# Patient Record
Sex: MALE | Race: OTHER | HISPANIC OR LATINO
[De-identification: names, ages, dates, MRNs, and addresses within clinical notes are randomized per-mention and may not be internally consistent; named-entity substitution may affect disease eponyms.]

---

## 2023-03-09 PROBLEM — Z00.00 ENCOUNTER FOR PREVENTIVE HEALTH EXAMINATION: Status: ACTIVE | Noted: 2023-03-09

## 2023-03-17 ENCOUNTER — APPOINTMENT (OUTPATIENT)
Dept: VASCULAR SURGERY | Facility: CLINIC | Age: 78
End: 2023-03-17
Payer: COMMERCIAL

## 2023-03-17 VITALS
SYSTOLIC BLOOD PRESSURE: 146 MMHG | DIASTOLIC BLOOD PRESSURE: 82 MMHG | WEIGHT: 189.38 LBS | HEART RATE: 69 BPM | HEIGHT: 65 IN | BODY MASS INDEX: 31.55 KG/M2

## 2023-03-17 VITALS — WEIGHT: 186 LBS

## 2023-03-17 DIAGNOSIS — Z78.9 OTHER SPECIFIED HEALTH STATUS: ICD-10-CM

## 2023-03-17 DIAGNOSIS — I87.2 VENOUS INSUFFICIENCY (CHRONIC) (PERIPHERAL): ICD-10-CM

## 2023-03-17 DIAGNOSIS — I87.8 OTHER SPECIFIED DISORDERS OF VEINS: ICD-10-CM

## 2023-03-17 DIAGNOSIS — Z87.891 PERSONAL HISTORY OF NICOTINE DEPENDENCE: ICD-10-CM

## 2023-03-17 DIAGNOSIS — M79.89 OTHER SPECIFIED SOFT TISSUE DISORDERS: ICD-10-CM

## 2023-03-17 PROCEDURE — 93970 EXTREMITY STUDY: CPT

## 2023-03-17 PROCEDURE — 99212 OFFICE O/P EST SF 10 MIN: CPT

## 2023-03-17 NOTE — PHYSICAL EXAM
[Normal Breath Sounds] : Normal breath sounds [Normal Heart Sounds] : normal heart sounds [2+] : left 2+ [Ankle Swelling (On Exam)] : present [Ankle Swelling Bilaterally] : bilaterally  [] : bilaterally [Ankle Swelling On The Left] : moderate [Alert] : alert [Oriented to Person] : oriented to person [Oriented to Place] : oriented to place [Oriented to Time] : oriented to time [Calm] : calm [Carotid Bruits] : no carotid bruits [Varicose Veins Of Lower Extremities] : not present [Abdomen Masses] : No abdominal masses [Abdomen Tenderness] : ~T ~M No abdominal tenderness [Abdominal Bruit] : no abdominal bruit

## 2023-03-17 NOTE — HISTORY OF PRESENT ILLNESS
[FreeTextEntry1] : The patient is a 77-year-old gentleman who presents with bilateral leg swelling and venous stasis skin changes.  The patient states his leg swelling has been present for the past 2 years.  His primary care physician believes it is a vein issue and referred him here for evaluation

## 2023-09-12 ENCOUNTER — APPOINTMENT (OUTPATIENT)
Dept: ORTHOPEDIC SURGERY | Facility: CLINIC | Age: 78
End: 2023-09-12
Payer: MEDICARE

## 2023-09-12 VITALS — HEIGHT: 67 IN | WEIGHT: 200 LBS | BODY MASS INDEX: 31.39 KG/M2

## 2023-09-12 PROCEDURE — 99203 OFFICE O/P NEW LOW 30 MIN: CPT

## 2023-09-12 PROCEDURE — 73630 X-RAY EXAM OF FOOT: CPT | Mod: LT

## 2023-09-12 PROCEDURE — 73610 X-RAY EXAM OF ANKLE: CPT | Mod: LT

## 2023-09-22 ENCOUNTER — APPOINTMENT (OUTPATIENT)
Dept: VASCULAR SURGERY | Facility: CLINIC | Age: 78
End: 2023-09-22

## 2023-10-05 ENCOUNTER — APPOINTMENT (OUTPATIENT)
Dept: ORTHOPEDIC SURGERY | Facility: CLINIC | Age: 78
End: 2023-10-05
Payer: MEDICARE

## 2023-10-05 DIAGNOSIS — M76.62 ACHILLES TENDINITIS, LEFT LEG: ICD-10-CM

## 2023-10-05 PROCEDURE — 99213 OFFICE O/P EST LOW 20 MIN: CPT

## 2023-12-02 ENCOUNTER — RX RENEWAL (OUTPATIENT)
Age: 78
End: 2023-12-02

## 2024-02-29 ENCOUNTER — RX RENEWAL (OUTPATIENT)
Age: 79
End: 2024-02-29

## 2024-05-22 ENCOUNTER — RX RENEWAL (OUTPATIENT)
Age: 79
End: 2024-05-22

## 2024-05-22 RX ORDER — MELOXICAM 15 MG/1
15 TABLET ORAL
Qty: 30 | Refills: 2 | Status: ACTIVE | COMMUNITY
Start: 2023-09-12 | End: 1900-01-01

## 2024-08-21 ENCOUNTER — EMERGENCY (EMERGENCY)
Facility: HOSPITAL | Age: 79
LOS: 0 days | Discharge: HOME | End: 2024-08-21
Attending: EMERGENCY MEDICINE
Payer: MEDICARE

## 2024-08-21 VITALS
SYSTOLIC BLOOD PRESSURE: 189 MMHG | WEIGHT: 186.07 LBS | HEART RATE: 85 BPM | RESPIRATION RATE: 18 BRPM | OXYGEN SATURATION: 97 % | HEIGHT: 66 IN | DIASTOLIC BLOOD PRESSURE: 91 MMHG | TEMPERATURE: 99 F

## 2024-08-21 DIAGNOSIS — R05.9 COUGH, UNSPECIFIED: ICD-10-CM

## 2024-08-21 DIAGNOSIS — J18.9 PNEUMONIA, UNSPECIFIED ORGANISM: ICD-10-CM

## 2024-08-21 DIAGNOSIS — E78.00 PURE HYPERCHOLESTEROLEMIA, UNSPECIFIED: ICD-10-CM

## 2024-08-21 DIAGNOSIS — R50.9 FEVER, UNSPECIFIED: ICD-10-CM

## 2024-08-21 DIAGNOSIS — E78.5 HYPERLIPIDEMIA, UNSPECIFIED: ICD-10-CM

## 2024-08-21 DIAGNOSIS — R09.89 OTHER SPECIFIED SYMPTOMS AND SIGNS INVOLVING THE CIRCULATORY AND RESPIRATORY SYSTEMS: ICD-10-CM

## 2024-08-21 DIAGNOSIS — U07.1 COVID-19: ICD-10-CM

## 2024-08-21 LAB
FLUAV AG NPH QL: SIGNIFICANT CHANGE UP
FLUBV AG NPH QL: SIGNIFICANT CHANGE UP
RSV RNA NPH QL NAA+NON-PROBE: SIGNIFICANT CHANGE UP
SARS-COV-2 RNA SPEC QL NAA+PROBE: DETECTED

## 2024-08-21 PROCEDURE — 99283 EMERGENCY DEPT VISIT LOW MDM: CPT | Mod: 25

## 2024-08-21 PROCEDURE — 71046 X-RAY EXAM CHEST 2 VIEWS: CPT

## 2024-08-21 PROCEDURE — 71046 X-RAY EXAM CHEST 2 VIEWS: CPT | Mod: 26

## 2024-08-21 PROCEDURE — 99284 EMERGENCY DEPT VISIT MOD MDM: CPT

## 2024-08-21 PROCEDURE — 0241U: CPT

## 2024-08-21 NOTE — ED PROVIDER NOTE - OBJECTIVE STATEMENT
Patient with history of high cholesterol presents with 2 days of runny nose dry cough, feeling feverish and malaise.  Denies sick contacts.  No relief with NyQuil.  Does admit to some dry mouth after taking NyQuil.  Denies chest pain or shortness of breath.

## 2024-08-21 NOTE — ED ADULT NURSE NOTE - ALCOHOL PRE SCREEN (AUDIT - C)
[Breast Self Exam] : breast self exam [Nutrition] : nutrition Statement Selected [Exercise] : exercise [Vitamins/Supplements] : vitamins/supplements [STD (testing, results, tx)] : STD (testing, results, tx)

## 2024-08-21 NOTE — ED PROVIDER NOTE - PATIENT PORTAL LINK FT
You can access the FollowMyHealth Patient Portal offered by [facility] by registering at the following website: [email or web address]. By joining [facility]’s FollowMyHealth portal, you will also be able to view your health information using other applications (apps) compatible with our system.

## 2024-08-21 NOTE — ED PROVIDER NOTE - CLINICAL SUMMARY MEDICAL DECISION MAKING FREE TEXT BOX
78-year-old male with past medical history of hyperlipidemia, presents with 2 days of congestion, cough, general fatigue and subjective fever.  No shortness of breath or chest pain.  No lower extremity swelling or GI symptoms.  On exam nontoxic, vital signs noted, normal work of breathing, lungs clear bilaterally, no wheezes, rales or rhonchi, heart regular no murmurs, abdomen benign, no peripheral edema.  Normal gait.  Chest x-ray with possible so left greater than right bibasilar opacities.  Clinically will treat for pneumonia.  In my opinion, based on current evaluation and results, an acute medical or surgical emergency does not appear to be occurring at this time and I feel that the pt is stable for further outpatient work up and/or treatment.  Return precautions discussed.

## 2024-08-21 NOTE — ED ADULT TRIAGE NOTE - CHIEF COMPLAINT QUOTE
Pt reports cold symptoms - congestion - starting today with dry mouth. Pt states he took nyquil with no relief

## 2024-08-21 NOTE — ED ADULT NURSE NOTE - CAS ELECT INFOMATION PROVIDED
44 yo m with h/o alcohol use , discarged yesterday after treatment for syncope and electrolyte disorder presents today in withdrawal has not had a drink in 4 days. per notes from past admission, ciwa protocol was initiated but score was too low to start ativan. pt is confused and cant provide reliable hx. initial ciwa 9 now improved to 5 after ativan and phenobarb. ct head and face unremarkable for fracture or bleed. labs unchanged from discharge however lipase was checked and is 690. us Abd feb 2 did not show pancreatitis. initial vitals 165/91, 147 bpm. 
DC instructions

## 2024-08-22 ENCOUNTER — RX RENEWAL (OUTPATIENT)
Age: 79
End: 2024-08-22

## 2024-09-03 ENCOUNTER — EMERGENCY (EMERGENCY)
Facility: HOSPITAL | Age: 79
LOS: 0 days | Discharge: HOME | End: 2024-09-03
Attending: STUDENT IN AN ORGANIZED HEALTH CARE EDUCATION/TRAINING PROGRAM
Payer: MEDICARE

## 2024-09-03 VITALS
HEART RATE: 72 BPM | SYSTOLIC BLOOD PRESSURE: 148 MMHG | OXYGEN SATURATION: 99 % | RESPIRATION RATE: 18 BRPM | DIASTOLIC BLOOD PRESSURE: 82 MMHG

## 2024-09-03 VITALS
HEIGHT: 67 IN | RESPIRATION RATE: 18 BRPM | WEIGHT: 199.96 LBS | OXYGEN SATURATION: 99 % | HEART RATE: 74 BPM | DIASTOLIC BLOOD PRESSURE: 90 MMHG | TEMPERATURE: 98 F | SYSTOLIC BLOOD PRESSURE: 160 MMHG

## 2024-09-03 DIAGNOSIS — E78.5 HYPERLIPIDEMIA, UNSPECIFIED: ICD-10-CM

## 2024-09-03 DIAGNOSIS — M79.662 PAIN IN LEFT LOWER LEG: ICD-10-CM

## 2024-09-03 LAB
ALBUMIN SERPL ELPH-MCNC: 4.4 G/DL — SIGNIFICANT CHANGE UP (ref 3.5–5.2)
ALP SERPL-CCNC: 86 U/L — SIGNIFICANT CHANGE UP (ref 30–115)
ALT FLD-CCNC: 42 U/L — HIGH (ref 0–41)
ANION GAP SERPL CALC-SCNC: 11 MMOL/L — SIGNIFICANT CHANGE UP (ref 7–14)
AST SERPL-CCNC: 47 U/L — HIGH (ref 0–41)
BASOPHILS # BLD AUTO: 0.08 K/UL — SIGNIFICANT CHANGE UP (ref 0–0.2)
BASOPHILS NFR BLD AUTO: 0.7 % — SIGNIFICANT CHANGE UP (ref 0–1)
BILIRUB SERPL-MCNC: 0.6 MG/DL — SIGNIFICANT CHANGE UP (ref 0.2–1.2)
BUN SERPL-MCNC: 11 MG/DL — SIGNIFICANT CHANGE UP (ref 10–20)
CALCIUM SERPL-MCNC: 9.8 MG/DL — SIGNIFICANT CHANGE UP (ref 8.4–10.4)
CHLORIDE SERPL-SCNC: 99 MMOL/L — SIGNIFICANT CHANGE UP (ref 98–110)
CO2 SERPL-SCNC: 26 MMOL/L — SIGNIFICANT CHANGE UP (ref 17–32)
CREAT SERPL-MCNC: 0.8 MG/DL — SIGNIFICANT CHANGE UP (ref 0.7–1.5)
EGFR: 89 ML/MIN/1.73M2 — SIGNIFICANT CHANGE UP
EOSINOPHIL # BLD AUTO: 0.15 K/UL — SIGNIFICANT CHANGE UP (ref 0–0.7)
EOSINOPHIL NFR BLD AUTO: 1.3 % — SIGNIFICANT CHANGE UP (ref 0–8)
GLUCOSE SERPL-MCNC: 117 MG/DL — HIGH (ref 70–99)
HCT VFR BLD CALC: 41.2 % — LOW (ref 42–52)
HGB BLD-MCNC: 13.8 G/DL — LOW (ref 14–18)
IMM GRANULOCYTES NFR BLD AUTO: 1.1 % — HIGH (ref 0.1–0.3)
LYMPHOCYTES # BLD AUTO: 1.71 K/UL — SIGNIFICANT CHANGE UP (ref 1.2–3.4)
LYMPHOCYTES # BLD AUTO: 14.7 % — LOW (ref 20.5–51.1)
MCHC RBC-ENTMCNC: 32.9 PG — HIGH (ref 27–31)
MCHC RBC-ENTMCNC: 33.5 G/DL — SIGNIFICANT CHANGE UP (ref 32–37)
MCV RBC AUTO: 98.1 FL — HIGH (ref 80–94)
MONOCYTES # BLD AUTO: 0.98 K/UL — HIGH (ref 0.1–0.6)
MONOCYTES NFR BLD AUTO: 8.4 % — SIGNIFICANT CHANGE UP (ref 1.7–9.3)
NEUTROPHILS # BLD AUTO: 8.61 K/UL — HIGH (ref 1.4–6.5)
NEUTROPHILS NFR BLD AUTO: 73.8 % — SIGNIFICANT CHANGE UP (ref 42.2–75.2)
NRBC # BLD: 0 /100 WBCS — SIGNIFICANT CHANGE UP (ref 0–0)
PLATELET # BLD AUTO: 247 K/UL — SIGNIFICANT CHANGE UP (ref 130–400)
PMV BLD: 10.3 FL — SIGNIFICANT CHANGE UP (ref 7.4–10.4)
POTASSIUM SERPL-MCNC: 5.1 MMOL/L — HIGH (ref 3.5–5)
POTASSIUM SERPL-SCNC: 5.1 MMOL/L — HIGH (ref 3.5–5)
PROT SERPL-MCNC: 7.5 G/DL — SIGNIFICANT CHANGE UP (ref 6–8)
RBC # BLD: 4.2 M/UL — LOW (ref 4.7–6.1)
RBC # FLD: 13.1 % — SIGNIFICANT CHANGE UP (ref 11.5–14.5)
SODIUM SERPL-SCNC: 136 MMOL/L — SIGNIFICANT CHANGE UP (ref 135–146)
WBC # BLD: 11.66 K/UL — HIGH (ref 4.8–10.8)
WBC # FLD AUTO: 11.66 K/UL — HIGH (ref 4.8–10.8)

## 2024-09-03 PROCEDURE — 85025 COMPLETE CBC W/AUTO DIFF WBC: CPT

## 2024-09-03 PROCEDURE — 36415 COLL VENOUS BLD VENIPUNCTURE: CPT

## 2024-09-03 PROCEDURE — 99284 EMERGENCY DEPT VISIT MOD MDM: CPT

## 2024-09-03 PROCEDURE — 73590 X-RAY EXAM OF LOWER LEG: CPT | Mod: 26,LT

## 2024-09-03 PROCEDURE — 99283 EMERGENCY DEPT VISIT LOW MDM: CPT | Mod: 25

## 2024-09-03 PROCEDURE — 73590 X-RAY EXAM OF LOWER LEG: CPT | Mod: LT

## 2024-09-03 PROCEDURE — 36000 PLACE NEEDLE IN VEIN: CPT

## 2024-09-03 PROCEDURE — 80053 COMPREHEN METABOLIC PANEL: CPT

## 2024-09-03 RX ORDER — CEPHALEXIN 500 MG
1 CAPSULE ORAL
Qty: 28 | Refills: 0
Start: 2024-09-03 | End: 2024-09-09

## 2024-09-03 RX ORDER — IBUPROFEN 600 MG
600 TABLET ORAL ONCE
Refills: 0 | Status: COMPLETED | OUTPATIENT
Start: 2024-09-03 | End: 2024-09-03

## 2024-09-03 NOTE — ED PROVIDER NOTE - PATIENT PORTAL LINK FT
You can access the FollowMyHealth Patient Portal offered by [facility] by registering at the following website: [email or web address]. By joining [employer]’s FollowMyHealth portal, you will also be able to view your health information using other applications (apps) compatible with our system.

## 2024-09-03 NOTE — ED ADULT NURSE NOTE - OBJECTIVE STATEMENT
79yr old male, presenting to ED c/o wound eval. As per pt, pt c/o L leg wound x2 years. Denies n/v/d/fevers/chills. Pt A&Ox4, ambulatory baseline.

## 2024-09-03 NOTE — ED PROVIDER NOTE - NSFOLLOWUPINSTRUCTIONS_ED_ALL_ED_FT
Follow up with Dr. Abreu this week for your wound follow up.    Wound Infection  A wound infection happens when germs start to grow in a wound. Infection can cause the wound to break open or get worse. Wound infections need treatment. If a wound infection is not treated, serious problems can happen. These problems could include getting an infection in your blood (septicemia) or bones (osteomyelitis).    What are the causes?  A wound infection is most often caused by bacteria growing in a wound. Other germs, like yeast and fungi, can also cause wound infections.    What increases the risk?  The following factors may make you more likely to develop a wound infection:  A weak body defense system (immune system).  Diabetes.  Taking steroid medicines for a long time (chronic use).  Smoking.  Being an older adult.  Obesity.  Taking chemotherapy medicines.  Poor nutrition.  What are the signs or symptoms?  Symptoms of a wound infection include:  More redness, swelling, or pain at the wound site.  More blood or fluid at the wound site.  A bad smell coming from a wound or bandage (dressing).  Fever.  Feeling tired (fatigued).  Warmth at or around the wound.  Pus at the wound site.  How is this diagnosed?  A wound infection is diagnosed based on your symptoms, medical history, and physical exam. You may also have a wound culture, blood tests, or both.    How is this treated?  This condition is usually treated with antibiotics and medicines that lower inflammation.    The infection should get better in 24–48 hours after starting antibiotics. After 24–48 hours, redness around the wound should stop spreading. The wound should also be less painful. If the condition is severe, you may need to stay at the hospital and get antibiotics through an IV.    Follow these instructions at home:  Medicines    Take or apply over-the-counter and prescription medicines only as told by your health care provider.  If you were prescribed antibiotics, take or apply them as told by your provider. Do not stop using the antibiotic even if you start to feel better.  Wound care    Two stitched incisions. One is normal. The other is red with pus and infected.  Clean the wound each day or as told by your provider.  Wash the wound with mild soap and water.  Rinse the wound with water to remove all soap.  Pat the wound dry with a clean towel. Do not rub it.  Follow instructions from your provider about how to take care of your wound. Make sure you:  Wash your hands with soap and water for at least 20 seconds before and after you change your dressing. If soap and water are not available, use hand .  Change your dressing as told by your provider.  Leave stitches (sutures), skin glue, or tape strips in place. These skin closures may need to stay in place for 2 weeks or longer. If tape strip edges start to loosen and curl up, you may trim the loose edges. Do not remove tape strips completely unless your provider tells you to do that.  Check your wound every day for signs of infection. Check for:  More redness, swelling, or pain.  More fluid or blood.  Warmth.  Pus or a bad smell.  General instructions    Drink enough fluid to keep your pee (urine) pale yellow.  Do not take baths, swim, or use a hot tub until your provider approves. Ask your provider if you may take showers. You may only be allowed to take sponge baths.  Raise (elevate) the wound area above the level of your heart while you are sitting or lying down.  Do not scratch or pick at the wound.  Keep all follow-up visits. These visits help your provider make sure a more serious infection is not developing.  Contact a health care provider if:  Your infection does not get better in 24–48 hours.  You have signs of infection.  You have a fever.  Your wound gets larger, turns dark in color, or becomes more painful.  You feel generally sick (malaise) with muscle aches and weakness.  Your symptoms get worse.  You have vomiting or diarrhea that does not stop.  Get help right away if:  Your wound that was closed breaks open.  You see red streaks coming from the infected area.  This information is not intended to replace advice given to you by your health care provider. Make sure you discuss any questions you have with your health care provider.

## 2024-09-03 NOTE — ED PROVIDER NOTE - PHYSICAL EXAMINATION
VITAL SIGNS: I have reviewed nursing notes and confirm.  CONSTITUTIONAL: well-appearing, non-toxic, NAD  SKIN: Warm dry, normal skin turgor. Aprox 3 cm wound to lateral aspect of lower leg. No erythema or purulent discharge.  HEAD: NCAT  CARD: RRR, no murmurs, rubs or gallops  RESP: clear to ausculation b/l.  No rales, rhonchi, or wheezing.  EXT: Full ROM. + L pedal pulse   NEURO: normal motor. normal sensory.   PSYCH: Cooperative, appropriate.

## 2024-09-03 NOTE — ED ADULT TRIAGE NOTE - CHIEF COMPLAINT QUOTE
Pt presents to the ED w/ c/o of left leg wound check. Pt states he has had a poorly healing wound above his left ankle for 2 years and recently it has been causing him more pain.

## 2024-09-03 NOTE — ED ADULT NURSE NOTE - NSFALLUNIVINTERV_ED_ALL_ED
Bed/Stretcher in lowest position, wheels locked, appropriate side rails in place/Call bell, personal items and telephone in reach/Instruct patient to call for assistance before getting out of bed/chair/stretcher/Non-slip footwear applied when patient is off stretcher/[place] to call system/Physically safe environment - no spills, clutter or unnecessary equipment/Purposeful proactive rounding/Room/bathroom lighting operational, light cord in reach

## 2024-09-03 NOTE — ED PROVIDER NOTE - OBJECTIVE STATEMENT
79-year-old male with past medical history of hyperlipidemia, presents to the ED due to left lower leg wound check.  Patient states wound has been there for about 2 years.  Has not seen a physician for this.  Denies any fever, nausea, vomiting, abdominal pain or other complaints.

## 2024-09-03 NOTE — ED PROVIDER NOTE - CLINICAL SUMMARY MEDICAL DECISION MAKING FREE TEXT BOX
78 yo male, PMHx of HLD, presenting for a non healing wound on left lower extremity, localized pain, non radiating, no alleviating or aggravating factors, no associated symptoms.  Denies fevers, chills, weakness, nausea, vomiting, inability to ambulate.  3 cm circular ulcer left lateral shin without erythematous streaking or discharge.  No calf swelling or tenderness. Otherwise FROM of left lower extremity.  Labs were ordered and reviewed.  Imaging was ordered and reviewed by me.  Appropriate medications for patient's presenting complaints were ordered and effects were reassessed. 78 yo male, PMHx of HLD, presenting for a non healing wound on left lower extremity, localized pain, non radiating, no alleviating or aggravating factors, no associated symptoms.  Denies fevers, chills, weakness, nausea, vomiting, inability to ambulate.  3 cm circular ulcer left lateral shin without erythematous streaking or discharge.  No calf swelling or tenderness. Otherwise FROM of left lower extremity.  Labs were ordered and reviewed.  Imaging was ordered and reviewed by me.  Appropriate medications for patient's presenting complaints were ordered and effects were reassessed. Antibiotic sent to pharmacy.  Discussed return precautions and follow up outpatient.  Patient comfortable with plan.

## 2024-10-16 ENCOUNTER — APPOINTMENT (OUTPATIENT)
Dept: GASTROENTEROLOGY | Facility: CLINIC | Age: 79
End: 2024-10-16

## 2024-10-21 ENCOUNTER — INPATIENT (INPATIENT)
Facility: HOSPITAL | Age: 79
LOS: 1 days | Discharge: LEFT BEFORE DISCHARGE | DRG: 872 | End: 2024-10-23
Attending: STUDENT IN AN ORGANIZED HEALTH CARE EDUCATION/TRAINING PROGRAM | Admitting: HOSPITALIST
Payer: MEDICARE

## 2024-10-21 VITALS
OXYGEN SATURATION: 98 % | SYSTOLIC BLOOD PRESSURE: 115 MMHG | HEART RATE: 109 BPM | TEMPERATURE: 98 F | DIASTOLIC BLOOD PRESSURE: 74 MMHG | RESPIRATION RATE: 18 BRPM | HEIGHT: 67 IN | WEIGHT: 199.96 LBS

## 2024-10-21 DIAGNOSIS — Z98.49 CATARACT EXTRACTION STATUS, UNSPECIFIED EYE: Chronic | ICD-10-CM

## 2024-10-21 DIAGNOSIS — A41.9 SEPSIS, UNSPECIFIED ORGANISM: ICD-10-CM

## 2024-10-21 LAB
ALBUMIN SERPL ELPH-MCNC: 4.2 G/DL — SIGNIFICANT CHANGE UP (ref 3.5–5.2)
ALP SERPL-CCNC: 55 U/L — SIGNIFICANT CHANGE UP (ref 30–115)
ALT FLD-CCNC: 14 U/L — SIGNIFICANT CHANGE UP (ref 0–41)
ANION GAP SERPL CALC-SCNC: 11 MMOL/L — SIGNIFICANT CHANGE UP (ref 7–14)
AST SERPL-CCNC: 19 U/L — SIGNIFICANT CHANGE UP (ref 0–41)
BASOPHILS # BLD AUTO: 0.02 K/UL — SIGNIFICANT CHANGE UP (ref 0–0.2)
BASOPHILS NFR BLD AUTO: 0.2 % — SIGNIFICANT CHANGE UP (ref 0–1)
BILIRUB SERPL-MCNC: 0.3 MG/DL — SIGNIFICANT CHANGE UP (ref 0.2–1.2)
BUN SERPL-MCNC: 34 MG/DL — HIGH (ref 10–20)
CALCIUM SERPL-MCNC: 9.4 MG/DL — SIGNIFICANT CHANGE UP (ref 8.4–10.5)
CHLORIDE SERPL-SCNC: 103 MMOL/L — SIGNIFICANT CHANGE UP (ref 98–110)
CO2 SERPL-SCNC: 23 MMOL/L — SIGNIFICANT CHANGE UP (ref 17–32)
CREAT SERPL-MCNC: 0.7 MG/DL — SIGNIFICANT CHANGE UP (ref 0.7–1.5)
CRP SERPL-MCNC: 12 MG/L — HIGH
EGFR: 93 ML/MIN/1.73M2 — SIGNIFICANT CHANGE UP
EOSINOPHIL # BLD AUTO: 0.02 K/UL — SIGNIFICANT CHANGE UP (ref 0–0.7)
EOSINOPHIL NFR BLD AUTO: 0.2 % — SIGNIFICANT CHANGE UP (ref 0–8)
ERYTHROCYTE [SEDIMENTATION RATE] IN BLOOD: 40 MM/HR — HIGH (ref 0–10)
GLUCOSE BLDC GLUCOMTR-MCNC: 117 MG/DL — HIGH (ref 70–99)
GLUCOSE BLDC GLUCOMTR-MCNC: 152 MG/DL — HIGH (ref 70–99)
GLUCOSE SERPL-MCNC: 110 MG/DL — HIGH (ref 70–99)
HCT VFR BLD CALC: 27.1 % — LOW (ref 42–52)
HGB BLD-MCNC: 9.1 G/DL — LOW (ref 14–18)
IMM GRANULOCYTES NFR BLD AUTO: 1.1 % — HIGH (ref 0.1–0.3)
LACTATE SERPL-SCNC: 2.2 MMOL/L — HIGH (ref 0.7–2)
LACTATE SERPL-SCNC: 3 MMOL/L — HIGH (ref 0.7–2)
LACTATE SERPL-SCNC: 3.8 MMOL/L — HIGH (ref 0.7–2)
LYMPHOCYTES # BLD AUTO: 1.99 K/UL — SIGNIFICANT CHANGE UP (ref 1.2–3.4)
LYMPHOCYTES # BLD AUTO: 16.2 % — LOW (ref 20.5–51.1)
MCHC RBC-ENTMCNC: 33.6 G/DL — SIGNIFICANT CHANGE UP (ref 32–37)
MCHC RBC-ENTMCNC: 34.3 PG — HIGH (ref 27–31)
MCV RBC AUTO: 102.3 FL — HIGH (ref 80–94)
MONOCYTES # BLD AUTO: 1.06 K/UL — HIGH (ref 0.1–0.6)
MONOCYTES NFR BLD AUTO: 8.6 % — SIGNIFICANT CHANGE UP (ref 1.7–9.3)
NEUTROPHILS # BLD AUTO: 9.08 K/UL — HIGH (ref 1.4–6.5)
NEUTROPHILS NFR BLD AUTO: 73.7 % — SIGNIFICANT CHANGE UP (ref 42.2–75.2)
NRBC # BLD: 0 /100 WBCS — SIGNIFICANT CHANGE UP (ref 0–0)
PLATELET # BLD AUTO: 287 K/UL — SIGNIFICANT CHANGE UP (ref 130–400)
PMV BLD: 9.6 FL — SIGNIFICANT CHANGE UP (ref 7.4–10.4)
POTASSIUM SERPL-MCNC: 4.4 MMOL/L — SIGNIFICANT CHANGE UP (ref 3.5–5)
POTASSIUM SERPL-SCNC: 4.4 MMOL/L — SIGNIFICANT CHANGE UP (ref 3.5–5)
PROT SERPL-MCNC: 7 G/DL — SIGNIFICANT CHANGE UP (ref 6–8)
RBC # BLD: 2.65 M/UL — LOW (ref 4.7–6.1)
RBC # FLD: 13.9 % — SIGNIFICANT CHANGE UP (ref 11.5–14.5)
SODIUM SERPL-SCNC: 137 MMOL/L — SIGNIFICANT CHANGE UP (ref 135–146)
WBC # BLD: 12.31 K/UL — HIGH (ref 4.8–10.8)
WBC # FLD AUTO: 12.31 K/UL — HIGH (ref 4.8–10.8)

## 2024-10-21 PROCEDURE — 85025 COMPLETE CBC W/AUTO DIFF WBC: CPT

## 2024-10-21 PROCEDURE — 83605 ASSAY OF LACTIC ACID: CPT

## 2024-10-21 PROCEDURE — 93970 EXTREMITY STUDY: CPT | Mod: 26

## 2024-10-21 PROCEDURE — 73630 X-RAY EXAM OF FOOT: CPT | Mod: 26,LT

## 2024-10-21 PROCEDURE — 83036 HEMOGLOBIN GLYCOSYLATED A1C: CPT

## 2024-10-21 PROCEDURE — 73590 X-RAY EXAM OF LOWER LEG: CPT | Mod: 26,LT

## 2024-10-21 PROCEDURE — 82728 ASSAY OF FERRITIN: CPT

## 2024-10-21 PROCEDURE — 83540 ASSAY OF IRON: CPT

## 2024-10-21 PROCEDURE — 82607 VITAMIN B-12: CPT

## 2024-10-21 PROCEDURE — 99222 1ST HOSP IP/OBS MODERATE 55: CPT

## 2024-10-21 PROCEDURE — 83550 IRON BINDING TEST: CPT

## 2024-10-21 PROCEDURE — 93970 EXTREMITY STUDY: CPT

## 2024-10-21 PROCEDURE — 86900 BLOOD TYPING SEROLOGIC ABO: CPT

## 2024-10-21 PROCEDURE — 83735 ASSAY OF MAGNESIUM: CPT

## 2024-10-21 PROCEDURE — 86901 BLOOD TYPING SEROLOGIC RH(D): CPT

## 2024-10-21 PROCEDURE — 80053 COMPREHEN METABOLIC PANEL: CPT

## 2024-10-21 PROCEDURE — 82962 GLUCOSE BLOOD TEST: CPT

## 2024-10-21 PROCEDURE — 99285 EMERGENCY DEPT VISIT HI MDM: CPT

## 2024-10-21 PROCEDURE — 86850 RBC ANTIBODY SCREEN: CPT

## 2024-10-21 PROCEDURE — 73610 X-RAY EXAM OF ANKLE: CPT | Mod: 26,LT

## 2024-10-21 PROCEDURE — 80202 ASSAY OF VANCOMYCIN: CPT

## 2024-10-21 PROCEDURE — 82746 ASSAY OF FOLIC ACID SERUM: CPT

## 2024-10-21 PROCEDURE — 36415 COLL VENOUS BLD VENIPUNCTURE: CPT

## 2024-10-21 RX ORDER — MAGNESIUM, ALUMINUM HYDROXIDE 200-200 MG
30 TABLET,CHEWABLE ORAL EVERY 4 HOURS
Refills: 0 | Status: DISCONTINUED | OUTPATIENT
Start: 2024-10-21 | End: 2024-10-23

## 2024-10-21 RX ORDER — INSULIN LISPRO 100/ML
VIAL (ML) SUBCUTANEOUS
Refills: 0 | Status: DISCONTINUED | OUTPATIENT
Start: 2024-10-21 | End: 2024-10-23

## 2024-10-21 RX ORDER — MELATONIN 5 MG
3 TABLET ORAL AT BEDTIME
Refills: 0 | Status: DISCONTINUED | OUTPATIENT
Start: 2024-10-21 | End: 2024-10-23

## 2024-10-21 RX ORDER — CHLORHEXIDINE GLUCONATE 40 MG/ML
1 SOLUTION TOPICAL
Refills: 0 | Status: DISCONTINUED | OUTPATIENT
Start: 2024-10-21 | End: 2024-10-23

## 2024-10-21 RX ORDER — ENOXAPARIN SODIUM 40MG/0.4ML
40 SYRINGE (ML) SUBCUTANEOUS EVERY 24 HOURS
Refills: 0 | Status: DISCONTINUED | OUTPATIENT
Start: 2024-10-21 | End: 2024-10-23

## 2024-10-21 RX ORDER — VANCOMYCIN HYDROCHLORIDE 50 MG/ML
750 KIT ORAL EVERY 12 HOURS
Refills: 0 | Status: DISCONTINUED | OUTPATIENT
Start: 2024-10-21 | End: 2024-10-22

## 2024-10-21 RX ORDER — ACETAMINOPHEN 500 MG
650 TABLET ORAL EVERY 6 HOURS
Refills: 0 | Status: DISCONTINUED | OUTPATIENT
Start: 2024-10-21 | End: 2024-10-23

## 2024-10-21 RX ORDER — MORPHINE SULFATE 30 MG/1
2 TABLET, EXTENDED RELEASE ORAL ONCE
Refills: 0 | Status: DISCONTINUED | OUTPATIENT
Start: 2024-10-21 | End: 2024-10-21

## 2024-10-21 RX ORDER — VANCOMYCIN HYDROCHLORIDE 50 MG/ML
1250 KIT ORAL ONCE
Refills: 0 | Status: COMPLETED | OUTPATIENT
Start: 2024-10-21 | End: 2024-10-21

## 2024-10-21 RX ORDER — AZTREONAM 75 MG/ML
2000 KIT INHALATION ONCE
Refills: 0 | Status: COMPLETED | OUTPATIENT
Start: 2024-10-21 | End: 2024-10-21

## 2024-10-21 RX ORDER — MORPHINE SULFATE 30 MG/1
4 TABLET, EXTENDED RELEASE ORAL ONCE
Refills: 0 | Status: DISCONTINUED | OUTPATIENT
Start: 2024-10-21 | End: 2024-10-21

## 2024-10-21 RX ORDER — ONDANSETRON HYDROCHLORIDE 2 MG/ML
4 INJECTION, SOLUTION INTRAMUSCULAR; INTRAVENOUS EVERY 8 HOURS
Refills: 0 | Status: DISCONTINUED | OUTPATIENT
Start: 2024-10-21 | End: 2024-10-23

## 2024-10-21 RX ORDER — METRONIDAZOLE 250 MG/1
500 TABLET ORAL ONCE
Refills: 0 | Status: COMPLETED | OUTPATIENT
Start: 2024-10-21 | End: 2024-10-21

## 2024-10-21 RX ORDER — GLUCAGON INJECTION, SOLUTION 1 MG/.2ML
1 INJECTION, SOLUTION SUBCUTANEOUS ONCE
Refills: 0 | Status: DISCONTINUED | OUTPATIENT
Start: 2024-10-21 | End: 2024-10-23

## 2024-10-21 RX ORDER — TAMSULOSIN HCL 0.4 MG
0.4 CAPSULE ORAL AT BEDTIME
Refills: 0 | Status: DISCONTINUED | OUTPATIENT
Start: 2024-10-21 | End: 2024-10-23

## 2024-10-21 RX ORDER — CEFEPIME 2 G/1
1000 INJECTION, POWDER, FOR SOLUTION INTRAVENOUS EVERY 8 HOURS
Refills: 0 | Status: DISCONTINUED | OUTPATIENT
Start: 2024-10-21 | End: 2024-10-22

## 2024-10-21 RX ADMIN — MORPHINE SULFATE 2 MILLIGRAM(S): 30 TABLET, EXTENDED RELEASE ORAL at 16:14

## 2024-10-21 RX ADMIN — VANCOMYCIN HYDROCHLORIDE 250 MILLIGRAM(S): KIT at 16:04

## 2024-10-21 RX ADMIN — MORPHINE SULFATE 2 MILLIGRAM(S): 30 TABLET, EXTENDED RELEASE ORAL at 14:05

## 2024-10-21 RX ADMIN — Medication 500 MILLILITER(S): at 14:32

## 2024-10-21 RX ADMIN — Medication 40 MILLIGRAM(S): at 18:12

## 2024-10-21 RX ADMIN — Medication 500 MILLILITER(S): at 15:25

## 2024-10-21 RX ADMIN — Medication 1: at 17:34

## 2024-10-21 RX ADMIN — CEFEPIME 100 MILLIGRAM(S): 2 INJECTION, POWDER, FOR SOLUTION INTRAVENOUS at 22:43

## 2024-10-21 RX ADMIN — MORPHINE SULFATE 4 MILLIGRAM(S): 30 TABLET, EXTENDED RELEASE ORAL at 14:54

## 2024-10-21 RX ADMIN — MORPHINE SULFATE 4 MILLIGRAM(S): 30 TABLET, EXTENDED RELEASE ORAL at 16:14

## 2024-10-21 RX ADMIN — Medication 20 MILLIGRAM(S): at 22:43

## 2024-10-21 RX ADMIN — Medication 0.4 MILLIGRAM(S): at 22:43

## 2024-10-21 RX ADMIN — Medication 100 MILLILITER(S): at 17:37

## 2024-10-21 RX ADMIN — METRONIDAZOLE 100 MILLIGRAM(S): 250 TABLET ORAL at 14:32

## 2024-10-21 RX ADMIN — AZTREONAM 100 MILLIGRAM(S): KIT at 15:12

## 2024-10-21 NOTE — PATIENT PROFILE ADULT - FALL HARM RISK - HARM RISK INTERVENTIONS

## 2024-10-21 NOTE — ED PROVIDER NOTE - CLINICAL SUMMARY MEDICAL DECISION MAKING FREE TEXT BOX
In my opinion, in patient treatment is medically justifiable and appropriate..  Patient has septic parameters and require IV antibiotics and IV fluid.

## 2024-10-21 NOTE — ED PROVIDER NOTE - OBJECTIVE STATEMENT
79-year-old male with history of DM and HLD presents for evaluation of left leg wound.  Patient has had this pain for 1 month and states that he has not taken any treatment for it.  Per chart review, patient was prescribed antibiotics last month for this infection, but does not endorse taking anything for the infection to his leg.  Patient denies any traumas, fevers, chills, sweats, chest pain, shortness of breath, abdominal, nausea, vomiting, diarrhea, or urinary symptoms.  Patient states that Tylenol which he is taking does not help this pain.

## 2024-10-21 NOTE — H&P ADULT - NSHPPHYSICALEXAM_GEN_ALL_CORE
Vital Signs (24 Hrs):  T(C): 36.7 (10-21-24 @ 09:59), Max: 36.7 (10-21-24 @ 09:59)  HR: 107 (10-21-24 @ 15:26) (107 - 109)  BP: 123/76 (10-21-24 @ 15:26) (115/74 - 123/76)  RR: 20 (10-21-24 @ 15:26) (18 - 20)  SpO2: 98% (10-21-24 @ 15:26) (98% - 98%)    PHYSICAL EXAM:  GENERAL: NAD, well-developed  SKIN: ulceration noted to anterior tib/fib approx 4x3 with some surrounding erythema.  No discharge.  + surrounding xerosis.  Another small ulceration is noted on latyeral aspect of distal tib/fib.    HEAD:  Atraumatic, Normocephalic  EYES: EOMI, PERRLA, conjunctiva and sclera clear  NECK: Supple, No JVD  CHEST/LUNG: Clear to auscultation bilaterally; No wheeze  HEART: Regular rate and rhythm; No murmurs, rubs, or gallops  ABDOMEN: Soft, Nontender, Nondistended; Bowel sounds present  EXTREMITIES:  No clubbing, cyanosis, 2+ LLE pitting edema  CNS: AAOx3

## 2024-10-21 NOTE — H&P ADULT - NS ATTEND AMEND GEN_ALL_CORE FT
Patient seen at bedside, time spent evaluating and treating the patient's acute illness as well as time spent reviewing labs, radiology, discussing with patient and/or patient's family and discussing the case with a multidisciplinary team.    Plan as above. I edited the note.

## 2024-10-21 NOTE — H&P ADULT - NSHPLABSRESULTS_GEN_ALL_CORE
9.1    12.31 )-----------( 287      ( 21 Oct 2024 12:50 )             27.1       10-21    137  |  103  |  34[H]  ----------------------------<  110[H]  4.4   |  23  |  0.7    Ca    9.4      21 Oct 2024 12:50    TPro  7.0  /  Alb  4.2  /  TBili  0.3  /  DBili  x   /  AST  19  /  ALT  14  /  AlkPhos  55  10-21          Urinalysis Basic - ( 21 Oct 2024 12:50 )    Color: x / Appearance: x / SG: x / pH: x  Gluc: 110 mg/dL / Ketone: x  / Bili: x / Urobili: x   Blood: x / Protein: x / Nitrite: x   Leuk Esterase: x / RBC: x / WBC x   Sq Epi: x / Non Sq Epi: x / Bacteria: x      Lactate Trend  10-21 @ 12:50 Lactate:2.2

## 2024-10-21 NOTE — ED PROVIDER NOTE - ATTENDING CONTRIBUTION TO CARE
Patient presents with increasing leak purulent and red wound on left shin.  Vital signs noted.  No tachycardia.  There is chronic venous stasis changes bilaterally.  There is a wound with active purulence overlying the tibia.  WBC is mildly elevated.  Lactate is elevated this patient has sepsis.  IV fluid, IV antibiotics ordered.  X-ray showed no fracture or definite signs of osteomyelitis.

## 2024-10-21 NOTE — H&P ADULT - ASSESSMENT
Patient is a 80 yo M with a PMHx of DM and HLD, p/w LLE wound x1 month, admitted to medicine service for further management.      #LLE Wound with Cellulitis, Severe Sepsis present on admission (HR, WBC, Lactic Acid)  - admit to medicine service  - Vanco/Cefepime   - check Venous Duplex  - FU X-ray done in ED  - ESR/CRP Done  - ID c/s  - FU BCx  - trend WBC count/fever curve  - LR at 100 for 12h, recheck lactic acid at 1900    #Anemia  - new onset compared to previous done 9/24  - check Fe stores, B12/Folate  - trend H/H    #DM2  - Hold PO meds  - FC AC TID w/ISS  - CHO Diet    #HLD  - c/w Statin  - DASH Diet    #BPH  - no s/s UR  - c/w Flomax    Diet: DASH/CHO  Activity: OOB  DVT PPX: Lovenox  Code status: Full  Dispo: Home  CHG 2% Wipes QD  Patient is a 78 yo M with a PMHx of DM and HLD, p/w LLE wound x1 month, admitted to medicine service for further management.      #LLE Wound with Cellulitis, Severe Sepsis present on admission (HR, WBC, Lactic Acid)  - admit to medicine service  - Vanco/Cefepime   - check Venous Duplex  - FU X-ray left leg   - ESR/CRP elevated  - ID c/s  - FU BCx  - trend WBC count/fever curve  - LR at 100 cc/hr  - trend lactate    #Anemia  - new onset compared to previous done 9/24  - check Fe stores, B12/Folate  - trend H/H    #DM2  - Hold PO meds  - FC AC TID w/ISS  - CHO Diet    #HLD  - c/w Statin  - DASH Diet    #BPH  - no s/s UR  - c/w Flomax    Diet: DASH/CHO  Activity: OOB  DVT PPX: Lovenox  Code status: Full  Dispo: Home  CHG 2% Wipes QD

## 2024-10-21 NOTE — ED PROVIDER NOTE - PHYSICAL EXAMINATION
CONSTITUTIONAL: well-appearing, well nourished, non-toxic, NAD  SKIN: distal anterior and lateral tibial superficial skin ulceration with purulent discharge, erythema and significant tenderness  HEAD: NCAT  EYES: EOMI, PERRLA, no scleral icterus  ENT: Moist mucous membranes, normal pharynx with no erythema or exudates  NECK: non tender. Full ROM. No cervical LAD  CARD: RRR  RESP: clear to ausculation b/l  ABD: soft, non-tender, No CVA tenderness  EXT: Full ROM,, +pedal edema, +   NEURO: normal motor. normal sensory. Normal gait.  PSYCH: Cooperative, appropriate.

## 2024-10-21 NOTE — H&P ADULT - HISTORY OF PRESENT ILLNESS
Patient is a 80 yo M poor historian with a PMHx of DM and HLD, p/w LLE wound x1 month. Patient denies any trauma. He reports feeling an itchy rash 1 month ago, which he scratched. He states that the wound started as a rash on the outer LLE and then it progressed across the shin. Patient c/o constant stabbing pain, 10/10 in severity. He reports going to his dermatologist who prescribed acetaminophen and tramadol, which the patient took w/ no relief. Patient reports worsening of pain when he sleeps, with mild relief when walking. He denies any radiation of pain, fever, chills, wound d/c.  Patient is a 80 yo M with a PMHx of DM and HLD, p/w LLE wound x1 month. Patient denies any trauma. He reports feeling an itchy rash 1 month ago, which he scratched. He states that the wound started as a rash on the outer LLE and then it progressed across the shin. Patient c/o constant stabbing pain, 10/10 in severity. He reports going to his dermatologist who prescribed acetaminophen and tramadol, which the patient took w/ no relief. Patient reports worsening of pain when he sleeps, with mild relief when walking. He denies any radiation of pain, fever, chills, wound d/c.

## 2024-10-22 ENCOUNTER — APPOINTMENT (OUTPATIENT)
Dept: ORTHOPEDIC SURGERY | Facility: CLINIC | Age: 79
End: 2024-10-22

## 2024-10-22 LAB
A1C WITH ESTIMATED AVERAGE GLUCOSE RESULT: 5.5 % — SIGNIFICANT CHANGE UP (ref 4–5.6)
ALBUMIN SERPL ELPH-MCNC: 3.8 G/DL — SIGNIFICANT CHANGE UP (ref 3.5–5.2)
ALP SERPL-CCNC: 50 U/L — SIGNIFICANT CHANGE UP (ref 30–115)
ALT FLD-CCNC: 12 U/L — SIGNIFICANT CHANGE UP (ref 0–41)
ANION GAP SERPL CALC-SCNC: 13 MMOL/L — SIGNIFICANT CHANGE UP (ref 7–14)
AST SERPL-CCNC: 17 U/L — SIGNIFICANT CHANGE UP (ref 0–41)
BASOPHILS # BLD AUTO: 0.04 K/UL — SIGNIFICANT CHANGE UP (ref 0–0.2)
BASOPHILS NFR BLD AUTO: 0.4 % — SIGNIFICANT CHANGE UP (ref 0–1)
BILIRUB SERPL-MCNC: 0.2 MG/DL — SIGNIFICANT CHANGE UP (ref 0.2–1.2)
BUN SERPL-MCNC: 23 MG/DL — HIGH (ref 10–20)
CALCIUM SERPL-MCNC: 9.1 MG/DL — SIGNIFICANT CHANGE UP (ref 8.4–10.5)
CHLORIDE SERPL-SCNC: 103 MMOL/L — SIGNIFICANT CHANGE UP (ref 98–110)
CO2 SERPL-SCNC: 24 MMOL/L — SIGNIFICANT CHANGE UP (ref 17–32)
CREAT SERPL-MCNC: 0.7 MG/DL — SIGNIFICANT CHANGE UP (ref 0.7–1.5)
EGFR: 93 ML/MIN/1.73M2 — SIGNIFICANT CHANGE UP
EOSINOPHIL # BLD AUTO: 0.04 K/UL — SIGNIFICANT CHANGE UP (ref 0–0.7)
EOSINOPHIL NFR BLD AUTO: 0.4 % — SIGNIFICANT CHANGE UP (ref 0–8)
ESTIMATED AVERAGE GLUCOSE: 111 MG/DL — SIGNIFICANT CHANGE UP (ref 68–114)
FERRITIN SERPL-MCNC: 384 NG/ML — SIGNIFICANT CHANGE UP (ref 30–400)
FOLATE SERPL-MCNC: 16 NG/ML — SIGNIFICANT CHANGE UP
GLUCOSE BLDC GLUCOMTR-MCNC: 117 MG/DL — HIGH (ref 70–99)
GLUCOSE BLDC GLUCOMTR-MCNC: 119 MG/DL — HIGH (ref 70–99)
GLUCOSE BLDC GLUCOMTR-MCNC: 133 MG/DL — HIGH (ref 70–99)
GLUCOSE BLDC GLUCOMTR-MCNC: 208 MG/DL — HIGH (ref 70–99)
GLUCOSE SERPL-MCNC: 128 MG/DL — HIGH (ref 70–99)
HCT VFR BLD CALC: 23.6 % — LOW (ref 42–52)
HGB BLD-MCNC: 7.9 G/DL — LOW (ref 14–18)
IMM GRANULOCYTES NFR BLD AUTO: 1.1 % — HIGH (ref 0.1–0.3)
IRON SATN MFR SERPL: 100 UG/DL — SIGNIFICANT CHANGE UP (ref 35–150)
IRON SATN MFR SERPL: 27 % — SIGNIFICANT CHANGE UP (ref 15–50)
LYMPHOCYTES # BLD AUTO: 18.6 % — LOW (ref 20.5–51.1)
LYMPHOCYTES # BLD AUTO: 2.11 K/UL — SIGNIFICANT CHANGE UP (ref 1.2–3.4)
MCHC RBC-ENTMCNC: 33.5 G/DL — SIGNIFICANT CHANGE UP (ref 32–37)
MCHC RBC-ENTMCNC: 34.5 PG — HIGH (ref 27–31)
MCV RBC AUTO: 103.1 FL — HIGH (ref 80–94)
MONOCYTES # BLD AUTO: 1.25 K/UL — HIGH (ref 0.1–0.6)
MONOCYTES NFR BLD AUTO: 11 % — HIGH (ref 1.7–9.3)
NEUTROPHILS # BLD AUTO: 7.8 K/UL — HIGH (ref 1.4–6.5)
NEUTROPHILS NFR BLD AUTO: 68.5 % — SIGNIFICANT CHANGE UP (ref 42.2–75.2)
NRBC # BLD: 0 /100 WBCS — SIGNIFICANT CHANGE UP (ref 0–0)
PLATELET # BLD AUTO: 245 K/UL — SIGNIFICANT CHANGE UP (ref 130–400)
PMV BLD: 9.7 FL — SIGNIFICANT CHANGE UP (ref 7.4–10.4)
POTASSIUM SERPL-MCNC: 4 MMOL/L — SIGNIFICANT CHANGE UP (ref 3.5–5)
POTASSIUM SERPL-SCNC: 4 MMOL/L — SIGNIFICANT CHANGE UP (ref 3.5–5)
PROT SERPL-MCNC: 6.3 G/DL — SIGNIFICANT CHANGE UP (ref 6–8)
RBC # BLD: 2.29 M/UL — LOW (ref 4.7–6.1)
RBC # FLD: 14.4 % — SIGNIFICANT CHANGE UP (ref 11.5–14.5)
SODIUM SERPL-SCNC: 140 MMOL/L — SIGNIFICANT CHANGE UP (ref 135–146)
TIBC SERPL-MCNC: 370 UG/DL — SIGNIFICANT CHANGE UP (ref 220–430)
UIBC SERPL-MCNC: 270 UG/DL — SIGNIFICANT CHANGE UP (ref 110–370)
VIT B12 SERPL-MCNC: 246 PG/ML — SIGNIFICANT CHANGE UP (ref 232–1245)
WBC # BLD: 11.36 K/UL — HIGH (ref 4.8–10.8)
WBC # FLD AUTO: 11.36 K/UL — HIGH (ref 4.8–10.8)

## 2024-10-22 PROCEDURE — 99232 SBSQ HOSP IP/OBS MODERATE 35: CPT

## 2024-10-22 RX ORDER — MORPHINE SULFATE 30 MG/1
2 TABLET, EXTENDED RELEASE ORAL EVERY 4 HOURS
Refills: 0 | Status: DISCONTINUED | OUTPATIENT
Start: 2024-10-22 | End: 2024-10-22

## 2024-10-22 RX ORDER — AMPICILLIN AND SULBACTAM 2; 1 G/1; G/1
3 INJECTION, POWDER, FOR SOLUTION INTRAMUSCULAR; INTRAVENOUS EVERY 6 HOURS
Refills: 0 | Status: DISCONTINUED | OUTPATIENT
Start: 2024-10-22 | End: 2024-10-23

## 2024-10-22 RX ORDER — MORPHINE SULFATE 30 MG/1
4 TABLET, EXTENDED RELEASE ORAL EVERY 6 HOURS
Refills: 0 | Status: DISCONTINUED | OUTPATIENT
Start: 2024-10-22 | End: 2024-10-22

## 2024-10-22 RX ORDER — VANCOMYCIN HYDROCHLORIDE 50 MG/ML
750 KIT ORAL EVERY 12 HOURS
Refills: 0 | Status: DISCONTINUED | OUTPATIENT
Start: 2024-10-22 | End: 2024-10-23

## 2024-10-22 RX ADMIN — AMPICILLIN AND SULBACTAM 200 GRAM(S): 2; 1 INJECTION, POWDER, FOR SOLUTION INTRAMUSCULAR; INTRAVENOUS at 16:38

## 2024-10-22 RX ADMIN — CHLORHEXIDINE GLUCONATE 1 APPLICATION(S): 40 SOLUTION TOPICAL at 05:42

## 2024-10-22 RX ADMIN — Medication 20 MILLIGRAM(S): at 21:17

## 2024-10-22 RX ADMIN — Medication 0.4 MILLIGRAM(S): at 21:18

## 2024-10-22 RX ADMIN — VANCOMYCIN HYDROCHLORIDE 250 MILLIGRAM(S): KIT at 05:41

## 2024-10-22 RX ADMIN — Medication 100 MILLILITER(S): at 21:26

## 2024-10-22 RX ADMIN — Medication 40 MILLIGRAM(S): at 16:37

## 2024-10-22 RX ADMIN — AMPICILLIN AND SULBACTAM 200 GRAM(S): 2; 1 INJECTION, POWDER, FOR SOLUTION INTRAMUSCULAR; INTRAVENOUS at 23:21

## 2024-10-22 RX ADMIN — MORPHINE SULFATE 2 MILLIGRAM(S): 30 TABLET, EXTENDED RELEASE ORAL at 21:33

## 2024-10-22 RX ADMIN — Medication 100 MILLILITER(S): at 05:41

## 2024-10-22 RX ADMIN — Medication 2: at 08:48

## 2024-10-22 RX ADMIN — CEFEPIME 100 MILLIGRAM(S): 2 INJECTION, POWDER, FOR SOLUTION INTRAVENOUS at 05:41

## 2024-10-22 RX ADMIN — VANCOMYCIN HYDROCHLORIDE 250 MILLIGRAM(S): KIT at 16:36

## 2024-10-22 NOTE — PROGRESS NOTE ADULT - CONVERSATION DETAILS
Discussed treatment plan with patient. Discussed code status. Patient states he would like to be full code, and continue full medical management.

## 2024-10-22 NOTE — CONSULT NOTE ADULT - SUBJECTIVE AND OBJECTIVE BOX
INFECTIOUS DISEASE CONSULT NOTE    Patient is a 79y old  Male who presents with a chief complaint of Sepsis (21 Oct 2024 15:30)    HPI:  Patient is a 80 yo M with a PMHx of DM and HLD, p/w LLE wound x1 month. Patient denies any trauma. He reports feeling an itchy rash 1 month ago, which he scratched. He states that the wound started as a rash on the outer LLE and then it progressed across the shin. Patient c/o constant stabbing pain, 10/10 in severity. He reports going to his dermatologist who prescribed acetaminophen and tramadol, which the patient took w/ no relief. Patient reports worsening of pain when he sleeps, with mild relief when walking. He denies any radiation of pain, fever, chills, wound d/c.  (21 Oct 2024 15:30)         Prior hospital charts reviewed [Yes]  Primary team notes reviewed [Yes]  Other consultant notes reviewed [Yes]    REVIEW OF SYSTEMS:      PAST MEDICAL & SURGICAL HISTORY:  Diabetes      HLD (hyperlipidemia)      BPH without urinary obstruction      S/P cataract surgery          SOCIAL HISTORY:  - Born in _____, migrated to  in 19XX  - Currently working as / Retired  - Lives with _____; no pets  - No recent travel  - Denies tobacco use  - Denies alcohol use  - Denies illicit drug use  - Currently sexually active, has one male/female sexual partner    FAMILY HISTORY:      Allergies:  No Known Allergies      ANTIMICROBIALS:  cefepime   IVPB 1000 every 8 hours  vancomycin  IVPB 750 every 12 hours      ANTIMICROBIALS (past 90 days):  MEDICATIONS  (STANDING):  aztreonam  IVPB   100 mL/Hr IV Intermittent (10-21-24 @ 15:12)    cefepime   IVPB   100 mL/Hr IV Intermittent (10-22-24 @ 05:41)   100 mL/Hr IV Intermittent (10-21-24 @ 22:43)    metroNIDAZOLE  IVPB   100 mL/Hr IV Intermittent (10-21-24 @ 14:32)    vancomycin  IVPB   250 mL/Hr IV Intermittent (10-22-24 @ 05:41)    vancomycin  IVPB.   250 mL/Hr IV Intermittent (10-21-24 @ 16:04)        OTHER MEDS:   MEDICATIONS  (STANDING):  acetaminophen     Tablet .. 650 every 6 hours PRN  aluminum hydroxide/magnesium hydroxide/simethicone Suspension 30 every 4 hours PRN  atorvastatin 20 at bedtime  dextrose 50% Injectable 12.5 once  dextrose 50% Injectable 25 once  dextrose 50% Injectable 25 once  dextrose Oral Gel 15 once PRN  enoxaparin Injectable 40 every 24 hours  glucagon  Injectable 1 once  insulin lispro (ADMELOG) corrective regimen sliding scale  three times a day before meals  melatonin 3 at bedtime PRN  ondansetron Injectable 4 every 8 hours PRN  tamsulosin 0.4 at bedtime      VITALS:  Vital Signs Last 24 Hrs  T(F): 98.5 (10-22-24 @ 04:48), Max: 99.4 (10-21-24 @ 16:27)    Vital Signs Last 24 Hrs  HR: 91 (10-22-24 @ 04:48) (91 - 109)  BP: 111/63 (10-22-24 @ 04:48) (111/63 - 136/74)  RR: 18 (10-22-24 @ 04:48)  SpO2: 98% (10-21-24 @ 21:34) (98% - 99%)  Wt(kg): --    EXAM:    Labs:                        7.9    11.36 )-----------( 245      ( 22 Oct 2024 06:02 )             23.6     10-22    140  |  103  |  23[H]  ----------------------------<  128[H]  4.0   |  24  |  0.7    Ca    9.1      22 Oct 2024 06:02    TPro  6.3  /  Alb  3.8  /  TBili  0.2  /  DBili  x   /  AST  17  /  ALT  12  /  AlkPhos  50  10-22      WBC Trend:  WBC Count: 11.36 (10-22-24 @ 06:02)  WBC Count: 12.31 (10-21-24 @ 12:50)      Auto Neutrophil #: 7.80 K/uL (10-22-24 @ 06:02)  Auto Neutrophil #: 9.08 K/uL (10-21-24 @ 12:50)  Auto Neutrophil #: 8.61 K/uL (09-03-24 @ 13:31)      Creatine Trend:  Creatinine: 0.7 (10-22)  Creatinine: 0.7 (10-21)      Liver Biochemical Testing Trend:  Alanine Aminotransferase (ALT/SGPT): 12 (10-22)  Alanine Aminotransferase (ALT/SGPT): 14 (10-21)  Alanine Aminotransferase (ALT/SGPT): 42 *H* (09-03)  Aspartate Aminotransferase (AST/SGOT): 17 (10-22-24 @ 06:02)  Aspartate Aminotransferase (AST/SGOT): 19 (10-21-24 @ 12:50)  Aspartate Aminotransferase (AST/SGOT): 47 (09-03-24 @ 13:31)  Bilirubin Total: 0.2 (10-22)  Bilirubin Total: 0.3 (10-21)  Bilirubin Total: 0.6 (09-03)      Trend LDH      Auto Eosinophil %: 0.4 % (10-22-24 @ 06:02)  Auto Eosinophil %: 0.2 % (10-21-24 @ 12:50)      Urinalysis Basic - ( 22 Oct 2024 06:02 )    Color: x / Appearance: x / SG: x / pH: x  Gluc: 128 mg/dL / Ketone: x  / Bili: x / Urobili: x   Blood: x / Protein: x / Nitrite: x   Leuk Esterase: x / RBC: x / WBC x   Sq Epi: x / Non Sq Epi: x / Bacteria: x          MICROBIOLOGY:    C-Reactive Protein: 12.0 (10-21)    Lactate, Blood: 3.8 (10-21 @ 19:06)  Lactate, Blood: 3.0 (10-21 @ 15:16)  Lactate, Blood: 2.2 (10-21 @ 12:50)      Sedimentation Rate, Erythrocyte: 40 mm/Hr (10-21-24 @ 12:50)      RADIOLOGY:  imaging below personally reviewed     INFECTIOUS DISEASE CONSULT NOTE    Patient is a 79y old  Male who presents with a chief complaint of Sepsis (21 Oct 2024 15:30)    HPI:  Patient is a 80 yo M with a PMHx of DM and HLD, p/w LLE wound x1 month. Patient denies any trauma. He reports feeling an itchy rash 1 month ago, which he scratched. He states that the wound started as a rash on the outer LLE and then it progressed across the shin. Patient c/o constant stabbing pain, 10/10 in severity. He reports going to his dermatologist who prescribed acetaminophen and tramadol, which the patient took w/ no relief. Patient reports worsening of pain when he sleeps, with mild relief when walking. He denies any radiation of pain, fever, chills, wound d/c.  (21 Oct 2024 15:30)     Prior hospital charts reviewed [Yes]  Primary team notes reviewed [Yes]  Other consultant notes reviewed [Yes]    REVIEW OF SYSTEMS:  CONSTITUTIONAL: No fever or chills  HEAD: No lesion on scalp  EYES: No visual disturbance  ENT: No sore throat  RESPIRATORY: No cough, no shortness of breath  CARDIOVASCULAR: No chest pain or palpitations; LLE swelling  GASTROINTESTINAL: No abdominal or epigastric pain  GENITOURINARY: No dysuria  NEUROLOGICAL: No headache/dizziness  MUSCULOSKELETAL: No joint pain, erythema, or swelling; no back pain  SKIN: LLE swelling and wound  All other ROS negative except noted above      PAST MEDICAL & SURGICAL HISTORY:  Diabetes      HLD (hyperlipidemia)      BPH without urinary obstruction      S/P cataract surgery          SOCIAL HISTORY:  - No recent travel  - Denies tobacco use  - Denies alcohol use  - Denies illicit drug use    FAMILY HISTORY:  No family history of DM    Allergies:  No Known Allergies      ANTIMICROBIALS:  cefepime   IVPB 1000 every 8 hours  vancomycin  IVPB 750 every 12 hours      ANTIMICROBIALS (past 90 days):  MEDICATIONS  (STANDING):  aztreonam  IVPB   100 mL/Hr IV Intermittent (10-21-24 @ 15:12)    cefepime   IVPB   100 mL/Hr IV Intermittent (10-22-24 @ 05:41)   100 mL/Hr IV Intermittent (10-21-24 @ 22:43)    metroNIDAZOLE  IVPB   100 mL/Hr IV Intermittent (10-21-24 @ 14:32)    vancomycin  IVPB   250 mL/Hr IV Intermittent (10-22-24 @ 05:41)    vancomycin  IVPB.   250 mL/Hr IV Intermittent (10-21-24 @ 16:04)        OTHER MEDS:   MEDICATIONS  (STANDING):  acetaminophen     Tablet .. 650 every 6 hours PRN  aluminum hydroxide/magnesium hydroxide/simethicone Suspension 30 every 4 hours PRN  atorvastatin 20 at bedtime  dextrose 50% Injectable 12.5 once  dextrose 50% Injectable 25 once  dextrose 50% Injectable 25 once  dextrose Oral Gel 15 once PRN  enoxaparin Injectable 40 every 24 hours  glucagon  Injectable 1 once  insulin lispro (ADMELOG) corrective regimen sliding scale  three times a day before meals  melatonin 3 at bedtime PRN  ondansetron Injectable 4 every 8 hours PRN  tamsulosin 0.4 at bedtime      VITALS:  Vital Signs Last 24 Hrs  T(F): 98.5 (10-22-24 @ 04:48), Max: 99.4 (10-21-24 @ 16:27)    Vital Signs Last 24 Hrs  HR: 91 (10-22-24 @ 04:48) (91 - 109)  BP: 111/63 (10-22-24 @ 04:48) (111/63 - 136/74)  RR: 18 (10-22-24 @ 04:48)  SpO2: 98% (10-21-24 @ 21:34) (98% - 99%)  Wt(kg): --    EXAM:  GENERAL: NAD, lying in bed  HEAD: No head lesions  EYES: Conjunctiva pink and cornea white  EAR, NOSE, MOUTH, THROAT: Normal external ears and nose, no discharges; moist mucous membranes  NECK: Supple, nontender to palpation; no JVD  RESPIRATORY: Clear to auscultation bilaterally  CARDIOVASCULAR: S1 S2  GASTROINTESTINAL: Soft, nontender, nondistended; normoactive bowel sounds  GENITOURINARY: No mejia catheter, no CVA tenderness  EXTREMITIES: No clubbing, cyanosis, LLE swelling  NERVOUS SYSTEM: Alert and oriented to person, time, place and situation, speech clear. No focal deficits   MUSCULOSKELETAL: No joint erythema, swelling or pain  SKIN: Left lower shin wounds with erythema, no purulence, no superficial thrombophlebitis  PSYCH: Normal affect      Labs:                        7.9    11.36 )-----------( 245      ( 22 Oct 2024 06:02 )             23.6     10-22    140  |  103  |  23[H]  ----------------------------<  128[H]  4.0   |  24  |  0.7    Ca    9.1      22 Oct 2024 06:02    TPro  6.3  /  Alb  3.8  /  TBili  0.2  /  DBili  x   /  AST  17  /  ALT  12  /  AlkPhos  50  10-22      WBC Trend:  WBC Count: 11.36 (10-22-24 @ 06:02)  WBC Count: 12.31 (10-21-24 @ 12:50)      Auto Neutrophil #: 7.80 K/uL (10-22-24 @ 06:02)  Auto Neutrophil #: 9.08 K/uL (10-21-24 @ 12:50)  Auto Neutrophil #: 8.61 K/uL (09-03-24 @ 13:31)      Creatine Trend:  Creatinine: 0.7 (10-22)  Creatinine: 0.7 (10-21)      Liver Biochemical Testing Trend:  Alanine Aminotransferase (ALT/SGPT): 12 (10-22)  Alanine Aminotransferase (ALT/SGPT): 14 (10-21)  Alanine Aminotransferase (ALT/SGPT): 42 *H* (09-03)  Aspartate Aminotransferase (AST/SGOT): 17 (10-22-24 @ 06:02)  Aspartate Aminotransferase (AST/SGOT): 19 (10-21-24 @ 12:50)  Aspartate Aminotransferase (AST/SGOT): 47 (09-03-24 @ 13:31)  Bilirubin Total: 0.2 (10-22)  Bilirubin Total: 0.3 (10-21)  Bilirubin Total: 0.6 (09-03)      Trend LDH      Auto Eosinophil %: 0.4 % (10-22-24 @ 06:02)  Auto Eosinophil %: 0.2 % (10-21-24 @ 12:50)      Urinalysis Basic - ( 22 Oct 2024 06:02 )    Color: x / Appearance: x / SG: x / pH: x  Gluc: 128 mg/dL / Ketone: x  / Bili: x / Urobili: x   Blood: x / Protein: x / Nitrite: x   Leuk Esterase: x / RBC: x / WBC x   Sq Epi: x / Non Sq Epi: x / Bacteria: x      MICROBIOLOGY:    C-Reactive Protein: 12.0 (10-21)    Lactate, Blood: 3.8 (10-21 @ 19:06)  Lactate, Blood: 3.0 (10-21 @ 15:16)  Lactate, Blood: 2.2 (10-21 @ 12:50)      Sedimentation Rate, Erythrocyte: 40 mm/Hr (10-21-24 @ 12:50)      RADIOLOGY:  imaging below personally reviewed      < from: VA Duplex Lower Ext Vein Scan, Bilat (10.21.24 @ 17:29) >  FINDINGS:    RIGHT:  Normal compressibility of the RIGHT common femoral, femoral and popliteal   veins.  Doppler examination shows normal spontaneous and phasic flow.  No RIGHT calf vein thrombosis is detected.    LEFT:  Normal compressibility of the LEFT common femoral, femoral and popliteal   veins.  Doppler examination shows normal spontaneous and phasic flow.  No LEFT calf vein thrombosis is detected.    IMPRESSION:  No evidence of deep venous thrombosis in either lower extremity.    < end of copied text >      < from: Xray Ankle Complete 3 Views, Left (10.21.24 @ 12:05) >  FINDINGS/  IMPRESSION:    Focal 2.7 cm soft tissue irregularity/defect along the lateral aspect of   the left mid to distal fibula, without underlying fracture or   dislocation. No definite radiographic evidence of acute osteomyelitis.   Ankle mortise ispreserved. Posterior and plantar calcaneal spurs.   Multiple vascular calcifications are noted.    < end of copied text >   16-Jun-2018 10:54

## 2024-10-22 NOTE — PHARMACOTHERAPY INTERVENTION NOTE - COMMENTS
Appropriate to continue dosing of vancomycin 750mg IV q12h as it predicts a therapeutic AUC of 426 (within goal of 400 to 600). Ordered level before 4th dose on 10/23 with AM labs.

## 2024-10-22 NOTE — PROGRESS NOTE ADULT - SUBJECTIVE AND OBJECTIVE BOX
LEENA BAILEY 79y Male  MRN#: [id number]     SUBJECTIVE  Patient is a 79y old Male who presents with a chief complaint of Sepsis (22 Oct 2024 09:50)    Interval/Overnight Events:    Today is hospital day 1d, and this morning he is lying in bed without distress.   No acute overnight events.     OBJECTIVE  PAST MEDICAL & SURGICAL HISTORY  Diabetes    HLD (hyperlipidemia)    BPH without urinary obstruction    S/P cataract surgery      ALLERGIES:  No Known Allergies    MEDICATIONS:  STANDING MEDICATIONS  ampicillin/sulbactam  IVPB 3 Gram(s) IV Intermittent every 6 hours  atorvastatin 20 milliGRAM(s) Oral at bedtime  chlorhexidine 2% Cloths 1 Application(s) Topical <User Schedule>  dextrose 5%. 1000 milliLiter(s) IV Continuous <Continuous>  dextrose 5%. 1000 milliLiter(s) IV Continuous <Continuous>  dextrose 50% Injectable 12.5 Gram(s) IV Push once  dextrose 50% Injectable 25 Gram(s) IV Push once  dextrose 50% Injectable 25 Gram(s) IV Push once  enoxaparin Injectable 40 milliGRAM(s) SubCutaneous every 24 hours  glucagon  Injectable 1 milliGRAM(s) IntraMuscular once  insulin lispro (ADMELOG) corrective regimen sliding scale   SubCutaneous three times a day before meals  lactated ringers. 1000 milliLiter(s) IV Continuous <Continuous>  tamsulosin 0.4 milliGRAM(s) Oral at bedtime  vancomycin  IVPB 750 milliGRAM(s) IV Intermittent every 12 hours    PRN MEDICATIONS  acetaminophen     Tablet .. 650 milliGRAM(s) Oral every 6 hours PRN  aluminum hydroxide/magnesium hydroxide/simethicone Suspension 30 milliLiter(s) Oral every 4 hours PRN  dextrose Oral Gel 15 Gram(s) Oral once PRN  melatonin 3 milliGRAM(s) Oral at bedtime PRN  ondansetron Injectable 4 milliGRAM(s) IV Push every 8 hours PRN    HOME MEDICATIONS  Home Medications:  atorvastatin 20 mg oral tablet: 1 tab(s) orally once a day (at bedtime) (21 Oct 2024 15:55)  Januvia 100 mg oral tablet: 1 tab(s) orally (21 Oct 2024 14:40)  tamsulosin 0.4 mg oral capsule: 1 cap(s) orally once a day (at bedtime) (21 Oct 2024 15:55)      LABS:                        7.9    11.36 )-----------( 245      ( 22 Oct 2024 06:02 )             23.6     10-22    140  |  103  |  23[H]  ----------------------------<  128[H]  4.0   |  24  |  0.7    Ca    9.1      22 Oct 2024 06:02    TPro  6.3  /  Alb  3.8  /  TBili  0.2  /  DBili  x   /  AST  17  /  ALT  12  /  AlkPhos  50  10-22    LIVER FUNCTIONS - ( 22 Oct 2024 06:02 )  Alb: 3.8 g/dL / Pro: 6.3 g/dL / ALK PHOS: 50 U/L / ALT: 12 U/L / AST: 17 U/L / GGT: x             Urinalysis Basic - ( 22 Oct 2024 06:02 )    Color: x / Appearance: x / SG: x / pH: x  Gluc: 128 mg/dL / Ketone: x  / Bili: x / Urobili: x   Blood: x / Protein: x / Nitrite: x   Leuk Esterase: x / RBC: x / WBC x   Sq Epi: x / Non Sq Epi: x / Bacteria: x        Lactate, Blood: 3.8 mmol/L *H* (10-21-24 @ 19:06)  Lactate, Blood: 3.0 mmol/L *H* (10-21-24 @ 15:16)          CAPILLARY BLOOD GLUCOSE      POCT Blood Glucose.: 133 mg/dL (22 Oct 2024 11:39)      PHYSICAL EXAM:  T(C): 36.9 (10-22-24 @ 04:48), Max: 37.4 (10-21-24 @ 16:27)  HR: 91 (10-22-24 @ 04:48) (91 - 107)  BP: 111/63 (10-22-24 @ 04:48) (111/63 - 136/74)  RR: 18 (10-22-24 @ 04:48) (18 - 20)  SpO2: 98% (10-21-24 @ 21:34) (98% - 99%)    GENERAL: NAD, well-developed, 79y  RESPIRATORY: Clear to auscultation bilaterally  CARDIOVASCULAR: Regular rate and rhythm  GASTROINTESTINAL: Abdomen Soft, Nontender, Nondistended, +BS  PSYCH: AAOx3, affect appropriate  NEUROLOGY: non-focal  SKIN: ulceration noted to anterior tib/fib approx 4x3 with some surrounding erythema.  No discharge.  + surrounding xerosis.    ADMISSION SUMMARY  Patient is a 79y old Male who presents with a chief complaint of Sepsis (22 Oct 2024 09:50)

## 2024-10-22 NOTE — CONSULT NOTE ADULT - ASSESSMENT
78 yo M with a PMHx of DM and HLD, p/w LLE wound x1 month. Patient denies any trauma. He reports feeling an itchy rash 1 month ago, which he scratched.     ID is consulted for LLE cellulitis  Low grade fever 99.4, WBC 12.31 > 11.36  On room air  BCx NGTD  No prior culture in [facility]    Venous duplex bilateral LE no DVT    Xray left leg  Focal 2.7 cm soft tissue irregularity/defect along the lateral aspect of   the left mid to distal fibula, without underlying fracture or   dislocation. No definite radiographic evidence of acute osteomyelitis.   Ankle mortise ispreserved. Posterior and plantar calcaneal spurs.   Multiple vascular calcifications are noted.    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  Attending Physician  Division of Infectious Diseases  [facility]  Please contact me via [employer] Teams

## 2024-10-23 ENCOUNTER — TRANSCRIPTION ENCOUNTER (OUTPATIENT)
Age: 79
End: 2024-10-23

## 2024-10-23 VITALS
HEART RATE: 98 BPM | TEMPERATURE: 98 F | DIASTOLIC BLOOD PRESSURE: 67 MMHG | SYSTOLIC BLOOD PRESSURE: 124 MMHG | OXYGEN SATURATION: 99 % | RESPIRATION RATE: 18 BRPM

## 2024-10-23 LAB
ALBUMIN SERPL ELPH-MCNC: 3.8 G/DL — SIGNIFICANT CHANGE UP (ref 3.5–5.2)
ALP SERPL-CCNC: 57 U/L — SIGNIFICANT CHANGE UP (ref 30–115)
ALT FLD-CCNC: 17 U/L — SIGNIFICANT CHANGE UP (ref 0–41)
ANION GAP SERPL CALC-SCNC: 13 MMOL/L — SIGNIFICANT CHANGE UP (ref 7–14)
AST SERPL-CCNC: 26 U/L — SIGNIFICANT CHANGE UP (ref 0–41)
BASOPHILS # BLD AUTO: 0.04 K/UL — SIGNIFICANT CHANGE UP (ref 0–0.2)
BASOPHILS NFR BLD AUTO: 0.4 % — SIGNIFICANT CHANGE UP (ref 0–1)
BILIRUB SERPL-MCNC: 0.3 MG/DL — SIGNIFICANT CHANGE UP (ref 0.2–1.2)
BLD GP AB SCN SERPL QL: SIGNIFICANT CHANGE UP
BUN SERPL-MCNC: 10 MG/DL — SIGNIFICANT CHANGE UP (ref 10–20)
CALCIUM SERPL-MCNC: 8.9 MG/DL — SIGNIFICANT CHANGE UP (ref 8.4–10.5)
CHLORIDE SERPL-SCNC: 101 MMOL/L — SIGNIFICANT CHANGE UP (ref 98–110)
CO2 SERPL-SCNC: 26 MMOL/L — SIGNIFICANT CHANGE UP (ref 17–32)
CREAT SERPL-MCNC: 0.7 MG/DL — SIGNIFICANT CHANGE UP (ref 0.7–1.5)
EGFR: 93 ML/MIN/1.73M2 — SIGNIFICANT CHANGE UP
EOSINOPHIL # BLD AUTO: 0.04 K/UL — SIGNIFICANT CHANGE UP (ref 0–0.7)
EOSINOPHIL NFR BLD AUTO: 0.4 % — SIGNIFICANT CHANGE UP (ref 0–8)
GLUCOSE BLDC GLUCOMTR-MCNC: 179 MG/DL — HIGH (ref 70–99)
GLUCOSE SERPL-MCNC: 122 MG/DL — HIGH (ref 70–99)
HCT VFR BLD CALC: 22.6 % — LOW (ref 42–52)
HGB BLD-MCNC: 7.5 G/DL — LOW (ref 14–18)
IMM GRANULOCYTES NFR BLD AUTO: 0.9 % — HIGH (ref 0.1–0.3)
LACTATE SERPL-SCNC: 1.5 MMOL/L — SIGNIFICANT CHANGE UP (ref 0.7–2)
LYMPHOCYTES # BLD AUTO: 1.2 K/UL — SIGNIFICANT CHANGE UP (ref 1.2–3.4)
LYMPHOCYTES # BLD AUTO: 11.4 % — LOW (ref 20.5–51.1)
MAGNESIUM SERPL-MCNC: 1.7 MG/DL — LOW (ref 1.8–2.4)
MCHC RBC-ENTMCNC: 33.2 G/DL — SIGNIFICANT CHANGE UP (ref 32–37)
MCHC RBC-ENTMCNC: 34.2 PG — HIGH (ref 27–31)
MCV RBC AUTO: 103.2 FL — HIGH (ref 80–94)
MONOCYTES # BLD AUTO: 1.18 K/UL — HIGH (ref 0.1–0.6)
MONOCYTES NFR BLD AUTO: 11.2 % — HIGH (ref 1.7–9.3)
NEUTROPHILS # BLD AUTO: 8 K/UL — HIGH (ref 1.4–6.5)
NEUTROPHILS NFR BLD AUTO: 75.7 % — HIGH (ref 42.2–75.2)
NRBC # BLD: 0 /100 WBCS — SIGNIFICANT CHANGE UP (ref 0–0)
PLATELET # BLD AUTO: 246 K/UL — SIGNIFICANT CHANGE UP (ref 130–400)
PMV BLD: 9.7 FL — SIGNIFICANT CHANGE UP (ref 7.4–10.4)
POTASSIUM SERPL-MCNC: 3.8 MMOL/L — SIGNIFICANT CHANGE UP (ref 3.5–5)
POTASSIUM SERPL-SCNC: 3.8 MMOL/L — SIGNIFICANT CHANGE UP (ref 3.5–5)
PROT SERPL-MCNC: 6.2 G/DL — SIGNIFICANT CHANGE UP (ref 6–8)
RBC # BLD: 2.19 M/UL — LOW (ref 4.7–6.1)
RBC # FLD: 14.5 % — SIGNIFICANT CHANGE UP (ref 11.5–14.5)
SODIUM SERPL-SCNC: 140 MMOL/L — SIGNIFICANT CHANGE UP (ref 135–146)
VANCOMYCIN TROUGH SERPL-MCNC: 5.9 UG/ML — SIGNIFICANT CHANGE UP (ref 5–10)
WBC # BLD: 10.56 K/UL — SIGNIFICANT CHANGE UP (ref 4.8–10.8)
WBC # FLD AUTO: 10.56 K/UL — SIGNIFICANT CHANGE UP (ref 4.8–10.8)

## 2024-10-23 PROCEDURE — 99239 HOSP IP/OBS DSCHRG MGMT >30: CPT

## 2024-10-23 RX ADMIN — Medication 1: at 08:15

## 2024-10-23 RX ADMIN — CHLORHEXIDINE GLUCONATE 1 APPLICATION(S): 40 SOLUTION TOPICAL at 05:35

## 2024-10-23 RX ADMIN — VANCOMYCIN HYDROCHLORIDE 250 MILLIGRAM(S): KIT at 07:00

## 2024-10-23 RX ADMIN — AMPICILLIN AND SULBACTAM 200 GRAM(S): 2; 1 INJECTION, POWDER, FOR SOLUTION INTRAMUSCULAR; INTRAVENOUS at 05:32

## 2024-10-23 NOTE — DISCHARGE NOTE PROVIDER - HOSPITAL COURSE
78 yo M with a PMHx of DM and HLD, p/w LLE wound x1 month. Admitted for LLE cellulitis.     #Left LE Cellulitis  - Sepsis present on admission (HR, WBC, Lactic Acid)  - xray left foot: Focal 2.7 cm soft tissue irregularity/defect along the lateral aspect of the left mid to distal fibula, without underlying fracture or dislocation. No evidence of osteomyelitis. LE venous duplex negative for DVT. ESR/CRP elevated  ID consulted>>Unasyn     Anemia  - H/H stable  - iron studies wnl  - f/u folate, B2  - monitor CBC  - keep active T&S    Patient lefet AMA, antibiotics will be sent to pharmacy on file

## 2024-10-23 NOTE — DISCHARGE NOTE PROVIDER - CARE PROVIDER_API CALL
Jose Abreu  Internal Medicine  [address] [place] [address]  Phone: [phone]  Fax: [phone]  Follow Up Time: 1-3 days

## 2024-10-23 NOTE — PROGRESS NOTE ADULT - SUBJECTIVE AND OBJECTIVE BOX
INFECTIOUS DISEASE FOLLOW UP NOTE:    Interval History/ROS: Patient is a 79y old  Male who presents with a chief complaint of Sepsis (22 Oct 2024 13:51)    Overnight events: Patient reports feeling well. Wants to go home. Left leg is still swollen but less warmth.    REVIEW OF SYSTEMS:  CONSTITUTIONAL: No fever or chills  HEAD: No lesion on scalp  EYES: No visual disturbance  ENT: No sore throat  RESPIRATORY: No cough, no shortness of breath  CARDIOVASCULAR: No chest pain or palpitations; LLE swelling  GASTROINTESTINAL: No abdominal or epigastric pain  GENITOURINARY: No dysuria  NEUROLOGICAL: No headache/dizziness  MUSCULOSKELETAL: No joint pain, erythema, or swelling; no back pain  SKIN: LLE swelling and wound  All other ROS negative except noted above    Prior hospital charts reviewed [Yes]  Primary team notes reviewed [Yes]  Other consultant notes reviewed [Yes]    Allergies:  No Known Allergies      ANTIMICROBIALS:   ampicillin/sulbactam  IVPB 3 every 6 hours      OTHER MEDS: MEDICATIONS  (STANDING):  acetaminophen     Tablet .. 650 every 6 hours PRN  aluminum hydroxide/magnesium hydroxide/simethicone Suspension 30 every 4 hours PRN  atorvastatin 20 at bedtime  dextrose 50% Injectable 25 once  dextrose 50% Injectable 25 once  dextrose 50% Injectable 12.5 once  dextrose Oral Gel 15 once PRN  enoxaparin Injectable 40 every 24 hours  glucagon  Injectable 1 once  insulin lispro (ADMELOG) corrective regimen sliding scale  three times a day before meals  melatonin 3 at bedtime PRN  morphine  - Injectable 2 every 4 hours PRN  morphine  - Injectable 4 every 6 hours PRN  ondansetron Injectable 4 every 8 hours PRN  tamsulosin 0.4 at bedtime      Vital Signs Last 24 Hrs  T(F): 98.4 (10-23-24 @ 04:47), Max: 99.4 (10-21-24 @ 16:27)    Vital Signs Last 24 Hrs  HR: 93 (10-23-24 @ 04:47) (87 - 103)  BP: 115/67 (10-23-24 @ 04:47) (113/57 - 125/62)  RR: 18 (10-23-24 @ 04:47)  SpO2: 100% (10-22-24 @ 20:53) (98% - 100%)  Wt(kg): --    EXAM:  GENERAL: NAD, lying in bed  HEAD: No head lesions  EYES: Conjunctiva pink and cornea white  EAR, NOSE, MOUTH, THROAT: Normal external ears and nose, no discharges; moist mucous membranes  NECK: Supple, nontender to palpation; no JVD  RESPIRATORY: Clear to auscultation bilaterally  CARDIOVASCULAR: S1 S2  GASTROINTESTINAL: Soft, nontender, nondistended; normoactive bowel sounds  GENITOURINARY: No mejia catheter, no CVA tenderness  EXTREMITIES: No clubbing, cyanosis, LLE swelling  NERVOUS SYSTEM: Alert and oriented to person, time, place and situation, speech clear. No focal deficits   MUSCULOSKELETAL: No joint erythema, swelling or pain  SKIN: Left lower shin wounds with erythema, no purulence, no superficial thrombophlebitis  PSYCH: Normal affect    Labs:                        7.5    10.56 )-----------( 246      ( 23 Oct 2024 06:15 )             22.6     10-23    140  |  101  |  10  ----------------------------<  122[H]  3.8   |  26  |  0.7    Ca    8.9      23 Oct 2024 06:15  Mg     1.7     10-23    TPro  6.2  /  Alb  3.8  /  TBili  0.3  /  DBili  x   /  AST  26  /  ALT  17  /  AlkPhos  57  10-23      WBC Trend:  WBC Count: 10.56 (10-23-24 @ 06:15)  WBC Count: 11.36 (10-22-24 @ 06:02)  WBC Count: 12.31 (10-21-24 @ 12:50)      Creatine Trend:  Creatinine: 0.7 (10-23)  Creatinine: 0.7 (10-22)  Creatinine: 0.7 (10-21)      Liver Biochemical Testing Trend:  Alanine Aminotransferase (ALT/SGPT): 17 (10-23)  Alanine Aminotransferase (ALT/SGPT): 12 (10-22)  Alanine Aminotransferase (ALT/SGPT): 14 (10-21)  Alanine Aminotransferase (ALT/SGPT): 42 *H* (09-03)  Aspartate Aminotransferase (AST/SGOT): 26 (10-23-24 @ 06:15)  Aspartate Aminotransferase (AST/SGOT): 17 (10-22-24 @ 06:02)  Aspartate Aminotransferase (AST/SGOT): 19 (10-21-24 @ 12:50)  Aspartate Aminotransferase (AST/SGOT): 47 (09-03-24 @ 13:31)  Bilirubin Total: 0.3 (10-23)  Bilirubin Total: 0.2 (10-22)  Bilirubin Total: 0.3 (10-21)  Bilirubin Total: 0.6 (09-03)      Trend LDH      Urinalysis Basic - ( 23 Oct 2024 06:15 )    Color: x / Appearance: x / SG: x / pH: x  Gluc: 122 mg/dL / Ketone: x  / Bili: x / Urobili: x   Blood: x / Protein: x / Nitrite: x   Leuk Esterase: x / RBC: x / WBC x   Sq Epi: x / Non Sq Epi: x / Bacteria: x        MICROBIOLOGY:  Vancomycin Level, Trough: 5.9 (10-23 @ 06:15)        Culture - Blood (collected 21 Oct 2024 12:50)  Source: .Blood BLOOD  Preliminary Report:    No growth at 24 hours    Culture - Blood (collected 21 Oct 2024 12:50)  Source: .Blood BLOOD  Preliminary Report:    No growth at 24 hours    C-Reactive Protein: 12.0 (10-21)    Ferritin: 384 (10-21)    Lactate, Blood: 1.5 (10-23 @ 06:15)  Lactate, Blood: 3.8 (10-21 @ 19:06)  Lactate, Blood: 3.0 (10-21 @ 15:16)  Lactate, Blood: 2.2 (10-21 @ 12:50)      RADIOLOGY:  imaging below personally reviewed    < from: VA Duplex Lower Ext Vein Scan, Bilat (10.21.24 @ 17:29) >  FINDINGS:    RIGHT:  Normal compressibility of the RIGHT common femoral, femoral and popliteal   veins.  Doppler examination shows normal spontaneous and phasic flow.  No RIGHT calf vein thrombosis is detected.    LEFT:  Normal compressibility of the LEFT common femoral, femoral and popliteal   veins.  Doppler examination shows normal spontaneous and phasic flow.  No LEFT calf vein thrombosis is detected.    IMPRESSION:  No evidence of deep venous thrombosis in either lower extremity.    < end of copied text >    < from: Xray Ankle Complete 3 Views, Left (10.21.24 @ 12:05) >  FINDINGS/  IMPRESSION:    Focal 2.7 cm soft tissue irregularity/defect along the lateral aspect of   the left mid to distal fibula, without underlying fracture or   dislocation. No definite radiographic evidence of acute osteomyelitis.   Ankle mortise ispreserved. Posterior and plantar calcaneal spurs.   Multiple vascular calcifications are noted.    < end of copied text >

## 2024-10-23 NOTE — DISCHARGE NOTE PROVIDER - ATTENDING DISCHARGE PHYSICAL EXAMINATION:
GENERAL: NAD, well-developed, 79y  RESPIRATORY: Clear to auscultation bilaterally  CARDIOVASCULAR: Regular rate and rhythm  GASTROINTESTINAL: Abdomen Soft, Nontender, Nondistended, +BS  PSYCH: AAOx3, affect appropriate  NEUROLOGY: non-focal  SKIN: ulceration noted to anterior tib/fib approx 4x3 with some surrounding erythema.  No discharge.  + surrounding xerosis.

## 2024-10-23 NOTE — PROGRESS NOTE ADULT - ASSESSMENT
80 yo M with a PMHx of DM and HLD, p/w LLE wound x1 month. Patient denies any trauma. He reports feeling an itchy rash 1 month ago, which he scratched.     ID is consulted for LLE cellulitis  Low grade fever 99.4, WBC 12.31 > 11.36  On room air  BCx NGTD  No prior culture in HIE    Venous duplex bilateral LE no DVT    Xray left leg  Focal 2.7 cm soft tissue irregularity/defect along the lateral aspect of   the left mid to distal fibula, without underlying fracture or   dislocation. No definite radiographic evidence of acute osteomyelitis.   Ankle mortise ispreserved. Posterior and plantar calcaneal spurs.   Multiple vascular calcifications are noted.    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  Attending Physician  Division of Infectious Diseases  [facility]  Please contact me via [employer] Teams
78 yo M with a PMHx of DM and HLD, p/w LLE wound x1 month. Admitted for LLE cellulitis.     #Left LE Cellulitis  - Sepsis present on admission (HR, WBC, Lactic Acid)  - xray left foot: Focal 2.7 cm soft tissue irregularity/defect along the lateral aspect of the left mid to distal fibula, without underlying fracture or dislocation. No evidence of osteomyelitis.   - LE venous duplex negative for DVT  - ESR/CRP elevated  - ID consulted  - c/w vancomycin and unasyn  - f/u blood cx    #Anemia  - H/H stable  - iron studies wnl  - f/u folate, B2  - monitor CBC  - keep active T&S    #DM2  - Hold PO meds  - FC AC TID w/ISS    #HLD  - c/w Statin    #BPH  - c/w Flomax    Diet: DASH/CHO  Activity: OOB  DVT PPX: Lovenox  Code status: Full  Dispo: Home    Pending/Handoff: Blood cx, clinical improvement in cellulitis, ID f/u, poss 48 hrs

## 2024-10-23 NOTE — DISCHARGE NOTE PROVIDER - NSDCMRMEDTOKEN_GEN_ALL_CORE_FT
atorvastatin 20 mg oral tablet: 1 tab(s) orally once a day (at bedtime)  Januvia 100 mg oral tablet: 1 tab(s) orally  tamsulosin 0.4 mg oral capsule: 1 cap(s) orally once a day (at bedtime)

## 2024-10-24 RX ORDER — AMOXICILLIN AND CLAVULANATE POTASSIUM 600; 42.9 MG/5ML; MG/5ML
1 POWDER, FOR SUSPENSION ORAL
Qty: 14 | Refills: 0
Start: 2024-10-24 | End: 2024-10-30

## 2024-10-26 LAB
CULTURE RESULTS: SIGNIFICANT CHANGE UP
CULTURE RESULTS: SIGNIFICANT CHANGE UP
SPECIMEN SOURCE: SIGNIFICANT CHANGE UP
SPECIMEN SOURCE: SIGNIFICANT CHANGE UP

## 2024-10-27 ENCOUNTER — EMERGENCY (EMERGENCY)
Facility: HOSPITAL | Age: 79
LOS: 0 days | Discharge: HOME | End: 2024-10-27
Attending: STUDENT IN AN ORGANIZED HEALTH CARE EDUCATION/TRAINING PROGRAM
Payer: MEDICARE

## 2024-10-27 VITALS
HEART RATE: 85 BPM | TEMPERATURE: 98 F | OXYGEN SATURATION: 98 % | RESPIRATION RATE: 18 BRPM | DIASTOLIC BLOOD PRESSURE: 72 MMHG | SYSTOLIC BLOOD PRESSURE: 132 MMHG | HEIGHT: 64 IN | WEIGHT: 199.96 LBS

## 2024-10-27 DIAGNOSIS — L98.9 DISORDER OF THE SKIN AND SUBCUTANEOUS TISSUE, UNSPECIFIED: ICD-10-CM

## 2024-10-27 DIAGNOSIS — E11.9 TYPE 2 DIABETES MELLITUS WITHOUT COMPLICATIONS: ICD-10-CM

## 2024-10-27 DIAGNOSIS — Z98.49 CATARACT EXTRACTION STATUS, UNSPECIFIED EYE: Chronic | ICD-10-CM

## 2024-10-27 PROCEDURE — 99284 EMERGENCY DEPT VISIT MOD MDM: CPT

## 2024-10-27 PROCEDURE — 99283 EMERGENCY DEPT VISIT LOW MDM: CPT

## 2024-10-27 RX ADMIN — Medication 1 TABLET(S): at 19:36

## 2024-10-27 RX ADMIN — Medication 600 MILLIGRAM(S): at 19:36

## 2024-10-27 NOTE — ED PROVIDER NOTE - NSFOLLOWUPINSTRUCTIONS_ED_ALL_ED_FT
TAKE ANTIBIOTICS AS PRESCRIBED  RETURN TO ED FOR NEW OR WORSENING SYMPTOMS  FOLLOW UP WITH YOUR PRIMARY DOCTOR    Chronic Wounds    WHAT YOU NEED TO KNOW:    A chronic wound is a wound that does not heal completely in 6 weeks. A wound is an injury that causes a break in the skin. There may also be damage to nearby tissues. Examples of wounds that can become chronic are deep ulcers (open sores), large burns, and infected cuts.    DISCHARGE INSTRUCTIONS:    Call your doctor if:    You have a fever.    You have increased or new pain, swelling, redness, or bleeding in or around your wound.    You have pus or a foul odor coming from your wound.    Your skin itches or has a rash.    You have open sores, blisters, or changes in the color or temperature of your skin.    You have questions or concerns about your condition or care.  Medicines: You may need any of the following:    NSAIDs, such as ibuprofen, help decrease swelling, pain, and fever. This medicine is available with or without a doctor's order. NSAIDs can cause stomach bleeding or kidney problems in certain people. If you take blood thinner medicine, always ask if NSAIDs are safe for you. Always read the medicine label and follow directions. Do not give these medicines to children younger than 6 months without direction from a healthcare provider.    Acetaminophen decreases pain and fever. It is available without a doctor's order. Ask how much to take and how often to take it. Follow directions. Read the labels of all other medicines you are using to see if they also contain acetaminophen, or ask your doctor or pharmacist. Acetaminophen can cause liver damage if not taken correctly.    Antibiotics may be given to prevent or treat an infection caused by bacteria.    Take your medicine as directed. Contact your healthcare provider if you think your medicine is not helping or if you have side effects. Tell your provider if you are allergic to any medicine. Keep a list of the medicines, vitamins, and herbs you take. Include the amounts, and when and why you take them. Bring the list or the pill bottles to follow-up visits. Carry your medicine list with you in case of an emergency.  What you need to know about wound care:    Wash your hands before and after you take care of your wound.  Handwashing      Keep the bandage clean and dry. Do not stop using the bandage on your wound unless your healthcare provider says it is okay.    Clean the wound and change the dressing as often as directed by your provider.  Eat healthy foods and drink liquids as directed: Healthy foods give your body the nutrients it needs to heal your wound. Liquids prevent dehydration that can decrease the blood supply to your wound. Healthy foods include fruits, vegetables, grains (breads and cereals), dairy, and protein foods. Protein foods include meat, fish, nuts, and soy products. Protein, calories, vitamin C, and zinc help wounds heal. Ask for more information about the foods you should eat to improve healing.    Do not smoke: If you smoke, it is never too late to quit. Smoking delays wound healing. Smoking also increases your risk for infection after surgery. Ask your healthcare provider for information if you need help quitting.    Prevent pressure wounds: Pressure wounds can develop when blood flow to an area is blocked. For example, you sit or lie in the same position without moving and put pressure on your heels. You can prevent pressure wounds by doing any of the following:    Change your position every 15 minutes while you are sitting.    Change your position every 2 hours while you lie in bed.    Prop your legs on pillows to lift your heels while you are lying down.    Check your skin or have someone else check your skin daily. Check the areas that are common to pressure wounds, such as elbows, heels, and buttocks. Common early signs of pressure wounds are open sores, blisters, or changes in color or temperature.  Follow up with your doctor as directed: You need to return to have your wound checked. You may also need to have the bandage changed. Write down your questions so you remember to ask them during your visits.

## 2024-10-27 NOTE — ED PROVIDER NOTE - PATIENT PORTAL LINK FT
You can access the FollowMyHealth Patient Portal offered by [facility] by registering at the following website: [email or web address]. By joining [employer]’s FollowMyHealth portal, you will also be able to view your health information using other applications (apps) compatible with our system.

## 2024-10-27 NOTE — ED PROVIDER NOTE - CARE PROVIDER_API CALL
your primary doctor,   Phone: (   )    -  Fax: (   )    -  Follow Up Time: 1-3 Days    Paulo Hoover  Plastic Surgery  [address] [place] [id number]  Phone: [phone]  Fax: [phone]  Follow Up Time: 1-3 Days

## 2024-10-27 NOTE — ED PROVIDER NOTE - PHYSICAL EXAMINATION
CONST: Well appearing in NAD  EYES: EOMI, Sclera and conjunctiva clear.   ENT: No nasal discharge.   NECK: Non-tender, no meningeal signs  CARD: Normal S1 S2; Normal rate and rhythm  RESP: Equal BS B/L, No wheezes, rhonchi or rales. No distress  GI: Soft, non-distended.  MS: Normal ROM in all extremities.   SKIN: Warm, dry. Good turgor. left lower leg lateral wound with mild erythema and purulent drainage  NEURO: A&Ox3, No focal deficits. Strength 5/5 with no sensory deficits. Steady gait

## 2024-10-27 NOTE — ED PROVIDER NOTE - CLINICAL SUMMARY MEDICAL DECISION MAKING FREE TEXT BOX
79-year-old presents today with left lower leg wound.  Patient's wound does not appear to be infected at this time.  Patient will start antibiotics that he was prescribed to take upon discharge.  Will discharge at this time.  Return precautions explained to patient.

## 2024-10-27 NOTE — ED PROVIDER NOTE - OBJECTIVE STATEMENT
Admit to neurology on patient is a 79-year-old male PMH diabetes coming to ED for wound to left lower leg.  Patient was discharged from hospital x 4 days ago, reports he did not know he should be taking antibiotics, reports increasing pain to left lower leg wound but no worsening redness/drainage.  Denies fever, numbness/paresthesias, weakness, chest pain, shortness of breath, abdominal pain, nausea, vomiting, diarrhea, constipation, inability to ambulate

## 2024-10-27 NOTE — ED PROVIDER NOTE - PROVIDER TOKENS
FREE:[LAST:[your primary doctor],PHONE:[(   )    -],FAX:[(   )    -],FOLLOWUP:[1-3 Days]],PROVIDER:[TOKEN:[[id number]],FOLLOWUP:[1-3 Days]]

## 2024-10-28 PROBLEM — E78.5 HYPERLIPIDEMIA, UNSPECIFIED: Chronic | Status: ACTIVE | Noted: 2024-10-21

## 2024-10-28 PROBLEM — E11.9 TYPE 2 DIABETES MELLITUS WITHOUT COMPLICATIONS: Chronic | Status: ACTIVE | Noted: 2024-10-21

## 2024-10-28 PROBLEM — N40.0 BENIGN PROSTATIC HYPERPLASIA WITHOUT LOWER URINARY TRACT SYMPTOMS: Chronic | Status: ACTIVE | Noted: 2024-10-21

## 2024-10-29 DIAGNOSIS — E78.5 HYPERLIPIDEMIA, UNSPECIFIED: ICD-10-CM

## 2024-10-29 DIAGNOSIS — L03.116 CELLULITIS OF LEFT LOWER LIMB: ICD-10-CM

## 2024-10-29 DIAGNOSIS — D84.81 IMMUNODEFICIENCY DUE TO CONDITIONS CLASSIFIED ELSEWHERE: ICD-10-CM

## 2024-10-29 DIAGNOSIS — I87.8 OTHER SPECIFIED DISORDERS OF VEINS: ICD-10-CM

## 2024-10-29 DIAGNOSIS — D64.9 ANEMIA, UNSPECIFIED: ICD-10-CM

## 2024-10-29 DIAGNOSIS — L97.929 NON-PRESSURE CHRONIC ULCER OF UNSPECIFIED PART OF LEFT LOWER LEG WITH UNSPECIFIED SEVERITY: ICD-10-CM

## 2024-10-29 DIAGNOSIS — N40.0 BENIGN PROSTATIC HYPERPLASIA WITHOUT LOWER URINARY TRACT SYMPTOMS: ICD-10-CM

## 2024-10-29 DIAGNOSIS — Z79.899 OTHER LONG TERM (CURRENT) DRUG THERAPY: ICD-10-CM

## 2024-10-29 DIAGNOSIS — Z79.84 LONG TERM (CURRENT) USE OF ORAL HYPOGLYCEMIC DRUGS: ICD-10-CM

## 2024-10-29 DIAGNOSIS — A41.9 SEPSIS, UNSPECIFIED ORGANISM: ICD-10-CM

## 2024-10-29 DIAGNOSIS — R65.20 SEVERE SEPSIS WITHOUT SEPTIC SHOCK: ICD-10-CM

## 2024-10-29 DIAGNOSIS — Z53.29 PROCEDURE AND TREATMENT NOT CARRIED OUT BECAUSE OF PATIENT'S DECISION FOR OTHER REASONS: ICD-10-CM

## 2024-10-29 DIAGNOSIS — E11.9 TYPE 2 DIABETES MELLITUS WITHOUT COMPLICATIONS: ICD-10-CM

## 2024-10-30 ENCOUNTER — INPATIENT (INPATIENT)
Facility: HOSPITAL | Age: 79
LOS: 6 days | Discharge: HOME HEALTH | DRG: 872 | End: 2024-11-06
Attending: INTERNAL MEDICINE | Admitting: STUDENT IN AN ORGANIZED HEALTH CARE EDUCATION/TRAINING PROGRAM
Payer: MEDICARE

## 2024-10-30 VITALS
DIASTOLIC BLOOD PRESSURE: 64 MMHG | HEIGHT: 64 IN | OXYGEN SATURATION: 100 % | TEMPERATURE: 98 F | HEART RATE: 96 BPM | RESPIRATION RATE: 18 BRPM | SYSTOLIC BLOOD PRESSURE: 123 MMHG | WEIGHT: 199.96 LBS

## 2024-10-30 DIAGNOSIS — L03.90 CELLULITIS, UNSPECIFIED: ICD-10-CM

## 2024-10-30 DIAGNOSIS — Z98.49 CATARACT EXTRACTION STATUS, UNSPECIFIED EYE: Chronic | ICD-10-CM

## 2024-10-30 LAB
ACANTHOCYTES BLD QL SMEAR: SLIGHT — SIGNIFICANT CHANGE UP
ALBUMIN SERPL ELPH-MCNC: 4.1 G/DL — SIGNIFICANT CHANGE UP (ref 3.5–5.2)
ALP SERPL-CCNC: 94 U/L — SIGNIFICANT CHANGE UP (ref 30–115)
ALT FLD-CCNC: 25 U/L — SIGNIFICANT CHANGE UP (ref 0–41)
ANION GAP SERPL CALC-SCNC: 12 MMOL/L — SIGNIFICANT CHANGE UP (ref 7–14)
ANISOCYTOSIS BLD QL: SLIGHT — SIGNIFICANT CHANGE UP
AST SERPL-CCNC: 37 U/L — SIGNIFICANT CHANGE UP (ref 0–41)
BASOPHILS # BLD AUTO: 0 K/UL — SIGNIFICANT CHANGE UP (ref 0–0.2)
BASOPHILS NFR BLD AUTO: 0 % — SIGNIFICANT CHANGE UP (ref 0–1)
BILIRUB SERPL-MCNC: 0.3 MG/DL — SIGNIFICANT CHANGE UP (ref 0.2–1.2)
BUN SERPL-MCNC: 6 MG/DL — LOW (ref 10–20)
BURR CELLS BLD QL SMEAR: PRESENT — SIGNIFICANT CHANGE UP
CALCIUM SERPL-MCNC: 9.2 MG/DL — SIGNIFICANT CHANGE UP (ref 8.4–10.5)
CHLORIDE SERPL-SCNC: 99 MMOL/L — SIGNIFICANT CHANGE UP (ref 98–110)
CO2 SERPL-SCNC: 26 MMOL/L — SIGNIFICANT CHANGE UP (ref 17–32)
CREAT SERPL-MCNC: 0.6 MG/DL — LOW (ref 0.7–1.5)
EGFR: 98 ML/MIN/1.73M2 — SIGNIFICANT CHANGE UP
EOSINOPHIL # BLD AUTO: 0 K/UL — SIGNIFICANT CHANGE UP (ref 0–0.7)
EOSINOPHIL NFR BLD AUTO: 0 % — SIGNIFICANT CHANGE UP (ref 0–8)
GLUCOSE SERPL-MCNC: 74 MG/DL — SIGNIFICANT CHANGE UP (ref 70–99)
HCT VFR BLD CALC: 26.1 % — LOW (ref 42–52)
HGB BLD-MCNC: 8.3 G/DL — LOW (ref 14–18)
LACTATE SERPL-SCNC: 1.8 MMOL/L — SIGNIFICANT CHANGE UP (ref 0.7–2)
LYMPHOCYTES # BLD AUTO: 1.88 K/UL — SIGNIFICANT CHANGE UP (ref 1.2–3.4)
LYMPHOCYTES # BLD AUTO: 12.7 % — LOW (ref 20.5–51.1)
MACROCYTES BLD QL: SLIGHT — SIGNIFICANT CHANGE UP
MCHC RBC-ENTMCNC: 31.8 G/DL — LOW (ref 32–37)
MCHC RBC-ENTMCNC: 33.2 PG — HIGH (ref 27–31)
MCV RBC AUTO: 104.4 FL — HIGH (ref 80–94)
MONOCYTES # BLD AUTO: 0.5 K/UL — SIGNIFICANT CHANGE UP (ref 0.1–0.6)
MONOCYTES NFR BLD AUTO: 3.4 % — SIGNIFICANT CHANGE UP (ref 1.7–9.3)
NEUTROPHILS # BLD AUTO: 12.16 K/UL — HIGH (ref 1.4–6.5)
NEUTROPHILS NFR BLD AUTO: 82.2 % — HIGH (ref 42.2–75.2)
PLAT MORPH BLD: ABNORMAL
PLATELET # BLD AUTO: 474 K/UL — HIGH (ref 130–400)
PMV BLD: 9.1 FL — SIGNIFICANT CHANGE UP (ref 7.4–10.4)
POIKILOCYTOSIS BLD QL AUTO: SLIGHT — SIGNIFICANT CHANGE UP
POLYCHROMASIA BLD QL SMEAR: SIGNIFICANT CHANGE UP
POTASSIUM SERPL-MCNC: 4.3 MMOL/L — SIGNIFICANT CHANGE UP (ref 3.5–5)
POTASSIUM SERPL-SCNC: 4.3 MMOL/L — SIGNIFICANT CHANGE UP (ref 3.5–5)
PROMYELOCYTES # FLD: 1.7 % — HIGH (ref 0–0)
PROT SERPL-MCNC: 7.1 G/DL — SIGNIFICANT CHANGE UP (ref 6–8)
RBC # BLD: 2.5 M/UL — LOW (ref 4.7–6.1)
RBC # FLD: 15.1 % — HIGH (ref 11.5–14.5)
RBC BLD AUTO: ABNORMAL
SODIUM SERPL-SCNC: 137 MMOL/L — SIGNIFICANT CHANGE UP (ref 135–146)
TARGETS BLD QL SMEAR: SLIGHT — SIGNIFICANT CHANGE UP
WBC # BLD: 14.79 K/UL — HIGH (ref 4.8–10.8)
WBC # FLD AUTO: 14.79 K/UL — HIGH (ref 4.8–10.8)

## 2024-10-30 PROCEDURE — 82607 VITAMIN B-12: CPT

## 2024-10-30 PROCEDURE — 87641 MR-STAPH DNA AMP PROBE: CPT

## 2024-10-30 PROCEDURE — 99285 EMERGENCY DEPT VISIT HI MDM: CPT

## 2024-10-30 PROCEDURE — 85045 AUTOMATED RETICULOCYTE COUNT: CPT

## 2024-10-30 PROCEDURE — 36415 COLL VENOUS BLD VENIPUNCTURE: CPT

## 2024-10-30 PROCEDURE — 87640 STAPH A DNA AMP PROBE: CPT

## 2024-10-30 PROCEDURE — 87070 CULTURE OTHR SPECIMN AEROBIC: CPT

## 2024-10-30 PROCEDURE — 86140 C-REACTIVE PROTEIN: CPT

## 2024-10-30 PROCEDURE — 73590 X-RAY EXAM OF LOWER LEG: CPT | Mod: 26,LT

## 2024-10-30 PROCEDURE — 82962 GLUCOSE BLOOD TEST: CPT

## 2024-10-30 PROCEDURE — 80053 COMPREHEN METABOLIC PANEL: CPT

## 2024-10-30 PROCEDURE — 87077 CULTURE AEROBIC IDENTIFY: CPT

## 2024-10-30 PROCEDURE — 99223 1ST HOSP IP/OBS HIGH 75: CPT

## 2024-10-30 PROCEDURE — 86900 BLOOD TYPING SEROLOGIC ABO: CPT

## 2024-10-30 PROCEDURE — 87186 SC STD MICRODIL/AGAR DIL: CPT

## 2024-10-30 PROCEDURE — 93925 LOWER EXTREMITY STUDY: CPT

## 2024-10-30 PROCEDURE — 86901 BLOOD TYPING SEROLOGIC RH(D): CPT

## 2024-10-30 PROCEDURE — 83735 ASSAY OF MAGNESIUM: CPT

## 2024-10-30 PROCEDURE — 86850 RBC ANTIBODY SCREEN: CPT

## 2024-10-30 PROCEDURE — 93971 EXTREMITY STUDY: CPT | Mod: 26,LT

## 2024-10-30 PROCEDURE — 85025 COMPLETE CBC W/AUTO DIFF WBC: CPT

## 2024-10-30 PROCEDURE — 85652 RBC SED RATE AUTOMATED: CPT

## 2024-10-30 RX ORDER — ONDANSETRON HYDROCHLORIDE 2 MG/ML
4 INJECTION, SOLUTION INTRAMUSCULAR; INTRAVENOUS ONCE
Refills: 0 | Status: COMPLETED | OUTPATIENT
Start: 2024-10-30 | End: 2024-10-30

## 2024-10-30 RX ORDER — MORPHINE SULFATE 30 MG/1
4 TABLET, EXTENDED RELEASE ORAL ONCE
Refills: 0 | Status: DISCONTINUED | OUTPATIENT
Start: 2024-10-30 | End: 2024-10-30

## 2024-10-30 RX ORDER — ACETAMINOPHEN 500 MG
650 TABLET ORAL ONCE
Refills: 0 | Status: COMPLETED | OUTPATIENT
Start: 2024-10-30 | End: 2024-10-30

## 2024-10-30 RX ORDER — CEFAZOLIN SODIUM 1 G
2000 VIAL (EA) INJECTION ONCE
Refills: 0 | Status: COMPLETED | OUTPATIENT
Start: 2024-10-30 | End: 2024-10-30

## 2024-10-30 RX ADMIN — Medication 100 MILLIGRAM(S): at 18:45

## 2024-10-30 RX ADMIN — MORPHINE SULFATE 4 MILLIGRAM(S): 30 TABLET, EXTENDED RELEASE ORAL at 18:45

## 2024-10-30 RX ADMIN — MORPHINE SULFATE 4 MILLIGRAM(S): 30 TABLET, EXTENDED RELEASE ORAL at 20:50

## 2024-10-30 RX ADMIN — Medication 650 MILLIGRAM(S): at 22:46

## 2024-10-30 RX ADMIN — ONDANSETRON HYDROCHLORIDE 4 MILLIGRAM(S): 2 INJECTION, SOLUTION INTRAMUSCULAR; INTRAVENOUS at 18:46

## 2024-10-30 NOTE — ED PROVIDER NOTE - CLINICAL SUMMARY MEDICAL DECISION MAKING FREE TEXT BOX
78 yo M with a PMHx of DM and HLD, p/w LLE wound x1 month p/w pain and clear discharge from the LLE- no fevers. was admitted for cellulitis however left AMA now presenting for persistent pain. no other medical complaints. labs reviewed. 80 yo M with a PMHx of DM and HLD, p/w LLE wound x1 month p/w pain and clear discharge from the LLE- no fevers. was admitted for cellulitis however left AMA now presenting for persistent pain. no other medical complaints. labs reviewed. xray independently interpreted by me Dr. Gonzalez showing no gas formation. given iv abx. admitted for further management.

## 2024-10-30 NOTE — ED PROVIDER NOTE - PHYSICAL EXAMINATION
CONST: Well appearing in NAD  EYES: PERRL, EOMI, Sclera and conjunctiva clear.   ENT: No nasal discharge. Oropharynx normal appearing  NECK: Non-tender, no meningeal signs. normal ROM. supple   CARD: Normal S1 S2; Normal rate and rhythm  RESP: Equal BS B/L, No wheezes, rhonchi or rales. No distress  GI: Soft, non-tender, non-distended. no cva tenderness. normal BS  MS: Normal ROM in all extremities. No midline spinal tenderness. pulses 2 +. no calf tenderness or swelling  SKIN: multiple weeping wounds to LLE, with redness and swelling. Warm, dry, no acute rashes. Good turgor  NEURO: sensation intact and muscle strength 5/5

## 2024-10-30 NOTE — ED ADULT NURSE NOTE - DOES PATIENT HAVE ADVANCE DIRECTIVE
September 24, 2024       Fior Hernandez MD  [address]  [address] [address]  Via Fax: [phone]      Patient: Patricia Barron   YOB: 1956   Date of Visit: 9/24/2024       Dear Dr. Hernandez:    I saw your patient, Patricia Barron, for an evaluation. Below are my notes for this visit with her.    If you have questions, please do not hesitate to call me.      Sincerely,        Aristides Villalobos MD        CC: No Recipients    Aristides Villalobos MD  9/24/2024 12:22 PM  Signed  Subjective  Patient ID: Patricia is a 68 year old female.    Chief Complaint   Patient presents with   • Surgical Followup     68-year-old female here for follow-up after laparoscopic appendectomy.  She refers feeling well regarding her surgery.  She developed a significant rash in her abdomen possible due to the Used To Clean the Abdomen before Surgery.  She Has Tried Multiple Ointments and Pills with No Results.  She Is Still Having A Lot Of Pruritus.    Here today for follow-up regarding her rash.  She refers feeling much better still having some pruritus but the rash is almost gone.  Tolerating diet    Surgical Followup  Associated symptoms include a rash. Pertinent negatives include no arthralgias, chest pain, congestion, coughing, fever, sore throat or weakness.     Patient's medications, allergies, past medical, surgical, social and family histories were reviewed and updated as appropriate.    Review of Systems   Constitutional:  Negative for activity change, appetite change and fever.   HENT:  Negative for congestion and sore throat.    Eyes:  Negative for pain and visual disturbance.   Respiratory:  Negative for apnea, cough and wheezing.    Cardiovascular:  Negative for chest pain and palpitations.   Endocrine: Negative for cold intolerance and heat intolerance.   Genitourinary:  Negative for difficulty urinating, dyspareunia and dysuria.   Musculoskeletal:  Negative for arthralgias and back pain.   Skin:   Positive for rash. Negative for color change.   Neurological:  Negative for tremors, syncope and weakness.   Psychiatric/Behavioral:  Negative for agitation, behavioral problems and confusion.      Objective  Physical Exam  Constitutional:       General: She is not in acute distress.     Appearance: Normal appearance. She is obese. She is not ill-appearing.   HENT:      Head: Normocephalic and atraumatic.      Right Ear: Tympanic membrane normal.      Left Ear: Tympanic membrane normal.      Nose: Nose normal.      Mouth/Throat:      Mouth: Mucous membranes are moist.      Pharynx: Oropharynx is clear.      Neck: Normal range of motion and neck supple. No rigidity.   Eyes:      Extraocular Movements: Extraocular movements intact.      Pupils: Pupils are equal, round, and reactive to light.   Cardiovascular:      Rate and Rhythm: Normal rate and regular rhythm.      Pulses: Normal pulses.      Heart sounds: Normal heart sounds.   Pulmonary:      Effort: Pulmonary effort is normal.      Breath sounds: Normal breath sounds.   Abdominal:      Comments: Soft nontender, nondistended.  Incision healing properly with Dermabond in place.  There is a papular erythematous rash all over the anterior abdomen mainly in the lower abdomen.   Musculoskeletal:         General: Normal range of motion.   Skin:     General: Skin is warm.      Capillary Refill: Capillary refill takes less than 2 seconds.      Findings: Rash present.      Comments: Rash significantly improved.  Monocryl sutures still in the umbilical area   Neurological:      General: No focal deficit present.      Mental Status: She is alert and oriented to person, place, and time.   Psychiatric:         Mood and Affect: Mood normal.         Behavior: Behavior normal.       Assessment  Diagnoses and all orders for this visit:  Acute appendicitis with localized peritonitis, without perforation, abscess, or gangrene    A 68-year-old female here for follow-up after  laparoscopic appendectomy for acute appendicitis.  She is doing well.  She developed a rash in the anterior abdomen, I think it may be due to the prep solution use before surgery.  She was prescribed steroids which helped significantly and she said right now is about 10%.  Otherwise she is doing very well.  Incisions healing properly    Follow-up as needed      Aristides Villalobos MD, FACS  General and Minimally Invasive Surgery  Robotic Hepatobiliary Surgery  [facility]  [phone]    The patient was offered a chaperone declines.    Accompanied by: unaccompanied    Subjective:  Date of procedure:9/4/24    Procedure: APPENDECTOMY, LAPAROSCOPIC     Results/ Data:  Pathology Results:    0 Result Notes      Component    Pathologic Diagnosis   A. Appendix, appendectomy:   -Acute appendicitis and periappendicitis   Electronically signed by Fadi Duffy MD on 9/10/2024 at 0742         No

## 2024-10-30 NOTE — ED PROVIDER NOTE - OBJECTIVE STATEMENT
79 year old male with pmhx of dm and hld presents to ed with LLE wounds x 1 month. + pain and swelling to LLE. no fevers. Pt was admitted and signed out AMA 1 week ago.

## 2024-10-30 NOTE — ED PROVIDER NOTE - ATTENDING APP SHARED VISIT CONTRIBUTION OF CARE
78 yo M with a PMHx of DM and HLD, p/w LLE wound x1 month p/w pain and clear discharge from the LLE- no fevers. was admitted for cellulitis however left AMA now presenting for persistent pain. no other medical complaints.     CONSTITUTIONAL: Well-developed; well-nourished; in no acute distress.   SKIN: warm, dry  HEAD: Normocephalic; atraumatic.  EYES: PERRL, EOMI, no conjunctival erythema  ENT: No nasal discharge; airway clear.  NECK: Supple; non tender.  CARD: S1, S2 normal; no murmurs, gallops, or rubs. Regular rate and rhythm.   RESP: No wheezes, rales or rhonchi.  ABD: soft ntnd  EXT: Normal ROM.  LLE is edematous, open sores to the left calf, blisters noted, no purulent discharge. DP pulse 2+ bilaterally.   NEURO: Alert, oriented, grossly unremarkable.  PSYCH: Cooperative, appropriate.

## 2024-10-31 ENCOUNTER — TRANSCRIPTION ENCOUNTER (OUTPATIENT)
Age: 79
End: 2024-10-31

## 2024-10-31 LAB
ALBUMIN SERPL ELPH-MCNC: 4 G/DL — SIGNIFICANT CHANGE UP (ref 3.5–5.2)
ALP SERPL-CCNC: 87 U/L — SIGNIFICANT CHANGE UP (ref 30–115)
ALT FLD-CCNC: 21 U/L — SIGNIFICANT CHANGE UP (ref 0–41)
ANION GAP SERPL CALC-SCNC: 14 MMOL/L — SIGNIFICANT CHANGE UP (ref 7–14)
AST SERPL-CCNC: 27 U/L — SIGNIFICANT CHANGE UP (ref 0–41)
BASOPHILS # BLD AUTO: 0.05 K/UL — SIGNIFICANT CHANGE UP (ref 0–0.2)
BASOPHILS NFR BLD AUTO: 0.4 % — SIGNIFICANT CHANGE UP (ref 0–1)
BILIRUB SERPL-MCNC: 0.3 MG/DL — SIGNIFICANT CHANGE UP (ref 0.2–1.2)
BUN SERPL-MCNC: 7 MG/DL — LOW (ref 10–20)
CALCIUM SERPL-MCNC: 9 MG/DL — SIGNIFICANT CHANGE UP (ref 8.4–10.5)
CHLORIDE SERPL-SCNC: 105 MMOL/L — SIGNIFICANT CHANGE UP (ref 98–110)
CO2 SERPL-SCNC: 25 MMOL/L — SIGNIFICANT CHANGE UP (ref 17–32)
CREAT SERPL-MCNC: 0.7 MG/DL — SIGNIFICANT CHANGE UP (ref 0.7–1.5)
CRP SERPL-MCNC: 33.2 MG/L — HIGH
EGFR: 93 ML/MIN/1.73M2 — SIGNIFICANT CHANGE UP
EOSINOPHIL # BLD AUTO: 0.23 K/UL — SIGNIFICANT CHANGE UP (ref 0–0.7)
EOSINOPHIL NFR BLD AUTO: 2 % — SIGNIFICANT CHANGE UP (ref 0–8)
ERYTHROCYTE [SEDIMENTATION RATE] IN BLOOD: 117 MM/HR — HIGH (ref 0–10)
GLUCOSE BLDC GLUCOMTR-MCNC: 105 MG/DL — HIGH (ref 70–99)
GLUCOSE BLDC GLUCOMTR-MCNC: 108 MG/DL — HIGH (ref 70–99)
GLUCOSE BLDC GLUCOMTR-MCNC: 108 MG/DL — HIGH (ref 70–99)
GLUCOSE BLDC GLUCOMTR-MCNC: 154 MG/DL — HIGH (ref 70–99)
GLUCOSE SERPL-MCNC: 87 MG/DL — SIGNIFICANT CHANGE UP (ref 70–99)
HCT VFR BLD CALC: 25.6 % — LOW (ref 42–52)
HGB BLD-MCNC: 8.1 G/DL — LOW (ref 14–18)
IMM GRANULOCYTES NFR BLD AUTO: 1.9 % — HIGH (ref 0.1–0.3)
LYMPHOCYTES # BLD AUTO: 1.81 K/UL — SIGNIFICANT CHANGE UP (ref 1.2–3.4)
LYMPHOCYTES # BLD AUTO: 15.5 % — LOW (ref 20.5–51.1)
MCHC RBC-ENTMCNC: 31.6 G/DL — LOW (ref 32–37)
MCHC RBC-ENTMCNC: 33.3 PG — HIGH (ref 27–31)
MCV RBC AUTO: 105.3 FL — HIGH (ref 80–94)
MONOCYTES # BLD AUTO: 1.42 K/UL — HIGH (ref 0.1–0.6)
MONOCYTES NFR BLD AUTO: 12.1 % — HIGH (ref 1.7–9.3)
MRSA PCR RESULT.: NEGATIVE — SIGNIFICANT CHANGE UP
NEUTROPHILS # BLD AUTO: 7.96 K/UL — HIGH (ref 1.4–6.5)
NEUTROPHILS NFR BLD AUTO: 68.1 % — SIGNIFICANT CHANGE UP (ref 42.2–75.2)
NRBC # BLD: 0 /100 WBCS — SIGNIFICANT CHANGE UP (ref 0–0)
PLATELET # BLD AUTO: 442 K/UL — HIGH (ref 130–400)
PMV BLD: 9.3 FL — SIGNIFICANT CHANGE UP (ref 7.4–10.4)
POTASSIUM SERPL-MCNC: 4.3 MMOL/L — SIGNIFICANT CHANGE UP (ref 3.5–5)
POTASSIUM SERPL-SCNC: 4.3 MMOL/L — SIGNIFICANT CHANGE UP (ref 3.5–5)
PROT SERPL-MCNC: 6.8 G/DL — SIGNIFICANT CHANGE UP (ref 6–8)
RBC # BLD: 2.43 M/UL — LOW (ref 4.7–6.1)
RBC # BLD: 2.43 M/UL — LOW (ref 4.7–6.1)
RBC # FLD: 15 % — HIGH (ref 11.5–14.5)
RETICS #: 149.4 K/UL — HIGH (ref 25–125)
RETICS/RBC NFR: 6.2 % — HIGH (ref 0.5–1.5)
SODIUM SERPL-SCNC: 144 MMOL/L — SIGNIFICANT CHANGE UP (ref 135–146)
WBC # BLD: 11.69 K/UL — HIGH (ref 4.8–10.8)
WBC # FLD AUTO: 11.69 K/UL — HIGH (ref 4.8–10.8)

## 2024-10-31 PROCEDURE — 93925 LOWER EXTREMITY STUDY: CPT | Mod: 26

## 2024-10-31 PROCEDURE — 99232 SBSQ HOSP IP/OBS MODERATE 35: CPT | Mod: GC

## 2024-10-31 RX ORDER — KETOROLAC TROMETHAMINE 30 MG/ML
15 INJECTION INTRAMUSCULAR; INTRAVENOUS ONCE
Refills: 0 | Status: DISCONTINUED | OUTPATIENT
Start: 2024-10-31 | End: 2024-10-31

## 2024-10-31 RX ORDER — CLINDAMYCIN PHOSPHATE 150 MG/ML
600 VIAL (ML) INJECTION ONCE
Refills: 0 | Status: COMPLETED | OUTPATIENT
Start: 2024-10-31 | End: 2024-10-31

## 2024-10-31 RX ORDER — ENOXAPARIN SODIUM 40MG/0.4ML
40 SYRINGE (ML) SUBCUTANEOUS EVERY 24 HOURS
Refills: 0 | Status: DISCONTINUED | OUTPATIENT
Start: 2024-10-31 | End: 2024-11-06

## 2024-10-31 RX ORDER — AMPICILLIN AND SULBACTAM 2; 1 G/1; G/1
3 INJECTION, POWDER, FOR SOLUTION INTRAMUSCULAR; INTRAVENOUS ONCE
Refills: 0 | Status: COMPLETED | OUTPATIENT
Start: 2024-10-31 | End: 2024-10-31

## 2024-10-31 RX ORDER — PANTOPRAZOLE SODIUM 40 MG/1
40 TABLET, DELAYED RELEASE ORAL
Refills: 0 | Status: DISCONTINUED | OUTPATIENT
Start: 2024-10-31 | End: 2024-10-31

## 2024-10-31 RX ORDER — CLINDAMYCIN PHOSPHATE 150 MG/ML
VIAL (ML) INJECTION
Refills: 0 | Status: DISCONTINUED | OUTPATIENT
Start: 2024-10-31 | End: 2024-11-01

## 2024-10-31 RX ORDER — INSULIN LISPRO 100/ML
VIAL (ML) SUBCUTANEOUS
Refills: 0 | Status: DISCONTINUED | OUTPATIENT
Start: 2024-10-31 | End: 2024-11-06

## 2024-10-31 RX ORDER — PANTOPRAZOLE SODIUM 40 MG/1
40 TABLET, DELAYED RELEASE ORAL DAILY
Refills: 0 | Status: DISCONTINUED | OUTPATIENT
Start: 2024-10-31 | End: 2024-11-01

## 2024-10-31 RX ORDER — NYSTATIN 100000 U/G
1 POWDER TOPICAL
Refills: 0 | Status: DISCONTINUED | OUTPATIENT
Start: 2024-10-31 | End: 2024-11-06

## 2024-10-31 RX ORDER — CLINDAMYCIN PHOSPHATE 150 MG/ML
600 VIAL (ML) INJECTION EVERY 8 HOURS
Refills: 0 | Status: DISCONTINUED | OUTPATIENT
Start: 2024-10-31 | End: 2024-11-01

## 2024-10-31 RX ORDER — ENOXAPARIN SODIUM 40MG/0.4ML
40 SYRINGE (ML) SUBCUTANEOUS EVERY 12 HOURS
Refills: 0 | Status: DISCONTINUED | OUTPATIENT
Start: 2024-10-31 | End: 2024-10-31

## 2024-10-31 RX ORDER — TAMSULOSIN HCL 0.4 MG
0.4 CAPSULE ORAL AT BEDTIME
Refills: 0 | Status: DISCONTINUED | OUTPATIENT
Start: 2024-10-31 | End: 2024-11-06

## 2024-10-31 RX ORDER — AMPICILLIN AND SULBACTAM 2; 1 G/1; G/1
INJECTION, POWDER, FOR SOLUTION INTRAMUSCULAR; INTRAVENOUS
Refills: 0 | Status: DISCONTINUED | OUTPATIENT
Start: 2024-10-31 | End: 2024-11-03

## 2024-10-31 RX ORDER — ACETAMINOPHEN 500 MG
650 TABLET ORAL EVERY 6 HOURS
Refills: 0 | Status: DISCONTINUED | OUTPATIENT
Start: 2024-10-31 | End: 2024-11-02

## 2024-10-31 RX ORDER — GLUCAGON INJECTION, SOLUTION 1 MG/.2ML
1 INJECTION, SOLUTION SUBCUTANEOUS ONCE
Refills: 0 | Status: DISCONTINUED | OUTPATIENT
Start: 2024-10-31 | End: 2024-11-06

## 2024-10-31 RX ORDER — MELATONIN 5 MG
3 TABLET ORAL AT BEDTIME
Refills: 0 | Status: DISCONTINUED | OUTPATIENT
Start: 2024-10-31 | End: 2024-11-06

## 2024-10-31 RX ORDER — AMPICILLIN AND SULBACTAM 2; 1 G/1; G/1
3 INJECTION, POWDER, FOR SOLUTION INTRAMUSCULAR; INTRAVENOUS EVERY 6 HOURS
Refills: 0 | Status: DISCONTINUED | OUTPATIENT
Start: 2024-10-31 | End: 2024-11-03

## 2024-10-31 RX ORDER — CYANOCOBALAMIN (VITAMIN B-12) 1000MCG/15
1000 LIQUID (ML) ORAL DAILY
Refills: 0 | Status: COMPLETED | OUTPATIENT
Start: 2024-10-31 | End: 2024-11-03

## 2024-10-31 RX ADMIN — AMPICILLIN AND SULBACTAM 200 GRAM(S): 2; 1 INJECTION, POWDER, FOR SOLUTION INTRAMUSCULAR; INTRAVENOUS at 03:25

## 2024-10-31 RX ADMIN — KETOROLAC TROMETHAMINE 15 MILLIGRAM(S): 30 INJECTION INTRAMUSCULAR; INTRAVENOUS at 00:30

## 2024-10-31 RX ADMIN — Medication 100 MILLIGRAM(S): at 21:10

## 2024-10-31 RX ADMIN — Medication 0.4 MILLIGRAM(S): at 21:10

## 2024-10-31 RX ADMIN — Medication 20 MILLIGRAM(S): at 21:10

## 2024-10-31 RX ADMIN — AMPICILLIN AND SULBACTAM 200 GRAM(S): 2; 1 INJECTION, POWDER, FOR SOLUTION INTRAMUSCULAR; INTRAVENOUS at 06:00

## 2024-10-31 RX ADMIN — Medication 1000 MICROGRAM(S): at 16:09

## 2024-10-31 RX ADMIN — NYSTATIN 1 APPLICATION(S): 100000 POWDER TOPICAL at 17:01

## 2024-10-31 RX ADMIN — AMPICILLIN AND SULBACTAM 200 GRAM(S): 2; 1 INJECTION, POWDER, FOR SOLUTION INTRAMUSCULAR; INTRAVENOUS at 23:54

## 2024-10-31 RX ADMIN — Medication 100 MILLIGRAM(S): at 16:08

## 2024-10-31 RX ADMIN — Medication 40 MILLIGRAM(S): at 11:41

## 2024-10-31 RX ADMIN — AMPICILLIN AND SULBACTAM 200 GRAM(S): 2; 1 INJECTION, POWDER, FOR SOLUTION INTRAMUSCULAR; INTRAVENOUS at 11:40

## 2024-10-31 RX ADMIN — PANTOPRAZOLE SODIUM 40 MILLIGRAM(S): 40 TABLET, DELAYED RELEASE ORAL at 11:41

## 2024-10-31 RX ADMIN — AMPICILLIN AND SULBACTAM 200 GRAM(S): 2; 1 INJECTION, POWDER, FOR SOLUTION INTRAMUSCULAR; INTRAVENOUS at 17:02

## 2024-10-31 NOTE — H&P ADULT - ATTENDING COMMENTS
HPI:  79 year old male with PMHx of DM and HLD presented to the ED today with swelling and wounds on his LLE for the last month. He has pain at the site, no impairments with walking. No loss of sensation, weakness, or difficulty with movements. No fevers or chills.  Patient was treated at [facility] last week with Unasyn for LLE cellulitis but signed out AMA on 10/23 and was not taking Augmentin sent to pharmacy. Patient not aware of anemia diagnosis and denies any blood in stool.     ED vitals:   · BP Systolic	123 mm Hg  · BP Diastolic	64 mm Hg  · Heart Rate	96 /min  · Respiration Rate (breaths/min)	18 /min  · Temp (F)	98.4 Degrees F  · Temp (C)	36.9 Degrees C  · Temp site	oral  · SpO2 (%)	100 %  · O2 Delivery/Oxygen Delivery Method	room air  · Temp at ED Arrival (C)	36.9 Degrees C   (31 Oct 2024 00:08)    REVIEW OF SYSTEMS: see cc/HPI   CONSTITUTIONAL: No weakness, fevers or chills, (+) c/o pain in LLE   EYES/ENT: No visual changes;  No vertigo or throat pain   NECK: No pain or stiffness  RESPIRATORY: No cough, wheezing, hemoptysis; No shortness of breath  CARDIOVASCULAR: No chest pain or palpitations  GASTROINTESTINAL: No abdominal or epigastric pain. No nausea, vomiting, or hematemesis; No diarrhea or constipation. No melena or hematochezia.  GENITOURINARY: No dysuria, frequency or hematuria  NEUROLOGICAL: No numbness or weakness  SKIN: No itching, rashes, (+) distal LLE ulceration, erythema, swelling   ENDO: No hyperglycemia, No thyroid disorder, No dyslipidemia   HEM: No bleeding, No easy bruising, No anemia   PSYCHE: No psychosis, No mood disorder No hallucinations No delusion   MSK: No deformity, No fracture, No Joint swelling    Physical Exam:  General: WN/WD NAD  Neurology: A&Ox3, nonfocal, follows commands  Eyes: PERRLA/ EOMI  ENT/Neck: Neck supple, trachea midline, No JVD  Respiratory: CTA B/L, No wheezing, rales, rhonchi  CV: Normal rate regular rhythm, S1S2, no murmurs, rubs or gallops  Abdominal: Soft, NT, ND +BS,   Extremities: (+) swelling /edema of LLE, + peripheral pulses, see skin eval  Skin: No Rashes, Hematoma, Ecchymosis,(+) distal LLE ulceration, erythema, swelling, tender to palp/touch     A/p  Distal LLE Cellulitis / infected/non-healing skin ulceration   -IV Abx  -Arterial duplex   -check blood Cx  -elevate the LE and compression ( ACE vs. stockings)   -PRN pain Rx - IV morphine vs. Toradol    Anemia w/ macrocytosis - acute change ?? etiology - patient denies any obvious symptoms    -check retic %, FOBT, Peripheral smear   -serial CBC and Active T&S   -if FOBT positive ----> GI eval vs. Hem eval if no GI etiology   -IV PPI for now     HTN - stable   -c/w OP Rx     Dyslipidemia  -statin     DM type   -F/s monitoring and ISS     DVT prophylaxis    PATIENT SEEN by ATTENDING 10/30/24  and case/work-up d/w Resident ( note revisited 10/31)

## 2024-10-31 NOTE — PATIENT PROFILE ADULT - TOBACCO USE
[No Acute Distress] : no acute distress [Well Nourished] : well nourished [Well Developed] : well developed [Well-Appearing] : well-appearing [Normal Sclera/Conjunctiva] : normal sclera/conjunctiva [PERRL] : pupils equal round and reactive to light [EOMI] : extraocular movements intact [Normal Outer Ear/Nose] : the outer ears and nose were normal in appearance [Normal Oropharynx] : the oropharynx was normal [No JVD] : no jugular venous distention [No Lymphadenopathy] : no lymphadenopathy [Supple] : supple [No Respiratory Distress] : no respiratory distress  [No Accessory Muscle Use] : no accessory muscle use [Clear to Auscultation] : lungs were clear to auscultation bilaterally [Normal Rate] : normal rate  [Regular Rhythm] : with a regular rhythm [Normal S1, S2] : normal S1 and S2 [No Murmur] : no murmur heard [Pedal Pulses Present] : the pedal pulses are present [No Edema] : there was no peripheral edema [No Extremity Clubbing/Cyanosis] : no extremity clubbing/cyanosis [Soft] : abdomen soft [Non Tender] : non-tender [Non-distended] : non-distended [Normal Bowel Sounds] : normal bowel sounds [Normal Posterior Cervical Nodes] : no posterior cervical lymphadenopathy [Normal Anterior Cervical Nodes] : no anterior cervical lymphadenopathy [No CVA Tenderness] : no CVA  tenderness [No Spinal Tenderness] : no spinal tenderness [No Joint Swelling] : no joint swelling [Grossly Normal Strength/Tone] : grossly normal strength/tone [No Rash] : no rash [Coordination Grossly Intact] : coordination grossly intact [No Focal Deficits] : no focal deficits [Normal Gait] : normal gait [Speech Grossly Normal] : speech grossly normal [Normal Affect] : the affect was normal [Alert and Oriented x3] : oriented to person, place, and time [Normal Insight/Judgement] : insight and judgment were intact Never smoker

## 2024-10-31 NOTE — H&P ADULT - HISTORY OF PRESENT ILLNESS
79 year old male with pmhx of DM and HLD presented to the ED today with swelling and wounds on his LLE for the last month. He has pain at the site, no impairments with walking. No loss of sensation, weakness, or difficulty with movements. No fevers or chills.  Patient was treated at [facility] last week with Unasyn for LLE cellulitis but signed out AMA on 10/23 and was not taking Augmentin sent to pharmacy. Patient not aware of anemia diagnosis and denies any blood in stool     ED vitals:   · BP Systolic	123 mm Hg  · BP Diastolic	64 mm Hg  · Heart Rate	96 /min  · Respiration Rate (breaths/min)	18 /min  · Temp (F)	98.4 Degrees F  · Temp (C)	36.9 Degrees C  · Temp site	oral  · SpO2 (%)	100 %  · O2 Delivery/Oxygen Delivery Method	room air  · Temp at ED Arrival (C)	36.9 Degrees C   79 year old male with pmhx of DM and HLD presented to the ED today with swelling and wounds on his LLE for the last month. He has pain at the site, no impairments with walking. No loss of sensation, weakness, or difficulty with movements. No fevers or chills.  Patient was treated at [facility] last week with Unasyn for LLE cellulitis but signed out AMA on 10/23 and was not taking Augmentin sent to pharmacy. Patient not aware of anemia diagnosis and denies any blood in stool.     ED vitals:   · BP Systolic	123 mm Hg  · BP Diastolic	64 mm Hg  · Heart Rate	96 /min  · Respiration Rate (breaths/min)	18 /min  · Temp (F)	98.4 Degrees F  · Temp (C)	36.9 Degrees C  · Temp site	oral  · SpO2 (%)	100 %  · O2 Delivery/Oxygen Delivery Method	room air  · Temp at ED Arrival (C)	36.9 Degrees C

## 2024-10-31 NOTE — DISCHARGE NOTE NURSING/CASE MANAGEMENT/SOCIAL WORK - PATIENT PORTAL LINK FT
You can access the FollowMyHealth Patient Portal offered by [facility] by registering at the following website: [email or web address]. By joining [employer]’s FollowMyHealth portal, you will also be able to view your health information using other applications (apps) compatible with our system.

## 2024-10-31 NOTE — H&P ADULT - ASSESSMENT
79 year old male with pmhx of DM and HLD presented to the ED today with swelling and wounds on his LLE for the last month. Patient was treated at [facility] last week with Unasyn for LLE cellulitis but signed out AMA on 10/23. Patient LLE is warm, erythematous, edematous tender with visible wounds.     #Cellulitis, LLE  - Doppler negative for DVT  - XR of LLE negative for osteomyelitis   - lactate 1.8, afebrile and HDS, WBC elevated (14.83)  - s/p 2g Ancef in ED   - Fu blood culture, consider wound culture if purulent drainage occurs   - consider ID consult   - Continue Ancef     #Anemia  - 13.8 in Sept 2024, downtrended to between 7-8 now   - 8.3 on admission   - no signs of active bleed  - iron studies, b12, folate all within normal limits during previous admission   - keep hgb >7, maintain active T&S  - consider gi consult or outpatient workup   - protonix daily     #HTN  #HLD  - continue home atorvastatin 20     #DM  - hold home Januvia   - SSI   - a1c 5.5 previous admission     #Misc  Dvt ppx - lovenox   Diet - dash   Dispo - admit to [address] [place] [address]  79 year old male with pmhx of DM and HLD presented to the ED today with swelling and wounds on his LLE for the last month. Patient was treated at [facility] last week with Unasyn for LLE cellulitis but signed out AMA on 10/23.     #Cellulitis, LLE  - LLE is warm, erythematous, edematous tender with visible wounds and no pus drainage  - Doppler negative for DVT  - XR of LLE negative for osteomyelitis   - lactate 1.8, afebrile and HDS, WBC elevated (14.83)  - s/p 2g Ancef in ED   - Fu blood culture  - Seen by ID Dr. Foster during previous admission, will continue Unasyn 3g q6 per their recommendation   - ESR, CRP in am   - Pt refuses compression stocks for edema, use ACE bandage wrapping for now     #Anemia  - 13.8 in Sept 2024, downtrended to between 7-8 now   - 8.3 on admission, MCV elevated   - no signs of active bleed  - iron studies, folate all within normal limits during previous admission   - B12 246, on lower end   - keep hgb >7, maintain active T&S  - protonix daily   - fu retic count, consider heme onc or gi consult based on result   - fu FOBT     #HTN  #HLD  - continue home atorvastatin 20     #DM  - hold home Januvia   - SSI   - a1c 5.5 previous admission     #Misc  Dvt ppx - lovenox   Diet - dash   Dispo - admit to  medicine

## 2024-10-31 NOTE — H&P ADULT - NSHPLABSRESULTS_GEN_ALL_CORE
8.3    14.79 )-----------( 474      ( 30 Oct 2024 19:09 )             26.1       10-30    137  |  99  |  6[L]  ----------------------------<  74  4.3   |  26  |  0.6[L]    Ca    9.2      30 Oct 2024 19:09    TPro  7.1  /  Alb  4.1  /  TBili  0.3  /  DBili  x   /  AST  37  /  ALT  25  /  AlkPhos  94  10-30              Urinalysis Basic - ( 30 Oct 2024 19:09 )    Color: x / Appearance: x / SG: x / pH: x  Gluc: 74 mg/dL / Ketone: x  / Bili: x / Urobili: x   Blood: x / Protein: x / Nitrite: x   Leuk Esterase: x / RBC: x / WBC x   Sq Epi: x / Non Sq Epi: x / Bacteria: x            Lactate Trend  10-30 @ 19:09 Lactate:1.8             CAPILLARY BLOOD GLUCOSE            Culture Results:   No growth at 5 days (10-21 @ 12:50)  Culture Results:   No growth at 5 days (10-21 @ 12:50)

## 2024-10-31 NOTE — PROGRESS NOTE ADULT - SUBJECTIVE AND OBJECTIVE BOX
LEENA BAILEY 79y Male  MRN#: [id number]   Hospital Day: 1d    HPI:  79 year old male with pmhx of DM and HLD presented to the ED today with swelling and wounds on his LLE for the last month. He has pain at the site, no impairments with walking. No loss of sensation, weakness, or difficulty with movements. No fevers or chills.  Patient was treated at [facility] last week with Unasyn for LLE cellulitis but signed out AMA on 10/23 and was not taking Augmentin sent to pharmacy. Patient not aware of anemia diagnosis and denies any blood in stool.     ED vitals:   · BP Systolic	123 mm Hg  · BP Diastolic	64 mm Hg  · Heart Rate	96 /min  · Respiration Rate (breaths/min)	18 /min  · Temp (F)	98.4 Degrees F  · Temp (C)	36.9 Degrees C  · Temp site	oral  · SpO2 (%)	100 %  · O2 Delivery/Oxygen Delivery Method	room air  · Temp at ED Arrival (C)	36.9 Degrees C   (31 Oct 2024 00:08)      SUBJECTIVE      OBJECTIVE  PAST MEDICAL & SURGICAL HISTORY  Diabetes    HLD (hyperlipidemia)    BPH without urinary obstruction    S/P cataract surgery      ALLERGIES:  No Known Allergies    MEDICATIONS:  STANDING MEDICATIONS  ampicillin/sulbactam  IVPB      ampicillin/sulbactam  IVPB 3 Gram(s) IV Intermittent every 6 hours  atorvastatin 20 milliGRAM(s) Oral at bedtime  dextrose 5%. 1000 milliLiter(s) IV Continuous <Continuous>  dextrose 50% Injectable 25 Gram(s) IV Push once  enoxaparin Injectable 40 milliGRAM(s) SubCutaneous every 24 hours  glucagon  Injectable 1 milliGRAM(s) IntraMuscular once  insulin lispro (ADMELOG) corrective regimen sliding scale   SubCutaneous three times a day before meals  ketorolac   Injectable 15 milliGRAM(s) IV Push once  pantoprazole    Tablet 40 milliGRAM(s) Oral before breakfast  tamsulosin 0.4 milliGRAM(s) Oral at bedtime    PRN MEDICATIONS  acetaminophen     Tablet .. 650 milliGRAM(s) Oral every 6 hours PRN  dextrose Oral Gel 15 Gram(s) Oral once PRN  melatonin 3 milliGRAM(s) Oral at bedtime PRN      VITAL SIGNS: Last 24 Hours  T(C): 36.8 (30 Oct 2024 23:40), Max: 36.9 (30 Oct 2024 17:30)  T(F): 98.2 (30 Oct 2024 23:40), Max: 98.4 (30 Oct 2024 17:30)  HR: 89 (30 Oct 2024 23:40) (89 - 96)  BP: 134/69 (30 Oct 2024 23:40) (123/64 - 134/69)  BP(mean): --  RR: 18 (30 Oct 2024 23:40) (18 - 18)  SpO2: 98% (30 Oct 2024 23:40) (98% - 100%)    LABS:                        8.3    14.79 )-----------( 474      ( 30 Oct 2024 19:09 )             26.1     10-30    137  |  99  |  6[L]  ----------------------------<  74  4.3   |  26  |  0.6[L]    Ca    9.2      30 Oct 2024 19:09    TPro  7.1  /  Alb  4.1  /  TBili  0.3  /  DBili  x   /  AST  37  /  ALT  25  /  AlkPhos  94  10-30      Urinalysis Basic - ( 30 Oct 2024 19:09 )    Color: x / Appearance: x / SG: x / pH: x  Gluc: 74 mg/dL / Ketone: x  / Bili: x / Urobili: x   Blood: x / Protein: x / Nitrite: x   Leuk Esterase: x / RBC: x / WBC x   Sq Epi: x / Non Sq Epi: x / Bacteria: x        Lactate, Blood: 1.8 mmol/L (10-30-24 @ 19:09)              PHYSICAL EXAM:  GENERAL: NAD, well-developed.  HEAD:  Atraumatic, Normocephalic.  EYES: conjunctiva and sclera clear  CHEST/LUNG: GBAE. No wheezing or crackles   HEART: regular rate and rhythm; S1/S2.  ABDOMEN: Soft, Nontender, Nondistended  EXTREMITIES: No edema.   PSYCH: AAOx3.  NEUROLOGY: non-focal; moves all extremities

## 2024-10-31 NOTE — PATIENT PROFILE ADULT - FALL HARM RISK - HARM RISK INTERVENTIONS

## 2024-10-31 NOTE — PROGRESS NOTE ADULT - ASSESSMENT
80 yo M with a PMHx of DM and HLD, p/w LLE wound x1 month. Admitted for LLE cellulitis.     #Left LE Cellulitis  - Sepsis present on admission (HR, WBC, Lactic Acid)  - xray left foot: Focal 2.7 cm soft tissue irregularity/defect along the lateral aspect of the left mid to distal fibula, without underlying fracture or dislocation. No evidence of osteomyelitis.   - LE venous duplex 10/21 negative for DVT  - ESR/CRP elevated  - ID consulted  - c/w vancomycin and unasyn  - f/u blood cx    #Macrocytic Anemia  - H/H stable  - iron studies wnl  - b12, folate wnl   - monitor CBC  - keep active T&S    #DM2  - Hold PO meds  - IS SS    #HLD  - c/w Statin    #BPH  - c/w Flomax    Diet: DASH/CHO  Activity: OOB  DVT PPX: Lovenox  Code status: Full  Dispo: Home 80 yo M with a PMHx of DM and HLD, p/w LLE wound x1 month. Admitted for LLE cellulitis.     #Left LE Cellulitis  - Sepsis present on admission (HR, WBC, Lactic Acid)  - xray left foot: Focal 2.7 cm soft tissue irregularity/defect along the lateral aspect of the left mid to distal fibula, without underlying fracture or dislocation. No evidence of osteomyelitis.   - LE venous duplex 10/21 negative for DVT  - LE arterial duplex 10/31: normal arterial flow b/l   - ESR/CRP elevated  - f/u blood cx  - F/U ID  - c/w Zosyn was on it before  - started clindamycin 10/31   - burn consult for b/l LE ulcers     #Macrocytic Anemia  - H/H stable  - iron studies wnl  - b12 (low side), folate wnl   - started 3 days of b12 injectable   - monitor CBC  - keep active T&S    #DM2  - Hold PO meds  - IS SS    #HLD  - c/w Statin    #BPH  - c/w Flomax    Diet: DASH/CHO  Activity: OOB  DVT PPX: Lovenox  Code status: Full  Dispo: Home

## 2024-10-31 NOTE — DISCHARGE NOTE NURSING/CASE MANAGEMENT/SOCIAL WORK - FINANCIAL ASSISTANCE
[facility] provides services at a reduced cost to those who are determined to be eligible through [facility]’s financial assistance program. Information regarding [facility]’s financial assistance program can be found by going to [email or web address] or by calling [phone].

## 2024-10-31 NOTE — H&P ADULT - NSHPPHYSICALEXAM_GEN_ALL_CORE
VITALS:   T(C): 36.9 (10-30-24 @ 17:30), Max: 36.9 (10-30-24 @ 17:30)  HR: 96 (10-30-24 @ 17:30) (96 - 96)  BP: 123/64 (10-30-24 @ 17:30) (123/64 - 123/64)  RR: 18 (10-30-24 @ 17:30) (18 - 18)  SpO2: 100% (10-30-24 @ 17:30) (100% - 100%)    GENERAL: NAD, lying in bed comfortably  HEAD:  Atraumatic, normocephalic  EYES: EOMI, PERRLA, conjunctiva and sclera clear  ENT: Moist mucous membranes  NECK: Supple, no JVD  HEART: Regular rate and rhythm, no murmurs, rubs, or gallops  LUNGS: Unlabored respirations.  Clear to auscultation bilaterally, no crackles, wheezing, or rhonchi  ABDOMEN: Soft, nontender, nondistended  EXTREMITIES: 2+ peripheral pulses bilaterally. No clubbing, cyanosis. LLE erythematous edematous warn to touch, non pitting, with open wounds some with yellow crusting around, no visible pus. RLE has dry skin discoloration without wounds. Sensation and movement intact bilaterally.   NERVOUS SYSTEM:  A&Ox3, no focal deficits

## 2024-11-01 LAB
ALBUMIN SERPL ELPH-MCNC: 3.6 G/DL — SIGNIFICANT CHANGE UP (ref 3.5–5.2)
ALP SERPL-CCNC: 77 U/L — SIGNIFICANT CHANGE UP (ref 30–115)
ALT FLD-CCNC: 16 U/L — SIGNIFICANT CHANGE UP (ref 0–41)
ANION GAP SERPL CALC-SCNC: 11 MMOL/L — SIGNIFICANT CHANGE UP (ref 7–14)
AST SERPL-CCNC: 24 U/L — SIGNIFICANT CHANGE UP (ref 0–41)
BASOPHILS # BLD AUTO: 0.04 K/UL — SIGNIFICANT CHANGE UP (ref 0–0.2)
BASOPHILS NFR BLD AUTO: 0.4 % — SIGNIFICANT CHANGE UP (ref 0–1)
BILIRUB SERPL-MCNC: 0.3 MG/DL — SIGNIFICANT CHANGE UP (ref 0.2–1.2)
BUN SERPL-MCNC: 8 MG/DL — LOW (ref 10–20)
CALCIUM SERPL-MCNC: 8.6 MG/DL — SIGNIFICANT CHANGE UP (ref 8.4–10.4)
CHLORIDE SERPL-SCNC: 102 MMOL/L — SIGNIFICANT CHANGE UP (ref 98–110)
CO2 SERPL-SCNC: 27 MMOL/L — SIGNIFICANT CHANGE UP (ref 17–32)
CREAT SERPL-MCNC: 0.8 MG/DL — SIGNIFICANT CHANGE UP (ref 0.7–1.5)
EGFR: 89 ML/MIN/1.73M2 — SIGNIFICANT CHANGE UP
EOSINOPHIL # BLD AUTO: 0.25 K/UL — SIGNIFICANT CHANGE UP (ref 0–0.7)
EOSINOPHIL NFR BLD AUTO: 2.4 % — SIGNIFICANT CHANGE UP (ref 0–8)
GLUCOSE BLDC GLUCOMTR-MCNC: 105 MG/DL — HIGH (ref 70–99)
GLUCOSE BLDC GLUCOMTR-MCNC: 123 MG/DL — HIGH (ref 70–99)
GLUCOSE BLDC GLUCOMTR-MCNC: 135 MG/DL — HIGH (ref 70–99)
GLUCOSE BLDC GLUCOMTR-MCNC: 141 MG/DL — HIGH (ref 70–99)
GLUCOSE SERPL-MCNC: 100 MG/DL — HIGH (ref 70–99)
HCT VFR BLD CALC: 23.8 % — LOW (ref 42–52)
HGB BLD-MCNC: 7.5 G/DL — LOW (ref 14–18)
IMM GRANULOCYTES NFR BLD AUTO: 1 % — HIGH (ref 0.1–0.3)
LYMPHOCYTES # BLD AUTO: 1.64 K/UL — SIGNIFICANT CHANGE UP (ref 1.2–3.4)
LYMPHOCYTES # BLD AUTO: 15.9 % — LOW (ref 20.5–51.1)
MAGNESIUM SERPL-MCNC: 1.9 MG/DL — SIGNIFICANT CHANGE UP (ref 1.8–2.4)
MCHC RBC-ENTMCNC: 31.5 G/DL — LOW (ref 32–37)
MCHC RBC-ENTMCNC: 32.6 PG — HIGH (ref 27–31)
MCV RBC AUTO: 103.5 FL — HIGH (ref 80–94)
MONOCYTES # BLD AUTO: 1.1 K/UL — HIGH (ref 0.1–0.6)
MONOCYTES NFR BLD AUTO: 10.7 % — HIGH (ref 1.7–9.3)
NEUTROPHILS # BLD AUTO: 7.16 K/UL — HIGH (ref 1.4–6.5)
NEUTROPHILS NFR BLD AUTO: 69.6 % — SIGNIFICANT CHANGE UP (ref 42.2–75.2)
NRBC # BLD: 0 /100 WBCS — SIGNIFICANT CHANGE UP (ref 0–0)
PLATELET # BLD AUTO: 389 K/UL — SIGNIFICANT CHANGE UP (ref 130–400)
PMV BLD: 9.3 FL — SIGNIFICANT CHANGE UP (ref 7.4–10.4)
POTASSIUM SERPL-MCNC: 4.2 MMOL/L — SIGNIFICANT CHANGE UP (ref 3.5–5)
POTASSIUM SERPL-SCNC: 4.2 MMOL/L — SIGNIFICANT CHANGE UP (ref 3.5–5)
PROT SERPL-MCNC: 6.4 G/DL — SIGNIFICANT CHANGE UP (ref 6–8)
RBC # BLD: 2.3 M/UL — LOW (ref 4.7–6.1)
RBC # FLD: 14.6 % — HIGH (ref 11.5–14.5)
SODIUM SERPL-SCNC: 140 MMOL/L — SIGNIFICANT CHANGE UP (ref 135–146)
VIT B12 SERPL-MCNC: 341 PG/ML — SIGNIFICANT CHANGE UP (ref 232–1245)
WBC # BLD: 10.29 K/UL — SIGNIFICANT CHANGE UP (ref 4.8–10.8)
WBC # FLD AUTO: 10.29 K/UL — SIGNIFICANT CHANGE UP (ref 4.8–10.8)

## 2024-11-01 PROCEDURE — 99497 ADVNCD CARE PLAN 30 MIN: CPT

## 2024-11-01 PROCEDURE — 99231 SBSQ HOSP IP/OBS SF/LOW 25: CPT

## 2024-11-01 PROCEDURE — 99232 SBSQ HOSP IP/OBS MODERATE 35: CPT

## 2024-11-01 RX ORDER — MORPHINE SULFATE 30 MG/1
1 TABLET, EXTENDED RELEASE ORAL EVERY 4 HOURS
Refills: 0 | Status: DISCONTINUED | OUTPATIENT
Start: 2024-11-01 | End: 2024-11-01

## 2024-11-01 RX ORDER — SILVER SULFADIAZINE 10 MG/G
1 CREAM TOPICAL
Refills: 0 | Status: DISCONTINUED | OUTPATIENT
Start: 2024-11-01 | End: 2024-11-06

## 2024-11-01 RX ORDER — PANTOPRAZOLE SODIUM 40 MG/1
40 TABLET, DELAYED RELEASE ORAL
Refills: 0 | Status: DISCONTINUED | OUTPATIENT
Start: 2024-11-01 | End: 2024-11-06

## 2024-11-01 RX ORDER — MORPHINE SULFATE 30 MG/1
1 TABLET, EXTENDED RELEASE ORAL EVERY 4 HOURS
Refills: 0 | Status: DISCONTINUED | OUTPATIENT
Start: 2024-11-01 | End: 2024-11-05

## 2024-11-01 RX ADMIN — AMPICILLIN AND SULBACTAM 200 GRAM(S): 2; 1 INJECTION, POWDER, FOR SOLUTION INTRAMUSCULAR; INTRAVENOUS at 23:00

## 2024-11-01 RX ADMIN — AMPICILLIN AND SULBACTAM 200 GRAM(S): 2; 1 INJECTION, POWDER, FOR SOLUTION INTRAMUSCULAR; INTRAVENOUS at 11:38

## 2024-11-01 RX ADMIN — MORPHINE SULFATE 1 MILLIGRAM(S): 30 TABLET, EXTENDED RELEASE ORAL at 14:28

## 2024-11-01 RX ADMIN — Medication 20 MILLIGRAM(S): at 21:01

## 2024-11-01 RX ADMIN — AMPICILLIN AND SULBACTAM 200 GRAM(S): 2; 1 INJECTION, POWDER, FOR SOLUTION INTRAMUSCULAR; INTRAVENOUS at 05:06

## 2024-11-01 RX ADMIN — Medication 0.4 MILLIGRAM(S): at 21:00

## 2024-11-01 RX ADMIN — Medication 1000 MICROGRAM(S): at 11:38

## 2024-11-01 RX ADMIN — Medication 100 MILLIGRAM(S): at 05:06

## 2024-11-01 RX ADMIN — AMPICILLIN AND SULBACTAM 200 GRAM(S): 2; 1 INJECTION, POWDER, FOR SOLUTION INTRAMUSCULAR; INTRAVENOUS at 17:58

## 2024-11-01 RX ADMIN — MORPHINE SULFATE 1 MILLIGRAM(S): 30 TABLET, EXTENDED RELEASE ORAL at 15:37

## 2024-11-01 RX ADMIN — MORPHINE SULFATE 1 MILLIGRAM(S): 30 TABLET, EXTENDED RELEASE ORAL at 11:37

## 2024-11-01 RX ADMIN — SILVER SULFADIAZINE 1 APPLICATION(S): 10 CREAM TOPICAL at 17:56

## 2024-11-01 RX ADMIN — NYSTATIN 1 APPLICATION(S): 100000 POWDER TOPICAL at 05:36

## 2024-11-01 RX ADMIN — Medication 40 MILLIGRAM(S): at 11:39

## 2024-11-01 NOTE — PROGRESS NOTE ADULT - SUBJECTIVE AND OBJECTIVE BOX
LEENA BAILEY 79y Male  MRN#: [id number]   Hospital Day: 2d    HPI:  79 year old male with pmhx of DM and HLD presented to the ED today with swelling and wounds on his LLE for the last month. He has pain at the site, no impairments with walking. No loss of sensation, weakness, or difficulty with movements. No fevers or chills.  Patient was treated at [facility] last week with Unasyn for LLE cellulitis but signed out AMA on 10/23 and was not taking Augmentin sent to pharmacy. Patient not aware of anemia diagnosis and denies any blood in stool.     ED vitals:   · BP Systolic	123 mm Hg  · BP Diastolic	64 mm Hg  · Heart Rate	96 /min  · Respiration Rate (breaths/min)	18 /min  · Temp (F)	98.4 Degrees F  · Temp (C)	36.9 Degrees C  · Temp site	oral  · SpO2 (%)	100 %  · O2 Delivery/Oxygen Delivery Method	room air  · Temp at ED Arrival (C)	36.9 Degrees C   (31 Oct 2024 00:08)      SUBJECTIVE      OBJECTIVE  PAST MEDICAL & SURGICAL HISTORY  Diabetes    HLD (hyperlipidemia)    BPH without urinary obstruction    S/P cataract surgery      ALLERGIES:  No Known Allergies    MEDICATIONS:  STANDING MEDICATIONS  ampicillin/sulbactam  IVPB      ampicillin/sulbactam  IVPB 3 Gram(s) IV Intermittent every 6 hours  atorvastatin 20 milliGRAM(s) Oral at bedtime  clindamycin IVPB      clindamycin IVPB 600 milliGRAM(s) IV Intermittent every 8 hours  cyanocobalamin Injectable 1000 MICROGram(s) IntraMuscular daily  dextrose 5%. 1000 milliLiter(s) IV Continuous <Continuous>  dextrose 50% Injectable 25 Gram(s) IV Push once  enoxaparin Injectable 40 milliGRAM(s) SubCutaneous every 24 hours  glucagon  Injectable 1 milliGRAM(s) IntraMuscular once  insulin lispro (ADMELOG) corrective regimen sliding scale   SubCutaneous three times a day before meals  nystatin Cream 1 Application(s) Topical two times a day  pantoprazole  Injectable 40 milliGRAM(s) IV Push daily  tamsulosin 0.4 milliGRAM(s) Oral at bedtime    PRN MEDICATIONS  acetaminophen     Tablet .. 650 milliGRAM(s) Oral every 6 hours PRN  dextrose Oral Gel 15 Gram(s) Oral once PRN  melatonin 3 milliGRAM(s) Oral at bedtime PRN      VITAL SIGNS: Last 24 Hours  T(C): 36.8 (01 Nov 2024 01:00), Max: 36.8 (31 Oct 2024 07:46)  T(F): 98.2 (01 Nov 2024 01:00), Max: 98.3 (31 Oct 2024 07:46)  HR: 83 (01 Nov 2024 01:00) (74 - 83)  BP: 123/60 (01 Nov 2024 01:00) (116/55 - 123/60)  BP(mean): --  RR: 19 (01 Nov 2024 01:00) (18 - 19)  SpO2: 97% (01 Nov 2024 01:00) (97% - 97%)    LABS:                        8.1    11.69 )-----------( 442      ( 31 Oct 2024 06:17 )             25.6     10-31    144  |  105  |  7[L]  ----------------------------<  87  4.3   |  25  |  0.7    Ca    9.0      31 Oct 2024 06:17    TPro  6.8  /  Alb  4.0  /  TBili  0.3  /  DBili  x   /  AST  27  /  ALT  21  /  AlkPhos  87  10-31      Urinalysis Basic - ( 31 Oct 2024 06:17 )    Color: x / Appearance: x / SG: x / pH: x  Gluc: 87 mg/dL / Ketone: x  / Bili: x / Urobili: x   Blood: x / Protein: x / Nitrite: x   Leuk Esterase: x / RBC: x / WBC x   Sq Epi: x / Non Sq Epi: x / Bacteria: x                    PHYSICAL EXAM:  GENERAL: NAD, well-developed.  HEAD:  Atraumatic, Normocephalic.  EYES: conjunctiva and sclera clear  CHEST/LUNG: GBAE. No wheezing or crackles   HEART: regular rate and rhythm; S1/S2.  ABDOMEN: Soft, Nontender, Nondistended  EXTREMITIES: No edema.   PSYCH: AAOx3.  NEUROLOGY: non-focal; moves all extremities     LEENA BAILEY 79y Male  MRN#: [id number]   Hospital Day: 2d    HPI:  79 year old male with pmhx of DM and HLD presented to the ED today with swelling and wounds on his LLE for the last month. He has pain at the site, no impairments with walking. No loss of sensation, weakness, or difficulty with movements. No fevers or chills.  Patient was treated at [facility] last week with Unasyn for LLE cellulitis but signed out AMA on 10/23 and was not taking Augmentin sent to pharmacy. Patient not aware of anemia diagnosis and denies any blood in stool.     ED vitals:   · BP Systolic	123 mm Hg  · BP Diastolic	64 mm Hg  · Heart Rate	96 /min  · Respiration Rate (breaths/min)	18 /min  · Temp (F)	98.4 Degrees F  · Temp (C)	36.9 Degrees C  · Temp site	oral  · SpO2 (%)	100 %  · O2 Delivery/Oxygen Delivery Method	room air  · Temp at ED Arrival (C)	36.9 Degrees C   (31 Oct 2024 00:08)      SUBJECTIVE  PT was seen and evaluated at bedside. No acute events overnight.     OBJECTIVE  PAST MEDICAL & SURGICAL HISTORY  Diabetes    HLD (hyperlipidemia)    BPH without urinary obstruction    S/P cataract surgery      ALLERGIES:  No Known Allergies    MEDICATIONS:  STANDING MEDICATIONS  ampicillin/sulbactam  IVPB      ampicillin/sulbactam  IVPB 3 Gram(s) IV Intermittent every 6 hours  atorvastatin 20 milliGRAM(s) Oral at bedtime  clindamycin IVPB      clindamycin IVPB 600 milliGRAM(s) IV Intermittent every 8 hours  cyanocobalamin Injectable 1000 MICROGram(s) IntraMuscular daily  dextrose 5%. 1000 milliLiter(s) IV Continuous <Continuous>  dextrose 50% Injectable 25 Gram(s) IV Push once  enoxaparin Injectable 40 milliGRAM(s) SubCutaneous every 24 hours  glucagon  Injectable 1 milliGRAM(s) IntraMuscular once  insulin lispro (ADMELOG) corrective regimen sliding scale   SubCutaneous three times a day before meals  nystatin Cream 1 Application(s) Topical two times a day  pantoprazole  Injectable 40 milliGRAM(s) IV Push daily  tamsulosin 0.4 milliGRAM(s) Oral at bedtime    PRN MEDICATIONS  acetaminophen     Tablet .. 650 milliGRAM(s) Oral every 6 hours PRN  dextrose Oral Gel 15 Gram(s) Oral once PRN  melatonin 3 milliGRAM(s) Oral at bedtime PRN      VITAL SIGNS: Last 24 Hours  T(C): 36.8 (01 Nov 2024 01:00), Max: 36.8 (31 Oct 2024 07:46)  T(F): 98.2 (01 Nov 2024 01:00), Max: 98.3 (31 Oct 2024 07:46)  HR: 83 (01 Nov 2024 01:00) (74 - 83)  BP: 123/60 (01 Nov 2024 01:00) (116/55 - 123/60)  BP(mean): --  RR: 19 (01 Nov 2024 01:00) (18 - 19)  SpO2: 97% (01 Nov 2024 01:00) (97% - 97%)    LABS:                        8.1    11.69 )-----------( 442      ( 31 Oct 2024 06:17 )             25.6     10-31    144  |  105  |  7[L]  ----------------------------<  87  4.3   |  25  |  0.7    Ca    9.0      31 Oct 2024 06:17    TPro  6.8  /  Alb  4.0  /  TBili  0.3  /  DBili  x   /  AST  27  /  ALT  21  /  AlkPhos  87  10-31      Urinalysis Basic - ( 31 Oct 2024 06:17 )    Color: x / Appearance: x / SG: x / pH: x  Gluc: 87 mg/dL / Ketone: x  / Bili: x / Urobili: x   Blood: x / Protein: x / Nitrite: x   Leuk Esterase: x / RBC: x / WBC x   Sq Epi: x / Non Sq Epi: x / Bacteria: x                    PHYSICAL EXAM:  GENERAL: NAD, well-developed.  HEAD:  Atraumatic, Normocephalic.  EYES: conjunctiva and sclera clear  CHEST/LUNG: GBAE. No wheezing or crackles   HEART: regular rate and rhythm; S1/S2.  ABDOMEN: Soft, Nontender, Nondistended  EXTREMITIES: LLE swollen warm, with some drainage not pus  PSYCH: AAOx3.  NEUROLOGY: non-focal; moves all extremities

## 2024-11-01 NOTE — CONSULT NOTE ADULT - SUBJECTIVE AND OBJECTIVE BOX
LEENA BAILEY  79y, Male  Allergy: No Known Allergies      All historical available data reviewed.    HPI:  79 year old male with pmhx of DM and HLD presented to the ED today with swelling and wounds on his LLE for the last month. He has pain at the site, no impairments with walking. No loss of sensation, weakness, or difficulty with movements. No fevers or chills.  Patient was treated at [facility] last week with Unasyn for LLE cellulitis but signed out AMA on 10/23 and was not taking Augmentin sent to pharmacy. Patient not aware of anemia diagnosis and denies any blood in stool.     ED vitals:   · BP Systolic	123 mm Hg  · BP Diastolic	64 mm Hg  · Heart Rate	96 /min  · Respiration Rate (breaths/min)	18 /min  · Temp (F)	98.4 Degrees F  · Temp (C)	36.9 Degrees C  · Temp site	oral  · SpO2 (%)	100 %  · O2 Delivery/Oxygen Delivery Method	room air  · Temp at ED Arrival (C)	36.9 Degrees C   (31 Oct 2024 00:08)    FAMILY HISTORY:    PAST MEDICAL & SURGICAL HISTORY:  Diabetes      HLD (hyperlipidemia)      BPH without urinary obstruction      S/P cataract surgery            VITALS:  T(F): 98.5, Max: 98.5 (11-01-24 @ 07:14)  HR: 78  BP: 116/67  RR: 18Vital Signs Last 24 Hrs  T(C): 36.9 (01 Nov 2024 07:14), Max: 36.9 (01 Nov 2024 07:14)  T(F): 98.5 (01 Nov 2024 07:14), Max: 98.5 (01 Nov 2024 07:14)  HR: 78 (01 Nov 2024 07:14) (76 - 83)  BP: 116/67 (01 Nov 2024 07:14) (116/55 - 123/60)  BP(mean): --  RR: 18 (01 Nov 2024 07:14) (18 - 19)  SpO2: 100% (01 Nov 2024 07:14) (97% - 100%)    Parameters below as of 01 Nov 2024 07:14  Patient On (Oxygen Delivery Method): room air        TESTS & MEASUREMENTS:                        7.5    10.29 )-----------( 389      ( 01 Nov 2024 05:26 )             23.8     11-01    140  |  102  |  8[L]  ----------------------------<  100[H]  4.2   |  27  |  0.8    Ca    8.6      01 Nov 2024 05:26  Mg     1.9     11-01    TPro  6.4  /  Alb  3.6  /  TBili  0.3  /  DBili  x   /  AST  24  /  ALT  16  /  AlkPhos  77  11-01    LIVER FUNCTIONS - ( 01 Nov 2024 05:26 )  Alb: 3.6 g/dL / Pro: 6.4 g/dL / ALK PHOS: 77 U/L / ALT: 16 U/L / AST: 24 U/L / GGT: x             Urinalysis Basic - ( 01 Nov 2024 05:26 )    Color: x / Appearance: x / SG: x / pH: x  Gluc: 100 mg/dL / Ketone: x  / Bili: x / Urobili: x   Blood: x / Protein: x / Nitrite: x   Leuk Esterase: x / RBC: x / WBC x   Sq Epi: x / Non Sq Epi: x / Bacteria: x          RADIOLOGY & ADDITIONAL TESTS:  Personal review of radiological diagnostics performed  Echo and EKG results noted when applicable.     MEDICATIONS:  acetaminophen     Tablet .. 650 milliGRAM(s) Oral every 6 hours PRN  ampicillin/sulbactam  IVPB      ampicillin/sulbactam  IVPB 3 Gram(s) IV Intermittent every 6 hours  atorvastatin 20 milliGRAM(s) Oral at bedtime  clindamycin IVPB      clindamycin IVPB 600 milliGRAM(s) IV Intermittent every 8 hours  cyanocobalamin Injectable 1000 MICROGram(s) IntraMuscular daily  dextrose 5%. 1000 milliLiter(s) IV Continuous <Continuous>  dextrose 50% Injectable 25 Gram(s) IV Push once  dextrose Oral Gel 15 Gram(s) Oral once PRN  enoxaparin Injectable 40 milliGRAM(s) SubCutaneous every 24 hours  glucagon  Injectable 1 milliGRAM(s) IntraMuscular once  insulin lispro (ADMELOG) corrective regimen sliding scale   SubCutaneous three times a day before meals  melatonin 3 milliGRAM(s) Oral at bedtime PRN  nystatin Cream 1 Application(s) Topical two times a day  pantoprazole  Injectable 40 milliGRAM(s) IV Push daily  tamsulosin 0.4 milliGRAM(s) Oral at bedtime      ANTIBIOTICS:  ampicillin/sulbactam  IVPB      ampicillin/sulbactam  IVPB 3 Gram(s) IV Intermittent every 6 hours  clindamycin IVPB      clindamycin IVPB 600 milliGRAM(s) IV Intermittent every 8 hours

## 2024-11-01 NOTE — CONSULT NOTE ADULT - ASSESSMENT
79 year old male with pmhx of DM, HLD, BPH seen by burn team for bilateral LE wounds.     ASSESSMENT/PLAN:  #b/l LE wounds with venous stasis changes   - No surgical debridement  - wound care: Wash with soap and water BID, apply silvadene, cover with ABD/Kerlix/ACE BID  - Abx as indicated/ per ID  - Rest of care and management as per primary team  79 year old male with pmhx of DM, HLD, BPH seen by burn team for bilateral LE wounds.     ASSESSMENT/PLAN:  #b/l LE wounds with venous stasis changes   - No surgical debridement  - wound care: Wash with soap and water BID  --> LEFT LEG: apply silvadene, cover with ABD/Kerlix/ACE BID  --> RIGHT LEG: wrap with ACE wrap   - F/u culture results   - Abx as indicated/ per ID  - Rest of care and management as per primary team

## 2024-11-01 NOTE — PROGRESS NOTE ADULT - CONVERSATION DETAILS
The Goals of care and preferences and wishes were discussed with patient. Discussed in detail possible prognosis and treatment options reviewed as best as possible .   Resuscitation measures explained and patient's wishes discussed.     patient is undecided now and will think about it. Informed patient we will be continuing FULL CODE status untill he finalized the decision.

## 2024-11-01 NOTE — PROGRESS NOTE ADULT - ASSESSMENT
78 yo M with a PMHx of DM and HLD, p/w LLE wound x1 month. Admitted for LLE cellulitis.     #Left LE Cellulitis  - Sepsis present on admission (HR, WBC, Lactic Acid)  - xray left foot: Focal 2.7 cm soft tissue irregularity/defect along the lateral aspect of the left mid to distal fibula, without underlying fracture or dislocation. No evidence of osteomyelitis.   - LE venous duplex 10/21 negative for DVT  - LE arterial duplex 10/31: normal arterial flow b/l   - ESR/CRP elevated  - f/u blood cx  - F/U ID  - c/w Zosyn was on it before  - started clindamycin 10/31   - burn consult for b/l LE ulcers     #Macrocytic Anemia  - H/H stable  - iron studies wnl  - b12 (low side), folate wnl   - started 3 days of b12 injectable   - monitor CBC  - keep active T&S    #DM2  - Hold PO meds  - IS SS    #HLD  - c/w Statin    #BPH  - c/w Flomax    Diet: DASH/CHO  Activity: OOB  DVT PPX: Lovenox  Code status: Full  Dispo: Home 80 yo M with a PMHx of DM and HLD, p/w LLE wound x1 month. Admitted for LLE cellulitis.     #Left LE Cellulitis  - Sepsis present on admission (HR, WBC, Lactic Acid)  - xray left foot: Focal 2.7 cm soft tissue irregularity/defect along the lateral aspect of the left mid to distal fibula, without underlying fracture or dislocation. No evidence of osteomyelitis.   - LE venous duplex 10/21 negative for DVT  - LE arterial duplex 10/31: normal arterial flow b/l   - ESR/CRP elevated  - f/u blood cx  - started clindamycin 10/31 Dc'ed   - burn consult for b/l LE ulcers   - ID: c/w unasyn, add prednisone 40 x 3 days, silvadene cream b/l BID    #Macrocytic Anemia  - H/H stable  - iron studies wnl  - b12 (low side), folate wnl   - started 3 days of b12 injectable   - monitor CBC  - keep active T&S    #DM2  - Hold PO meds  - IS SS    #HLD  - c/w Statin    #BPH  - c/w Flomax    Diet: DASH/CHO  Activity: OOB  DVT PPX: Lovenox  Code status: Full  Dispo: Home 78 yo M with a PMHx of DM and HLD, p/w LLE wound x1 month. Admitted for LLE cellulitis.     #Left LE Cellulitis  - Sepsis present on admission (HR, WBC, Lactic Acid)  - xray left foot: Focal 2.7 cm soft tissue irregularity/defect along the lateral aspect of the left mid to distal fibula, without underlying fracture or dislocation. No evidence of osteomyelitis.   - LE venous duplex 10/21 negative for DVT  - LE arterial duplex 10/31: normal arterial flow b/l   - ESR/CRP elevated  - f/u blood cx  - started clindamycin 10/31 Dc'ed   - burn consult for b/l LE ulcers   - ID: c/w unasyn, add prednisone 40 x 3 days, silvadene cream b/l BID    #Penile rash  - pt had some irritation on tip of the penis   - had some fungal like infection on the glans under the foreskin  - c/w Nystatin cream bid     #Macrocytic Anemia  - H/H stable  - iron studies wnl  - b12 (low side), folate wnl   - started 3 days of b12 injectable   - monitor CBC  - keep active T&S    #DM2  - Hold PO meds  - IS SS    #HLD  - c/w Statin    #BPH  - c/w Flomax    Diet: DASH/CHO  Activity: OOB  DVT PPX: Lovenox  Code status: Full  Dispo: Home 80 yo M with a PMHx of DM and HLD, p/w LLE wound x1 month. Admitted for LLE cellulitis.     #Left LE Cellulitis  - Sepsis present on admission (HR, WBC, Lactic Acid)  - xray left foot: Focal 2.7 cm soft tissue irregularity/defect along the lateral aspect of the left mid to distal fibula, without underlying fracture or dislocation. No evidence of osteomyelitis.   - LE venous duplex 10/21 negative for DVT  - LE arterial duplex 10/31: normal arterial flow b/l   - ESR/CRP elevated  - f/u blood cx  - started clindamycin 10/31 Dc'ed   - C/w wound care, no debridements   - ID: c/w unasyn, add prednisone 40 x 3 days, silvadene cream b/l BID    #Penile rash  - pt had some irritation on tip of the penis   - had some fungal like infection on the glans under the foreskin  - c/w Nystatin cream bid     #Macrocytic Anemia  - H/H stable  - iron studies wnl  - b12 (low side), folate wnl   - started 3 days of b12 injectable   - monitor CBC  - keep active T&S    #DM2  - Hold PO meds  - IS SS    #HLD  - c/w Statin    #BPH  - c/w Flomax    Diet: DASH/CHO  Activity: OOB  DVT PPX: Lovenox  Code status: Full  Dispo: Home

## 2024-11-01 NOTE — CONSULT NOTE ADULT - ASSESSMENT
79 year old male with pmhx of DM and HLD presented to the ED today with swelling and wounds on his LLE for the last month. He has pain at the site, no impairments with walking. No loss of sensation, weakness, or difficulty with movements. No fevers or chills.  Patient was treated at [facility] last week with Unasyn for LLE cellulitis but signed out AMA on 10/23 and was not taking Augmentin sent to pharmacy. Patient not aware of anemia diagnosis and denies any blood in stool.     IMPRESSION/RECOMMENDATIONS  Immunosuppression/Immunosenescence ( above age 60 yrs there is a exponential decline in immunity which could result in poor clinical outcomes.   LLE cellulitis  Chronic lymphedema Janet  10/31 Duplex Janet : good arterial flow  10/30 Doppler LLE : no DVT  WBC 10.2    Diabetes  HLD (hyperlipidemia)  BPH without urinary obstruction  Morbid obesity    -Off loading to prevent pressure sores and preventive measures to avoid aspiration  -Local Silvadene cream to Janet b/l  -consider po Prednisone 40 mg q24h for 3-4 days ( to reduce the inflammation )  -Unasyn 3 gm iv q6h. Monitor for toxicity: hemolysis ( daily CBC) , rash ( clinical monitoring )    Discussion of management/test results with primary team.

## 2024-11-01 NOTE — CONSULT NOTE ADULT - SUBJECTIVE AND OBJECTIVE BOX
79 year old male with pmhx of DM, HLD, BPH presented to the ED with swelling and wounds on his LLE for the last month. He has pain at the site, no impairments with walking. No loss of sensation, weakness, or difficulty with movements. No fevers or chills.  Patient was treated at [facility] last week with Unasyn for LLE cellulitis but signed out AMA on 10/23 and was not taking Augmentin sent to pharmacy. Patient not aware of anemia diagnosis and denies any blood in stool.   -------------  Burn team was consulted for evaluation of bilateral LE wounds. Patient was seen with attending on AM rounds.       Allergies  No Known Allergies    PAST MEDICAL & SURGICAL HISTORY:  Diabetes  HLD (hyperlipidemia)  BPH without urinary obstruction  S/P cataract surgery    Vital Signs Last 24 Hrs  T(C): 36.9 (01 Nov 2024 07:14), Max: 36.9 (01 Nov 2024 07:14)  T(F): 98.5 (01 Nov 2024 07:14), Max: 98.5 (01 Nov 2024 07:14)  HR: 78 (01 Nov 2024 07:14) (76 - 83)  BP: 116/67 (01 Nov 2024 07:14) (116/55 - 123/60)  RR: 18 (01 Nov 2024 07:14) (18 - 19)  SpO2: 100% (01 Nov 2024 07:14) (97% - 100%)    Parameters below as of 01 Nov 2024 07:14  Patient On (Oxygen Delivery Method): room air      LABS:                        7.5    10.29 )-----------( 389      ( 01 Nov 2024 05:26 )             23.8       PHYSICAL EXAM:  GENERAL: Sitting comfortably in chair, NAD, alert, cooperative  HEAD:  Normocephalic  HEART/LUNG:  Not in cardiopulmonary distress, no retractions or increased WOB. Breathing comfortably on room air  NEUROLOGY: AAOx3, non-focal, moves all four extremities  SKIN/WOUND:   LLE: scattered partial thickness wounds with adherent yellow exudate and venous stasis changes. Surrounding erythema, mild distal edema. No active bleeding, no gross purulence, no malodor   RLE: venous stasis changes, scattered areas of hyperpigmentation mostly on lateral shin and mild surrounding erythema  Dressing change performed, patient tolerated well.                           79 year old male with pmhx of DM, HLD, BPH presented to the ED with swelling and wounds on his LLE for the last month. He has pain at the site, no impairments with walking. No loss of sensation, weakness, or difficulty with movements. No fevers or chills.  Patient was treated at [facility] last week with Unasyn for LLE cellulitis but signed out AMA on 10/23 and was not taking Augmentin sent to pharmacy. Patient not aware of anemia diagnosis and denies any blood in stool.   -------------  Burn team was consulted for evaluation of bilateral LE wounds. Patient was seen with attending on AM rounds.       Allergies  No Known Allergies    PAST MEDICAL & SURGICAL HISTORY:  Diabetes  HLD (hyperlipidemia)  BPH without urinary obstruction  S/P cataract surgery    Vital Signs Last 24 Hrs  T(C): 36.9 (01 Nov 2024 07:14), Max: 36.9 (01 Nov 2024 07:14)  T(F): 98.5 (01 Nov 2024 07:14), Max: 98.5 (01 Nov 2024 07:14)  HR: 78 (01 Nov 2024 07:14) (76 - 83)  BP: 116/67 (01 Nov 2024 07:14) (116/55 - 123/60)  RR: 18 (01 Nov 2024 07:14) (18 - 19)  SpO2: 100% (01 Nov 2024 07:14) (97% - 100%)    Parameters below as of 01 Nov 2024 07:14  Patient On (Oxygen Delivery Method): room air      LABS:                        7.5    10.29 )-----------( 389      ( 01 Nov 2024 05:26 )             23.8       PHYSICAL EXAM:  GENERAL: Sitting comfortably in chair, NAD, alert, cooperative  HEAD:  Normocephalic  HEART/LUNG:  Not in cardiopulmonary distress, no retractions or increased WOB. Breathing comfortably on room air  NEUROLOGY: AAOx3, non-focal, moves all four extremities  SKIN/WOUND:   LLE: scattered partial thickness wounds with adherent yellow exudate and venous stasis changes. Surrounding erythema, mild distal edema. No active bleeding, no gross purulence, no malodor   RLE: venous stasis changes, scattered areas of hyperpigmentation mostly on lateral shin and mild surrounding erythema. No drainage, no purulence, no bleeding, no malodor.   Culture swab was taken. Dressing change performed, patient tolerated well.

## 2024-11-02 LAB
ALBUMIN SERPL ELPH-MCNC: 3.8 G/DL — SIGNIFICANT CHANGE UP (ref 3.5–5.2)
ALP SERPL-CCNC: 87 U/L — SIGNIFICANT CHANGE UP (ref 30–115)
ALT FLD-CCNC: 18 U/L — SIGNIFICANT CHANGE UP (ref 0–41)
ANION GAP SERPL CALC-SCNC: 14 MMOL/L — SIGNIFICANT CHANGE UP (ref 7–14)
AST SERPL-CCNC: 29 U/L — SIGNIFICANT CHANGE UP (ref 0–41)
BASOPHILS # BLD AUTO: 0.05 K/UL — SIGNIFICANT CHANGE UP (ref 0–0.2)
BASOPHILS NFR BLD AUTO: 0.5 % — SIGNIFICANT CHANGE UP (ref 0–1)
BILIRUB SERPL-MCNC: 0.3 MG/DL — SIGNIFICANT CHANGE UP (ref 0.2–1.2)
BUN SERPL-MCNC: 7 MG/DL — LOW (ref 10–20)
CALCIUM SERPL-MCNC: 8.8 MG/DL — SIGNIFICANT CHANGE UP (ref 8.4–10.5)
CHLORIDE SERPL-SCNC: 102 MMOL/L — SIGNIFICANT CHANGE UP (ref 98–110)
CO2 SERPL-SCNC: 25 MMOL/L — SIGNIFICANT CHANGE UP (ref 17–32)
CREAT SERPL-MCNC: 0.8 MG/DL — SIGNIFICANT CHANGE UP (ref 0.7–1.5)
EGFR: 89 ML/MIN/1.73M2 — SIGNIFICANT CHANGE UP
EOSINOPHIL # BLD AUTO: 0.18 K/UL — SIGNIFICANT CHANGE UP (ref 0–0.7)
EOSINOPHIL NFR BLD AUTO: 1.7 % — SIGNIFICANT CHANGE UP (ref 0–8)
GLUCOSE BLDC GLUCOMTR-MCNC: 119 MG/DL — HIGH (ref 70–99)
GLUCOSE BLDC GLUCOMTR-MCNC: 150 MG/DL — HIGH (ref 70–99)
GLUCOSE BLDC GLUCOMTR-MCNC: 178 MG/DL — HIGH (ref 70–99)
GLUCOSE BLDC GLUCOMTR-MCNC: 184 MG/DL — HIGH (ref 70–99)
GLUCOSE SERPL-MCNC: 100 MG/DL — HIGH (ref 70–99)
HCT VFR BLD CALC: 25.3 % — LOW (ref 42–52)
HGB BLD-MCNC: 8.2 G/DL — LOW (ref 14–18)
IMM GRANULOCYTES NFR BLD AUTO: 1.1 % — HIGH (ref 0.1–0.3)
LYMPHOCYTES # BLD AUTO: 1.42 K/UL — SIGNIFICANT CHANGE UP (ref 1.2–3.4)
LYMPHOCYTES # BLD AUTO: 13.5 % — LOW (ref 20.5–51.1)
MAGNESIUM SERPL-MCNC: 1.9 MG/DL — SIGNIFICANT CHANGE UP (ref 1.8–2.4)
MCHC RBC-ENTMCNC: 32.4 G/DL — SIGNIFICANT CHANGE UP (ref 32–37)
MCHC RBC-ENTMCNC: 33.3 PG — HIGH (ref 27–31)
MCV RBC AUTO: 102.8 FL — HIGH (ref 80–94)
MONOCYTES # BLD AUTO: 1.33 K/UL — HIGH (ref 0.1–0.6)
MONOCYTES NFR BLD AUTO: 12.7 % — HIGH (ref 1.7–9.3)
NEUTROPHILS # BLD AUTO: 7.4 K/UL — HIGH (ref 1.4–6.5)
NEUTROPHILS NFR BLD AUTO: 70.5 % — SIGNIFICANT CHANGE UP (ref 42.2–75.2)
NRBC # BLD: 0 /100 WBCS — SIGNIFICANT CHANGE UP (ref 0–0)
PLATELET # BLD AUTO: 392 K/UL — SIGNIFICANT CHANGE UP (ref 130–400)
PMV BLD: 9.2 FL — SIGNIFICANT CHANGE UP (ref 7.4–10.4)
POTASSIUM SERPL-MCNC: 4.4 MMOL/L — SIGNIFICANT CHANGE UP (ref 3.5–5)
POTASSIUM SERPL-SCNC: 4.4 MMOL/L — SIGNIFICANT CHANGE UP (ref 3.5–5)
PROT SERPL-MCNC: 6.6 G/DL — SIGNIFICANT CHANGE UP (ref 6–8)
RBC # BLD: 2.46 M/UL — LOW (ref 4.7–6.1)
RBC # FLD: 14.7 % — HIGH (ref 11.5–14.5)
SODIUM SERPL-SCNC: 141 MMOL/L — SIGNIFICANT CHANGE UP (ref 135–146)
WBC # BLD: 10.5 K/UL — SIGNIFICANT CHANGE UP (ref 4.8–10.8)
WBC # FLD AUTO: 10.5 K/UL — SIGNIFICANT CHANGE UP (ref 4.8–10.8)

## 2024-11-02 PROCEDURE — 99232 SBSQ HOSP IP/OBS MODERATE 35: CPT

## 2024-11-02 RX ORDER — OXYCODONE HYDROCHLORIDE 30 MG/1
5 TABLET ORAL EVERY 8 HOURS
Refills: 0 | Status: DISCONTINUED | OUTPATIENT
Start: 2024-11-02 | End: 2024-11-06

## 2024-11-02 RX ORDER — ACETAMINOPHEN 500 MG
650 TABLET ORAL EVERY 8 HOURS
Refills: 0 | Status: COMPLETED | OUTPATIENT
Start: 2024-11-02 | End: 2024-11-04

## 2024-11-02 RX ADMIN — Medication 1000 MICROGRAM(S): at 11:54

## 2024-11-02 RX ADMIN — PANTOPRAZOLE SODIUM 40 MILLIGRAM(S): 40 TABLET, DELAYED RELEASE ORAL at 05:19

## 2024-11-02 RX ADMIN — SILVER SULFADIAZINE 1 APPLICATION(S): 10 CREAM TOPICAL at 17:31

## 2024-11-02 RX ADMIN — NYSTATIN 1 APPLICATION(S): 100000 POWDER TOPICAL at 05:25

## 2024-11-02 RX ADMIN — Medication 3 MILLIGRAM(S): at 21:26

## 2024-11-02 RX ADMIN — MORPHINE SULFATE 1 MILLIGRAM(S): 30 TABLET, EXTENDED RELEASE ORAL at 22:35

## 2024-11-02 RX ADMIN — Medication 20 MILLIGRAM(S): at 21:27

## 2024-11-02 RX ADMIN — Medication 2: at 17:32

## 2024-11-02 RX ADMIN — AMPICILLIN AND SULBACTAM 200 GRAM(S): 2; 1 INJECTION, POWDER, FOR SOLUTION INTRAMUSCULAR; INTRAVENOUS at 05:19

## 2024-11-02 RX ADMIN — Medication 650 MILLIGRAM(S): at 12:07

## 2024-11-02 RX ADMIN — Medication 650 MILLIGRAM(S): at 13:16

## 2024-11-02 RX ADMIN — Medication 2: at 11:53

## 2024-11-02 RX ADMIN — SILVER SULFADIAZINE 1 APPLICATION(S): 10 CREAM TOPICAL at 05:24

## 2024-11-02 RX ADMIN — MORPHINE SULFATE 1 MILLIGRAM(S): 30 TABLET, EXTENDED RELEASE ORAL at 22:05

## 2024-11-02 RX ADMIN — OXYCODONE HYDROCHLORIDE 5 MILLIGRAM(S): 30 TABLET ORAL at 16:30

## 2024-11-02 RX ADMIN — Medication 650 MILLIGRAM(S): at 21:26

## 2024-11-02 RX ADMIN — MORPHINE SULFATE 1 MILLIGRAM(S): 30 TABLET, EXTENDED RELEASE ORAL at 10:36

## 2024-11-02 RX ADMIN — MORPHINE SULFATE 1 MILLIGRAM(S): 30 TABLET, EXTENDED RELEASE ORAL at 09:21

## 2024-11-02 RX ADMIN — NYSTATIN 1 APPLICATION(S): 100000 POWDER TOPICAL at 17:31

## 2024-11-02 RX ADMIN — OXYCODONE HYDROCHLORIDE 5 MILLIGRAM(S): 30 TABLET ORAL at 15:12

## 2024-11-02 RX ADMIN — Medication 40 MILLIGRAM(S): at 11:52

## 2024-11-02 RX ADMIN — AMPICILLIN AND SULBACTAM 200 GRAM(S): 2; 1 INJECTION, POWDER, FOR SOLUTION INTRAMUSCULAR; INTRAVENOUS at 11:52

## 2024-11-02 RX ADMIN — Medication 650 MILLIGRAM(S): at 21:56

## 2024-11-02 RX ADMIN — Medication 0.4 MILLIGRAM(S): at 21:27

## 2024-11-02 RX ADMIN — Medication 40 MILLIGRAM(S): at 05:18

## 2024-11-02 RX ADMIN — AMPICILLIN AND SULBACTAM 200 GRAM(S): 2; 1 INJECTION, POWDER, FOR SOLUTION INTRAMUSCULAR; INTRAVENOUS at 21:25

## 2024-11-02 NOTE — PROGRESS NOTE ADULT - SUBJECTIVE AND OBJECTIVE BOX
HPI  Patient is a 79y old Male who presents with a chief complaint of cellulitis (01 Nov 2024 12:42)    Currently admitted to medicine with the primary diagnosis of Cellulitis       Today is hospital day 3d.     INTERVAL HPI / OVERNIGHT EVENTS:  Patient was seen and examined at bedside  c/o leg pain; states medication didnt help  Denies any complains of chest pain or shortness of breath  Denies any abdominal pain/nausea/vomiting        PAST MEDICAL & SURGICAL HISTORY  Diabetes    HLD (hyperlipidemia)    BPH without urinary obstruction    S/P cataract surgery      ALLERGIES  No Known Allergies    MEDICATIONS  STANDING MEDICATIONS  acetaminophen     Tablet .. 650 milliGRAM(s) Oral every 8 hours  ampicillin/sulbactam  IVPB      ampicillin/sulbactam  IVPB 3 Gram(s) IV Intermittent every 6 hours  atorvastatin 20 milliGRAM(s) Oral at bedtime  cyanocobalamin Injectable 1000 MICROGram(s) IntraMuscular daily  dextrose 5%. 1000 milliLiter(s) IV Continuous <Continuous>  dextrose 50% Injectable 25 Gram(s) IV Push once  enoxaparin Injectable 40 milliGRAM(s) SubCutaneous every 24 hours  glucagon  Injectable 1 milliGRAM(s) IntraMuscular once  insulin lispro (ADMELOG) corrective regimen sliding scale   SubCutaneous three times a day before meals  nystatin Cream 1 Application(s) Topical two times a day  pantoprazole    Tablet 40 milliGRAM(s) Oral before breakfast  predniSONE   Tablet 40 milliGRAM(s) Oral daily  silver sulfADIAZINE 1% Cream 1 Application(s) Topical two times a day  tamsulosin 0.4 milliGRAM(s) Oral at bedtime    PRN MEDICATIONS  dextrose Oral Gel 15 Gram(s) Oral once PRN  melatonin 3 milliGRAM(s) Oral at bedtime PRN  morphine  - Injectable 1 milliGRAM(s) IV Push every 4 hours PRN  oxyCODONE    IR 5 milliGRAM(s) Oral every 8 hours PRN    VITALS:  T(F): 97.6  HR: 75  BP: 123/64  RR: 18  SpO2: 100%    PHYSICAL EXAM  GEN: no distress, comfortable  PULM: BS heard b/l equal, No wheezing  CVS: S1S2 present, no rubs or gallops  EXT: left extremity swelling; b/l LE with ACE wrap  NEURO: A&Ox3,    LABS                        8.2    10.50 )-----------( 392      ( 02 Nov 2024 06:18 )             25.3     11-02    141  |  102  |  7[L]  ----------------------------<  100[H]  4.4   |  25  |  0.8    Ca    8.8      02 Nov 2024 06:18  Mg     1.9     11-02    TPro  6.6  /  Alb  3.8  /  TBili  0.3  /  DBili  x   /  AST  29  /  ALT  18  /  AlkPhos  87  11-02      Urinalysis Basic - ( 02 Nov 2024 06:18 )    Color: x / Appearance: x / SG: x / pH: x  Gluc: 100 mg/dL / Ketone: x  / Bili: x / Urobili: x   Blood: x / Protein: x / Nitrite: x   Leuk Esterase: x / RBC: x / WBC x   Sq Epi: x / Non Sq Epi: x / Bacteria: x            Culture - Blood (collected 30 Oct 2024 19:09)  Source: .Blood BLOOD  Preliminary Report (02 Nov 2024 10:01):    No growth at 48 Hours    Culture - Blood (collected 30 Oct 2024 19:09)  Source: .Blood BLOOD  Preliminary Report (02 Nov 2024 10:01):    No growth at 48 Hours          RADIOLOGY

## 2024-11-02 NOTE — PROGRESS NOTE ADULT - ASSESSMENT
80 yo M with a PMHx of DM and HLD, p/w LLE wound x1 month. Admitted for LLE cellulitis.     #Left LE Cellulitis  - xray left foot: Focal 2.7 cm soft tissue irregularity/defect along the lateral aspect of the left mid to distal fibula, without underlying fracture or dislocation. No evidence of osteomyelitis.   - LE venous duplex 10/21 negative for DVT  - LE arterial duplex 10/31: normal arterial flow b/l   ID, burn team recommendation reviewed  continue unasyn  started on prednisone for 3 days  pain control- started on tylenol scheduled and oxy PRN    #Macrocytic Anemia  - iron studies wnl  - b12- low normal  - started 3 days of b12 injectable   - D/w patient for OP follow up with PCP and GI    #DM2  - IS SS    #HLD- c/w Statin    #BPH- c/w Flomax    DVT px- lovenox    time spent 35 mnts

## 2024-11-03 LAB
-  AMIKACIN: SIGNIFICANT CHANGE UP
-  AZTREONAM: SIGNIFICANT CHANGE UP
-  CEFEPIME: SIGNIFICANT CHANGE UP
-  CEFTRIAXONE: SIGNIFICANT CHANGE UP
-  CIPROFLOXACIN: SIGNIFICANT CHANGE UP
-  GENTAMICIN: SIGNIFICANT CHANGE UP
-  LEVOFLOXACIN: SIGNIFICANT CHANGE UP
-  MEROPENEM: SIGNIFICANT CHANGE UP
-  PIPERACILLIN/TAZOBACTAM: SIGNIFICANT CHANGE UP
-  TOBRAMYCIN: SIGNIFICANT CHANGE UP
-  TRIMETHOPRIM/SULFAMETHOXAZOLE: SIGNIFICANT CHANGE UP
ALBUMIN SERPL ELPH-MCNC: 3.7 G/DL — SIGNIFICANT CHANGE UP (ref 3.5–5.2)
ALP SERPL-CCNC: 108 U/L — SIGNIFICANT CHANGE UP (ref 30–115)
ALT FLD-CCNC: 40 U/L — SIGNIFICANT CHANGE UP (ref 0–41)
ANION GAP SERPL CALC-SCNC: 11 MMOL/L — SIGNIFICANT CHANGE UP (ref 7–14)
AST SERPL-CCNC: 63 U/L — HIGH (ref 0–41)
BASOPHILS # BLD AUTO: 0.04 K/UL — SIGNIFICANT CHANGE UP (ref 0–0.2)
BASOPHILS NFR BLD AUTO: 0.3 % — SIGNIFICANT CHANGE UP (ref 0–1)
BILIRUB SERPL-MCNC: 0.3 MG/DL — SIGNIFICANT CHANGE UP (ref 0.2–1.2)
BUN SERPL-MCNC: 10 MG/DL — SIGNIFICANT CHANGE UP (ref 10–20)
CALCIUM SERPL-MCNC: 9 MG/DL — SIGNIFICANT CHANGE UP (ref 8.4–10.5)
CHLORIDE SERPL-SCNC: 102 MMOL/L — SIGNIFICANT CHANGE UP (ref 98–110)
CO2 SERPL-SCNC: 26 MMOL/L — SIGNIFICANT CHANGE UP (ref 17–32)
CREAT SERPL-MCNC: 0.8 MG/DL — SIGNIFICANT CHANGE UP (ref 0.7–1.5)
EGFR: 89 ML/MIN/1.73M2 — SIGNIFICANT CHANGE UP
EOSINOPHIL # BLD AUTO: 0.07 K/UL — SIGNIFICANT CHANGE UP (ref 0–0.7)
EOSINOPHIL NFR BLD AUTO: 0.5 % — SIGNIFICANT CHANGE UP (ref 0–8)
GLUCOSE BLDC GLUCOMTR-MCNC: 129 MG/DL — HIGH (ref 70–99)
GLUCOSE BLDC GLUCOMTR-MCNC: 168 MG/DL — HIGH (ref 70–99)
GLUCOSE BLDC GLUCOMTR-MCNC: 185 MG/DL — HIGH (ref 70–99)
GLUCOSE BLDC GLUCOMTR-MCNC: 189 MG/DL — HIGH (ref 70–99)
GLUCOSE SERPL-MCNC: 167 MG/DL — HIGH (ref 70–99)
HCT VFR BLD CALC: 25.5 % — LOW (ref 42–52)
HGB BLD-MCNC: 8.2 G/DL — LOW (ref 14–18)
IMM GRANULOCYTES NFR BLD AUTO: 0.8 % — HIGH (ref 0.1–0.3)
LYMPHOCYTES # BLD AUTO: 1.38 K/UL — SIGNIFICANT CHANGE UP (ref 1.2–3.4)
LYMPHOCYTES # BLD AUTO: 10.8 % — LOW (ref 20.5–51.1)
MAGNESIUM SERPL-MCNC: 2 MG/DL — SIGNIFICANT CHANGE UP (ref 1.8–2.4)
MCHC RBC-ENTMCNC: 32.2 G/DL — SIGNIFICANT CHANGE UP (ref 32–37)
MCHC RBC-ENTMCNC: 32.7 PG — HIGH (ref 27–31)
MCV RBC AUTO: 101.6 FL — HIGH (ref 80–94)
METHOD TYPE: SIGNIFICANT CHANGE UP
MONOCYTES # BLD AUTO: 1.36 K/UL — HIGH (ref 0.1–0.6)
MONOCYTES NFR BLD AUTO: 10.6 % — HIGH (ref 1.7–9.3)
NEUTROPHILS # BLD AUTO: 9.86 K/UL — HIGH (ref 1.4–6.5)
NEUTROPHILS NFR BLD AUTO: 77 % — HIGH (ref 42.2–75.2)
NRBC # BLD: 0 /100 WBCS — SIGNIFICANT CHANGE UP (ref 0–0)
PLATELET # BLD AUTO: 380 K/UL — SIGNIFICANT CHANGE UP (ref 130–400)
PMV BLD: 9.1 FL — SIGNIFICANT CHANGE UP (ref 7.4–10.4)
POTASSIUM SERPL-MCNC: 3.9 MMOL/L — SIGNIFICANT CHANGE UP (ref 3.5–5)
POTASSIUM SERPL-SCNC: 3.9 MMOL/L — SIGNIFICANT CHANGE UP (ref 3.5–5)
PROT SERPL-MCNC: 6.5 G/DL — SIGNIFICANT CHANGE UP (ref 6–8)
RBC # BLD: 2.51 M/UL — LOW (ref 4.7–6.1)
RBC # FLD: 14.7 % — HIGH (ref 11.5–14.5)
SODIUM SERPL-SCNC: 139 MMOL/L — SIGNIFICANT CHANGE UP (ref 135–146)
WBC # BLD: 12.81 K/UL — HIGH (ref 4.8–10.8)
WBC # FLD AUTO: 12.81 K/UL — HIGH (ref 4.8–10.8)

## 2024-11-03 PROCEDURE — 99232 SBSQ HOSP IP/OBS MODERATE 35: CPT

## 2024-11-03 RX ORDER — CYANOCOBALAMIN (VITAMIN B-12) 1000MCG/15
1000 LIQUID (ML) ORAL DAILY
Refills: 0 | Status: DISCONTINUED | OUTPATIENT
Start: 2024-11-03 | End: 2024-11-06

## 2024-11-03 RX ORDER — CEFEPIME 2 G/1
2000 INJECTION, POWDER, FOR SOLUTION INTRAVENOUS EVERY 8 HOURS
Refills: 0 | Status: DISCONTINUED | OUTPATIENT
Start: 2024-11-03 | End: 2024-11-05

## 2024-11-03 RX ORDER — CEFEPIME 2 G/1
2000 INJECTION, POWDER, FOR SOLUTION INTRAVENOUS ONCE
Refills: 0 | Status: COMPLETED | OUTPATIENT
Start: 2024-11-03 | End: 2024-11-03

## 2024-11-03 RX ORDER — CEFEPIME 2 G/1
INJECTION, POWDER, FOR SOLUTION INTRAVENOUS
Refills: 0 | Status: DISCONTINUED | OUTPATIENT
Start: 2024-11-03 | End: 2024-11-05

## 2024-11-03 RX ADMIN — Medication 2: at 11:49

## 2024-11-03 RX ADMIN — Medication 650 MILLIGRAM(S): at 06:22

## 2024-11-03 RX ADMIN — NYSTATIN 1 APPLICATION(S): 100000 POWDER TOPICAL at 17:39

## 2024-11-03 RX ADMIN — Medication 40 MILLIGRAM(S): at 05:52

## 2024-11-03 RX ADMIN — AMPICILLIN AND SULBACTAM 200 GRAM(S): 2; 1 INJECTION, POWDER, FOR SOLUTION INTRAMUSCULAR; INTRAVENOUS at 08:33

## 2024-11-03 RX ADMIN — PANTOPRAZOLE SODIUM 40 MILLIGRAM(S): 40 TABLET, DELAYED RELEASE ORAL at 05:52

## 2024-11-03 RX ADMIN — NYSTATIN 1 APPLICATION(S): 100000 POWDER TOPICAL at 05:54

## 2024-11-03 RX ADMIN — MORPHINE SULFATE 1 MILLIGRAM(S): 30 TABLET, EXTENDED RELEASE ORAL at 05:50

## 2024-11-03 RX ADMIN — Medication 40 MILLIGRAM(S): at 11:42

## 2024-11-03 RX ADMIN — Medication 650 MILLIGRAM(S): at 21:36

## 2024-11-03 RX ADMIN — MORPHINE SULFATE 1 MILLIGRAM(S): 30 TABLET, EXTENDED RELEASE ORAL at 21:34

## 2024-11-03 RX ADMIN — Medication 20 MILLIGRAM(S): at 21:37

## 2024-11-03 RX ADMIN — AMPICILLIN AND SULBACTAM 200 GRAM(S): 2; 1 INJECTION, POWDER, FOR SOLUTION INTRAMUSCULAR; INTRAVENOUS at 11:42

## 2024-11-03 RX ADMIN — CEFEPIME 100 MILLIGRAM(S): 2 INJECTION, POWDER, FOR SOLUTION INTRAVENOUS at 21:33

## 2024-11-03 RX ADMIN — MORPHINE SULFATE 1 MILLIGRAM(S): 30 TABLET, EXTENDED RELEASE ORAL at 22:06

## 2024-11-03 RX ADMIN — CEFEPIME 100 MILLIGRAM(S): 2 INJECTION, POWDER, FOR SOLUTION INTRAVENOUS at 17:38

## 2024-11-03 RX ADMIN — SILVER SULFADIAZINE 1 APPLICATION(S): 10 CREAM TOPICAL at 05:54

## 2024-11-03 RX ADMIN — AMPICILLIN AND SULBACTAM 200 GRAM(S): 2; 1 INJECTION, POWDER, FOR SOLUTION INTRAMUSCULAR; INTRAVENOUS at 02:47

## 2024-11-03 RX ADMIN — Medication 3 MILLIGRAM(S): at 21:38

## 2024-11-03 RX ADMIN — Medication 2: at 17:37

## 2024-11-03 RX ADMIN — Medication 0.4 MILLIGRAM(S): at 21:36

## 2024-11-03 RX ADMIN — Medication 650 MILLIGRAM(S): at 05:52

## 2024-11-03 RX ADMIN — Medication 650 MILLIGRAM(S): at 17:13

## 2024-11-03 RX ADMIN — MORPHINE SULFATE 1 MILLIGRAM(S): 30 TABLET, EXTENDED RELEASE ORAL at 06:20

## 2024-11-03 RX ADMIN — Medication 1000 MICROGRAM(S): at 11:42

## 2024-11-03 RX ADMIN — Medication 650 MILLIGRAM(S): at 13:55

## 2024-11-03 RX ADMIN — SILVER SULFADIAZINE 1 APPLICATION(S): 10 CREAM TOPICAL at 17:40

## 2024-11-03 RX ADMIN — Medication 2: at 08:32

## 2024-11-03 RX ADMIN — Medication 650 MILLIGRAM(S): at 22:06

## 2024-11-03 NOTE — PROGRESS NOTE ADULT - ASSESSMENT
80 yo M with a PMHx of DM and HLD, p/w LLE wound x1 month. Admitted for LLE cellulitis.     #Left LE Cellulitis  - xray left foot: Focal 2.7 cm soft tissue irregularity/defect along the lateral aspect of the left mid to distal fibula, without underlying fracture or dislocation. No evidence of osteomyelitis.   - LE venous duplex 10/21 negative for DVT  - LE arterial duplex 10/31: normal arterial flow b/l   ID, burn team evaluated  continue unasyn  s/p prednisone for 3 days  pain control- started on tylenol scheduled and oxy PRN    #Macrocytic Anemia  - iron studies wnl  - b12- low normal  - s/p 3 days of b12 injectable ; start on PO supplement  - D/w patient for OP follow up with PCP and GI    #DM2  - IS SS    #HLD- c/w Statin    #BPH- c/w Flomax    DVT px- lovenox    time spent 35 mnts  Discharge plan in 1-2 days

## 2024-11-03 NOTE — PROGRESS NOTE ADULT - ASSESSMENT
80 yo M with a PMHx of DM and HLD, p/w LLE wound x1 month. Admitted for LLE cellulitis.     #Left LE Cellulitis  - Sepsis present on admission (HR, WBC, Lactic Acid)  - xray left foot: Focal 2.7 cm soft tissue irregularity/defect along the lateral aspect of the left mid to distal fibula, without underlying fracture or dislocation. No evidence of osteomyelitis.   - LE venous duplex 10/21 negative for DVT  - LE arterial duplex 10/31: normal arterial flow b/l   - ESR/CRP elevated  - f/u blood cx  - started clindamycin 10/31 Dc'ed   - C/w wound care, no debridements   - ID: c/w unasyn, add prednisone 40 x 3 days, silvadene cream b/l BID    #Penile rash  - pt had some irritation on tip of the penis   - had some fungal like infection on the glans under the foreskin  - c/w Nystatin cream bid     #Macrocytic Anemia  - H/H stable  - iron studies wnl  - b12 (low side), folate wnl   - started 3 days of b12 injectable   - monitor CBC  - keep active T&S    #DM2  - Hold PO meds  - IS SS    #HLD  - c/w Statin    #BPH  - c/w Flomax    Diet: DASH/CHO  Activity: OOB  DVT PPX: Lovenox  Code status: Full  Dispo: Home

## 2024-11-03 NOTE — PROGRESS NOTE ADULT - SUBJECTIVE AND OBJECTIVE BOX
HPI  Patient is a 79y old Male who presents with a chief complaint of cellulitis (03 Nov 2024 00:37)    Currently admitted to medicine with the primary diagnosis of Cellulitis       Today is hospital day 4d.     INTERVAL HPI / OVERNIGHT EVENTS:  Patient was seen and examined at bedside    Patient Feels better  pain controlled          PAST MEDICAL & SURGICAL HISTORY  Diabetes    HLD (hyperlipidemia)    BPH without urinary obstruction    S/P cataract surgery      ALLERGIES  No Known Allergies    MEDICATIONS  STANDING MEDICATIONS  acetaminophen     Tablet .. 650 milliGRAM(s) Oral every 8 hours  ampicillin/sulbactam  IVPB 3 Gram(s) IV Intermittent every 6 hours  ampicillin/sulbactam  IVPB      atorvastatin 20 milliGRAM(s) Oral at bedtime  dextrose 5%. 1000 milliLiter(s) IV Continuous <Continuous>  dextrose 50% Injectable 25 Gram(s) IV Push once  enoxaparin Injectable 40 milliGRAM(s) SubCutaneous every 24 hours  glucagon  Injectable 1 milliGRAM(s) IntraMuscular once  insulin lispro (ADMELOG) corrective regimen sliding scale   SubCutaneous three times a day before meals  nystatin Cream 1 Application(s) Topical two times a day  pantoprazole    Tablet 40 milliGRAM(s) Oral before breakfast  predniSONE   Tablet 40 milliGRAM(s) Oral daily  silver sulfADIAZINE 1% Cream 1 Application(s) Topical two times a day  tamsulosin 0.4 milliGRAM(s) Oral at bedtime    PRN MEDICATIONS  dextrose Oral Gel 15 Gram(s) Oral once PRN  melatonin 3 milliGRAM(s) Oral at bedtime PRN  morphine  - Injectable 1 milliGRAM(s) IV Push every 4 hours PRN  oxyCODONE    IR 5 milliGRAM(s) Oral every 8 hours PRN    VITALS:  T(F): 97.9  HR: 73  BP: 110/60  RR: 18  SpO2: 100%    PHYSICAL EXAM  GEN: no distress, comfortable  PULM: BS heard b/l equal, No wheezing  CVS: S1S2 present, no rubs or gallops  ABD: Soft, non-distended, no guarding; non-tender  EXT: LE padded  NEURO: A&Ox3, moving all extremities    LABS                        8.2    12.81 )-----------( 380      ( 03 Nov 2024 07:46 )             25.5     11-03    139  |  102  |  10  ----------------------------<  167[H]  3.9   |  26  |  0.8    Ca    9.0      03 Nov 2024 07:46  Mg     2.0     11-03    TPro  6.5  /  Alb  3.7  /  TBili  0.3  /  DBili  x   /  AST  63[H]  /  ALT  40  /  AlkPhos  108  11-03      Urinalysis Basic - ( 03 Nov 2024 07:46 )    Color: x / Appearance: x / SG: x / pH: x  Gluc: 167 mg/dL / Ketone: x  / Bili: x / Urobili: x   Blood: x / Protein: x / Nitrite: x   Leuk Esterase: x / RBC: x / WBC x   Sq Epi: x / Non Sq Epi: x / Bacteria: x            Culture - Wound Aerobic/Anaerobic (collected 01 Nov 2024 14:00)  Source: Skin/Wound  Preliminary Report (02 Nov 2024 18:38):    Numerous Pseudomonas putida group          RADIOLOGY

## 2024-11-03 NOTE — PROGRESS NOTE ADULT - SUBJECTIVE AND OBJECTIVE BOX
LEENA BAILEY 79y Male  MRN#: [id number]   Hospital Day: 4d    HPI:  79 year old male with pmhx of DM and HLD presented to the ED today with swelling and wounds on his LLE for the last month. He has pain at the site, no impairments with walking. No loss of sensation, weakness, or difficulty with movements. No fevers or chills.  Patient was treated at [facility] last week with Unasyn for LLE cellulitis but signed out AMA on 10/23 and was not taking Augmentin sent to pharmacy. Patient not aware of anemia diagnosis and denies any blood in stool.     ED vitals:   · BP Systolic	123 mm Hg  · BP Diastolic	64 mm Hg  · Heart Rate	96 /min  · Respiration Rate (breaths/min)	18 /min  · Temp (F)	98.4 Degrees F  · Temp (C)	36.9 Degrees C  · Temp site	oral  · SpO2 (%)	100 %  · O2 Delivery/Oxygen Delivery Method	room air  · Temp at ED Arrival (C)	36.9 Degrees C   (31 Oct 2024 00:08)      SUBJECTIVE      OBJECTIVE  PAST MEDICAL & SURGICAL HISTORY  Diabetes    HLD (hyperlipidemia)    BPH without urinary obstruction    S/P cataract surgery      ALLERGIES:  No Known Allergies    MEDICATIONS:  STANDING MEDICATIONS  acetaminophen     Tablet .. 650 milliGRAM(s) Oral every 8 hours  ampicillin/sulbactam  IVPB      ampicillin/sulbactam  IVPB 3 Gram(s) IV Intermittent every 6 hours  atorvastatin 20 milliGRAM(s) Oral at bedtime  cyanocobalamin Injectable 1000 MICROGram(s) IntraMuscular daily  dextrose 5%. 1000 milliLiter(s) IV Continuous <Continuous>  dextrose 50% Injectable 25 Gram(s) IV Push once  enoxaparin Injectable 40 milliGRAM(s) SubCutaneous every 24 hours  glucagon  Injectable 1 milliGRAM(s) IntraMuscular once  insulin lispro (ADMELOG) corrective regimen sliding scale   SubCutaneous three times a day before meals  nystatin Cream 1 Application(s) Topical two times a day  pantoprazole    Tablet 40 milliGRAM(s) Oral before breakfast  predniSONE   Tablet 40 milliGRAM(s) Oral daily  silver sulfADIAZINE 1% Cream 1 Application(s) Topical two times a day  tamsulosin 0.4 milliGRAM(s) Oral at bedtime    PRN MEDICATIONS  dextrose Oral Gel 15 Gram(s) Oral once PRN  melatonin 3 milliGRAM(s) Oral at bedtime PRN  morphine  - Injectable 1 milliGRAM(s) IV Push every 4 hours PRN  oxyCODONE    IR 5 milliGRAM(s) Oral every 8 hours PRN      VITAL SIGNS: Last 24 Hours  T(C): 36.7 (02 Nov 2024 23:43), Max: 36.9 (02 Nov 2024 08:20)  T(F): 98 (02 Nov 2024 23:43), Max: 98.5 (02 Nov 2024 08:20)  HR: 74 (02 Nov 2024 23:43) (72 - 75)  BP: 126/64 (02 Nov 2024 23:43) (115/68 - 126/64)  BP(mean): 85 (02 Nov 2024 23:43) (85 - 85)  RR: 18 (02 Nov 2024 23:43) (18 - 18)  SpO2: 100% (02 Nov 2024 23:43) (98% - 100%)    LABS:                        8.2    10.50 )-----------( 392      ( 02 Nov 2024 06:18 )             25.3     11-02    141  |  102  |  7[L]  ----------------------------<  100[H]  4.4   |  25  |  0.8    Ca    8.8      02 Nov 2024 06:18  Mg     1.9     11-02    TPro  6.6  /  Alb  3.8  /  TBili  0.3  /  DBili  x   /  AST  29  /  ALT  18  /  AlkPhos  87  11-02      Urinalysis Basic - ( 02 Nov 2024 06:18 )    Color: x / Appearance: x / SG: x / pH: x  Gluc: 100 mg/dL / Ketone: x  / Bili: x / Urobili: x   Blood: x / Protein: x / Nitrite: x   Leuk Esterase: x / RBC: x / WBC x   Sq Epi: x / Non Sq Epi: x / Bacteria: x            Culture - Wound Aerobic/Anaerobic (collected 01 Nov 2024 14:00)  Source: Skin/Wound  Preliminary Report (02 Nov 2024 18:38):    Numerous Pseudomonas putida group              PHYSICAL EXAM:  GENERAL: NAD, well-developed.  HEAD:  Atraumatic, Normocephalic.  EYES: conjunctiva and sclera clear  CHEST/LUNG: GBAE. No wheezing or crackles   HEART: regular rate and rhythm; S1/S2.  ABDOMEN: Soft, Nontender, Nondistended  EXTREMITIES: No edema.   PSYCH: AAOx3.  NEUROLOGY: non-focal; moves all extremities

## 2024-11-04 LAB
ALBUMIN SERPL ELPH-MCNC: 3.5 G/DL — SIGNIFICANT CHANGE UP (ref 3.5–5.2)
ALP SERPL-CCNC: 118 U/L — HIGH (ref 30–115)
ALT FLD-CCNC: 114 U/L — HIGH (ref 0–41)
ANION GAP SERPL CALC-SCNC: 12 MMOL/L — SIGNIFICANT CHANGE UP (ref 7–14)
AST SERPL-CCNC: 145 U/L — HIGH (ref 0–41)
BASOPHILS # BLD AUTO: 0.02 K/UL — SIGNIFICANT CHANGE UP (ref 0–0.2)
BASOPHILS NFR BLD AUTO: 0.2 % — SIGNIFICANT CHANGE UP (ref 0–1)
BILIRUB SERPL-MCNC: 0.2 MG/DL — SIGNIFICANT CHANGE UP (ref 0.2–1.2)
BUN SERPL-MCNC: 12 MG/DL — SIGNIFICANT CHANGE UP (ref 10–20)
CALCIUM SERPL-MCNC: 8.7 MG/DL — SIGNIFICANT CHANGE UP (ref 8.4–10.5)
CHLORIDE SERPL-SCNC: 103 MMOL/L — SIGNIFICANT CHANGE UP (ref 98–110)
CO2 SERPL-SCNC: 25 MMOL/L — SIGNIFICANT CHANGE UP (ref 17–32)
CREAT SERPL-MCNC: 0.7 MG/DL — SIGNIFICANT CHANGE UP (ref 0.7–1.5)
EGFR: 93 ML/MIN/1.73M2 — SIGNIFICANT CHANGE UP
EOSINOPHIL # BLD AUTO: 0.04 K/UL — SIGNIFICANT CHANGE UP (ref 0–0.7)
EOSINOPHIL NFR BLD AUTO: 0.3 % — SIGNIFICANT CHANGE UP (ref 0–8)
GLUCOSE BLDC GLUCOMTR-MCNC: 126 MG/DL — HIGH (ref 70–99)
GLUCOSE BLDC GLUCOMTR-MCNC: 138 MG/DL — HIGH (ref 70–99)
GLUCOSE BLDC GLUCOMTR-MCNC: 183 MG/DL — HIGH (ref 70–99)
GLUCOSE BLDC GLUCOMTR-MCNC: 199 MG/DL — HIGH (ref 70–99)
GLUCOSE SERPL-MCNC: 101 MG/DL — HIGH (ref 70–99)
HCT VFR BLD CALC: 25.2 % — LOW (ref 42–52)
HGB BLD-MCNC: 7.9 G/DL — LOW (ref 14–18)
IMM GRANULOCYTES NFR BLD AUTO: 0.7 % — HIGH (ref 0.1–0.3)
LYMPHOCYTES # BLD AUTO: 15.7 % — LOW (ref 20.5–51.1)
LYMPHOCYTES # BLD AUTO: 2.08 K/UL — SIGNIFICANT CHANGE UP (ref 1.2–3.4)
MAGNESIUM SERPL-MCNC: 2.1 MG/DL — SIGNIFICANT CHANGE UP (ref 1.8–2.4)
MCHC RBC-ENTMCNC: 31.3 G/DL — LOW (ref 32–37)
MCHC RBC-ENTMCNC: 32.5 PG — HIGH (ref 27–31)
MCV RBC AUTO: 103.7 FL — HIGH (ref 80–94)
MONOCYTES # BLD AUTO: 1.2 K/UL — HIGH (ref 0.1–0.6)
MONOCYTES NFR BLD AUTO: 9.1 % — SIGNIFICANT CHANGE UP (ref 1.7–9.3)
NEUTROPHILS # BLD AUTO: 9.81 K/UL — HIGH (ref 1.4–6.5)
NEUTROPHILS NFR BLD AUTO: 74 % — SIGNIFICANT CHANGE UP (ref 42.2–75.2)
NRBC # BLD: 0 /100 WBCS — SIGNIFICANT CHANGE UP (ref 0–0)
PLATELET # BLD AUTO: 369 K/UL — SIGNIFICANT CHANGE UP (ref 130–400)
PMV BLD: 9.7 FL — SIGNIFICANT CHANGE UP (ref 7.4–10.4)
POTASSIUM SERPL-MCNC: 4.2 MMOL/L — SIGNIFICANT CHANGE UP (ref 3.5–5)
POTASSIUM SERPL-SCNC: 4.2 MMOL/L — SIGNIFICANT CHANGE UP (ref 3.5–5)
PROT SERPL-MCNC: 6.3 G/DL — SIGNIFICANT CHANGE UP (ref 6–8)
RBC # BLD: 2.43 M/UL — LOW (ref 4.7–6.1)
RBC # FLD: 14.6 % — HIGH (ref 11.5–14.5)
SODIUM SERPL-SCNC: 140 MMOL/L — SIGNIFICANT CHANGE UP (ref 135–146)
WBC # BLD: 13.24 K/UL — HIGH (ref 4.8–10.8)
WBC # FLD AUTO: 13.24 K/UL — HIGH (ref 4.8–10.8)

## 2024-11-04 PROCEDURE — 99232 SBSQ HOSP IP/OBS MODERATE 35: CPT | Mod: GC

## 2024-11-04 RX ORDER — CHLORHEXIDINE GLUCONATE 40 MG/ML
1 SOLUTION TOPICAL DAILY
Refills: 0 | Status: DISCONTINUED | OUTPATIENT
Start: 2024-11-04 | End: 2024-11-06

## 2024-11-04 RX ADMIN — NYSTATIN 1 APPLICATION(S): 100000 POWDER TOPICAL at 05:25

## 2024-11-04 RX ADMIN — Medication 40 MILLIGRAM(S): at 11:31

## 2024-11-04 RX ADMIN — CEFEPIME 100 MILLIGRAM(S): 2 INJECTION, POWDER, FOR SOLUTION INTRAVENOUS at 05:24

## 2024-11-04 RX ADMIN — MORPHINE SULFATE 1 MILLIGRAM(S): 30 TABLET, EXTENDED RELEASE ORAL at 06:00

## 2024-11-04 RX ADMIN — Medication 0.4 MILLIGRAM(S): at 21:28

## 2024-11-04 RX ADMIN — CEFEPIME 100 MILLIGRAM(S): 2 INJECTION, POWDER, FOR SOLUTION INTRAVENOUS at 13:34

## 2024-11-04 RX ADMIN — Medication 1000 MICROGRAM(S): at 11:31

## 2024-11-04 RX ADMIN — NYSTATIN 1 APPLICATION(S): 100000 POWDER TOPICAL at 17:22

## 2024-11-04 RX ADMIN — CEFEPIME 100 MILLIGRAM(S): 2 INJECTION, POWDER, FOR SOLUTION INTRAVENOUS at 21:31

## 2024-11-04 RX ADMIN — OXYCODONE HYDROCHLORIDE 5 MILLIGRAM(S): 30 TABLET ORAL at 21:28

## 2024-11-04 RX ADMIN — PANTOPRAZOLE SODIUM 40 MILLIGRAM(S): 40 TABLET, DELAYED RELEASE ORAL at 05:26

## 2024-11-04 RX ADMIN — CHLORHEXIDINE GLUCONATE 1 APPLICATION(S): 40 SOLUTION TOPICAL at 21:30

## 2024-11-04 RX ADMIN — SILVER SULFADIAZINE 1 APPLICATION(S): 10 CREAM TOPICAL at 17:23

## 2024-11-04 RX ADMIN — Medication 2: at 17:18

## 2024-11-04 RX ADMIN — MORPHINE SULFATE 1 MILLIGRAM(S): 30 TABLET, EXTENDED RELEASE ORAL at 05:24

## 2024-11-04 RX ADMIN — Medication 650 MILLIGRAM(S): at 06:00

## 2024-11-04 RX ADMIN — Medication 40 MILLIGRAM(S): at 05:26

## 2024-11-04 RX ADMIN — SILVER SULFADIAZINE 1 APPLICATION(S): 10 CREAM TOPICAL at 05:28

## 2024-11-04 RX ADMIN — Medication 2: at 11:30

## 2024-11-04 RX ADMIN — Medication 650 MILLIGRAM(S): at 05:26

## 2024-11-04 NOTE — PROGRESS NOTE ADULT - TIME BILLING
-I reviewed the completed testing ( labs, imaging )  -My assessment required an independent historian  -I independently interpreted the most recent imaging ( CXR )  -I discussed my recommendations with the primary team housestaff/Attending  -I assisted in the decision regarding continued need for hospitalization / or escalation of care as needed.  -Patient is on drug therapy that requires intense monitoring or toxicity and adjustment of the Antibiotics based on creatinine clearence

## 2024-11-04 NOTE — PROGRESS NOTE ADULT - SUBJECTIVE AND OBJECTIVE BOX
LEENA BAILEY 79y Male  MRN#: [id number]   Hospital Day: 5d    HPI:  79 year old male with pmhx of DM and HLD presented to the ED today with swelling and wounds on his LLE for the last month. He has pain at the site, no impairments with walking. No loss of sensation, weakness, or difficulty with movements. No fevers or chills.  Patient was treated at [facility] last week with Unasyn for LLE cellulitis but signed out AMA on 10/23 and was not taking Augmentin sent to pharmacy. Patient not aware of anemia diagnosis and denies any blood in stool.     ED vitals:   · BP Systolic	123 mm Hg  · BP Diastolic	64 mm Hg  · Heart Rate	96 /min  · Respiration Rate (breaths/min)	18 /min  · Temp (F)	98.4 Degrees F  · Temp (C)	36.9 Degrees C  · Temp site	oral  · SpO2 (%)	100 %  · O2 Delivery/Oxygen Delivery Method	room air  · Temp at ED Arrival (C)	36.9 Degrees C   (31 Oct 2024 00:08)      SUBJECTIVE      OBJECTIVE  PAST MEDICAL & SURGICAL HISTORY  Diabetes    HLD (hyperlipidemia)    BPH without urinary obstruction    S/P cataract surgery      ALLERGIES:  No Known Allergies    MEDICATIONS:  STANDING MEDICATIONS  atorvastatin 20 milliGRAM(s) Oral at bedtime  cefepime   IVPB 2000 milliGRAM(s) IV Intermittent every 8 hours  cefepime   IVPB      cyanocobalamin 1000 MICROGram(s) Oral daily  dextrose 5%. 1000 milliLiter(s) IV Continuous <Continuous>  dextrose 50% Injectable 25 Gram(s) IV Push once  enoxaparin Injectable 40 milliGRAM(s) SubCutaneous every 24 hours  glucagon  Injectable 1 milliGRAM(s) IntraMuscular once  insulin lispro (ADMELOG) corrective regimen sliding scale   SubCutaneous three times a day before meals  nystatin Cream 1 Application(s) Topical two times a day  pantoprazole    Tablet 40 milliGRAM(s) Oral before breakfast  silver sulfADIAZINE 1% Cream 1 Application(s) Topical two times a day  tamsulosin 0.4 milliGRAM(s) Oral at bedtime    PRN MEDICATIONS  dextrose Oral Gel 15 Gram(s) Oral once PRN  melatonin 3 milliGRAM(s) Oral at bedtime PRN  morphine  - Injectable 1 milliGRAM(s) IV Push every 4 hours PRN  oxyCODONE    IR 5 milliGRAM(s) Oral every 8 hours PRN      VITAL SIGNS: Last 24 Hours  T(C): 36.4 (04 Nov 2024 00:09), Max: 36.8 (03 Nov 2024 16:43)  T(F): 97.5 (04 Nov 2024 00:09), Max: 98.2 (03 Nov 2024 16:43)  HR: 65 (04 Nov 2024 00:09) (60 - 73)  BP: 124/64 (04 Nov 2024 00:09) (110/60 - 124/64)  BP(mean): --  RR: 18 (03 Nov 2024 16:43) (18 - 18)  SpO2: 95% (03 Nov 2024 16:43) (95% - 95%)    LABS:                        8.2    12.81 )-----------( 380      ( 03 Nov 2024 07:46 )             25.5     11-03    139  |  102  |  10  ----------------------------<  167[H]  3.9   |  26  |  0.8    Ca    9.0      03 Nov 2024 07:46  Mg     2.0     11-03    TPro  6.5  /  Alb  3.7  /  TBili  0.3  /  DBili  x   /  AST  63[H]  /  ALT  40  /  AlkPhos  108  11-03      Urinalysis Basic - ( 03 Nov 2024 07:46 )    Color: x / Appearance: x / SG: x / pH: x  Gluc: 167 mg/dL / Ketone: x  / Bili: x / Urobili: x   Blood: x / Protein: x / Nitrite: x   Leuk Esterase: x / RBC: x / WBC x   Sq Epi: x / Non Sq Epi: x / Bacteria: x            Culture - Wound Aerobic/Anaerobic (collected 01 Nov 2024 14:00)  Source: Skin/Wound  Preliminary Report (03 Nov 2024 18:51):    Numerous Pseudomonas putida group    Rare Candida parapsilosis "Susceptibilities not performed"    Few Mold Like Fungus Referred to Mycology for Identification  Organism: Pseudomonas putida group (03 Nov 2024 15:45)  Organism: Pseudomonas putida group (03 Nov 2024 15:45)              PHYSICAL EXAM:  GENERAL: NAD, well-developed.  HEAD:  Atraumatic, Normocephalic.  EYES: conjunctiva and sclera clear  CHEST/LUNG: GBAE. No wheezing or crackles   HEART: regular rate and rhythm; S1/S2.  ABDOMEN: Soft, Nontender, Nondistended  EXTREMITIES: No edema.   PSYCH: AAOx3.  NEUROLOGY: non-focal; moves all extremities     LEENA BAILEY 79y Male  MRN#: [id number]   Hospital Day: 5d    HPI:  79 year old male with pmhx of DM and HLD presented to the ED today with swelling and wounds on his LLE for the last month. He has pain at the site, no impairments with walking. No loss of sensation, weakness, or difficulty with movements. No fevers or chills.  Patient was treated at [facility] last week with Unasyn for LLE cellulitis but signed out AMA on 10/23 and was not taking Augmentin sent to pharmacy. Patient not aware of anemia diagnosis and denies any blood in stool.     ED vitals:   · BP Systolic	123 mm Hg  · BP Diastolic	64 mm Hg  · Heart Rate	96 /min  · Respiration Rate (breaths/min)	18 /min  · Temp (F)	98.4 Degrees F  · Temp (C)	36.9 Degrees C  · Temp site	oral  · SpO2 (%)	100 %  · O2 Delivery/Oxygen Delivery Method	room air  · Temp at ED Arrival (C)	36.9 Degrees C   (31 Oct 2024 00:08)      SUBJECTIVE  Pt was seen and evaluated at bedside. No acute events overnight. reports improvement of symptoms     OBJECTIVE  PAST MEDICAL & SURGICAL HISTORY  Diabetes    HLD (hyperlipidemia)    BPH without urinary obstruction    S/P cataract surgery      ALLERGIES:  No Known Allergies    MEDICATIONS:  STANDING MEDICATIONS  atorvastatin 20 milliGRAM(s) Oral at bedtime  cefepime   IVPB 2000 milliGRAM(s) IV Intermittent every 8 hours  cefepime   IVPB      cyanocobalamin 1000 MICROGram(s) Oral daily  dextrose 5%. 1000 milliLiter(s) IV Continuous <Continuous>  dextrose 50% Injectable 25 Gram(s) IV Push once  enoxaparin Injectable 40 milliGRAM(s) SubCutaneous every 24 hours  glucagon  Injectable 1 milliGRAM(s) IntraMuscular once  insulin lispro (ADMELOG) corrective regimen sliding scale   SubCutaneous three times a day before meals  nystatin Cream 1 Application(s) Topical two times a day  pantoprazole    Tablet 40 milliGRAM(s) Oral before breakfast  silver sulfADIAZINE 1% Cream 1 Application(s) Topical two times a day  tamsulosin 0.4 milliGRAM(s) Oral at bedtime    PRN MEDICATIONS  dextrose Oral Gel 15 Gram(s) Oral once PRN  melatonin 3 milliGRAM(s) Oral at bedtime PRN  morphine  - Injectable 1 milliGRAM(s) IV Push every 4 hours PRN  oxyCODONE    IR 5 milliGRAM(s) Oral every 8 hours PRN      VITAL SIGNS: Last 24 Hours  T(C): 36.4 (04 Nov 2024 00:09), Max: 36.8 (03 Nov 2024 16:43)  T(F): 97.5 (04 Nov 2024 00:09), Max: 98.2 (03 Nov 2024 16:43)  HR: 65 (04 Nov 2024 00:09) (60 - 73)  BP: 124/64 (04 Nov 2024 00:09) (110/60 - 124/64)  BP(mean): --  RR: 18 (03 Nov 2024 16:43) (18 - 18)  SpO2: 95% (03 Nov 2024 16:43) (95% - 95%)    LABS:                        8.2    12.81 )-----------( 380      ( 03 Nov 2024 07:46 )             25.5     11-03    139  |  102  |  10  ----------------------------<  167[H]  3.9   |  26  |  0.8    Ca    9.0      03 Nov 2024 07:46  Mg     2.0     11-03    TPro  6.5  /  Alb  3.7  /  TBili  0.3  /  DBili  x   /  AST  63[H]  /  ALT  40  /  AlkPhos  108  11-03      Urinalysis Basic - ( 03 Nov 2024 07:46 )    Color: x / Appearance: x / SG: x / pH: x  Gluc: 167 mg/dL / Ketone: x  / Bili: x / Urobili: x   Blood: x / Protein: x / Nitrite: x   Leuk Esterase: x / RBC: x / WBC x   Sq Epi: x / Non Sq Epi: x / Bacteria: x            Culture - Wound Aerobic/Anaerobic (collected 01 Nov 2024 14:00)  Source: Skin/Wound  Preliminary Report (03 Nov 2024 18:51):    Numerous Pseudomonas putida group    Rare Candida parapsilosis "Susceptibilities not performed"    Few Mold Like Fungus Referred to Mycology for Identification  Organism: Pseudomonas putida group (03 Nov 2024 15:45)  Organism: Pseudomonas putida group (03 Nov 2024 15:45)              PHYSICAL EXAM:  GENERAL: NAD, well-developed.  HEAD:  Atraumatic, Normocephalic.  EYES: conjunctiva and sclera clear  CHEST/LUNG: GBAE. No wheezing or crackles   HEART: regular rate and rhythm; S1/S2.  ABDOMEN: Soft, Nontender, Nondistended  EXTREMITIES: LLE wrapped with ace wraps, dry   PSYCH: AAOx3.  NEUROLOGY: non-focal; moves all extremities

## 2024-11-04 NOTE — PROGRESS NOTE ADULT - SUBJECTIVE AND OBJECTIVE BOX
LEENA BAILEY  79y, Male    All available historical data reviewed    OVERNIGHT EVENTS:  no fevers  feels well and has no new complaints    ROS:  General: Denies rigors, nightsweats  HEENT: Denies headache, rhinorrhea, sore throat, eye pain  CV: Denies CP, palpitations  PULM: Denies wheezing, hemoptysis  GI: Denies hematemesis, hematochezia, melena  : Denies discharge, hematuria  MSK: Denies arthralgias, myalgias  SKIN: Denies rash, lesions  NEURO: weakness  PSYCH: Denies depression, anxiety    VITALS:  T(F): 95.5, Max: 98.2 (11-03-24 @ 16:43)  HR: 65  BP: 123/62  RR: 18Vital Signs Last 24 Hrs  T(C): 35.3 (04 Nov 2024 08:14), Max: 36.8 (03 Nov 2024 16:43)  T(F): 95.5 (04 Nov 2024 08:14), Max: 98.2 (03 Nov 2024 16:43)  HR: 65 (04 Nov 2024 08:14) (60 - 65)  BP: 123/62 (04 Nov 2024 08:14) (119/65 - 124/64)  BP(mean): --  RR: 18 (04 Nov 2024 08:14) (18 - 18)  SpO2: 96% (04 Nov 2024 08:14) (95% - 96%)    Parameters below as of 04 Nov 2024 08:14  Patient On (Oxygen Delivery Method): room air        TESTS & MEASUREMENTS:                        7.9    13.24 )-----------( 369      ( 04 Nov 2024 04:37 )             25.2     11-04    140  |  103  |  12  ----------------------------<  101[H]  4.2   |  25  |  0.7    Ca    8.7      04 Nov 2024 04:37  Mg     2.1     11-04    TPro  6.3  /  Alb  3.5  /  TBili  0.2  /  DBili  x   /  AST  145[H]  /  ALT  114[H]  /  AlkPhos  118[H]  11-04    LIVER FUNCTIONS - ( 04 Nov 2024 04:37 )  Alb: 3.5 g/dL / Pro: 6.3 g/dL / ALK PHOS: 118 U/L / ALT: 114 U/L / AST: 145 U/L / GGT: x             Culture - Wound Aerobic/Anaerobic (collected 11-01-24 @ 14:00)  Source: Skin/Wound  Preliminary Report (11-03-24 @ 18:51):    Numerous Pseudomonas putida group    Rare Candida parapsilosis "Susceptibilities not performed"    Few Mold Like Fungus Referred to Mycology for Identification  Organism: Pseudomonas putida group (11-03-24 @ 15:45)  Organism: Pseudomonas putida group (11-03-24 @ 15:45)      Method Type: SEBLE      -  Amikacin: S <=16      -  Aztreonam: S <=4      -  Cefepime: S <=2      -  Ceftriaxone: S 8      -  Ciprofloxacin: S <=0.25      -  Gentamicin: S <=2      -  Levofloxacin: S <=0.5      -  Meropenem: S <=1      -  Piperacillin/Tazobactam: S <=8      -  Tobramycin: S <=2      -  Trimethoprim/Sulfamethoxazole: S 2/38    Culture - Blood (collected 10-30-24 @ 19:09)  Source: .Blood BLOOD  Preliminary Report (11-04-24 @ 10:01):    No growth at 4 days    Culture - Blood (collected 10-30-24 @ 19:09)  Source: .Blood BLOOD  Preliminary Report (11-04-24 @ 10:01):    No growth at 4 days      Urinalysis Basic - ( 04 Nov 2024 04:37 )    Color: x / Appearance: x / SG: x / pH: x  Gluc: 101 mg/dL / Ketone: x  / Bili: x / Urobili: x   Blood: x / Protein: x / Nitrite: x   Leuk Esterase: x / RBC: x / WBC x   Sq Epi: x / Non Sq Epi: x / Bacteria: x          Social History:  Tobacco Use: No  Alcohol Use: No  Drug Use: No    RADIOLOGY & ADDITIONAL TESTS:  Personal review of radiological diagnostics performed  Echo and EKG results noted when applicable.     MEDICATIONS:  cefepime   IVPB 2000 milliGRAM(s) IV Intermittent every 8 hours  cefepime   IVPB      cyanocobalamin 1000 MICROGram(s) Oral daily  dextrose 5%. 1000 milliLiter(s) IV Continuous <Continuous>  dextrose 50% Injectable 25 Gram(s) IV Push once  dextrose Oral Gel 15 Gram(s) Oral once PRN  enoxaparin Injectable 40 milliGRAM(s) SubCutaneous every 24 hours  glucagon  Injectable 1 milliGRAM(s) IntraMuscular once  insulin lispro (ADMELOG) corrective regimen sliding scale   SubCutaneous three times a day before meals  melatonin 3 milliGRAM(s) Oral at bedtime PRN  morphine  - Injectable 1 milliGRAM(s) IV Push every 4 hours PRN  nystatin Cream 1 Application(s) Topical two times a day  oxyCODONE    IR 5 milliGRAM(s) Oral every 8 hours PRN  pantoprazole    Tablet 40 milliGRAM(s) Oral before breakfast  silver sulfADIAZINE 1% Cream 1 Application(s) Topical two times a day  tamsulosin 0.4 milliGRAM(s) Oral at bedtime      ANTIBIOTICS:  cefepime   IVPB 2000 milliGRAM(s) IV Intermittent every 8 hours  cefepime   IVPB

## 2024-11-04 NOTE — PROGRESS NOTE ADULT - ASSESSMENT
80 yo M with a PMHx of DM and HLD, p/w LLE wound x1 month. Admitted for LLE cellulitis.     #Left LE Cellulitis  - Sepsis present on admission (HR, WBC, Lactic Acid)  - xray left foot: Focal 2.7 cm soft tissue irregularity/defect along the lateral aspect of the left mid to distal fibula, without underlying fracture or dislocation. No evidence of osteomyelitis.   - LE venous duplex 10/21 negative for DVT  - LE arterial duplex 10/31: normal arterial flow b/l   - ESR/CRP elevated  - f/u blood cx  - started clindamycin 10/31 Dc'ed   - C/w wound care, no debridements   - ID: c/w unasyn, add prednisone 40 x 3 days (finished)  - pain control w/ tylenol an oxycodone     #Penile rash  - pt had some irritation on tip of the penis   - had some fungal like infection on the glans under the foreskin  - c/w Nystatin cream bid     #Macrocytic Anemia  - H/H stable  - iron studies wnl  - b12 (low side), folate wnl   - started 3 days of b12 injectable   - monitor CBC  - keep active T&S    #DM2  - Hold PO meds  - IS SS    #HLD  - c/w Statin    #BPH  - c/w Flomax    Diet: DASH/CHO  Activity: OOB  DVT PPX: Lovenox  Code status: Full  Dispo: Home 80 yo M with a PMHx of DM and HLD, p/w LLE wound x1 month. Admitted for LLE cellulitis.     #Left LE Cellulitis  - Sepsis present on admission (HR, WBC, Lactic Acid)  - xray left foot: Focal 2.7 cm soft tissue irregularity/defect along the lateral aspect of the left mid to distal fibula, without underlying fracture or dislocation. No evidence of osteomyelitis.   - LE venous duplex 10/21 negative for DVT  - LE arterial duplex 10/31: normal arterial flow b/l   - ESR/CRP elevated  - f/u blood cx  - started clindamycin 10/31 Dc'ed   - C/w wound care, no debridements   - ID: c/w unasyn, add prednisone 40 x 3 days (finished)  - WOUND CX: Pseudomonas PUTIA, susciptible to unasyn   - F/U ID   - pain control w/ tylenol an oxycodone     #Penile rash  - pt had some irritation on tip of the penis   - had some fungal like infection on the glans under the foreskin  - c/w Nystatin cream bid     #Macrocytic Anemia  - H/H stable  - iron studies wnl  - b12 (low side), folate wnl   - started 3 days of b12 injectable   - monitor CBC  - keep active T&S    #DM2  - Hold PO meds  - IS SS    #HLD  - c/w Statin    #BPH  - c/w Flomax    Diet: DASH/CHO  Activity: OOB  DVT PPX: Lovenox  Code status: Full  Dispo: Home 78 yo M with a PMHx of DM and HLD, p/w LLE wound x1 month. Admitted for LLE cellulitis.     #Left LE Cellulitis  - Sepsis present on admission (HR, WBC, Lactic Acid)  - xray left foot: Focal 2.7 cm soft tissue irregularity/defect along the lateral aspect of the left mid to distal fibula, without underlying fracture or dislocation. No evidence of osteomyelitis.   - LE venous duplex 10/21 negative for DVT  - LE arterial duplex 10/31: normal arterial flow b/l   - ESR/CRP elevated  - f/u blood cx  - started clindamycin 10/31 Dc'ed   - C/w wound care, no debridements   - ID: c/w unasyn, add prednisone 40 x 3 days (finished)  - WOUND CX: Pseudomonas PUTIA, susciptible to zosyn  - ID switched the pt to cefipime 11/3  - F/U ID   - pain control w/ tylenol an oxycodone     #Transaminitis  - AST/ALT increased on 11/4  - held pt's home atorvastatin    #Penile rash  - pt had some irritation on tip of the penis   - had some fungal like infection on the glans under the foreskin  - c/w Nystatin cream bid     #Macrocytic Anemia  - H/H stable  - iron studies wnl  - b12 (low side), folate wnl   - started 3 days of b12 injectable   - monitor CBC  - keep active T&S    #DM2  - Hold PO meds  - IS SS    #HLD  - c/w Statin    #BPH  - c/w Flomax    Diet: DASH/CHO  Activity: OOB  DVT PPX: Lovenox  Code status: Full  Dispo: Home

## 2024-11-04 NOTE — PROGRESS NOTE ADULT - ASSESSMENT
· Assessment	  79 year old male with pmhx of DM and HLD presented to the ED today with swelling and wounds on his LLE for the last month. He has pain at the site, no impairments with walking. No loss of sensation, weakness, or difficulty with movements. No fevers or chills.  Patient was treated at [facility] last week with Unasyn for LLE cellulitis but signed out AMA on 10/23 and was not taking Augmentin sent to pharmacy. Patient not aware of anemia diagnosis and denies any blood in stool.     IMPRESSION/RECOMMENDATIONS  Immunosuppression/Immunosenescence ( above age 60 yrs there is a exponential decline in immunity which could result in poor clinical outcomes.   LLE cellulitis with decreased edema  Chronic lymphedema Janet  10/31 Duplex Janet : good arterial flow  10/30 Doppler LLE : no DVT  WBC 13.2 ( increase secondary to steroids )    Diabetes  HLD (hyperlipidemia)  BPH without urinary obstruction  Morbid obesity    -Off loading to prevent pressure sores and preventive measures to avoid aspiration  -Local Silvadene cream to Janet b/l  -po Prednisone 40 mg q24h for 3-4 days ( to reduce the inflammation )  -Unasyn 3 gm iv q6h. Monitor for toxicity: hemolysis ( daily CBC) , rash ( clinical monitoring )    Discussion of management/test results with primary team.

## 2024-11-05 LAB
ALBUMIN SERPL ELPH-MCNC: 3.8 G/DL — SIGNIFICANT CHANGE UP (ref 3.5–5.2)
ALP SERPL-CCNC: 109 U/L — SIGNIFICANT CHANGE UP (ref 30–115)
ALT FLD-CCNC: 218 U/L — HIGH (ref 0–41)
ANION GAP SERPL CALC-SCNC: 12 MMOL/L — SIGNIFICANT CHANGE UP (ref 7–14)
AST SERPL-CCNC: 187 U/L — HIGH (ref 0–41)
BASOPHILS # BLD AUTO: 0.05 K/UL — SIGNIFICANT CHANGE UP (ref 0–0.2)
BASOPHILS NFR BLD AUTO: 0.4 % — SIGNIFICANT CHANGE UP (ref 0–1)
BILIRUB SERPL-MCNC: 0.2 MG/DL — SIGNIFICANT CHANGE UP (ref 0.2–1.2)
BUN SERPL-MCNC: 17 MG/DL — SIGNIFICANT CHANGE UP (ref 10–20)
CALCIUM SERPL-MCNC: 8.8 MG/DL — SIGNIFICANT CHANGE UP (ref 8.4–10.5)
CHLORIDE SERPL-SCNC: 102 MMOL/L — SIGNIFICANT CHANGE UP (ref 98–110)
CO2 SERPL-SCNC: 26 MMOL/L — SIGNIFICANT CHANGE UP (ref 17–32)
CREAT SERPL-MCNC: 0.9 MG/DL — SIGNIFICANT CHANGE UP (ref 0.7–1.5)
CULTURE RESULTS: SIGNIFICANT CHANGE UP
CULTURE RESULTS: SIGNIFICANT CHANGE UP
EGFR: 86 ML/MIN/1.73M2 — SIGNIFICANT CHANGE UP
EOSINOPHIL # BLD AUTO: 0.07 K/UL — SIGNIFICANT CHANGE UP (ref 0–0.7)
EOSINOPHIL NFR BLD AUTO: 0.5 % — SIGNIFICANT CHANGE UP (ref 0–8)
GLUCOSE BLDC GLUCOMTR-MCNC: 121 MG/DL — HIGH (ref 70–99)
GLUCOSE BLDC GLUCOMTR-MCNC: 127 MG/DL — HIGH (ref 70–99)
GLUCOSE BLDC GLUCOMTR-MCNC: 142 MG/DL — HIGH (ref 70–99)
GLUCOSE BLDC GLUCOMTR-MCNC: 154 MG/DL — HIGH (ref 70–99)
GLUCOSE SERPL-MCNC: 99 MG/DL — SIGNIFICANT CHANGE UP (ref 70–99)
HCT VFR BLD CALC: 26.7 % — LOW (ref 42–52)
HGB BLD-MCNC: 8.4 G/DL — LOW (ref 14–18)
IMM GRANULOCYTES NFR BLD AUTO: 1.3 % — HIGH (ref 0.1–0.3)
LYMPHOCYTES # BLD AUTO: 2.8 K/UL — SIGNIFICANT CHANGE UP (ref 1.2–3.4)
LYMPHOCYTES # BLD AUTO: 20.6 % — SIGNIFICANT CHANGE UP (ref 20.5–51.1)
MAGNESIUM SERPL-MCNC: 2 MG/DL — SIGNIFICANT CHANGE UP (ref 1.8–2.4)
MCHC RBC-ENTMCNC: 31.5 G/DL — LOW (ref 32–37)
MCHC RBC-ENTMCNC: 32.3 PG — HIGH (ref 27–31)
MCV RBC AUTO: 102.7 FL — HIGH (ref 80–94)
MONOCYTES # BLD AUTO: 1.26 K/UL — HIGH (ref 0.1–0.6)
MONOCYTES NFR BLD AUTO: 9.3 % — SIGNIFICANT CHANGE UP (ref 1.7–9.3)
NEUTROPHILS # BLD AUTO: 9.23 K/UL — HIGH (ref 1.4–6.5)
NEUTROPHILS NFR BLD AUTO: 67.9 % — SIGNIFICANT CHANGE UP (ref 42.2–75.2)
NRBC # BLD: 0 /100 WBCS — SIGNIFICANT CHANGE UP (ref 0–0)
PLATELET # BLD AUTO: 376 K/UL — SIGNIFICANT CHANGE UP (ref 130–400)
PMV BLD: 9.5 FL — SIGNIFICANT CHANGE UP (ref 7.4–10.4)
POTASSIUM SERPL-MCNC: 4 MMOL/L — SIGNIFICANT CHANGE UP (ref 3.5–5)
POTASSIUM SERPL-SCNC: 4 MMOL/L — SIGNIFICANT CHANGE UP (ref 3.5–5)
PROT SERPL-MCNC: 6.5 G/DL — SIGNIFICANT CHANGE UP (ref 6–8)
RBC # BLD: 2.6 M/UL — LOW (ref 4.7–6.1)
RBC # FLD: 14.7 % — HIGH (ref 11.5–14.5)
SODIUM SERPL-SCNC: 140 MMOL/L — SIGNIFICANT CHANGE UP (ref 135–146)
SPECIMEN SOURCE: SIGNIFICANT CHANGE UP
SPECIMEN SOURCE: SIGNIFICANT CHANGE UP
WBC # BLD: 13.58 K/UL — HIGH (ref 4.8–10.8)
WBC # FLD AUTO: 13.58 K/UL — HIGH (ref 4.8–10.8)

## 2024-11-05 PROCEDURE — 99232 SBSQ HOSP IP/OBS MODERATE 35: CPT

## 2024-11-05 RX ORDER — OXYCODONE HYDROCHLORIDE 30 MG/1
5 TABLET ORAL ONCE
Refills: 0 | Status: DISCONTINUED | OUTPATIENT
Start: 2024-11-05 | End: 2024-11-05

## 2024-11-05 RX ADMIN — SILVER SULFADIAZINE 1 APPLICATION(S): 10 CREAM TOPICAL at 05:35

## 2024-11-05 RX ADMIN — CHLORHEXIDINE GLUCONATE 1 APPLICATION(S): 40 SOLUTION TOPICAL at 11:24

## 2024-11-05 RX ADMIN — MORPHINE SULFATE 1 MILLIGRAM(S): 30 TABLET, EXTENDED RELEASE ORAL at 05:28

## 2024-11-05 RX ADMIN — NYSTATIN 1 APPLICATION(S): 100000 POWDER TOPICAL at 05:32

## 2024-11-05 RX ADMIN — OXYCODONE HYDROCHLORIDE 5 MILLIGRAM(S): 30 TABLET ORAL at 14:11

## 2024-11-05 RX ADMIN — CEFEPIME 100 MILLIGRAM(S): 2 INJECTION, POWDER, FOR SOLUTION INTRAVENOUS at 05:34

## 2024-11-05 RX ADMIN — OXYCODONE HYDROCHLORIDE 5 MILLIGRAM(S): 30 TABLET ORAL at 16:10

## 2024-11-05 RX ADMIN — Medication 0.4 MILLIGRAM(S): at 22:08

## 2024-11-05 RX ADMIN — Medication 1000 MICROGRAM(S): at 11:22

## 2024-11-05 RX ADMIN — NYSTATIN 1 APPLICATION(S): 100000 POWDER TOPICAL at 18:55

## 2024-11-05 RX ADMIN — PANTOPRAZOLE SODIUM 40 MILLIGRAM(S): 40 TABLET, DELAYED RELEASE ORAL at 05:32

## 2024-11-05 RX ADMIN — CEFEPIME 100 MILLIGRAM(S): 2 INJECTION, POWDER, FOR SOLUTION INTRAVENOUS at 14:45

## 2024-11-05 RX ADMIN — Medication 40 MILLIGRAM(S): at 11:22

## 2024-11-05 RX ADMIN — SILVER SULFADIAZINE 1 APPLICATION(S): 10 CREAM TOPICAL at 18:55

## 2024-11-05 RX ADMIN — OXYCODONE HYDROCHLORIDE 5 MILLIGRAM(S): 30 TABLET ORAL at 22:08

## 2024-11-05 RX ADMIN — Medication 3 MILLIGRAM(S): at 04:58

## 2024-11-05 NOTE — PROGRESS NOTE ADULT - MUSCULOSKELETAL
no calf tenderness/no chest wall tenderness/decreased strength
no calf tenderness/decreased strength

## 2024-11-05 NOTE — PROGRESS NOTE ADULT - ATTENDING COMMENTS
Patient seen and examined. Case discussed with resident physician, nursing staff and case management.   patient feeling better  wound evalauted  d/w ID physician  antibitoics to be discontinued  continue wound care  discharge plan  monitor LFT; continue to hold tylenol and statin  monitor hg; continue b12 supplement  d/c IV pain medicaitons; cont oxycodone PRN    TIme spent 35 mnts
# LLE wound infection with slough tissue; with oozing edema  limb elevation  continue unsayn, start on clinda  MRSA neg  consult ID, consult burn    # macrocytic anemia  # low normal B12   start on b12 inj  iron studies normal  patient needs OP follow up with GI    # DM  # HLP  # BPH
78 yo M with a PMHx of DM and HLD, p/w LLE wound x1 month. Admitted for LLE cellulitis.     #Left LE Cellulitis  - xray left foot: Focal 2.7 cm soft tissue irregularity/defect along the lateral aspect of the left mid to distal fibula, without underlying fracture or dislocation. No evidence of osteomyelitis.   - LE venous duplex 10/21 negative for DVT  - LE arterial duplex 10/31: normal arterial flow b/l   ID, burn team evaluated  was on unasyn, wound culutres- pseudomonas putida- antibiotics changed to cefepime; continue  s/p prednisone for 3 days  pain control- started on tylenol scheduled and oxy PRN    # transaminitis  possible medication induced  hold statin and monitor    #Macrocytic Anemia  - iron studies wnl  - b12- low normal  - s/p 3 days of b12 injectable ; start on PO supplement  - D/w patient for OP follow up with PCP and GI    #DM2  - IS SS    #HLD- c/w Statin    #BPH- c/w Flomax    DVT px- lovenox    time spent 35 mnts
patient feeling better  ID, burn team recommendation reviewed  continue unasyn, stop clinda  start on prednisone  monitor labs, vitals closely  plan of care discussed with patient

## 2024-11-05 NOTE — PROGRESS NOTE ADULT - ASSESSMENT
80 yo M with a PMHx of DM and HLD, p/w LLE wound x1 month. Admitted for LLE cellulitis.     #Left LE Cellulitis  - Sepsis present on admission (HR, WBC, Lactic Acid)  - xray left foot: Focal 2.7 cm soft tissue irregularity/defect along the lateral aspect of the left mid to distal fibula, without underlying fracture or dislocation. No evidence of osteomyelitis.   - LE venous duplex 10/21 negative for DVT  - LE arterial duplex 10/31: normal arterial flow b/l   - ESR/CRP elevated  - f/u blood cx  - started clindamycin 10/31 Dc'ed   - C/w wound care, no debridements   - ID: c/w unasyn, add prednisone 40 x 3 days (finished)  - WOUND CX: Pseudomonas PUTIA, susciptible to zosyn  - ID switched the pt to cefipime 11/3  - F/U ID   - pain control w/ tylenol an oxycodone     #Transaminitis  - AST/ALT increased on 11/4  - held pt's home atorvastatin    #Penile rash  - pt had some irritation on tip of the penis   - had some fungal like infection on the glans under the foreskin  - c/w Nystatin cream bid     #Macrocytic Anemia  - H/H stable  - iron studies wnl  - b12 (low side), folate wnl   - started 3 days of b12 injectable   - monitor CBC  - keep active T&S    #DM2  - Hold PO meds  - IS SS    #HLD  - c/w Statin    #BPH  - c/w Flomax    Diet: DASH/CHO  Activity: OOB  DVT PPX: Lovenox  Code status: Full  Dispo: Home

## 2024-11-05 NOTE — PROGRESS NOTE ADULT - SUBJECTIVE AND OBJECTIVE BOX
LEENA BAILEY  79y, Male    All available historical data reviewed    OVERNIGHT EVENTS:  none  feels well and has no new complaints  No fevers     ROS:  General: Denies rigors, nightsweats  HEENT: Denies headache, rhinorrhea, sore throat, eye pain  CV: Denies CP, palpitations  PULM: Denies wheezing, hemoptysis  GI: Denies hematemesis, hematochezia, melena  : Denies discharge, hematuria  MSK: Denies arthralgias, myalgias  SKIN: Denies rash, lesions  NEURO: Denies paresthesias, weakness  PSYCH: Denies depression, anxiety    VITALS:  T(F): 98, Max: 98.2 (11-05-24 @ 08:09)  HR: 68  BP: 120/62  RR: 18Vital Signs Last 24 Hrs  T(C): 36.7 (05 Nov 2024 15:25), Max: 36.8 (05 Nov 2024 08:09)  T(F): 98 (05 Nov 2024 15:25), Max: 98.2 (05 Nov 2024 08:09)  HR: 68 (05 Nov 2024 15:25) (60 - 69)  BP: 120/62 (05 Nov 2024 15:25) (120/62 - 149/70)  BP(mean): --  RR: 18 (05 Nov 2024 15:25) (18 - 18)  SpO2: 99% (05 Nov 2024 15:25) (99% - 100%)    Parameters below as of 05 Nov 2024 15:25  Patient On (Oxygen Delivery Method): room air        TESTS & MEASUREMENTS:                        8.4    13.58 )-----------( 376      ( 05 Nov 2024 07:48 )             26.7     11-05    140  |  102  |  17  ----------------------------<  99  4.0   |  26  |  0.9    Ca    8.8      05 Nov 2024 07:48  Mg     2.0     11-05    TPro  6.5  /  Alb  3.8  /  TBili  0.2  /  DBili  x   /  AST  187[H]  /  ALT  218[H]  /  AlkPhos  109  11-05    LIVER FUNCTIONS - ( 05 Nov 2024 07:48 )  Alb: 3.8 g/dL / Pro: 6.5 g/dL / ALK PHOS: 109 U/L / ALT: 218 U/L / AST: 187 U/L / GGT: x             Culture - Wound Aerobic/Anaerobic (collected 11-01-24 @ 14:00)  Source: Skin/Wound  Preliminary Report (11-03-24 @ 18:51):    Numerous Pseudomonas putida group    Rare Candida parapsilosis "Susceptibilities not performed"    Few Mold Like Fungus Referred to Mycology for Identification  Organism: Pseudomonas putida group (11-03-24 @ 15:45)  Organism: Pseudomonas putida group (11-03-24 @ 15:45)      -  Levofloxacin: S <=0.5      -  Tobramycin: S <=2      -  Aztreonam: S <=4      -  Gentamicin: S <=2      -  Cefepime: S <=2      -  Piperacillin/Tazobactam: S <=8      -  Ciprofloxacin: S <=0.25      -  Ceftriaxone: S 8      Method Type: SEBLE      -  Meropenem: S <=1      -  Amikacin: S <=16      -  Trimethoprim/Sulfamethoxazole: S 2/38    Culture - Blood (collected 10-30-24 @ 19:09)  Source: .Blood BLOOD  Final Report (11-05-24 @ 10:00):    No growth at 5 days    Culture - Blood (collected 10-30-24 @ 19:09)  Source: .Blood BLOOD  Final Report (11-05-24 @ 10:00):    No growth at 5 days      Urinalysis Basic - ( 05 Nov 2024 07:48 )    Color: x / Appearance: x / SG: x / pH: x  Gluc: 99 mg/dL / Ketone: x  / Bili: x / Urobili: x   Blood: x / Protein: x / Nitrite: x   Leuk Esterase: x / RBC: x / WBC x   Sq Epi: x / Non Sq Epi: x / Bacteria: x          Social History:  Tobacco Use: No  Alcohol Use: No  Drug Use: No    RADIOLOGY & ADDITIONAL TESTS:  Personal review of radiological diagnostics performed  Echo and EKG results noted when applicable.     MEDICATIONS:  cefepime   IVPB 2000 milliGRAM(s) IV Intermittent every 8 hours  cefepime   IVPB      chlorhexidine 2% Cloths 1 Application(s) Topical daily  cyanocobalamin 1000 MICROGram(s) Oral daily  dextrose 5%. 1000 milliLiter(s) IV Continuous <Continuous>  dextrose 50% Injectable 25 Gram(s) IV Push once  dextrose Oral Gel 15 Gram(s) Oral once PRN  enoxaparin Injectable 40 milliGRAM(s) SubCutaneous every 24 hours  glucagon  Injectable 1 milliGRAM(s) IntraMuscular once  insulin lispro (ADMELOG) corrective regimen sliding scale   SubCutaneous three times a day before meals  melatonin 3 milliGRAM(s) Oral at bedtime PRN  nystatin Cream 1 Application(s) Topical two times a day  oxyCODONE    IR 5 milliGRAM(s) Oral once PRN  oxyCODONE    IR 5 milliGRAM(s) Oral every 8 hours PRN  pantoprazole    Tablet 40 milliGRAM(s) Oral before breakfast  silver sulfADIAZINE 1% Cream 1 Application(s) Topical two times a day  tamsulosin 0.4 milliGRAM(s) Oral at bedtime      ANTIBIOTICS:  cefepime   IVPB 2000 milliGRAM(s) IV Intermittent every 8 hours  cefepime   IVPB

## 2024-11-05 NOTE — PROGRESS NOTE ADULT - ASSESSMENT
· Assessment	  79 year old male with pmhx of DM and HLD presented to the ED today with swelling and wounds on his LLE for the last month. He has pain at the site, no impairments with walking. No loss of sensation, weakness, or difficulty with movements. No fevers or chills.  Patient was treated at [facility] last week with Unasyn for LLE cellulitis but signed out AMA on 10/23 and was not taking Augmentin sent to pharmacy. Patient not aware of anemia diagnosis and denies any blood in stool.     IMPRESSION/RECOMMENDATIONS  Immunosuppression/Immunosenescence ( above age 60 yrs there is a exponential decline in immunity which could result in poor clinical outcomes.   LLE cellulitis with decreased edema. Now with chronic hyperpigmentation with resolved cellulitis  Chronic lymphedema Janet  10/30 WCx Pseudomonas putida, Mold, Candida parapsilosis. Independent analysis of WCX results and interpretation of sensitivity results.   10/31 Duplex Janet : good arterial flow  10/30 Doppler LLE : no DVT  WBC 13.2 ( increase secondary to steroids )    Diabetes  HLD (hyperlipidemia)  BPH without urinary obstruction  Morbid obesity    -Off loading to prevent pressure sores and preventive measures to avoid aspiration  -Local Silvadene cream to Jaent b/l  -d/c ABx    Discussion of management/test results  with primary team / Dr Lockwood

## 2024-11-05 NOTE — PROGRESS NOTE ADULT - SUBJECTIVE AND OBJECTIVE BOX
LEENA BAILEY 79y Male  MRN#: [id number]   Hospital Day: 6d    HPI:  79 year old male with pmhx of DM and HLD presented to the ED today with swelling and wounds on his LLE for the last month. He has pain at the site, no impairments with walking. No loss of sensation, weakness, or difficulty with movements. No fevers or chills.  Patient was treated at [facility] last week with Unasyn for LLE cellulitis but signed out AMA on 10/23 and was not taking Augmentin sent to pharmacy. Patient not aware of anemia diagnosis and denies any blood in stool.     ED vitals:   · BP Systolic	123 mm Hg  · BP Diastolic	64 mm Hg  · Heart Rate	96 /min  · Respiration Rate (breaths/min)	18 /min  · Temp (F)	98.4 Degrees F  · Temp (C)	36.9 Degrees C  · Temp site	oral  · SpO2 (%)	100 %  · O2 Delivery/Oxygen Delivery Method	room air  · Temp at ED Arrival (C)	36.9 Degrees C   (31 Oct 2024 00:08)      SUBJECTIVE      OBJECTIVE  PAST MEDICAL & SURGICAL HISTORY  Diabetes    HLD (hyperlipidemia)    BPH without urinary obstruction    S/P cataract surgery      ALLERGIES:  No Known Allergies    MEDICATIONS:  STANDING MEDICATIONS  cefepime   IVPB 2000 milliGRAM(s) IV Intermittent every 8 hours  cefepime   IVPB      chlorhexidine 2% Cloths 1 Application(s) Topical daily  cyanocobalamin 1000 MICROGram(s) Oral daily  dextrose 5%. 1000 milliLiter(s) IV Continuous <Continuous>  dextrose 50% Injectable 25 Gram(s) IV Push once  enoxaparin Injectable 40 milliGRAM(s) SubCutaneous every 24 hours  glucagon  Injectable 1 milliGRAM(s) IntraMuscular once  insulin lispro (ADMELOG) corrective regimen sliding scale   SubCutaneous three times a day before meals  nystatin Cream 1 Application(s) Topical two times a day  pantoprazole    Tablet 40 milliGRAM(s) Oral before breakfast  silver sulfADIAZINE 1% Cream 1 Application(s) Topical two times a day  tamsulosin 0.4 milliGRAM(s) Oral at bedtime    PRN MEDICATIONS  dextrose Oral Gel 15 Gram(s) Oral once PRN  melatonin 3 milliGRAM(s) Oral at bedtime PRN  morphine  - Injectable 1 milliGRAM(s) IV Push every 4 hours PRN  oxyCODONE    IR 5 milliGRAM(s) Oral every 8 hours PRN      VITAL SIGNS: Last 24 Hours  T(C): 36.6 (05 Nov 2024 00:13), Max: 36.6 (05 Nov 2024 00:13)  T(F): 97.9 (05 Nov 2024 00:13), Max: 97.9 (05 Nov 2024 00:13)  HR: 60 (05 Nov 2024 00:13) (60 - 65)  BP: 149/70 (05 Nov 2024 00:13) (122/48 - 149/70)  BP(mean): --  RR: 18 (05 Nov 2024 00:13) (18 - 18)  SpO2: 100% (05 Nov 2024 00:13) (96% - 100%)    LABS:                        7.9    13.24 )-----------( 369      ( 04 Nov 2024 04:37 )             25.2     11-04    140  |  103  |  12  ----------------------------<  101[H]  4.2   |  25  |  0.7    Ca    8.7      04 Nov 2024 04:37  Mg     2.1     11-04    TPro  6.3  /  Alb  3.5  /  TBili  0.2  /  DBili  x   /  AST  145[H]  /  ALT  114[H]  /  AlkPhos  118[H]  11-04      Urinalysis Basic - ( 04 Nov 2024 04:37 )    Color: x / Appearance: x / SG: x / pH: x  Gluc: 101 mg/dL / Ketone: x  / Bili: x / Urobili: x   Blood: x / Protein: x / Nitrite: x   Leuk Esterase: x / RBC: x / WBC x   Sq Epi: x / Non Sq Epi: x / Bacteria: x                    PHYSICAL EXAM:  GENERAL: NAD, well-developed.  HEAD:  Atraumatic, Normocephalic.  EYES: conjunctiva and sclera clear  CHEST/LUNG: GBAE. No wheezing or crackles   HEART: regular rate and rhythm; S1/S2.  ABDOMEN: Soft, Nontender, Nondistended  EXTREMITIES: No edema.   PSYCH: AAOx3.  NEUROLOGY: non-focal; moves all extremities     LEENA BAILEY 79y Male  MRN#: [id number]   Hospital Day: 6d    HPI:  79 year old male with pmhx of DM and HLD presented to the ED today with swelling and wounds on his LLE for the last month. He has pain at the site, no impairments with walking. No loss of sensation, weakness, or difficulty with movements. No fevers or chills.  Patient was treated at [facility] last week with Unasyn for LLE cellulitis but signed out AMA on 10/23 and was not taking Augmentin sent to pharmacy. Patient not aware of anemia diagnosis and denies any blood in stool.     ED vitals:   · BP Systolic	123 mm Hg  · BP Diastolic	64 mm Hg  · Heart Rate	96 /min  · Respiration Rate (breaths/min)	18 /min  · Temp (F)	98.4 Degrees F  · Temp (C)	36.9 Degrees C  · Temp site	oral  · SpO2 (%)	100 %  · O2 Delivery/Oxygen Delivery Method	room air  · Temp at ED Arrival (C)	36.9 Degrees C   (31 Oct 2024 00:08)      SUBJECTIVE  Pt was seen and evaluated. reports improvement in symptoms     OBJECTIVE  PAST MEDICAL & SURGICAL HISTORY  Diabetes    HLD (hyperlipidemia)    BPH without urinary obstruction    S/P cataract surgery      ALLERGIES:  No Known Allergies    MEDICATIONS:  STANDING MEDICATIONS  cefepime   IVPB 2000 milliGRAM(s) IV Intermittent every 8 hours  cefepime   IVPB      chlorhexidine 2% Cloths 1 Application(s) Topical daily  cyanocobalamin 1000 MICROGram(s) Oral daily  dextrose 5%. 1000 milliLiter(s) IV Continuous <Continuous>  dextrose 50% Injectable 25 Gram(s) IV Push once  enoxaparin Injectable 40 milliGRAM(s) SubCutaneous every 24 hours  glucagon  Injectable 1 milliGRAM(s) IntraMuscular once  insulin lispro (ADMELOG) corrective regimen sliding scale   SubCutaneous three times a day before meals  nystatin Cream 1 Application(s) Topical two times a day  pantoprazole    Tablet 40 milliGRAM(s) Oral before breakfast  silver sulfADIAZINE 1% Cream 1 Application(s) Topical two times a day  tamsulosin 0.4 milliGRAM(s) Oral at bedtime    PRN MEDICATIONS  dextrose Oral Gel 15 Gram(s) Oral once PRN  melatonin 3 milliGRAM(s) Oral at bedtime PRN  morphine  - Injectable 1 milliGRAM(s) IV Push every 4 hours PRN  oxyCODONE    IR 5 milliGRAM(s) Oral every 8 hours PRN      VITAL SIGNS: Last 24 Hours  T(C): 36.6 (05 Nov 2024 00:13), Max: 36.6 (05 Nov 2024 00:13)  T(F): 97.9 (05 Nov 2024 00:13), Max: 97.9 (05 Nov 2024 00:13)  HR: 60 (05 Nov 2024 00:13) (60 - 65)  BP: 149/70 (05 Nov 2024 00:13) (122/48 - 149/70)  BP(mean): --  RR: 18 (05 Nov 2024 00:13) (18 - 18)  SpO2: 100% (05 Nov 2024 00:13) (96% - 100%)    LABS:                        7.9    13.24 )-----------( 369      ( 04 Nov 2024 04:37 )             25.2     11-04    140  |  103  |  12  ----------------------------<  101[H]  4.2   |  25  |  0.7    Ca    8.7      04 Nov 2024 04:37  Mg     2.1     11-04    TPro  6.3  /  Alb  3.5  /  TBili  0.2  /  DBili  x   /  AST  145[H]  /  ALT  114[H]  /  AlkPhos  118[H]  11-04      Urinalysis Basic - ( 04 Nov 2024 04:37 )    Color: x / Appearance: x / SG: x / pH: x  Gluc: 101 mg/dL / Ketone: x  / Bili: x / Urobili: x   Blood: x / Protein: x / Nitrite: x   Leuk Esterase: x / RBC: x / WBC x   Sq Epi: x / Non Sq Epi: x / Bacteria: x                    PHYSICAL EXAM:  GENERAL: NAD, well-developed.  HEAD:  Atraumatic, Normocephalic.  EYES: conjunctiva and sclera clear  CHEST/LUNG: GBAE. No wheezing or crackles   HEART: regular rate and rhythm; S1/S2.  ABDOMEN: Soft, Nontender, Nondistended  EXTREMITIES: b/l Le swollen and ace wrapped   PSYCH: AAOx3.  NEUROLOGY: non-focal; moves all extremities

## 2024-11-06 ENCOUNTER — TRANSCRIPTION ENCOUNTER (OUTPATIENT)
Age: 79
End: 2024-11-06

## 2024-11-06 VITALS
OXYGEN SATURATION: 97 % | RESPIRATION RATE: 18 BRPM | DIASTOLIC BLOOD PRESSURE: 56 MMHG | SYSTOLIC BLOOD PRESSURE: 109 MMHG | HEART RATE: 66 BPM | TEMPERATURE: 98 F

## 2024-11-06 LAB
ALBUMIN SERPL ELPH-MCNC: 3.7 G/DL — SIGNIFICANT CHANGE UP (ref 3.5–5.2)
ALP SERPL-CCNC: 107 U/L — SIGNIFICANT CHANGE UP (ref 30–115)
ALT FLD-CCNC: 180 U/L — HIGH (ref 0–41)
ANION GAP SERPL CALC-SCNC: 12 MMOL/L — SIGNIFICANT CHANGE UP (ref 7–14)
AST SERPL-CCNC: 95 U/L — HIGH (ref 0–41)
BASOPHILS # BLD AUTO: 0.1 K/UL — SIGNIFICANT CHANGE UP (ref 0–0.2)
BASOPHILS NFR BLD AUTO: 0.8 % — SIGNIFICANT CHANGE UP (ref 0–1)
BILIRUB SERPL-MCNC: 0.2 MG/DL — SIGNIFICANT CHANGE UP (ref 0.2–1.2)
BUN SERPL-MCNC: 19 MG/DL — SIGNIFICANT CHANGE UP (ref 10–20)
CALCIUM SERPL-MCNC: 8.9 MG/DL — SIGNIFICANT CHANGE UP (ref 8.4–10.5)
CHLORIDE SERPL-SCNC: 103 MMOL/L — SIGNIFICANT CHANGE UP (ref 98–110)
CO2 SERPL-SCNC: 25 MMOL/L — SIGNIFICANT CHANGE UP (ref 17–32)
CREAT SERPL-MCNC: 0.8 MG/DL — SIGNIFICANT CHANGE UP (ref 0.7–1.5)
EGFR: 89 ML/MIN/1.73M2 — SIGNIFICANT CHANGE UP
EOSINOPHIL # BLD AUTO: 0.18 K/UL — SIGNIFICANT CHANGE UP (ref 0–0.7)
EOSINOPHIL NFR BLD AUTO: 1.4 % — SIGNIFICANT CHANGE UP (ref 0–8)
GLUCOSE BLDC GLUCOMTR-MCNC: 132 MG/DL — HIGH (ref 70–99)
GLUCOSE BLDC GLUCOMTR-MCNC: 159 MG/DL — HIGH (ref 70–99)
GLUCOSE SERPL-MCNC: 118 MG/DL — HIGH (ref 70–99)
HCT VFR BLD CALC: 27.3 % — LOW (ref 42–52)
HGB BLD-MCNC: 8.5 G/DL — LOW (ref 14–18)
IMM GRANULOCYTES NFR BLD AUTO: 1.7 % — HIGH (ref 0.1–0.3)
LYMPHOCYTES # BLD AUTO: 16.8 % — LOW (ref 20.5–51.1)
LYMPHOCYTES # BLD AUTO: 2.23 K/UL — SIGNIFICANT CHANGE UP (ref 1.2–3.4)
MAGNESIUM SERPL-MCNC: 2.1 MG/DL — SIGNIFICANT CHANGE UP (ref 1.8–2.4)
MCHC RBC-ENTMCNC: 31.1 G/DL — LOW (ref 32–37)
MCHC RBC-ENTMCNC: 31.8 PG — HIGH (ref 27–31)
MCV RBC AUTO: 102.2 FL — HIGH (ref 80–94)
MONOCYTES # BLD AUTO: 1.63 K/UL — HIGH (ref 0.1–0.6)
MONOCYTES NFR BLD AUTO: 12.3 % — HIGH (ref 1.7–9.3)
NEUTROPHILS # BLD AUTO: 8.92 K/UL — HIGH (ref 1.4–6.5)
NEUTROPHILS NFR BLD AUTO: 67 % — SIGNIFICANT CHANGE UP (ref 42.2–75.2)
NRBC # BLD: 0 /100 WBCS — SIGNIFICANT CHANGE UP (ref 0–0)
PLATELET # BLD AUTO: 348 K/UL — SIGNIFICANT CHANGE UP (ref 130–400)
PMV BLD: 9.5 FL — SIGNIFICANT CHANGE UP (ref 7.4–10.4)
POTASSIUM SERPL-MCNC: 4.4 MMOL/L — SIGNIFICANT CHANGE UP (ref 3.5–5)
POTASSIUM SERPL-SCNC: 4.4 MMOL/L — SIGNIFICANT CHANGE UP (ref 3.5–5)
PROT SERPL-MCNC: 6.6 G/DL — SIGNIFICANT CHANGE UP (ref 6–8)
RBC # BLD: 2.67 M/UL — LOW (ref 4.7–6.1)
RBC # FLD: 15.2 % — HIGH (ref 11.5–14.5)
SODIUM SERPL-SCNC: 140 MMOL/L — SIGNIFICANT CHANGE UP (ref 135–146)
WBC # BLD: 13.29 K/UL — HIGH (ref 4.8–10.8)
WBC # FLD AUTO: 13.29 K/UL — HIGH (ref 4.8–10.8)

## 2024-11-06 PROCEDURE — 99238 HOSP IP/OBS DSCHRG MGMT 30/<: CPT

## 2024-11-06 RX ORDER — SENNA 187 MG
1 TABLET ORAL
Qty: 10 | Refills: 0
Start: 2024-11-06 | End: 2024-11-15

## 2024-11-06 RX ORDER — SILVER SULFADIAZINE 10 MG/G
1 CREAM TOPICAL
Qty: 2 | Refills: 0
Start: 2024-11-06 | End: 2024-11-15

## 2024-11-06 RX ORDER — OXYCODONE HYDROCHLORIDE 30 MG/1
1 TABLET ORAL
Qty: 6 | Refills: 0
Start: 2024-11-06 | End: 2024-11-08

## 2024-11-06 RX ORDER — POLYETHYLENE GLYCOL 3350 17 G/17G
17 POWDER, FOR SOLUTION ORAL
Qty: 1 | Refills: 0
Start: 2024-11-06 | End: 2024-11-15

## 2024-11-06 RX ORDER — CYANOCOBALAMIN (VITAMIN B-12) 1000MCG/15
1 LIQUID (ML) ORAL
Qty: 30 | Refills: 0
Start: 2024-11-06 | End: 2024-12-05

## 2024-11-06 RX ADMIN — OXYCODONE HYDROCHLORIDE 5 MILLIGRAM(S): 30 TABLET ORAL at 06:25

## 2024-11-06 RX ADMIN — SILVER SULFADIAZINE 1 APPLICATION(S): 10 CREAM TOPICAL at 06:04

## 2024-11-06 RX ADMIN — Medication 40 MILLIGRAM(S): at 11:37

## 2024-11-06 RX ADMIN — OXYCODONE HYDROCHLORIDE 5 MILLIGRAM(S): 30 TABLET ORAL at 14:33

## 2024-11-06 RX ADMIN — NYSTATIN 1 APPLICATION(S): 100000 POWDER TOPICAL at 06:05

## 2024-11-06 RX ADMIN — Medication 1000 MICROGRAM(S): at 11:38

## 2024-11-06 RX ADMIN — PANTOPRAZOLE SODIUM 40 MILLIGRAM(S): 40 TABLET, DELAYED RELEASE ORAL at 06:02

## 2024-11-06 RX ADMIN — CHLORHEXIDINE GLUCONATE 1 APPLICATION(S): 40 SOLUTION TOPICAL at 11:38

## 2024-11-06 NOTE — DISCHARGE NOTE PROVIDER - HOSPITAL COURSE
80 yo M with a PMHx of DM and HLD, p/w LLE wound x1 month. Admitted for LLE cellulitis.     #Left LE Cellulitis  - Sepsis present on admission (HR, WBC, Lactic Acid)  - xray left foot: No evidence of osteomyelitis.   - LE venous duplex 10/21 negative for DVT  - LE arterial duplex 10/31: normal arterial flow b/l   - ESR/CRP elevated  - wound care no debridements   - ID: c/w unasyn, add prednisone 40 x 3 days (finished)  - WOUND CX: Pseudomonas PUTIA, susceptible to zosyn  - ID switched the pt to cefipime 11/3>>11/5, then discontinued per ID   - pain control w/ tylenol an oxycodone     #Transaminitis  - AST/ALT increased on 11/4  - held pt's home atorvastatin  - improving 11/06   - restart statin as outpt with repeat blood work     #Penile rash  - pt had some irritation on tip of the penis   - had some fungal like infection on the glans under the foreskin  - Treated with nystatin cream    #Macrocytic Anemia  - H/H stable  - iron studies wnl  - b12 (low side), folate wnl   - started 3 days of b12 injectable   - f/u op with GI     #DM2  - continue home meds     #HLD  - restart statin op with repeat blood work Statin    #BPH  - c/w Flomax   78 yo M with a PMHx of DM and HLD, p/w LLE wound x1 month. Admitted for LLE cellulitis.     #Left LE Cellulitis  - Sepsis present on admission (HR, WBC, Lactic Acid)  - xray left foot: No evidence of osteomyelitis.   - LE venous duplex 10/21 negative for DVT  - LE arterial duplex 10/31: normal arterial flow b/l   - ESR/CRP elevated  - Burn no debridements   - ID: patient treated with unasyn, add prednisone 40 x 3 days (finished),WOUND CX: Pseudomonas PUTIA- and hence antibiotics changed to cefpeime.  - antibiotics discontinued on 11/5 since improvement  - pain control w/ tylenol an oxycodone     #Transaminitis- improving  - possible medication induced  - AST/ALT increased on 11/4  - held pt's home atorvastatin  - improving 11/06   - restart statin as outpt with repeat blood work     #Penile rash  - pt had some irritation on tip of the penis   - had some fungal like infection on the glans under the foreskin  - Treated with nystatin cream    #Macrocytic Anemia  - H/H stable  - iron studies wnl  - b12 (low side), folate wnl   - started 3 days of b12 injectable ; continue PO supplements  - f/u op with GI     #DM2  - continue home meds     #HLD  - restart statin op with repeat blood work Statin    #BPH  - c/w Flomax

## 2024-11-06 NOTE — PROGRESS NOTE ADULT - ASSESSMENT
80 yo M with a PMHx of DM and HLD, p/w LLE wound x1 month. Admitted for LLE cellulitis.     #Left LE Cellulitis  - Sepsis present on admission (HR, WBC, Lactic Acid)  - xray left foot: Focal 2.7 cm soft tissue irregularity/defect along the lateral aspect of the left mid to distal fibula, without underlying fracture or dislocation. No evidence of osteomyelitis.   - LE venous duplex 10/21 negative for DVT  - LE arterial duplex 10/31: normal arterial flow b/l   - ESR/CRP elevated  - f/u blood cx  - started clindamycin 10/31 Dc'ed   - C/w wound care, no debridements   - ID: c/w unasyn, add prednisone 40 x 3 days (finished)  - WOUND CX: Pseudomonas PUTIA, susciptible to zosyn  - ID switched the pt to cefipime 11/3, now off abx   - pain control w/ tylenol an oxycodone     #Transaminitis  - AST/ALT increased on 11/4  - held pt's home atorvastatin    #Penile rash  - pt had some irritation on tip of the penis   - had some fungal like infection on the glans under the foreskin  - c/w Nystatin cream bid     #Macrocytic Anemia  - H/H stable  - iron studies wnl  - b12 (low side), folate wnl   - started 3 days of b12 injectable   - monitor CBC  - keep active T&S    #DM2  - Hold PO meds  - IS SS    #HLD  - c/w Statin    #BPH  - c/w Flomax    Diet: DASH/CHO  Activity: OOB  DVT PPX: Lovenox  Code status: Full  Dispo: Home

## 2024-11-06 NOTE — DISCHARGE NOTE PROVIDER - NSDCCPGOAL_GEN_ALL_CORE_FT
[FreeTextEntry3] : I reviewed the patient with the Nurse Practitioner today. We reviewed the case together and I am in agreement with the current assessment and plan\par 
To get better and follow your care plan as instructed.

## 2024-11-06 NOTE — DISCHARGE NOTE PROVIDER - CARE PROVIDER_API CALL
Jose Abreu  Internal Medicine  [address] [place] [address]  Phone: [phone]  Fax: [phone]  Follow Up Time: 1-3 days   Jose Abreu  Internal Medicine  [address] [place] [address]  Phone: [phone]  Fax: [phone]  Follow Up Time: 1-3 days    gastroenterology,   Phone: (   )    -  Fax: (   )    -  Follow Up Time: 2 weeks

## 2024-11-06 NOTE — PROGRESS NOTE ADULT - PROVIDER SPECIALTY LIST ADULT
Internal Medicine
Infectious Disease
Infectious Disease

## 2024-11-06 NOTE — PROGRESS NOTE ADULT - SUBJECTIVE AND OBJECTIVE BOX
LEENA BAILEY 79y Male  MRN#: [id number]   Hospital Day: 7d    HPI:  79 year old male with pmhx of DM and HLD presented to the ED today with swelling and wounds on his LLE for the last month. He has pain at the site, no impairments with walking. No loss of sensation, weakness, or difficulty with movements. No fevers or chills.  Patient was treated at [facility] last week with Unasyn for LLE cellulitis but signed out AMA on 10/23 and was not taking Augmentin sent to pharmacy. Patient not aware of anemia diagnosis and denies any blood in stool.     ED vitals:   · BP Systolic	123 mm Hg  · BP Diastolic	64 mm Hg  · Heart Rate	96 /min  · Respiration Rate (breaths/min)	18 /min  · Temp (F)	98.4 Degrees F  · Temp (C)	36.9 Degrees C  · Temp site	oral  · SpO2 (%)	100 %  · O2 Delivery/Oxygen Delivery Method	room air  · Temp at ED Arrival (C)	36.9 Degrees C   (31 Oct 2024 00:08)      SUBJECTIVE      OBJECTIVE  PAST MEDICAL & SURGICAL HISTORY  Diabetes    HLD (hyperlipidemia)    BPH without urinary obstruction    S/P cataract surgery      ALLERGIES:  No Known Allergies    MEDICATIONS:  STANDING MEDICATIONS  chlorhexidine 2% Cloths 1 Application(s) Topical daily  cyanocobalamin 1000 MICROGram(s) Oral daily  dextrose 5%. 1000 milliLiter(s) IV Continuous <Continuous>  dextrose 50% Injectable 25 Gram(s) IV Push once  enoxaparin Injectable 40 milliGRAM(s) SubCutaneous every 24 hours  glucagon  Injectable 1 milliGRAM(s) IntraMuscular once  insulin lispro (ADMELOG) corrective regimen sliding scale   SubCutaneous three times a day before meals  nystatin Cream 1 Application(s) Topical two times a day  pantoprazole    Tablet 40 milliGRAM(s) Oral before breakfast  silver sulfADIAZINE 1% Cream 1 Application(s) Topical two times a day  tamsulosin 0.4 milliGRAM(s) Oral at bedtime    PRN MEDICATIONS  dextrose Oral Gel 15 Gram(s) Oral once PRN  melatonin 3 milliGRAM(s) Oral at bedtime PRN  oxyCODONE    IR 5 milliGRAM(s) Oral every 8 hours PRN      VITAL SIGNS: Last 24 Hours  T(C): 36.7 (05 Nov 2024 15:25), Max: 36.8 (05 Nov 2024 08:09)  T(F): 98 (05 Nov 2024 15:25), Max: 98.2 (05 Nov 2024 08:09)  HR: 66 (05 Nov 2024 23:53) (66 - 69)  BP: 112/61 (05 Nov 2024 23:53) (112/61 - 121/63)  BP(mean): --  RR: 18 (05 Nov 2024 23:53) (18 - 18)  SpO2: 99% (05 Nov 2024 23:53) (99% - 100%)    LABS:                        8.4    13.58 )-----------( 376      ( 05 Nov 2024 07:48 )             26.7     11-05    140  |  102  |  17  ----------------------------<  99  4.0   |  26  |  0.9    Ca    8.8      05 Nov 2024 07:48  Mg     2.0     11-05    TPro  6.5  /  Alb  3.8  /  TBili  0.2  /  DBili  x   /  AST  187[H]  /  ALT  218[H]  /  AlkPhos  109  11-05      Urinalysis Basic - ( 05 Nov 2024 07:48 )    Color: x / Appearance: x / SG: x / pH: x  Gluc: 99 mg/dL / Ketone: x  / Bili: x / Urobili: x   Blood: x / Protein: x / Nitrite: x   Leuk Esterase: x / RBC: x / WBC x   Sq Epi: x / Non Sq Epi: x / Bacteria: x                    PHYSICAL EXAM:  GENERAL: NAD, well-developed.  HEAD:  Atraumatic, Normocephalic.  EYES: conjunctiva and sclera clear  CHEST/LUNG: GBAE. No wheezing or crackles   HEART: regular rate and rhythm; S1/S2.  ABDOMEN: Soft, Nontender, Nondistended  EXTREMITIES: No edema.   PSYCH: AAOx3.  NEUROLOGY: non-focal; moves all extremities

## 2024-11-06 NOTE — DISCHARGE NOTE PROVIDER - PROVIDER TOKENS
PROVIDER:[TOKEN:[[id number]],FOLLOWUP:[1-3 days]] PROVIDER:[TOKEN:[[id number]],FOLLOWUP:[1-3 days]],FREE:[LAST:[gastroenterology],PHONE:[(   )    -],FAX:[(   )    -],FOLLOWUP:[2 weeks]]

## 2024-11-06 NOTE — DISCHARGE NOTE PROVIDER - NSDCCPCAREPLAN_GEN_ALL_CORE_FT
PRINCIPAL DISCHARGE DIAGNOSIS  Diagnosis: Cellulitis  Assessment and Plan of Treatment: You came in for sweeling in your legs, which was was most likely due to an infection. You were treated with an antibiotic for the infectio and a steroid for the swelling. Your infection resolved and you reported improvement in your symptoms. You were found to have increased liver enzymes, for whiuch we stopped your cholestrol medication for. Please have follow up blood work with your primary care provider to continue your medication. You were also found to have decreased hemoglobin, and you reported thatyou have a gastroentorogist at [facility]. Please follow up with your gastrroentorologist in 1 week for your anemia.

## 2024-11-06 NOTE — DISCHARGE NOTE PROVIDER - NSDCMRMEDTOKEN_GEN_ALL_CORE_FT
atorvastatin 20 mg oral tablet: 1 tab(s) orally once a day (at bedtime)  Januvia 100 mg oral tablet: 1 tab(s) orally once a day  tamsulosin 0.4 mg oral capsule: 1 cap(s) orally once a day (at bedtime)   atorvastatin 20 mg oral tablet: 1 tab(s) orally once a day (at bedtime) HOLD FOR NOW; you need to follow up with PCP and monitor your liver functions before restarting the medications.  cyanocobalamin 1000 mcg oral tablet: 1 tab(s) orally once a day  Januvia 100 mg oral tablet: 1 tab(s) orally once a day  oxyCODONE 5 mg oral tablet: 1 tab(s) orally 2 times a day as needed for Severe Pain (7 - 10) MDD: 2  Silvadene 1% topical cream: Apply topically to affected area once a day  tamsulosin 0.4 mg oral capsule: 1 cap(s) orally once a day (at bedtime)

## 2024-11-08 LAB
CULTURE RESULTS: ABNORMAL
ORGANISM # SPEC MICROSCOPIC CNT: ABNORMAL
ORGANISM # SPEC MICROSCOPIC CNT: SIGNIFICANT CHANGE UP
SPECIMEN SOURCE: SIGNIFICANT CHANGE UP

## 2024-11-12 DIAGNOSIS — Z79.84 LONG TERM (CURRENT) USE OF ORAL HYPOGLYCEMIC DRUGS: ICD-10-CM

## 2024-11-12 DIAGNOSIS — D84.9 IMMUNODEFICIENCY, UNSPECIFIED: ICD-10-CM

## 2024-11-12 DIAGNOSIS — R74.01 ELEVATION OF LEVELS OF LIVER TRANSAMINASE LEVELS: ICD-10-CM

## 2024-11-12 DIAGNOSIS — B96.5 PSEUDOMONAS (AERUGINOSA) (MALLEI) (PSEUDOMALLEI) AS THE CAUSE OF DISEASES CLASSIFIED ELSEWHERE: ICD-10-CM

## 2024-11-12 DIAGNOSIS — E66.01 MORBID (SEVERE) OBESITY DUE TO EXCESS CALORIES: ICD-10-CM

## 2024-11-12 DIAGNOSIS — T46.6X5A ADVERSE EFFECT OF ANTIHYPERLIPIDEMIC AND ANTIARTERIOSCLEROTIC DRUGS, INITIAL ENCOUNTER: ICD-10-CM

## 2024-11-12 DIAGNOSIS — I10 ESSENTIAL (PRIMARY) HYPERTENSION: ICD-10-CM

## 2024-11-12 DIAGNOSIS — N40.0 BENIGN PROSTATIC HYPERPLASIA WITHOUT LOWER URINARY TRACT SYMPTOMS: ICD-10-CM

## 2024-11-12 DIAGNOSIS — A41.9 SEPSIS, UNSPECIFIED ORGANISM: ICD-10-CM

## 2024-11-12 DIAGNOSIS — L03.116 CELLULITIS OF LEFT LOWER LIMB: ICD-10-CM

## 2024-11-12 DIAGNOSIS — B36.9 SUPERFICIAL MYCOSIS, UNSPECIFIED: ICD-10-CM

## 2024-11-12 DIAGNOSIS — Z91.81 HISTORY OF FALLING: ICD-10-CM

## 2024-11-12 DIAGNOSIS — D53.9 NUTRITIONAL ANEMIA, UNSPECIFIED: ICD-10-CM

## 2024-11-12 DIAGNOSIS — E11.9 TYPE 2 DIABETES MELLITUS WITHOUT COMPLICATIONS: ICD-10-CM

## 2024-11-12 DIAGNOSIS — D64.9 ANEMIA, UNSPECIFIED: ICD-10-CM

## 2024-11-15 ENCOUNTER — INPATIENT (INPATIENT)
Facility: HOSPITAL | Age: 79
LOS: 10 days | Discharge: HOME HEALTH | DRG: 603 | End: 2024-11-26
Attending: STUDENT IN AN ORGANIZED HEALTH CARE EDUCATION/TRAINING PROGRAM | Admitting: STUDENT IN AN ORGANIZED HEALTH CARE EDUCATION/TRAINING PROGRAM
Payer: MEDICARE

## 2024-11-15 VITALS
WEIGHT: 199.96 LBS | HEIGHT: 64 IN | SYSTOLIC BLOOD PRESSURE: 105 MMHG | TEMPERATURE: 98 F | DIASTOLIC BLOOD PRESSURE: 40 MMHG | HEART RATE: 84 BPM | RESPIRATION RATE: 18 BRPM | OXYGEN SATURATION: 98 %

## 2024-11-15 DIAGNOSIS — L03.90 CELLULITIS, UNSPECIFIED: ICD-10-CM

## 2024-11-15 DIAGNOSIS — Z98.49 CATARACT EXTRACTION STATUS, UNSPECIFIED EYE: Chronic | ICD-10-CM

## 2024-11-15 LAB
ALBUMIN SERPL ELPH-MCNC: 4 G/DL — SIGNIFICANT CHANGE UP (ref 3.5–5.2)
ALP SERPL-CCNC: 99 U/L — SIGNIFICANT CHANGE UP (ref 30–115)
ALT FLD-CCNC: 30 U/L — SIGNIFICANT CHANGE UP (ref 0–41)
ANION GAP SERPL CALC-SCNC: 8 MMOL/L — SIGNIFICANT CHANGE UP (ref 7–14)
APPEARANCE UR: CLEAR — SIGNIFICANT CHANGE UP
APTT BLD: 30.6 SEC — SIGNIFICANT CHANGE UP (ref 27–39.2)
AST SERPL-CCNC: 24 U/L — SIGNIFICANT CHANGE UP (ref 0–41)
BASOPHILS # BLD AUTO: 0.06 K/UL — SIGNIFICANT CHANGE UP (ref 0–0.2)
BASOPHILS NFR BLD AUTO: 0.4 % — SIGNIFICANT CHANGE UP (ref 0–1)
BILIRUB SERPL-MCNC: 0.3 MG/DL — SIGNIFICANT CHANGE UP (ref 0.2–1.2)
BILIRUB UR-MCNC: NEGATIVE — SIGNIFICANT CHANGE UP
BUN SERPL-MCNC: 10 MG/DL — SIGNIFICANT CHANGE UP (ref 10–20)
CALCIUM SERPL-MCNC: 8.7 MG/DL — SIGNIFICANT CHANGE UP (ref 8.4–10.4)
CHLORIDE SERPL-SCNC: 102 MMOL/L — SIGNIFICANT CHANGE UP (ref 98–110)
CO2 SERPL-SCNC: 27 MMOL/L — SIGNIFICANT CHANGE UP (ref 17–32)
COLOR SPEC: YELLOW — SIGNIFICANT CHANGE UP
CREAT SERPL-MCNC: 1.1 MG/DL — SIGNIFICANT CHANGE UP (ref 0.7–1.5)
CRP SERPL-MCNC: 22.2 MG/L — HIGH
DIFF PNL FLD: NEGATIVE — SIGNIFICANT CHANGE UP
EGFR: 68 ML/MIN/1.73M2 — SIGNIFICANT CHANGE UP
EOSINOPHIL # BLD AUTO: 0.29 K/UL — SIGNIFICANT CHANGE UP (ref 0–0.7)
EOSINOPHIL NFR BLD AUTO: 2.1 % — SIGNIFICANT CHANGE UP (ref 0–8)
ERYTHROCYTE [SEDIMENTATION RATE] IN BLOOD: 53 MM/HR — HIGH (ref 0–10)
GLUCOSE BLDC GLUCOMTR-MCNC: 102 MG/DL — HIGH (ref 70–99)
GLUCOSE BLDC GLUCOMTR-MCNC: 135 MG/DL — HIGH (ref 70–99)
GLUCOSE BLDC GLUCOMTR-MCNC: 225 MG/DL — HIGH (ref 70–99)
GLUCOSE SERPL-MCNC: 121 MG/DL — HIGH (ref 70–99)
GLUCOSE UR QL: NEGATIVE MG/DL — SIGNIFICANT CHANGE UP
HCT VFR BLD CALC: 29.2 % — LOW (ref 42–52)
HGB BLD-MCNC: 9.1 G/DL — LOW (ref 14–18)
IMM GRANULOCYTES NFR BLD AUTO: 0.9 % — HIGH (ref 0.1–0.3)
INR BLD: 0.96 RATIO — SIGNIFICANT CHANGE UP (ref 0.65–1.3)
KETONES UR-MCNC: NEGATIVE MG/DL — SIGNIFICANT CHANGE UP
LACTATE SERPL-SCNC: 1.4 MMOL/L — SIGNIFICANT CHANGE UP (ref 0.7–2)
LEUKOCYTE ESTERASE UR-ACNC: NEGATIVE — SIGNIFICANT CHANGE UP
LYMPHOCYTES # BLD AUTO: 1.38 K/UL — SIGNIFICANT CHANGE UP (ref 1.2–3.4)
LYMPHOCYTES # BLD AUTO: 9.9 % — LOW (ref 20.5–51.1)
MCHC RBC-ENTMCNC: 31.2 G/DL — LOW (ref 32–37)
MCHC RBC-ENTMCNC: 31.2 PG — HIGH (ref 27–31)
MCV RBC AUTO: 100 FL — HIGH (ref 80–94)
MONOCYTES # BLD AUTO: 0.99 K/UL — HIGH (ref 0.1–0.6)
MONOCYTES NFR BLD AUTO: 7.1 % — SIGNIFICANT CHANGE UP (ref 1.7–9.3)
NEUTROPHILS # BLD AUTO: 11.16 K/UL — HIGH (ref 1.4–6.5)
NEUTROPHILS NFR BLD AUTO: 79.6 % — HIGH (ref 42.2–75.2)
NITRITE UR-MCNC: NEGATIVE — SIGNIFICANT CHANGE UP
NRBC # BLD: 0 /100 WBCS — SIGNIFICANT CHANGE UP (ref 0–0)
PH UR: 6.5 — SIGNIFICANT CHANGE UP (ref 5–8)
PLATELET # BLD AUTO: 271 K/UL — SIGNIFICANT CHANGE UP (ref 130–400)
PMV BLD: 9.2 FL — SIGNIFICANT CHANGE UP (ref 7.4–10.4)
POTASSIUM SERPL-MCNC: 4.7 MMOL/L — SIGNIFICANT CHANGE UP (ref 3.5–5)
POTASSIUM SERPL-SCNC: 4.7 MMOL/L — SIGNIFICANT CHANGE UP (ref 3.5–5)
PROT SERPL-MCNC: 6.7 G/DL — SIGNIFICANT CHANGE UP (ref 6–8)
PROT UR-MCNC: NEGATIVE MG/DL — SIGNIFICANT CHANGE UP
PROTHROM AB SERPL-ACNC: 11.3 SEC — SIGNIFICANT CHANGE UP (ref 9.95–12.87)
RBC # BLD: 2.92 M/UL — LOW (ref 4.7–6.1)
RBC # FLD: 15.1 % — HIGH (ref 11.5–14.5)
SODIUM SERPL-SCNC: 137 MMOL/L — SIGNIFICANT CHANGE UP (ref 135–146)
SP GR SPEC: 1.01 — SIGNIFICANT CHANGE UP (ref 1–1.03)
TSH SERPL-MCNC: 2.3 UIU/ML — SIGNIFICANT CHANGE UP (ref 0.27–4.2)
UROBILINOGEN FLD QL: 0.2 MG/DL — SIGNIFICANT CHANGE UP (ref 0.2–1)
WBC # BLD: 14 K/UL — HIGH (ref 4.8–10.8)
WBC # FLD AUTO: 14 K/UL — HIGH (ref 4.8–10.8)

## 2024-11-15 PROCEDURE — 99222 1ST HOSP IP/OBS MODERATE 55: CPT

## 2024-11-15 PROCEDURE — 76882 US LMTD JT/FCL EVL NVASC XTR: CPT | Mod: LT

## 2024-11-15 PROCEDURE — 84300 ASSAY OF URINE SODIUM: CPT

## 2024-11-15 PROCEDURE — 85652 RBC SED RATE AUTOMATED: CPT

## 2024-11-15 PROCEDURE — 84100 ASSAY OF PHOSPHORUS: CPT

## 2024-11-15 PROCEDURE — 84540 ASSAY OF URINE/UREA-N: CPT

## 2024-11-15 PROCEDURE — 99285 EMERGENCY DEPT VISIT HI MDM: CPT

## 2024-11-15 PROCEDURE — 83605 ASSAY OF LACTIC ACID: CPT

## 2024-11-15 PROCEDURE — 84443 ASSAY THYROID STIM HORMONE: CPT

## 2024-11-15 PROCEDURE — 82962 GLUCOSE BLOOD TEST: CPT

## 2024-11-15 PROCEDURE — 93970 EXTREMITY STUDY: CPT

## 2024-11-15 PROCEDURE — 85025 COMPLETE CBC W/AUTO DIFF WBC: CPT

## 2024-11-15 PROCEDURE — 73590 X-RAY EXAM OF LOWER LEG: CPT | Mod: 26,50

## 2024-11-15 PROCEDURE — 80048 BASIC METABOLIC PNL TOTAL CA: CPT

## 2024-11-15 PROCEDURE — 97116 GAIT TRAINING THERAPY: CPT | Mod: GP

## 2024-11-15 PROCEDURE — 71045 X-RAY EXAM CHEST 1 VIEW: CPT | Mod: 26

## 2024-11-15 PROCEDURE — 86140 C-REACTIVE PROTEIN: CPT

## 2024-11-15 PROCEDURE — 81001 URINALYSIS AUTO W/SCOPE: CPT

## 2024-11-15 PROCEDURE — 97166 OT EVAL MOD COMPLEX 45 MIN: CPT | Mod: GO

## 2024-11-15 PROCEDURE — 76770 US EXAM ABDO BACK WALL COMP: CPT

## 2024-11-15 PROCEDURE — 82570 ASSAY OF URINE CREATININE: CPT

## 2024-11-15 PROCEDURE — 83735 ASSAY OF MAGNESIUM: CPT

## 2024-11-15 PROCEDURE — 97162 PT EVAL MOD COMPLEX 30 MIN: CPT | Mod: GP

## 2024-11-15 PROCEDURE — 80053 COMPREHEN METABOLIC PANEL: CPT

## 2024-11-15 PROCEDURE — 93306 TTE W/DOPPLER COMPLETE: CPT

## 2024-11-15 PROCEDURE — 36415 COLL VENOUS BLD VENIPUNCTURE: CPT

## 2024-11-15 RX ORDER — SENNOSIDES 8.6 MG
2 TABLET ORAL AT BEDTIME
Refills: 0 | Status: DISCONTINUED | OUTPATIENT
Start: 2024-11-15 | End: 2024-11-26

## 2024-11-15 RX ORDER — CEFAZOLIN SODIUM 10 G
500 VIAL (EA) INJECTION EVERY 8 HOURS
Refills: 0 | Status: DISCONTINUED | OUTPATIENT
Start: 2024-11-15 | End: 2024-11-16

## 2024-11-15 RX ORDER — FUROSEMIDE 40 MG/1
40 TABLET ORAL ONCE
Refills: 0 | Status: COMPLETED | OUTPATIENT
Start: 2024-11-15 | End: 2024-11-15

## 2024-11-15 RX ORDER — POLYETHYLENE GLYCOL 3350 17 G/17G
17 POWDER, FOR SOLUTION ORAL DAILY
Refills: 0 | Status: DISCONTINUED | OUTPATIENT
Start: 2024-11-15 | End: 2024-11-26

## 2024-11-15 RX ORDER — METRONIDAZOLE 500 MG/1
500 TABLET ORAL ONCE
Refills: 0 | Status: COMPLETED | OUTPATIENT
Start: 2024-11-15 | End: 2024-11-15

## 2024-11-15 RX ORDER — AZTREONAM 2 G
500 VIAL (EA) INJECTION ONCE
Refills: 0 | Status: COMPLETED | OUTPATIENT
Start: 2024-11-15 | End: 2024-11-15

## 2024-11-15 RX ORDER — TAMSULOSIN HCL 0.4 MG
1 CAPSULE ORAL
Refills: 0 | DISCHARGE

## 2024-11-15 RX ORDER — ACETAMINOPHEN 500MG 500 MG/1
650 TABLET, COATED ORAL ONCE
Refills: 0 | Status: COMPLETED | OUTPATIENT
Start: 2024-11-15 | End: 2024-11-15

## 2024-11-15 RX ORDER — HEPARIN SODIUM,PORCINE 1000/ML
5000 VIAL (ML) INJECTION EVERY 8 HOURS
Refills: 0 | Status: DISCONTINUED | OUTPATIENT
Start: 2024-11-15 | End: 2024-11-26

## 2024-11-15 RX ORDER — SITAGLIPTIN 100 MG/1
1 TABLET, FILM COATED ORAL
Qty: 0 | Refills: 0 | DISCHARGE

## 2024-11-15 RX ORDER — 0.9 % SODIUM CHLORIDE 0.9 %
1500 INTRAVENOUS SOLUTION INTRAVENOUS ONCE
Refills: 0 | Status: COMPLETED | OUTPATIENT
Start: 2024-11-15 | End: 2024-11-15

## 2024-11-15 RX ORDER — VANCOMYCIN HCL 900 MCG/MG
1000 POWDER (GRAM) MISCELLANEOUS ONCE
Refills: 0 | Status: COMPLETED | OUTPATIENT
Start: 2024-11-15 | End: 2024-11-15

## 2024-11-15 RX ADMIN — METRONIDAZOLE 100 MILLIGRAM(S): 500 TABLET ORAL at 08:54

## 2024-11-15 RX ADMIN — Medication 4 MILLIGRAM(S): at 12:16

## 2024-11-15 RX ADMIN — Medication 250 MILLIGRAM(S): at 08:35

## 2024-11-15 RX ADMIN — Medication 20 MILLIGRAM(S): at 21:57

## 2024-11-15 RX ADMIN — FUROSEMIDE 40 MILLIGRAM(S): 40 TABLET ORAL at 16:56

## 2024-11-15 RX ADMIN — Medication 4 MILLIGRAM(S): at 10:05

## 2024-11-15 RX ADMIN — Medication 100 MILLIGRAM(S): at 21:57

## 2024-11-15 RX ADMIN — Medication 1 APPLICATION(S): at 16:56

## 2024-11-15 RX ADMIN — Medication 5000 UNIT(S): at 21:58

## 2024-11-15 RX ADMIN — Medication 1500 MILLILITER(S): at 09:35

## 2024-11-15 RX ADMIN — Medication 50 MILLIGRAM(S): at 10:44

## 2024-11-15 RX ADMIN — Medication 1500 MILLILITER(S): at 08:35

## 2024-11-15 RX ADMIN — Medication 2 TABLET(S): at 21:57

## 2024-11-15 NOTE — ED PROVIDER NOTE - DIFFERENTIAL DIAGNOSIS
The differential diagnosis for patients clinical presentation includes but is not limited to:  cellulitis  osteo  infectious vs metabolic etiology Differential Diagnosis

## 2024-11-15 NOTE — PATIENT PROFILE ADULT - NSPRONUTRITIONRISK_GEN_A_NUR
Spoke with Jatinder Fink in [facility] , I faxed the name of the meds that were given to pt to be tested for, the allergists name and phone # and the information about the reaction to the cefazolin pt had at the allergist (local reaction, red bump that kept getting larger, no SOB, no benadryl given). Jatinder Fink stated anesthesia will be speaking with the pharmacist and will reach out to Dr Claudia Salcedo about pre-treatment before surgery. Pt advised I will call to schedule surgery ASAP. Pt also having allergy testing done 5/12/23 for amoxicillin. No indicators present

## 2024-11-15 NOTE — ED PROVIDER NOTE - PROGRESS NOTE DETAILS
ZABRINA-- on reeval pt reports pain improved after morphine, discussed plan for admission ED Attending TERE Ceja  I have fully discussed the medical management and delivery of care with the patient. I have discussed any available labs, imaging and treatment options with the patient.  Pt admitted for further care & management.

## 2024-11-15 NOTE — ED ADULT NURSE NOTE - IN ACCORDANCE WITH [place] STATE LAW, WE OFFER EVERY PATIENT A HEPATITIS C TEST. WOULD YOU LIKE TO BE TESTED TODAY?
Dr. Stokes    Patient called to schedule 5 year recall colonoscopy.  Please provide orders if ok to schedule directly.    Thank you    Last Procedure, Date, MD:  Colonoscopy Dr. Stokes 8/13/2019  Last Diagnosis:  colon polyps x2, internal hemorrhoids  Recalled (mth/yrs): 5 years  Sedation Used Previously:  MAC  Last Prep Used (if known):  Colyte  Quality Of Prep (if known):  good  Anticoagulants: no  Diabetic Med's (PO/Injectables): no  Weight loss Med's: no  Iron/Herbal/Multivitamin Supplements (RX/OTC): no  Marijuana/Vaping/CBD: yes-occasionally   Height & Weight: 5'6\" 174 lbs  BMI: 28.1  Hx of Cardiac/CVA Issues/(MI/Stroke): no  Devices Pacemaker/Defibrillator/Stents: no  Respiratory Issues/Oxygen Use/JUAN MANUEL/COPD: no  Issues w/ Anesthesia: no    Symptoms (Y/N): no  Symptoms Details: n/a    Special Comments/Notes: n/a    Please advise on orders and prep.     Thank you!     Patient has new insurance-[employer], member ID [id number] Group [id number].  I asked  staff to update chart since insurance right now says \"none\"     Opt out

## 2024-11-15 NOTE — ED PROVIDER NOTE - EKG/XRAY ADDITIONAL INFORMATION
Event Note Normal sinus rhythm at 75 bpm, WV interval 244, , QTc 46, no ST elevations or depressions.  no pna or ptx  no dislocation

## 2024-11-15 NOTE — PATIENT PROFILE ADULT - FUNCTIONAL ASSESSMENT - BASIC MOBILITY 6.
3-calculated by average/Not able to assess (calculate score using [facility] averaging method)

## 2024-11-15 NOTE — H&P ADULT - HISTORY OF PRESENT ILLNESS
· HPI Objective Statement: 79yoM PMHx DM, HLD, and recent admission for left leg cellulitis with pitting edema (discharged on 11/6) presenting for new swelling and skin breakdown of his right lower leg that began today. Pt reports his left leg is still more painful than the right leg, despite a woundcare nurse helping him at home for 4 hours a day (most recently changed bandage 2 days ago). Pt lives alone, able to ambulate slowly with walker. Denies chest pain, sob, fever, chills, dysuria, nausea, vomiting    79-year-old male with past medical history of diabetes, hyperlipidemia, anemia, BPH, left lower extremity wound that has been ongoing for a little over a month, recent admission from October 30 to November 6 for left lower extremity cellulitis,   patient received IV antibiotics, wound culture grew Pseudomonas PUTIA    presents for bilateral lower extremity wound, states left lower extremity wound not improving, developed new wound to right lower extremity over the past 2 days.  Patient states he has a visiting nurse/changing dressing to left lower extremity 2 days ago. HPI:  79yoM PMHx DM, HLD,  recent admission for left leg cellulitis (discharged Nov 6), now presents to ED bilateral lower extremity wounds. Patient states left lower  extremity wound not improving, developed new wound to right lower extremity over the past 2 days. During hospitalization, pt was first treated with Unasyn, cultures grew Pseudomonas PUTIA and was switched to Cefepime. Pt also received Prednisone 40 mg  for 3-4 days to reduce  inflammation. Patient states he has a visiting nurse for wound care, last visited 2 days ago. Pt lives alone, able to ambulate slowly with walker. Patient complains of bilateral leg pain.  Denies chest pain, sob, fever, chills, dysuria, nausea, vomiting    Vital Signs Last 24 Hrs  T(C): 36.5 (15 Nov 2024 15:35), Max: 37 (15 Nov 2024 08:38)  T(F): 97.7 (15 Nov 2024 15:35), Max: 98.6 (15 Nov 2024 08:38)  HR: 71 (15 Nov 2024 15:35) (71 - 84)  BP: 142/78 (15 Nov 2024 15:35) (105/40 - 176/74)  RR: 18 (15 Nov 2024 15:35) (18 - 18)  SpO2: 99% (15 Nov 2024 15:35) (98% - 99%)    Parameters below as of 15 Nov 2024 15:35  Patient On (Oxygen Delivery Method): room air    Labs: WBC 14k, Hgb 9.1     Xray Tibia + Fibula B/L : No acute osseous abnormalities. Diffuse soft tissue swelling/edema in the   bilateral legs. Lateral distal left leg skin defect.    Chest xray: Mild pulm venous congestion     Admitted for B/L Cellulitis

## 2024-11-15 NOTE — ED ADULT NURSE NOTE - NSFALLUNIVINTERV_ED_ALL_ED
Bed/Stretcher in lowest position, wheels locked, appropriate side rails in place/Call bell, personal items and telephone in reach/Instruct patient to call for assistance before getting out of bed/chair/stretcher/Non-slip footwear applied when patient is off stretcher/[place] to call system/Physically safe environment - no spills, clutter or unnecessary equipment/Purposeful proactive rounding/Room/bathroom lighting operational, light cord in reach

## 2024-11-15 NOTE — H&P ADULT - ASSESSMENT
79yoM PMHx DM, HLD,  recent admission for left leg cellulitis (discharged Nov 6), now presents to ED bilateral lower extremity wounds.     #B/L Cellulitis vs Chronic hyperpigmentation and wounds   -recent admission for LLE cellulitis, now progressing to RLE with pitting edema   -pt was  treated with unasyn, added  prednisone 40 x 3 days, then switched to Cefepime   -Wound cultures last admission grew  Pseudomonas PUTIA  -Start Lasix 40mg PO   -Check Echo   -Fu mrsa  -Bcx  -Repeat wound Cx   -Duplex   -Mild leukocytosis, afebrile. ESR /CRP down trending   -Cover with Unasyn for now  -ID Consult   -Pain control with tylenol and oxycodone prn       #Macrocytic Anemia  - Hgb stable 9.1  (was 13 in September)  - iron studies wnl, folate wnl, b12 was on lower side  - started on B12 supplements last admission  - Check TSH   -Outpt fu with GI        #DM2  - on Januvia at home  -ISS inpatient       #HLD  -Statin was held last admission for transaminitis now resolved  -resume statin       #BPH  - c/w Flomax      #DVT ppx-Heparin Subq   #GI ppx-N/A  #Diet-dash/tlc | CC  #Activity-IAT  #Dispo-acute  79yoM PMHx DM, HLD,  recent admission for left leg cellulitis (discharged Nov 6), now presents to ED bilateral lower extremity wounds.     #B/L Cellulitis vs Chronic hyperpigmentation and wounds   -recent admission for LLE cellulitis, now progressing to RLE with pitting edema   -pt was  treated with unasyn, added  prednisone 40 x 3 days, then switched to Cefepime   -Wound cultures last admission grew  Pseudomonas PUTIA  -Start Lasix 40mg PO   -Check Echo   -Fu mrsa  -Bcx  -Repeat wound Cx   -Duplex   -Mild leukocytosis, afebrile. ESR /CRP down trending   -Cover with Unasyn for now  -ID Consult   -Pain control with tylenol and oxycodone prn       #Macrocytic Anemia  - Hgb stable 9.1  (was 13 in September)  - iron studies wnl, folate wnl, b12 was on lower side  - started on B12 supplements last admission  - Check TSH   -Outpt fu with GI        #DM2  - on Januvia at home  -ISS inpatient       #HLD  -Statin was held last admission for transaminitis now resolved  -resume statin       #BPH  - per pt, not on Tamsulosin       #DVT ppx-Heparin Subq   #GI ppx-N/A  #Diet-dash/tlc | CC  #Activity-IAT  #Dispo-acute  79yoM PMHx DM, HLD,  recent admission for left leg cellulitis (discharged Nov 6), now presents to ED bilateral lower extremity wounds.     #B/L Cellulitis vs Chronic hyperpigmentation and wounds   -recent admission for LLE cellulitis, now progressing to RLE with pitting edema   -pt was  treated with unasyn, added  prednisone 40 x 3 days, then switched to Cefepime   -Wound cultures last admission grew  Pseudomonas PUTIA  -s/p Aztreonam, flagyl, vanc in ED  -Start Lasix 40mg PO   -Check Echo   -Fu mrsa  -Bcx  -Repeat wound Cx   -Duplex   -Mild leukocytosis, afebrile. ESR /CRP down trending   -Cover with Unasyn for now  -ID Consult   -wound care consult   -continue silver sulfadiazine  -Pain control with tylenol and oxycodone prn       #Macrocytic Anemia  - Hgb stable 9.1  (was 13 in September)  - iron studies wnl, folate wnl, b12 was on lower side  - started on B12 supplements last admission  - Check TSH   -Outpt fu with GI        #DM2  - on Januvia at home  -ISS inpatient       #HLD  -Statin was held last admission for transaminitis now resolved  -resume statin       #BPH  - per pt, not on Tamsulosin       #DVT ppx-Heparin Subq   #GI ppx-N/A  #Diet-dash/tlc | CC  #Activity-IAT  #Dispo-acute

## 2024-11-15 NOTE — ED PROVIDER NOTE - ATTENDING CONTRIBUTION TO CARE
79-year-old male with past medical history of diabetes, hyperlipidemia, anemia, BPH, left lower extremity wound that has been ongoing for a little over a month, admitted from October 30 to November 6 for left lower extremity cellulitis, lower extremity venous duplex negative for DVT, lower extremity arterial duplex.  Arterial flow, ESR CRP elevated, but reported no debridements, patient received IV antibiotics, wound culture grew Pseudomonas PUTIA-patient also had transaminitis that was improving, presents for bilateral lower extremity wound, states left lower extremity wound not improving, developed new wound to right lower extremity over the past 2 days.  Patient states he has a visiting nurse/changing dressing to left lower extremity 2 days ago.  Patient states he is not currently on any antibiotics, ex-smoker.  No IV drug abuse.  No alcohol abuse.  No fever, chills, n/v, cp,  pleuritic cp, sob, palpitations, diaphoresis, cough, ha/lh/dizziness, numbness/tingling, neck pain/ stiffness, abd pain, diarrhea, constipation, melena/brbpr, urinary symptoms, trauma, calf pain, sick contacts, recent travel.    On Exam:  Vital Signs: I have reviewed the initial vital signs. Constitutional: Non toxic appearing pt sitting on stretcher speaking full sentences. Integumentary: b/l tib fib wounds, RLE with odor and discharge, LLE with mild discharge. (+) erythema  , (+) warmth to palpation, no crepitus, no signs of trauma, no abscess, (+) soft compartments. ENT: MMM NECK: Supple, non-tender, no meningeal signs. Cardiovascular: RRR, radial pulses 2/4 b/l. No JVD. Due to edema difficult to assess dp and pt pulses. pt recently had duplex showing arterial flow. Respiratory: BS present b/l, ctabl, no wheezing or crackles, no accessory muscle use, no stridor. Gastrointestinal: BS present throughout all 4 quadrants, soft, nd, nt, no rebound tenderness or guarding, no cvat. Musculoskeletal: FROM, 2+ b/l pitting edema, no calf pain. Neurologic: AAOx3, motor 5/5 and sensation intact throughout upper and lower ext, CN II-XII intact, No facial droop or slurring of speech. No focal deficits.    Plan: EKG, CXR, b/l LE xrays, labs, ivf, abx, reassess.

## 2024-11-15 NOTE — H&P ADULT - NSHPLABSRESULTS_GEN_ALL_CORE
.  LABS:                         9.1    14.00 )-----------( 271      ( 15 Nov 2024 08:30 )             29.2     -15    137  |  102  |  10  ----------------------------<  121[H]  4.7   |  27  |  1.1    Ca    8.7      15 Nov 2024 08:30    TPro  6.7  /  Alb  4.0  /  TBili  0.3  /  DBili  x   /  AST  24  /  ALT  30  /  AlkPhos  99  11-15    PT/INR - ( 15 Nov 2024 08:30 )   PT: 11.30 sec;   INR: 0.96 ratio         PTT - ( 15 Nov 2024 08:30 )  PTT:30.6 sec  Urinalysis Basic - ( 15 Nov 2024 10:04 )    Color: Yellow / Appearance: Clear / S.007 / pH: x  Gluc: x / Ketone: Negative mg/dL  / Bili: Negative / Urobili: 0.2 mg/dL   Blood: x / Protein: Negative mg/dL / Nitrite: Negative   Leuk Esterase: Negative / RBC: x / WBC x   Sq Epi: x / Non Sq Epi: x / Bacteria: x        Lactate, Blood: 1.4 mmol/L (11-15 @ 08:30)      RADIOLOGY, EKG & ADDITIONAL TESTS: Reviewed.

## 2024-11-15 NOTE — ED PROVIDER NOTE - OBJECTIVE STATEMENT
79yoM PMHx DM, HLD, and recent admission for left leg cellulitis with pitting edema (discharged on 11/6) presenting for new swelling and skin breakdown of his right lower leg that began today. Pt reports his left leg is still more painful than the right leg, despite a woundcare nurse helping him at home for 4 hours a day (most recently changed bandage 2 days ago). Pt lives alone, able to ambulate slowly with walker. Denies chest pain, sob, fever, chills, dysuria, nausea, vomiting

## 2024-11-15 NOTE — PATIENT PROFILE ADULT - FUNCTIONAL ASSESSMENT - DAILY ACTIVITY ASSESSMENT TYPE
AFP order faxed to patients local [facility]. Asked that [facility] call patient to schedule when order is received.    Lila DEL CID LPN  [facility]    Admission

## 2024-11-15 NOTE — ED PROVIDER NOTE - PHYSICAL EXAMINATION
CONSTITUTIONAL: NAD  SKIN: Warm, dry  HEAD: NCAT  EYES: Clear conjunctiva   ENT: MMM  NECK: Supple  CARD: RRR, S1, S2; no M/R/G  RESP: Normal respiratory effort, CTAB  ABD: Soft, nontender. No rebound, rigidity, or guarding  EXT: 2+pitting edema to bilateral lower extremities with multiple open, erythematous and tender, weeping areas of skin breakdown to both legs  NEURO: Grossly intact. Awake, alert, moving all extremities, no facial asymmetry.

## 2024-11-15 NOTE — ED PROVIDER NOTE - CLINICAL SUMMARY MEDICAL DECISION MAKING FREE TEXT BOX
79-year-old male with past medical history of diabetes, hyperlipidemia, anemia, BPH, left lower extremity wound that has been ongoing for a little over a month, admitted from October 30 to November 6 for left lower extremity cellulitis, lower extremity venous duplex negative for DVT, lower extremity arterial duplex.  Arterial flow, ESR CRP elevated, but reported no debridements, patient received IV antibiotics, wound culture grew Pseudomonas PUTIA-patient also had transaminitis that was improving, presents for bilateral lower extremity wound, states left lower extremity wound not improving, developed new wound to right lower extremity over the past 2 days.  Patient states he has a visiting nurse/changing dressing to left lower extremity 2 days ago.  Patient states he is not currently on any antibiotics, ex-smoker.  No IV drug abuse.  No alcohol abuse.  No fever, chills, n/v, cp,  pleuritic cp, sob, palpitations, diaphoresis, cough, ha/lh/dizziness, numbness/tingling, neck pain/ stiffness, abd pain, diarrhea, constipation, melena/brbpr, urinary symptoms, trauma, calf pain, sick contacts, recent travel.    On Exam:  Vital Signs: I have reviewed the initial vital signs. Constitutional: Non toxic appearing pt sitting on stretcher speaking full sentences. Integumentary: b/l tib fib wounds, RLE with odor and discharge, LLE with mild discharge. (+) erythema  , (+) warmth to palpation, no crepitus, no signs of trauma, no abscess, (+) soft compartments. ENT: MMM NECK: Supple, non-tender, no meningeal signs. Cardiovascular: RRR, radial pulses 2/4 b/l. No JVD. Due to edema difficult to assess dp and pt pulses. pt recently had duplex showing arterial flow. Respiratory: BS present b/l, ctabl, no wheezing or crackles, no accessory muscle use, no stridor. Gastrointestinal: BS present throughout all 4 quadrants, soft, nd, nt, no rebound tenderness or guarding, no cvat. Musculoskeletal: FROM, 2+ b/l pitting edema, no calf pain. Neurologic: AAOx3, motor 5/5 and sensation intact throughout upper and lower ext, CN II-XII intact, No facial droop or slurring of speech. No focal deficits.    Plan: EKG, CXR, b/l LE xrays, labs, ivf, abx, reassess.    Labs and EKG were ordered and reviewed.  Imaging was ordered and reviewed by me.  Appropriate medications for patient's presenting complaints were ordered and effects were reassessed.  Patient's records (prior hospital, ED visit) were reviewed. 79-year-old male with past medical history of diabetes, hyperlipidemia, anemia, BPH, left lower extremity wound that has been ongoing for a little over a month, admitted from October 30 to November 6 for left lower extremity cellulitis, lower extremity venous duplex negative for DVT, lower extremity arterial duplex.  Arterial flow, ESR CRP elevated, but reported no debridements, patient received IV antibiotics, wound culture grew Pseudomonas PUTIA-patient also had transaminitis that was improving, presents for bilateral lower extremity wound, states left lower extremity wound not improving, developed new wound to right lower extremity over the past 2 days.  Patient states he has a visiting nurse/changing dressing to left lower extremity 2 days ago.  Patient states he is not currently on any antibiotics, ex-smoker.  No IV drug abuse.  No alcohol abuse.  No fever, chills, n/v, cp,  pleuritic cp, sob, palpitations, diaphoresis, cough, ha/lh/dizziness, numbness/tingling, neck pain/ stiffness, abd pain, diarrhea, constipation, melena/brbpr, urinary symptoms, trauma, calf pain, sick contacts, recent travel.    On Exam:  Vital Signs: I have reviewed the initial vital signs. Constitutional: Non toxic appearing pt sitting on stretcher speaking full sentences. Integumentary: b/l tib fib wounds, RLE with odor and discharge, LLE with mild discharge. (+) erythema  , (+) warmth to palpation, no crepitus, no signs of trauma, no abscess, (+) soft compartments. ENT: MMM NECK: Supple, non-tender, no meningeal signs. Cardiovascular: RRR, radial pulses 2/4 b/l. No JVD. Due to edema difficult to assess dp and pt pulses. pt recently had duplex showing arterial flow. Respiratory: BS present b/l, ctabl, no wheezing or crackles, no accessory muscle use, no stridor. Gastrointestinal: BS present throughout all 4 quadrants, soft, nd, nt, no rebound tenderness or guarding, no cvat. Musculoskeletal: FROM, 2+ b/l pitting edema, no calf pain. Neurologic: AAOx3, motor 5/5 and sensation intact throughout upper and lower ext, CN II-XII intact, No facial droop or slurring of speech. No focal deficits.    Plan: EKG, CXR, b/l LE xrays, labs, ivf, abx, reassess.    Labs and EKG were ordered and reviewed.  Imaging was ordered and reviewed by me.  Appropriate medications for patient's presenting complaints were ordered and effects were reassessed.  Patient's records (prior hospital, ED visit) were reviewed. Escalation to admission/observation was considered. Patient requires inpatient hospitalization - monitored setting.  I have fully discussed the medical management and delivery of care with the patient. I have discussed any available labs, imaging and treatment options with the patient.  Pt admitted for further care & management.

## 2024-11-15 NOTE — H&P ADULT - ATTENDING COMMENTS
79yoM PMHx DM, HLD,  recent admission for left leg cellulitis (discharged Nov 6), now presents to ED bilateral lower extremity wounds. Patient states left lower  extremity wound not improving, developed new wound to right lower extremity over the past 2 days. During prior hospitalization, pt was first treated with Unasyn, cultures grew Pseudomonas PUTIA and was switched to Cefepime. Pt also received Prednisone 40 mg  for 3-4 days to reduce  inflammation. Patient states he has a visiting nurse for wound care, last visited 2 days ago. Pt lives alone, able to ambulate slowly with walker. Patient complains of bilateral leg pain.  Denies chest pain, sob, fever, chills, dysuria, nausea, vomiting    VSS  Labs: WBC 14k, Hgb 9.1   Xray Tibia + Fibula B/L : No acute osseous abnormalities. Diffuse soft tissue swelling/edema in the   bilateral legs. Lateral distal left leg skin defect.  Chest xray: Mild pulm venous congestion     Admitted for B/L LE Cellulitis      GENERAL: mild discomfort, NAD   HNENT: NCAT MMM  RESP: No respiratory distress, no use of accessory muscles; CTA b/l, no WRR  CV: RR, +S1S2, + systolic murmur   GI: Soft, NT, ND  SKIN: 2+pitting edema to bilateral lower extremities with open wounds of LLE  PSYCH: A+O x 3      #B/L Cellulitis vs Chronic venous stasis  -recent admission for LLE cellulitis, now progressing to RLE with pitting edema   -pt was previously  treated with unasyn/ cefepime, prednisone 40 x 3 days  -Wound cultures last admission grew  Pseudomonas PUTIA  -s/p Aztreonam, flagyl, vanc in ED  -Start Lasix 40mg PO to help with  leg pain associated with tightness and swelling   -Check Echo   -Fu mrsa  -Bcx  -Repeat wound Cx   -Duplex   -Cover with Unasyn for now  -ID Consult   -wound care consult   -continue silver sulfadiazine  -Pain control with percocet     #Macrocytic Anemia  - Hgb stable 9.1  - iron studies wnl, folate wnl, b12 was on lower side  - started on B12 supplements last admission  - Check TSH   -Outpt fu with GI      #DM2  - on Januvia at home  -ISS inpatient     #HLD  -Statin was held last admission for transaminitis now resolved  -resume statin     #BPH  - per pt, not on Tamsulosin       #DVT ppx-Heparin Subq     Dispo: Admit for worsening LE pain/ wounds, possible cellulitis. Appreciate ID [email or web address].       Total time spent to complete patient's bedside assessment, review medical chart, discuss medical plan of care with covering medical team was more than 55 minutes  with >50% of time spent face to face with patient, discussion with patient/family and/or coordination of care. My note supersedes them medical resident note in case of discrepancy.      Cuca Felix, DO

## 2024-11-15 NOTE — PATIENT PROFILE ADULT - FALL HARM RISK - HARM RISK INTERVENTIONS

## 2024-11-15 NOTE — H&P ADULT - NSHPPHYSICALEXAM_GEN_ALL_CORE
T(C): 36.5 (11-15-24 @ 15:35), Max: 37 (11-15-24 @ 08:38)  HR: 71 (11-15-24 @ 15:35) (71 - 84)  BP: 142/78 (11-15-24 @ 15:35) (105/40 - 176/74)  RR: 18 (11-15-24 @ 15:35) (18 - 18)  SpO2: 99% (11-15-24 @ 15:35) (98% - 99%)    CONSTITUTIONAL: Mild distresss, NAD   RESP: No respiratory distress, no use of accessory muscles; CTA b/l, no WRR  CV: RRR, +S1S2  GI: Soft, NT, ND  SKIN: 2+pitting edema to bilateral lower extremities with multiple open, erythematous and tender, weeping areas of skin breakdown to both legs  PSYCH: A+O x 3

## 2024-11-16 ENCOUNTER — RESULT REVIEW (OUTPATIENT)
Age: 79
End: 2024-11-16

## 2024-11-16 LAB
ALBUMIN SERPL ELPH-MCNC: 3.6 G/DL — SIGNIFICANT CHANGE UP (ref 3.5–5.2)
ALP SERPL-CCNC: 92 U/L — SIGNIFICANT CHANGE UP (ref 30–115)
ALT FLD-CCNC: 20 U/L — SIGNIFICANT CHANGE UP (ref 0–41)
ANION GAP SERPL CALC-SCNC: 9 MMOL/L — SIGNIFICANT CHANGE UP (ref 7–14)
AST SERPL-CCNC: 17 U/L — SIGNIFICANT CHANGE UP (ref 0–41)
BASOPHILS # BLD AUTO: 0.05 K/UL — SIGNIFICANT CHANGE UP (ref 0–0.2)
BASOPHILS NFR BLD AUTO: 0.4 % — SIGNIFICANT CHANGE UP (ref 0–1)
BILIRUB SERPL-MCNC: 0.3 MG/DL — SIGNIFICANT CHANGE UP (ref 0.2–1.2)
BUN SERPL-MCNC: 11 MG/DL — SIGNIFICANT CHANGE UP (ref 10–20)
CALCIUM SERPL-MCNC: 8.7 MG/DL — SIGNIFICANT CHANGE UP (ref 8.4–10.4)
CHLORIDE SERPL-SCNC: 101 MMOL/L — SIGNIFICANT CHANGE UP (ref 98–110)
CO2 SERPL-SCNC: 29 MMOL/L — SIGNIFICANT CHANGE UP (ref 17–32)
CREAT SERPL-MCNC: 1 MG/DL — SIGNIFICANT CHANGE UP (ref 0.7–1.5)
CRP SERPL-MCNC: 45.2 MG/L — HIGH
EGFR: 76 ML/MIN/1.73M2 — SIGNIFICANT CHANGE UP
EOSINOPHIL # BLD AUTO: 0.33 K/UL — SIGNIFICANT CHANGE UP (ref 0–0.7)
EOSINOPHIL NFR BLD AUTO: 2.6 % — SIGNIFICANT CHANGE UP (ref 0–8)
ERYTHROCYTE [SEDIMENTATION RATE] IN BLOOD: 48 MM/HR — HIGH (ref 0–10)
GLUCOSE BLDC GLUCOMTR-MCNC: 103 MG/DL — HIGH (ref 70–99)
GLUCOSE BLDC GLUCOMTR-MCNC: 106 MG/DL — HIGH (ref 70–99)
GLUCOSE BLDC GLUCOMTR-MCNC: 107 MG/DL — HIGH (ref 70–99)
GLUCOSE SERPL-MCNC: 93 MG/DL — SIGNIFICANT CHANGE UP (ref 70–99)
HCT VFR BLD CALC: 28.1 % — LOW (ref 42–52)
HGB BLD-MCNC: 8.5 G/DL — LOW (ref 14–18)
IMM GRANULOCYTES NFR BLD AUTO: 0.8 % — HIGH (ref 0.1–0.3)
LYMPHOCYTES # BLD AUTO: 1.29 K/UL — SIGNIFICANT CHANGE UP (ref 1.2–3.4)
LYMPHOCYTES # BLD AUTO: 10.2 % — LOW (ref 20.5–51.1)
MAGNESIUM SERPL-MCNC: 2 MG/DL — SIGNIFICANT CHANGE UP (ref 1.8–2.4)
MCHC RBC-ENTMCNC: 30.2 G/DL — LOW (ref 32–37)
MCHC RBC-ENTMCNC: 30.8 PG — SIGNIFICANT CHANGE UP (ref 27–31)
MCV RBC AUTO: 101.8 FL — HIGH (ref 80–94)
MONOCYTES # BLD AUTO: 0.96 K/UL — HIGH (ref 0.1–0.6)
MONOCYTES NFR BLD AUTO: 7.6 % — SIGNIFICANT CHANGE UP (ref 1.7–9.3)
NEUTROPHILS # BLD AUTO: 9.95 K/UL — HIGH (ref 1.4–6.5)
NEUTROPHILS NFR BLD AUTO: 78.4 % — HIGH (ref 42.2–75.2)
NRBC # BLD: 0 /100 WBCS — SIGNIFICANT CHANGE UP (ref 0–0)
PLATELET # BLD AUTO: 257 K/UL — SIGNIFICANT CHANGE UP (ref 130–400)
PMV BLD: 9.9 FL — SIGNIFICANT CHANGE UP (ref 7.4–10.4)
POTASSIUM SERPL-MCNC: 4.8 MMOL/L — SIGNIFICANT CHANGE UP (ref 3.5–5)
POTASSIUM SERPL-SCNC: 4.8 MMOL/L — SIGNIFICANT CHANGE UP (ref 3.5–5)
PROT SERPL-MCNC: 6.1 G/DL — SIGNIFICANT CHANGE UP (ref 6–8)
RBC # BLD: 2.76 M/UL — LOW (ref 4.7–6.1)
RBC # FLD: 15.2 % — HIGH (ref 11.5–14.5)
SODIUM SERPL-SCNC: 139 MMOL/L — SIGNIFICANT CHANGE UP (ref 135–146)
WBC # BLD: 12.68 K/UL — HIGH (ref 4.8–10.8)
WBC # FLD AUTO: 12.68 K/UL — HIGH (ref 4.8–10.8)

## 2024-11-16 PROCEDURE — 93970 EXTREMITY STUDY: CPT | Mod: 26

## 2024-11-16 PROCEDURE — 93306 TTE W/DOPPLER COMPLETE: CPT | Mod: 26

## 2024-11-16 PROCEDURE — 99233 SBSQ HOSP IP/OBS HIGH 50: CPT

## 2024-11-16 RX ORDER — VORICONAZOLE 10 MG/1
250 INJECTION, POWDER, LYOPHILIZED, FOR SOLUTION INTRAVENOUS EVERY 12 HOURS
Refills: 0 | Status: DISCONTINUED | OUTPATIENT
Start: 2024-11-17 | End: 2024-11-20

## 2024-11-16 RX ORDER — CEFEPIME 2 G/1
2000 INJECTION, POWDER, FOR SOLUTION INTRAVENOUS EVERY 12 HOURS
Refills: 0 | Status: DISCONTINUED | OUTPATIENT
Start: 2024-11-16 | End: 2024-11-18

## 2024-11-16 RX ORDER — VORICONAZOLE 10 MG/1
500 INJECTION, POWDER, LYOPHILIZED, FOR SOLUTION INTRAVENOUS EVERY 12 HOURS
Refills: 0 | Status: COMPLETED | OUTPATIENT
Start: 2024-11-16 | End: 2024-11-16

## 2024-11-16 RX ORDER — FUROSEMIDE 40 MG/1
40 TABLET ORAL DAILY
Refills: 0 | Status: COMPLETED | OUTPATIENT
Start: 2024-11-16 | End: 2024-11-17

## 2024-11-16 RX ORDER — CEFEPIME 2 G/1
2000 INJECTION, POWDER, FOR SOLUTION INTRAVENOUS ONCE
Refills: 0 | Status: COMPLETED | OUTPATIENT
Start: 2024-11-16 | End: 2024-11-16

## 2024-11-16 RX ORDER — CEFEPIME 2 G/1
INJECTION, POWDER, FOR SOLUTION INTRAVENOUS
Refills: 0 | Status: DISCONTINUED | OUTPATIENT
Start: 2024-11-16 | End: 2024-11-18

## 2024-11-16 RX ADMIN — Medication 1 APPLICATION(S): at 06:45

## 2024-11-16 RX ADMIN — CEFEPIME 100 MILLIGRAM(S): 2 INJECTION, POWDER, FOR SOLUTION INTRAVENOUS at 10:08

## 2024-11-16 RX ADMIN — FUROSEMIDE 40 MILLIGRAM(S): 40 TABLET ORAL at 14:32

## 2024-11-16 RX ADMIN — Medication 1 APPLICATION(S): at 18:10

## 2024-11-16 RX ADMIN — Medication 20 MILLIGRAM(S): at 22:02

## 2024-11-16 RX ADMIN — Medication 1000 MICROGRAM(S): at 11:36

## 2024-11-16 RX ADMIN — Medication 5000 UNIT(S): at 13:07

## 2024-11-16 RX ADMIN — VORICONAZOLE 125 MILLIGRAM(S): 10 INJECTION, POWDER, LYOPHILIZED, FOR SOLUTION INTRAVENOUS at 18:09

## 2024-11-16 RX ADMIN — Medication 5000 UNIT(S): at 06:45

## 2024-11-16 RX ADMIN — Medication 100 MILLIGRAM(S): at 06:45

## 2024-11-16 RX ADMIN — Medication 5000 UNIT(S): at 22:02

## 2024-11-16 RX ADMIN — VORICONAZOLE 125 MILLIGRAM(S): 10 INJECTION, POWDER, LYOPHILIZED, FOR SOLUTION INTRAVENOUS at 11:36

## 2024-11-16 RX ADMIN — CEFEPIME 100 MILLIGRAM(S): 2 INJECTION, POWDER, FOR SOLUTION INTRAVENOUS at 18:10

## 2024-11-16 NOTE — PROGRESS NOTE ADULT - ASSESSMENT
79yoM PMHx DM, HLD,  recent admission for left leg cellulitis (discharged Nov 6), now presents to ED bilateral lower extremity wounds. Patient states left lower  extremity wound not improving, developed new wound to right lower extremity over the past 2 days. During prior hospitalization, pt was first treated with Unasyn, cultures grew Pseudomonas PUTIA and was switched to Cefepime. Pt also received Prednisone 40 mg  for 3-4 days to reduce  inflammation. Patient states he has a visiting nurse for wound care, last visited 2 days ago. Pt lives alone, able to ambulate slowly with walker. Patient complains of bilateral leg pain.  Denies chest pain, sob, fever, chills, dysuria, nausea, vomiting    VSS  Labs: WBC 14k, Hgb 9.1   Xray Tibia + Fibula B/L : No acute osseous abnormalities. Diffuse soft tissue swelling/edema in the   bilateral legs. Lateral distal left leg skin defect.  Chest xray: Mild pulm venous congestion     Admitted for B/L LE Cellulitis    A/P:    #Acute B/L Cellulitis on Chronic venous stasis.  #Non improving wound on Left leg. New wound on RLE.   -recent admission for LLE cellulitis, now progressing to RLE with pitting edema   -pt was previously  treated with unasyn/ cefepime, prednisone 40 x 3 days  -Wound cultures last admission grew  Pseudomonas PUTIDA  -s/p Aztreonam, flagyl, vanc in ED  -Giving Lasix 40mg PO QD (11/15-17) to help with  leg pain associated with tightness and swelling   -Check Echo **  - Lungs CTAB on 11/16.   -Fu mrsa Nares (NEG On previous admission 10/31)  -Bcx Pending  -Repeat wound Cx not needed since we have data from recent culture on previous admission.  -Duplex PENDING  -ID Consult noted: IV cefepime 2gm Q12. Voriconazole 500mg Q12 x 2 doses, then 250 mg Q12  -wound care consult   -continue silver sulfadiazine  -Pain control with percocet     #Macrocytic Anemia  - Hgb stable 9.1  - iron studies wnl, folate wnl, b12 was on lower side  - started on B12 supplements last admission  - Check TSH   -Outpt fu with GI      #DM2  - on Januvia at home  -ISS inpatient     #HLD  -Statin was held last admission for transaminitis now resolved  -resume statin     #BPH  - per pt, not on Tamsulosin       #DVT ppx-Heparin Subq     Dispo: Admit for worsening LE pain/ wounds, possible cellulitis. Appreciate JAMI smith.

## 2024-11-16 NOTE — CONSULT NOTE ADULT - NS ATTEST RISK PROBLEM GEN_ALL_CORE FT
-My assessment required an independent historian and is independent of the teaching service  -I independently interpreted the most recent imaging ( CXR ) and labs ( CBC, CMP) and cultures ( along with the sensitivities / SEBLE )  -I discussed my recommendations with the primary team housestaff/Attending  -I assisted with initiation of antibiotics  -Pt is on ABx therapy requiring intensive monitoring for toxicity

## 2024-11-16 NOTE — PROGRESS NOTE ADULT - NS ATTEST RISK PROBLEM GEN_ALL_CORE FT
--------------------------------Complexity of data reviewed ----------------------------------------------  The Patients complexity of data reviewed  is Low [ ] Moderate [ ] High [ x] due to the following:   CATEGORY 1: Reviewed prior external records [ ]  Considering/ Ordered a unique test:  Labs [x ] Imaging [x ] Stress Test  [ ] Other: Specify [ ]  Reviewed each unique test result [x ]   Assessment requiring an independent historian [ ]  CATEGORY 2: Independent interpretation of:   X-Ray [ x] EKG [ ]  Other: Specify  [ ]  CATEGORY 3: Discussion of management of tests with clinician outside of my group [x ID].

## 2024-11-16 NOTE — PROGRESS NOTE ADULT - SUBJECTIVE AND OBJECTIVE BOX
S: pain controlled  no n/v/f/c  legs swollen, wrapped.    All other pertinent ROS negative.      11-15-24 @ 07:01  -  11-16-24 @ 07:00  --------------------------------------------------------  IN: 0 mL / OUT: 700 mL / NET: -700 mL      Vital Signs Last 24 Hrs  T(C): 36.7 (16 Nov 2024 05:40), Max: 36.8 (15 Nov 2024 20:43)  T(F): 98.1 (16 Nov 2024 05:40), Max: 98.3 (15 Nov 2024 20:43)  HR: 73 (16 Nov 2024 05:40) (71 - 78)  BP: 127/69 (16 Nov 2024 05:40) (126/67 - 142/78)  BP(mean): 88 (16 Nov 2024 05:40) (88 - 88)  RR: 18 (16 Nov 2024 05:40) (18 - 18)  SpO2: 97% (16 Nov 2024 05:40) (97% - 99%)    Parameters below as of 15 Nov 2024 15:35  Patient On (Oxygen Delivery Method): room air      PHYSICAL EXAM:    Constitutional: NAD, awake and alert  HEENT: PERR, EOMI, Normal Hearing, MMM  Neck: Soft and supple, No LAD, No JVD  Respiratory: Breath sounds are clear bilaterally, No wheezing, rales or rhonchi  Cardiovascular: S1 and S2, regular rate and rhythm, no Murmurs, gallops or rubs  Gastrointestinal: Bowel Sounds present, soft, nontender, nondistended, no guarding, no rebound  Extremities: swollen , wrapped b/l LE. warm.       MEDICATIONS:  MEDICATIONS  (STANDING):  atorvastatin 20 milliGRAM(s) Oral at bedtime  cefepime   IVPB      cefepime   IVPB 2000 milliGRAM(s) IV Intermittent every 12 hours  cyanocobalamin 1000 MICROGram(s) Oral daily  heparin   Injectable 5000 Unit(s) SubCutaneous every 8 hours  senna 2 Tablet(s) Oral at bedtime  silver sulfADIAZINE 1% Cream 1 Application(s) Topical two times a day  voriconazole IVPB 500 milliGRAM(s) IV Intermittent every 12 hours      LABS: All Labs Reviewed:                        8.5    12.68 )-----------( 257      ( 16 Nov 2024 12:14 )             28.1     11-15    137  |  102  |  10  ----------------------------<  121[H]  4.7   |  27  |  1.1    Ca    8.7      15 Nov 2024 08:30    TPro  6.7  /  Alb  4.0  /  TBili  0.3  /  DBili  x   /  AST  24  /  ALT  30  /  AlkPhos  99  11-15    PT/INR - ( 15 Nov 2024 08:30 )   PT: 11.30 sec;   INR: 0.96 ratio         PTT - ( 15 Nov 2024 08:30 )  PTT:30.6 sec      Blood Culture:     Radiology: reviewed

## 2024-11-17 LAB
ALBUMIN SERPL ELPH-MCNC: 3.7 G/DL — SIGNIFICANT CHANGE UP (ref 3.5–5.2)
ALP SERPL-CCNC: 110 U/L — SIGNIFICANT CHANGE UP (ref 30–115)
ALT FLD-CCNC: 19 U/L — SIGNIFICANT CHANGE UP (ref 0–41)
ANION GAP SERPL CALC-SCNC: 17 MMOL/L — HIGH (ref 7–14)
AST SERPL-CCNC: 23 U/L — SIGNIFICANT CHANGE UP (ref 0–41)
BASOPHILS # BLD AUTO: 0.04 K/UL — SIGNIFICANT CHANGE UP (ref 0–0.2)
BASOPHILS NFR BLD AUTO: 0.3 % — SIGNIFICANT CHANGE UP (ref 0–1)
BILIRUB SERPL-MCNC: 0.5 MG/DL — SIGNIFICANT CHANGE UP (ref 0.2–1.2)
BUN SERPL-MCNC: 16 MG/DL — SIGNIFICANT CHANGE UP (ref 10–20)
CALCIUM SERPL-MCNC: 8.8 MG/DL — SIGNIFICANT CHANGE UP (ref 8.4–10.5)
CHLORIDE SERPL-SCNC: 98 MMOL/L — SIGNIFICANT CHANGE UP (ref 98–110)
CO2 SERPL-SCNC: 23 MMOL/L — SIGNIFICANT CHANGE UP (ref 17–32)
CREAT SERPL-MCNC: 1.4 MG/DL — SIGNIFICANT CHANGE UP (ref 0.7–1.5)
EGFR: 51 ML/MIN/1.73M2 — LOW
EOSINOPHIL # BLD AUTO: 0.25 K/UL — SIGNIFICANT CHANGE UP (ref 0–0.7)
EOSINOPHIL NFR BLD AUTO: 1.8 % — SIGNIFICANT CHANGE UP (ref 0–8)
GLUCOSE BLDC GLUCOMTR-MCNC: 106 MG/DL — HIGH (ref 70–99)
GLUCOSE BLDC GLUCOMTR-MCNC: 113 MG/DL — HIGH (ref 70–99)
GLUCOSE BLDC GLUCOMTR-MCNC: 123 MG/DL — HIGH (ref 70–99)
GLUCOSE BLDC GLUCOMTR-MCNC: 90 MG/DL — SIGNIFICANT CHANGE UP (ref 70–99)
GLUCOSE SERPL-MCNC: 104 MG/DL — HIGH (ref 70–99)
HCT VFR BLD CALC: 28.7 % — LOW (ref 42–52)
HGB BLD-MCNC: 9.1 G/DL — LOW (ref 14–18)
IMM GRANULOCYTES NFR BLD AUTO: 0.7 % — HIGH (ref 0.1–0.3)
LYMPHOCYTES # BLD AUTO: 0.49 K/UL — LOW (ref 1.2–3.4)
LYMPHOCYTES # BLD AUTO: 3.5 % — LOW (ref 20.5–51.1)
MAGNESIUM SERPL-MCNC: 1.8 MG/DL — SIGNIFICANT CHANGE UP (ref 1.8–2.4)
MCHC RBC-ENTMCNC: 31.5 PG — HIGH (ref 27–31)
MCHC RBC-ENTMCNC: 31.7 G/DL — LOW (ref 32–37)
MCV RBC AUTO: 99.3 FL — HIGH (ref 80–94)
MONOCYTES # BLD AUTO: 0.98 K/UL — HIGH (ref 0.1–0.6)
MONOCYTES NFR BLD AUTO: 7 % — SIGNIFICANT CHANGE UP (ref 1.7–9.3)
NEUTROPHILS # BLD AUTO: 12.22 K/UL — HIGH (ref 1.4–6.5)
NEUTROPHILS NFR BLD AUTO: 86.7 % — HIGH (ref 42.2–75.2)
NRBC # BLD: 0 /100 WBCS — SIGNIFICANT CHANGE UP (ref 0–0)
PLATELET # BLD AUTO: 250 K/UL — SIGNIFICANT CHANGE UP (ref 130–400)
PMV BLD: 9.8 FL — SIGNIFICANT CHANGE UP (ref 7.4–10.4)
POTASSIUM SERPL-MCNC: 4.8 MMOL/L — SIGNIFICANT CHANGE UP (ref 3.5–5)
POTASSIUM SERPL-SCNC: 4.8 MMOL/L — SIGNIFICANT CHANGE UP (ref 3.5–5)
PROT SERPL-MCNC: 6.6 G/DL — SIGNIFICANT CHANGE UP (ref 6–8)
RBC # BLD: 2.89 M/UL — LOW (ref 4.7–6.1)
RBC # FLD: 15.5 % — HIGH (ref 11.5–14.5)
SODIUM SERPL-SCNC: 138 MMOL/L — SIGNIFICANT CHANGE UP (ref 135–146)
WBC # BLD: 14.08 K/UL — HIGH (ref 4.8–10.8)
WBC # FLD AUTO: 14.08 K/UL — HIGH (ref 4.8–10.8)

## 2024-11-17 PROCEDURE — 99232 SBSQ HOSP IP/OBS MODERATE 35: CPT

## 2024-11-17 RX ADMIN — VORICONAZOLE 125 MILLIGRAM(S): 10 INJECTION, POWDER, LYOPHILIZED, FOR SOLUTION INTRAVENOUS at 09:38

## 2024-11-17 RX ADMIN — Medication 20 MILLIGRAM(S): at 21:03

## 2024-11-17 RX ADMIN — Medication 1 APPLICATION(S): at 05:44

## 2024-11-17 RX ADMIN — Medication 5000 UNIT(S): at 21:03

## 2024-11-17 RX ADMIN — Medication 1000 MICROGRAM(S): at 11:58

## 2024-11-17 RX ADMIN — Medication 5000 UNIT(S): at 05:44

## 2024-11-17 RX ADMIN — CEFEPIME 100 MILLIGRAM(S): 2 INJECTION, POWDER, FOR SOLUTION INTRAVENOUS at 17:03

## 2024-11-17 RX ADMIN — CEFEPIME 100 MILLIGRAM(S): 2 INJECTION, POWDER, FOR SOLUTION INTRAVENOUS at 05:44

## 2024-11-17 RX ADMIN — VORICONAZOLE 125 MILLIGRAM(S): 10 INJECTION, POWDER, LYOPHILIZED, FOR SOLUTION INTRAVENOUS at 21:02

## 2024-11-17 RX ADMIN — Medication 1 APPLICATION(S): at 17:03

## 2024-11-17 RX ADMIN — FUROSEMIDE 40 MILLIGRAM(S): 40 TABLET ORAL at 05:43

## 2024-11-17 NOTE — PROGRESS NOTE ADULT - SUBJECTIVE AND OBJECTIVE BOX
S: no new events/complaints      All other pertinent ROS negative.      Vital Signs Last 24 Hrs  T(C): 36.7 (17 Nov 2024 14:04), Max: 36.8 (16 Nov 2024 21:01)  T(F): 98 (17 Nov 2024 14:04), Max: 98.2 (16 Nov 2024 21:01)  HR: 91 (17 Nov 2024 14:04) (77 - 91)  BP: 100/64 (17 Nov 2024 14:04) (100/64 - 120/68)  BP(mean): 85 (16 Nov 2024 21:01) (85 - 85)  RR: 18 (17 Nov 2024 14:04) (18 - 18)  SpO2: 96% (16 Nov 2024 21:01) (96% - 96%)    Parameters below as of 16 Nov 2024 21:01  Patient On (Oxygen Delivery Method): room air      PHYSICAL EXAM:    Constitutional: NAD, awake and alert  HEENT: PERR, EOMI, Normal Hearing, MMM  Neck: Soft and supple, No LAD, No JVD  Respiratory: Breath sounds are clear bilaterally, No wheezing, rales or rhonchi  Cardiovascular: S1 and S2, regular rate and rhythm, no Murmurs, gallops or rubs  Gastrointestinal: Bowel Sounds present, soft, nontender, nondistended, no guarding, no rebound  Extremities: b/l Le edema, warmth. wounds wrapped      MEDICATIONS:  MEDICATIONS  (STANDING):  atorvastatin 20 milliGRAM(s) Oral at bedtime  cefepime   IVPB      cefepime   IVPB 2000 milliGRAM(s) IV Intermittent every 12 hours  cyanocobalamin 1000 MICROGram(s) Oral daily  heparin   Injectable 5000 Unit(s) SubCutaneous every 8 hours  senna 2 Tablet(s) Oral at bedtime  silver sulfADIAZINE 1% Cream 1 Application(s) Topical two times a day  voriconazole IVPB 250 milliGRAM(s) IV Intermittent every 12 hours      LABS: All Labs Reviewed:                        9.1    14.08 )-----------( 250      ( 17 Nov 2024 06:37 )             28.7     11-17    138  |  98  |  16  ----------------------------<  104[H]  4.8   |  23  |  1.4    Ca    8.8      17 Nov 2024 06:37  Mg     1.8     11-17    TPro  6.6  /  Alb  3.7  /  TBili  0.5  /  DBili  x   /  AST  23  /  ALT  19  /  AlkPhos  110  11-17          Blood Culture: 11-15 @ 08:30  Organism --  Gram Stain Blood -- Gram Stain --  Specimen Source .Blood BLOOD  Culture-Blood --        Radiology: reviewed

## 2024-11-17 NOTE — PROGRESS NOTE ADULT - ASSESSMENT
79yoM PMHx DM, HLD,  recent admission for left leg cellulitis (discharged Nov 6), now presents to ED bilateral lower extremity wounds. Patient states left lower  extremity wound not improving, developed new wound to right lower extremity over the past 2 days. During prior hospitalization, pt was first treated with Unasyn, cultures grew Pseudomonas PUTIA and was switched to Cefepime. Pt also received Prednisone 40 mg  for 3-4 days to reduce  inflammation. Patient states he has a visiting nurse for wound care, last visited 2 days ago. Pt lives alone, able to ambulate slowly with walker. Patient complains of bilateral leg pain.  Denies chest pain, sob, fever, chills, dysuria, nausea, vomiting    VSS  Labs: WBC 14k, Hgb 9.1   Xray Tibia + Fibula B/L : No acute osseous abnormalities. Diffuse soft tissue swelling/edema in the   bilateral legs. Lateral distal left leg skin defect.  Chest xray: Mild pulm venous congestion     Admitted for B/L LE Cellulitis    A/P:    #Acute B/L Cellulitis on Chronic venous stasis.  #Non improving wound on Left leg. New wound on RLE.   -recent admission for LLE cellulitis, now progressing to RLE with pitting edema   -pt was previously  treated with unasyn/ cefepime, prednisone 40 x 3 days  -Wound cultures last admission grew  Pseudomonas PUTIDA  -s/p Aztreonam, flagyl, vanc in ED  -Giving Lasix 40mg PO QD (11/15-17) to help with  leg pain associated with tightness and swelling. Then reassess.   -Check Echo : Normal global left ventricular systolic function. EF of 69 %.Grade I diastolic dysfunction.  Severe aortic valve stenosis. Mean PG 30 mmHg, DOUGLAS 0.8 cm^2.  Estimated pulmonary artery systolic pressure is 40.0 mmHg assuming a right atrial pressure of 3 mmHg, which is consistent with borderline pulmonary hypertension.  - Cr 1.0 (11/16) > 1.4 (11/17). No more lasix after today. Patient may be pre load dependent? Monitor Cr, Volume status, then reassess.     - Lungs CTAB on 11/16, 11/17.   -Fu mrsa Nares (NEG On previous admission 10/31)  -Bcx Pending  -Repeat wound Cx not needed since we have data from recent culture on previous admission.  -B/l LE Venous Duplex: Prelim Neg.   -ID Consult noted: IV cefepime 2gm Q12. Voriconazole 500mg Q12 x 2 doses, then 250 mg Q12  -wound care consult   -continue silver sulfadiazine  -Pain control with percocet     #Macrocytic Anemia  - Hgb stable 9.1  - iron studies wnl, folate wnl, b12 was on lower side  - started on B12 supplements last admission  - Check TSH   -Outpt fu with GI      #DM2  - on Januvia at home  -ISS inpatient     #HLD  -Statin was held last admission for transaminitis now resolved  -resume statin     #BPH  - per pt, not on Tamsulosin       #DVT ppx-Heparin Subq     Dispo: Admit for worsening LE pain/ wounds, possible cellulitis. Abx per ID. LWC. monitor Cr.

## 2024-11-18 LAB
ALBUMIN SERPL ELPH-MCNC: 3.7 G/DL — SIGNIFICANT CHANGE UP (ref 3.5–5.2)
ALP SERPL-CCNC: 141 U/L — HIGH (ref 30–115)
ALT FLD-CCNC: 28 U/L — SIGNIFICANT CHANGE UP (ref 0–41)
ANION GAP SERPL CALC-SCNC: 14 MMOL/L — SIGNIFICANT CHANGE UP (ref 7–14)
AST SERPL-CCNC: 44 U/L — HIGH (ref 0–41)
BASOPHILS # BLD AUTO: 0.05 K/UL — SIGNIFICANT CHANGE UP (ref 0–0.2)
BASOPHILS NFR BLD AUTO: 0.4 % — SIGNIFICANT CHANGE UP (ref 0–1)
BILIRUB SERPL-MCNC: 0.9 MG/DL — SIGNIFICANT CHANGE UP (ref 0.2–1.2)
BUN SERPL-MCNC: 24 MG/DL — HIGH (ref 10–20)
CALCIUM SERPL-MCNC: 8.4 MG/DL — SIGNIFICANT CHANGE UP (ref 8.4–10.4)
CHLORIDE SERPL-SCNC: 93 MMOL/L — LOW (ref 98–110)
CO2 SERPL-SCNC: 26 MMOL/L — SIGNIFICANT CHANGE UP (ref 17–32)
CREAT SERPL-MCNC: 1.7 MG/DL — HIGH (ref 0.7–1.5)
EGFR: 40 ML/MIN/1.73M2 — LOW
EOSINOPHIL # BLD AUTO: 0.54 K/UL — SIGNIFICANT CHANGE UP (ref 0–0.7)
EOSINOPHIL NFR BLD AUTO: 3.9 % — SIGNIFICANT CHANGE UP (ref 0–8)
GLUCOSE BLDC GLUCOMTR-MCNC: 106 MG/DL — HIGH (ref 70–99)
GLUCOSE BLDC GLUCOMTR-MCNC: 107 MG/DL — HIGH (ref 70–99)
GLUCOSE BLDC GLUCOMTR-MCNC: 113 MG/DL — HIGH (ref 70–99)
GLUCOSE BLDC GLUCOMTR-MCNC: 89 MG/DL — SIGNIFICANT CHANGE UP (ref 70–99)
GLUCOSE SERPL-MCNC: 89 MG/DL — SIGNIFICANT CHANGE UP (ref 70–99)
HCT VFR BLD CALC: 27.2 % — LOW (ref 42–52)
HGB BLD-MCNC: 8.6 G/DL — LOW (ref 14–18)
IMM GRANULOCYTES NFR BLD AUTO: 0.9 % — HIGH (ref 0.1–0.3)
LYMPHOCYTES # BLD AUTO: 0.65 K/UL — LOW (ref 1.2–3.4)
LYMPHOCYTES # BLD AUTO: 4.7 % — LOW (ref 20.5–51.1)
MAGNESIUM SERPL-MCNC: 1.8 MG/DL — SIGNIFICANT CHANGE UP (ref 1.8–2.4)
MCHC RBC-ENTMCNC: 30.9 PG — SIGNIFICANT CHANGE UP (ref 27–31)
MCHC RBC-ENTMCNC: 31.6 G/DL — LOW (ref 32–37)
MCV RBC AUTO: 97.8 FL — HIGH (ref 80–94)
MONOCYTES # BLD AUTO: 1.91 K/UL — HIGH (ref 0.1–0.6)
MONOCYTES NFR BLD AUTO: 13.7 % — HIGH (ref 1.7–9.3)
NEUTROPHILS # BLD AUTO: 10.68 K/UL — HIGH (ref 1.4–6.5)
NEUTROPHILS NFR BLD AUTO: 76.4 % — HIGH (ref 42.2–75.2)
NRBC # BLD: 0 /100 WBCS — SIGNIFICANT CHANGE UP (ref 0–0)
PLATELET # BLD AUTO: 246 K/UL — SIGNIFICANT CHANGE UP (ref 130–400)
PMV BLD: 10 FL — SIGNIFICANT CHANGE UP (ref 7.4–10.4)
POTASSIUM SERPL-MCNC: 4.2 MMOL/L — SIGNIFICANT CHANGE UP (ref 3.5–5)
POTASSIUM SERPL-SCNC: 4.2 MMOL/L — SIGNIFICANT CHANGE UP (ref 3.5–5)
PROT SERPL-MCNC: 6.6 G/DL — SIGNIFICANT CHANGE UP (ref 6–8)
RBC # BLD: 2.78 M/UL — LOW (ref 4.7–6.1)
RBC # FLD: 15.7 % — HIGH (ref 11.5–14.5)
SODIUM SERPL-SCNC: 133 MMOL/L — LOW (ref 135–146)
WBC # BLD: 13.96 K/UL — HIGH (ref 4.8–10.8)
WBC # FLD AUTO: 13.96 K/UL — HIGH (ref 4.8–10.8)

## 2024-11-18 PROCEDURE — 99233 SBSQ HOSP IP/OBS HIGH 50: CPT

## 2024-11-18 RX ORDER — CEFEPIME 2 G/1
2000 INJECTION, POWDER, FOR SOLUTION INTRAVENOUS EVERY 12 HOURS
Refills: 0 | Status: DISCONTINUED | OUTPATIENT
Start: 2024-11-18 | End: 2024-11-20

## 2024-11-18 RX ORDER — CHLORHEXIDINE GLUCONATE 1.2 MG/ML
1 RINSE ORAL
Refills: 0 | Status: DISCONTINUED | OUTPATIENT
Start: 2024-11-18 | End: 2024-11-26

## 2024-11-18 RX ORDER — ACETAMINOPHEN 500MG 500 MG/1
325 TABLET, COATED ORAL ONCE
Refills: 0 | Status: COMPLETED | OUTPATIENT
Start: 2024-11-18 | End: 2024-11-18

## 2024-11-18 RX ADMIN — Medication 1 APPLICATION(S): at 05:31

## 2024-11-18 RX ADMIN — Medication 1000 MICROGRAM(S): at 13:15

## 2024-11-18 RX ADMIN — Medication 5000 UNIT(S): at 13:15

## 2024-11-18 RX ADMIN — Medication 5000 UNIT(S): at 21:19

## 2024-11-18 RX ADMIN — ACETAMINOPHEN 500MG 325 MILLIGRAM(S): 500 TABLET, COATED ORAL at 06:53

## 2024-11-18 RX ADMIN — CEFEPIME 100 MILLIGRAM(S): 2 INJECTION, POWDER, FOR SOLUTION INTRAVENOUS at 05:30

## 2024-11-18 RX ADMIN — ACETAMINOPHEN 500MG 325 MILLIGRAM(S): 500 TABLET, COATED ORAL at 06:07

## 2024-11-18 RX ADMIN — Medication 2 TABLET(S): at 21:19

## 2024-11-18 RX ADMIN — VORICONAZOLE 125 MILLIGRAM(S): 10 INJECTION, POWDER, LYOPHILIZED, FOR SOLUTION INTRAVENOUS at 21:18

## 2024-11-18 RX ADMIN — Medication 20 MILLIGRAM(S): at 21:19

## 2024-11-18 RX ADMIN — Medication 5000 UNIT(S): at 05:31

## 2024-11-18 RX ADMIN — VORICONAZOLE 125 MILLIGRAM(S): 10 INJECTION, POWDER, LYOPHILIZED, FOR SOLUTION INTRAVENOUS at 09:17

## 2024-11-18 NOTE — PROGRESS NOTE ADULT - SUBJECTIVE AND OBJECTIVE BOX
24H events:    Patient is a 79y old Male who presents with a chief complaint of   Primary diagnosis of Cellulitis    Today is hospital day 3d. This morning patient was seen and examined at bedside, resting comfortably in bed.    No acute or major events overnight.    PAST MEDICAL & SURGICAL HISTORY  Diabetes    HLD (hyperlipidemia)    BPH without urinary obstruction    S/P cataract surgery      SOCIAL HISTORY:  Social History:      ALLERGIES:  No Known Allergies    MEDICATIONS:  STANDING MEDICATIONS  atorvastatin 20 milliGRAM(s) Oral at bedtime  chlorhexidine 2% Cloths 1 Application(s) Topical <User Schedule>  cyanocobalamin 1000 MICROGram(s) Oral daily  heparin   Injectable 5000 Unit(s) SubCutaneous every 8 hours  senna 2 Tablet(s) Oral at bedtime  silver sulfADIAZINE 1% Cream 1 Application(s) Topical two times a day  voriconazole IVPB 250 milliGRAM(s) IV Intermittent every 12 hours    PRN MEDICATIONS  oxycodone    5 mG/acetaminophen 325 mG 1 Tablet(s) Oral every 6 hours PRN  polyethylene glycol 3350 17 Gram(s) Oral daily PRN    VITALS:   T(F): 98.9  HR: 91  BP: 111/55  RR: 18  SpO2: 97%    PHYSICAL EXAM:    Constitutional: NAD, awake and alert  HEENT: PERR, EOMI, Normal Hearing, MMM  Neck: Soft and supple, No LAD, No JVD  Respiratory: Breath sounds are clear bilaterally, No wheezing, rales or rhonchi  Cardiovascular: S1 and S2, regular rate and rhythm, no Murmurs, gallops or rubs  Gastrointestinal: Bowel Sounds present, soft, nontender, nondistended, no guarding, no rebound  Extremities: b/l Le edema, warmth. wounds wrapped    LABS:                        8.6    13.96 )-----------( 246      ( 18 Nov 2024 07:16 )             27.2     11-18    133[L]  |  93[L]  |  24[H]  ----------------------------<  89  4.2   |  26  |  1.7[H]    Ca    8.4      18 Nov 2024 07:16  Mg     1.8     11-18    TPro  6.6  /  Alb  3.7  /  TBili  0.9  /  DBili  x   /  AST  44[H]  /  ALT  28  /  AlkPhos  141[H]  11-18      Urinalysis Basic - ( 18 Nov 2024 07:16 )    Color: x / Appearance: x / SG: x / pH: x  Gluc: 89 mg/dL / Ketone: x  / Bili: x / Urobili: x   Blood: x / Protein: x / Nitrite: x   Leuk Esterase: x / RBC: x / WBC x   Sq Epi: x / Non Sq Epi: x / Bacteria: x

## 2024-11-19 LAB
ALBUMIN SERPL ELPH-MCNC: 3.6 G/DL — SIGNIFICANT CHANGE UP (ref 3.5–5.2)
ALP SERPL-CCNC: 205 U/L — HIGH (ref 30–115)
ALT FLD-CCNC: 25 U/L — SIGNIFICANT CHANGE UP (ref 0–41)
ANION GAP SERPL CALC-SCNC: 15 MMOL/L — HIGH (ref 7–14)
AST SERPL-CCNC: 41 U/L — SIGNIFICANT CHANGE UP (ref 0–41)
BASOPHILS # BLD AUTO: 0.05 K/UL — SIGNIFICANT CHANGE UP (ref 0–0.2)
BASOPHILS NFR BLD AUTO: 0.4 % — SIGNIFICANT CHANGE UP (ref 0–1)
BILIRUB SERPL-MCNC: 0.9 MG/DL — SIGNIFICANT CHANGE UP (ref 0.2–1.2)
BUN SERPL-MCNC: 23 MG/DL — HIGH (ref 10–20)
CALCIUM SERPL-MCNC: 8.7 MG/DL — SIGNIFICANT CHANGE UP (ref 8.4–10.5)
CHLORIDE SERPL-SCNC: 93 MMOL/L — LOW (ref 98–110)
CO2 SERPL-SCNC: 25 MMOL/L — SIGNIFICANT CHANGE UP (ref 17–32)
CREAT SERPL-MCNC: 1.5 MG/DL — SIGNIFICANT CHANGE UP (ref 0.7–1.5)
EGFR: 47 ML/MIN/1.73M2 — LOW
EOSINOPHIL # BLD AUTO: 0.71 K/UL — HIGH (ref 0–0.7)
EOSINOPHIL NFR BLD AUTO: 5.7 % — SIGNIFICANT CHANGE UP (ref 0–8)
GLUCOSE SERPL-MCNC: 77 MG/DL — SIGNIFICANT CHANGE UP (ref 70–99)
HCT VFR BLD CALC: 26.9 % — LOW (ref 42–52)
HGB BLD-MCNC: 8.6 G/DL — LOW (ref 14–18)
IMM GRANULOCYTES NFR BLD AUTO: 0.7 % — HIGH (ref 0.1–0.3)
LYMPHOCYTES # BLD AUTO: 0.87 K/UL — LOW (ref 1.2–3.4)
LYMPHOCYTES # BLD AUTO: 6.9 % — LOW (ref 20.5–51.1)
MAGNESIUM SERPL-MCNC: 2.1 MG/DL — SIGNIFICANT CHANGE UP (ref 1.8–2.4)
MCHC RBC-ENTMCNC: 31.2 PG — HIGH (ref 27–31)
MCHC RBC-ENTMCNC: 32 G/DL — SIGNIFICANT CHANGE UP (ref 32–37)
MCV RBC AUTO: 97.5 FL — HIGH (ref 80–94)
MONOCYTES # BLD AUTO: 1.94 K/UL — HIGH (ref 0.1–0.6)
MONOCYTES NFR BLD AUTO: 15.5 % — HIGH (ref 1.7–9.3)
NEUTROPHILS # BLD AUTO: 8.87 K/UL — HIGH (ref 1.4–6.5)
NEUTROPHILS NFR BLD AUTO: 70.8 % — SIGNIFICANT CHANGE UP (ref 42.2–75.2)
NRBC # BLD: 0 /100 WBCS — SIGNIFICANT CHANGE UP (ref 0–0)
PLATELET # BLD AUTO: 227 K/UL — SIGNIFICANT CHANGE UP (ref 130–400)
PMV BLD: 10.2 FL — SIGNIFICANT CHANGE UP (ref 7.4–10.4)
POTASSIUM SERPL-MCNC: 4.2 MMOL/L — SIGNIFICANT CHANGE UP (ref 3.5–5)
POTASSIUM SERPL-SCNC: 4.2 MMOL/L — SIGNIFICANT CHANGE UP (ref 3.5–5)
PROT SERPL-MCNC: 6.6 G/DL — SIGNIFICANT CHANGE UP (ref 6–8)
RBC # BLD: 2.76 M/UL — LOW (ref 4.7–6.1)
RBC # FLD: 15.5 % — HIGH (ref 11.5–14.5)
SODIUM SERPL-SCNC: 133 MMOL/L — LOW (ref 135–146)
WBC # BLD: 12.53 K/UL — HIGH (ref 4.8–10.8)
WBC # FLD AUTO: 12.53 K/UL — HIGH (ref 4.8–10.8)

## 2024-11-19 PROCEDURE — 99232 SBSQ HOSP IP/OBS MODERATE 35: CPT

## 2024-11-19 RX ADMIN — Medication 5000 UNIT(S): at 22:39

## 2024-11-19 RX ADMIN — Medication 1000 MICROGRAM(S): at 12:09

## 2024-11-19 RX ADMIN — CEFEPIME 100 MILLIGRAM(S): 2 INJECTION, POWDER, FOR SOLUTION INTRAVENOUS at 05:19

## 2024-11-19 RX ADMIN — Medication 5000 UNIT(S): at 14:37

## 2024-11-19 RX ADMIN — VORICONAZOLE 125 MILLIGRAM(S): 10 INJECTION, POWDER, LYOPHILIZED, FOR SOLUTION INTRAVENOUS at 23:01

## 2024-11-19 RX ADMIN — Medication 1 APPLICATION(S): at 05:20

## 2024-11-19 RX ADMIN — Medication 20 MILLIGRAM(S): at 22:39

## 2024-11-19 RX ADMIN — CHLORHEXIDINE GLUCONATE 1 APPLICATION(S): 1.2 RINSE ORAL at 05:20

## 2024-11-19 RX ADMIN — Medication 5000 UNIT(S): at 05:19

## 2024-11-19 RX ADMIN — Medication 1 APPLICATION(S): at 19:30

## 2024-11-19 RX ADMIN — VORICONAZOLE 125 MILLIGRAM(S): 10 INJECTION, POWDER, LYOPHILIZED, FOR SOLUTION INTRAVENOUS at 13:34

## 2024-11-19 RX ADMIN — CEFEPIME 100 MILLIGRAM(S): 2 INJECTION, POWDER, FOR SOLUTION INTRAVENOUS at 18:23

## 2024-11-19 RX ADMIN — Medication 2 TABLET(S): at 22:39

## 2024-11-19 RX ADMIN — Medication 1 APPLICATION(S): at 18:25

## 2024-11-19 NOTE — PROGRESS NOTE ADULT - ASSESSMENT
79yoM PMHx DM, HLD,  recent admission for left leg cellulitis (discharged Nov 6), now presents to ED bilateral lower extremity wounds. Patient states left lower  extremity wound not improving, developed new wound to right lower extremity over the past 2 days. During hospitalization, pt was first treated with Unasyn, cultures grew Pseudomonas PUTIA and was switched to Cefepime. Pt also received Prednisone 40 mg  for 3-4 days to reduce  inflammation. Patient states he has a visiting nurse for wound care, last visited 2 days ago. Pt lives alone, able to ambulate slowly with walker. Patient complains of bilateral leg pain.  Denies chest pain, sob, fever, chills, dysuria, nausea, vomiting    IMPRESSION/RECOMMENDATIONS  Immunosuppression/Immunosenescence ( above age 60 yrs there is a exponential decline in immunity which could result in poor clinical outcomes.  Acute cellulitis on chronic Janet venous stasis changes  Cellulitis has responded well to ABx  LLE cellulitis with superficial ulcers laterally  No abscess/OM  11/1 WCx LLE : Fusarium, C parapsilosis, Pseudomonas putida. Independent analysis of BCX results and interpretation of sensitivity results.  CBC 12.5 , CMP ( Cr 1.5  ) reviewed .   11/15 CXR no opacities: no PNA  11/15 BCx NG    DM stable  HLD stable  Morbid obesity    -BCX  -local LE Silvadene cream q12h  -Cefepime 2 gm iv q12h ( I am aware of his allergies )  -Voriconazole 250 mg iv q12h    Discussion of management/test results/antibiotic regimen  with primary team.

## 2024-11-19 NOTE — PROGRESS NOTE ADULT - SUBJECTIVE AND OBJECTIVE BOX
24H events:    Patient is a 79y old Male who presents with a chief complaint of   Primary diagnosis of Cellulitis      Day 1:  Day 2:  Day 3:     Today is hospital day 4d. This morning patient was seen and examined at bedside, resting comfortably in bed.    No acute or major events overnight.    Code Status:    Family communication:  Contact date:  Name of person contacted:  Relationship to patient:  Communication details:  What matters most:    PAST MEDICAL & SURGICAL HISTORY  Diabetes    HLD (hyperlipidemia)    BPH without urinary obstruction    S/P cataract surgery      SOCIAL HISTORY:  Social History:      ALLERGIES:  No Known Allergies    MEDICATIONS:  STANDING MEDICATIONS  atorvastatin 20 milliGRAM(s) Oral at bedtime  cefepime   IVPB 2000 milliGRAM(s) IV Intermittent every 12 hours  chlorhexidine 2% Cloths 1 Application(s) Topical <User Schedule>  cyanocobalamin 1000 MICROGram(s) Oral daily  heparin   Injectable 5000 Unit(s) SubCutaneous every 8 hours  senna 2 Tablet(s) Oral at bedtime  silver sulfADIAZINE 1% Cream 1 Application(s) Topical two times a day  voriconazole IVPB 250 milliGRAM(s) IV Intermittent every 12 hours    PRN MEDICATIONS  oxycodone    5 mG/acetaminophen 325 mG 1 Tablet(s) Oral every 6 hours PRN  polyethylene glycol 3350 17 Gram(s) Oral daily PRN    VITALS:   T(F): 97.8  HR: 94  BP: 103/65  RR: 18  SpO2: 98%    PHYSICAL EXAM:  Constitutional: NAD, awake and alert  HEENT: PERR, EOMI, Normal Hearing, MMM  Neck: Soft and supple, No LAD, No JVD  Respiratory: Breath sounds are clear bilaterally, No wheezing, rales or rhonchi  Cardiovascular: S1 and S2, regular rate and rhythm, no Murmurs, gallops or rubs  Gastrointestinal: Bowel Sounds present, soft, nontender, nondistended, no guarding, no rebound  Extremities: b/l Le edema, warmth. wounds wrapped    LABS:                        8.6    12.53 )-----------( 227      ( 19 Nov 2024 07:23 )             26.9     11-19    133[L]  |  93[L]  |  23[H]  ----------------------------<  77  4.2   |  25  |  1.5    Ca    8.7      19 Nov 2024 07:23  Mg     2.1     11-19    TPro  6.6  /  Alb  3.6  /  TBili  0.9  /  DBili  x   /  AST  41  /  ALT  25  /  AlkPhos  205[H]  11-19      Urinalysis Basic - ( 19 Nov 2024 07:23 )    Color: x / Appearance: x / SG: x / pH: x  Gluc: 77 mg/dL / Ketone: x  / Bili: x / Urobili: x   Blood: x / Protein: x / Nitrite: x   Leuk Esterase: x / RBC: x / WBC x   Sq Epi: x / Non Sq Epi: x / Bacteria: x

## 2024-11-19 NOTE — PROGRESS NOTE ADULT - SUBJECTIVE AND OBJECTIVE BOX
LYNN, LEENA  79y, Male    All available historical data reviewed    OVERNIGHT EVENTS:  feels well and has no new complaints  No fevers   pain Janet reduced    ROS:  General: Denies rigors, nightsweats  HEENT: Denies headache, rhinorrhea, sore throat, eye pain  CV: Denies CP, palpitations  PULM: Denies wheezing, hemoptysis  GI: Denies hematemesis, hematochezia, melena  : Denies discharge, hematuria  MSK: arthralgias, myalgias  SKIN: Denies rash, lesions  NEURO: Denies paresthesias, weakness  PSYCH: anxiety    VITALS:  T(F): 97.8, Max: 99.1 (11-19-24 @ 01:45)  HR: 94  BP: 103/65  RR: 18Vital Signs Last 24 Hrs  T(C): 36.6 (19 Nov 2024 05:03), Max: 37.3 (19 Nov 2024 01:45)  T(F): 97.8 (19 Nov 2024 05:03), Max: 99.1 (19 Nov 2024 01:45)  HR: 94 (19 Nov 2024 05:03) (74 - 94)  BP: 103/65 (19 Nov 2024 05:03) (103/65 - 119/67)  BP(mean): --  RR: 18 (19 Nov 2024 05:03) (18 - 18)  SpO2: 98% (19 Nov 2024 05:03) (98% - 99%)        TESTS & MEASUREMENTS:                        8.6    12.53 )-----------( 227      ( 19 Nov 2024 07:23 )             26.9     11-19    133[L]  |  93[L]  |  23[H]  ----------------------------<  77  4.2   |  25  |  1.5    Ca    8.7      19 Nov 2024 07:23  Mg     2.1     11-19    TPro  6.6  /  Alb  3.6  /  TBili  0.9  /  DBili  x   /  AST  41  /  ALT  25  /  AlkPhos  205[H]  11-19    LIVER FUNCTIONS - ( 19 Nov 2024 07:23 )  Alb: 3.6 g/dL / Pro: 6.6 g/dL / ALK PHOS: 205 U/L / ALT: 25 U/L / AST: 41 U/L / GGT: x             Urinalysis with Rflx Culture (collected 11-15-24 @ 10:04)    Culture - Blood (collected 11-15-24 @ 08:30)  Source: .Blood BLOOD  Preliminary Report (11-19-24 @ 14:01):    No growth at 4 days    Culture - Blood (collected 11-15-24 @ 08:30)  Source: .Blood BLOOD  Preliminary Report (11-19-24 @ 14:01):    No growth at 4 days      Urinalysis Basic - ( 19 Nov 2024 07:23 )    Color: x / Appearance: x / SG: x / pH: x  Gluc: 77 mg/dL / Ketone: x  / Bili: x / Urobili: x   Blood: x / Protein: x / Nitrite: x   Leuk Esterase: x / RBC: x / WBC x   Sq Epi: x / Non Sq Epi: x / Bacteria: x          Social History:  Tobacco Use: No  Alcohol Use: No  Drug Use: No    RADIOLOGY & ADDITIONAL TESTS:  Personal review of radiological diagnostics performed  Echo and EKG results noted when applicable.     MEDICATIONS:  atorvastatin 20 milliGRAM(s) Oral at bedtime  cefepime   IVPB 2000 milliGRAM(s) IV Intermittent every 12 hours  chlorhexidine 2% Cloths 1 Application(s) Topical <User Schedule>  cyanocobalamin 1000 MICROGram(s) Oral daily  heparin   Injectable 5000 Unit(s) SubCutaneous every 8 hours  oxycodone    5 mG/acetaminophen 325 mG 1 Tablet(s) Oral every 6 hours PRN  polyethylene glycol 3350 17 Gram(s) Oral daily PRN  senna 2 Tablet(s) Oral at bedtime  silver sulfADIAZINE 1% Cream 1 Application(s) Topical two times a day  voriconazole IVPB 250 milliGRAM(s) IV Intermittent every 12 hours      ANTIBIOTICS:  cefepime   IVPB 2000 milliGRAM(s) IV Intermittent every 12 hours  voriconazole IVPB 250 milliGRAM(s) IV Intermittent every 12 hours

## 2024-11-20 ENCOUNTER — RX RENEWAL (OUTPATIENT)
Age: 79
End: 2024-11-20

## 2024-11-20 ENCOUNTER — TRANSCRIPTION ENCOUNTER (OUTPATIENT)
Age: 79
End: 2024-11-20

## 2024-11-20 LAB
ALBUMIN SERPL ELPH-MCNC: 3.7 G/DL — SIGNIFICANT CHANGE UP (ref 3.5–5.2)
ALP SERPL-CCNC: 272 U/L — HIGH (ref 30–115)
ALT FLD-CCNC: 24 U/L — SIGNIFICANT CHANGE UP (ref 0–41)
ANION GAP SERPL CALC-SCNC: 14 MMOL/L — SIGNIFICANT CHANGE UP (ref 7–14)
AST SERPL-CCNC: 32 U/L — SIGNIFICANT CHANGE UP (ref 0–41)
BASOPHILS # BLD AUTO: 0.06 K/UL — SIGNIFICANT CHANGE UP (ref 0–0.2)
BASOPHILS NFR BLD AUTO: 0.5 % — SIGNIFICANT CHANGE UP (ref 0–1)
BILIRUB SERPL-MCNC: 0.9 MG/DL — SIGNIFICANT CHANGE UP (ref 0.2–1.2)
BUN SERPL-MCNC: 22 MG/DL — HIGH (ref 10–20)
CALCIUM SERPL-MCNC: 8.9 MG/DL — SIGNIFICANT CHANGE UP (ref 8.4–10.5)
CHLORIDE SERPL-SCNC: 92 MMOL/L — LOW (ref 98–110)
CO2 SERPL-SCNC: 26 MMOL/L — SIGNIFICANT CHANGE UP (ref 17–32)
CREAT SERPL-MCNC: 1.3 MG/DL — SIGNIFICANT CHANGE UP (ref 0.7–1.5)
CULTURE RESULTS: SIGNIFICANT CHANGE UP
CULTURE RESULTS: SIGNIFICANT CHANGE UP
EGFR: 56 ML/MIN/1.73M2 — LOW
EOSINOPHIL # BLD AUTO: 0.66 K/UL — SIGNIFICANT CHANGE UP (ref 0–0.7)
EOSINOPHIL NFR BLD AUTO: 5.8 % — SIGNIFICANT CHANGE UP (ref 0–8)
GLUCOSE BLDC GLUCOMTR-MCNC: 123 MG/DL — HIGH (ref 70–99)
GLUCOSE BLDC GLUCOMTR-MCNC: 84 MG/DL — SIGNIFICANT CHANGE UP (ref 70–99)
GLUCOSE SERPL-MCNC: 87 MG/DL — SIGNIFICANT CHANGE UP (ref 70–99)
HCT VFR BLD CALC: 27.7 % — LOW (ref 42–52)
HGB BLD-MCNC: 8.8 G/DL — LOW (ref 14–18)
IMM GRANULOCYTES NFR BLD AUTO: 0.6 % — HIGH (ref 0.1–0.3)
LYMPHOCYTES # BLD AUTO: 0.91 K/UL — LOW (ref 1.2–3.4)
LYMPHOCYTES # BLD AUTO: 8 % — LOW (ref 20.5–51.1)
MAGNESIUM SERPL-MCNC: 2.3 MG/DL — SIGNIFICANT CHANGE UP (ref 1.8–2.4)
MCHC RBC-ENTMCNC: 30.6 PG — SIGNIFICANT CHANGE UP (ref 27–31)
MCHC RBC-ENTMCNC: 31.8 G/DL — LOW (ref 32–37)
MCV RBC AUTO: 96.2 FL — HIGH (ref 80–94)
MONOCYTES # BLD AUTO: 1.59 K/UL — HIGH (ref 0.1–0.6)
MONOCYTES NFR BLD AUTO: 14 % — HIGH (ref 1.7–9.3)
NEUTROPHILS # BLD AUTO: 8.07 K/UL — HIGH (ref 1.4–6.5)
NEUTROPHILS NFR BLD AUTO: 71.1 % — SIGNIFICANT CHANGE UP (ref 42.2–75.2)
NRBC # BLD: 0 /100 WBCS — SIGNIFICANT CHANGE UP (ref 0–0)
PLATELET # BLD AUTO: 239 K/UL — SIGNIFICANT CHANGE UP (ref 130–400)
PMV BLD: 9.8 FL — SIGNIFICANT CHANGE UP (ref 7.4–10.4)
POTASSIUM SERPL-MCNC: 4.5 MMOL/L — SIGNIFICANT CHANGE UP (ref 3.5–5)
POTASSIUM SERPL-SCNC: 4.5 MMOL/L — SIGNIFICANT CHANGE UP (ref 3.5–5)
PROT SERPL-MCNC: 6.9 G/DL — SIGNIFICANT CHANGE UP (ref 6–8)
RBC # BLD: 2.88 M/UL — LOW (ref 4.7–6.1)
RBC # FLD: 15.4 % — HIGH (ref 11.5–14.5)
SODIUM SERPL-SCNC: 132 MMOL/L — LOW (ref 135–146)
SPECIMEN SOURCE: SIGNIFICANT CHANGE UP
SPECIMEN SOURCE: SIGNIFICANT CHANGE UP
WBC # BLD: 11.36 K/UL — HIGH (ref 4.8–10.8)
WBC # FLD AUTO: 11.36 K/UL — HIGH (ref 4.8–10.8)

## 2024-11-20 PROCEDURE — 99232 SBSQ HOSP IP/OBS MODERATE 35: CPT

## 2024-11-20 RX ORDER — ACETAMINOPHEN 500MG 500 MG/1
650 TABLET, COATED ORAL EVERY 6 HOURS
Refills: 0 | Status: DISCONTINUED | OUTPATIENT
Start: 2024-11-20 | End: 2024-11-21

## 2024-11-20 RX ORDER — VORICONAZOLE 10 MG/1
200 INJECTION, POWDER, LYOPHILIZED, FOR SOLUTION INTRAVENOUS EVERY 12 HOURS
Refills: 0 | Status: DISCONTINUED | OUTPATIENT
Start: 2024-11-20 | End: 2024-11-26

## 2024-11-20 RX ADMIN — CEFEPIME 100 MILLIGRAM(S): 2 INJECTION, POWDER, FOR SOLUTION INTRAVENOUS at 05:26

## 2024-11-20 RX ADMIN — Medication 1 APPLICATION(S): at 17:53

## 2024-11-20 RX ADMIN — Medication 1000 MICROGRAM(S): at 11:42

## 2024-11-20 RX ADMIN — VORICONAZOLE 125 MILLIGRAM(S): 10 INJECTION, POWDER, LYOPHILIZED, FOR SOLUTION INTRAVENOUS at 10:03

## 2024-11-20 RX ADMIN — Medication 5000 UNIT(S): at 21:15

## 2024-11-20 RX ADMIN — VORICONAZOLE 200 MILLIGRAM(S): 10 INJECTION, POWDER, LYOPHILIZED, FOR SOLUTION INTRAVENOUS at 18:09

## 2024-11-20 RX ADMIN — Medication 20 MILLIGRAM(S): at 21:14

## 2024-11-20 RX ADMIN — CHLORHEXIDINE GLUCONATE 1 APPLICATION(S): 1.2 RINSE ORAL at 06:18

## 2024-11-20 RX ADMIN — Medication 5000 UNIT(S): at 13:25

## 2024-11-20 RX ADMIN — Medication 2 TABLET(S): at 21:15

## 2024-11-20 RX ADMIN — Medication 5000 UNIT(S): at 05:28

## 2024-11-20 RX ADMIN — Medication 1 APPLICATION(S): at 05:32

## 2024-11-20 NOTE — DISCHARGE NOTE NURSING/CASE MANAGEMENT/SOCIAL WORK - FINANCIAL ASSISTANCE
[facility] provides services at a reduced cost to those who are determined to be eligible through [facility]’s financial assistance program. Information regarding [facility]’s financial assistance program can be found by going to [email or web address] or by calling [phone].

## 2024-11-20 NOTE — PROGRESS NOTE ADULT - SUBJECTIVE AND OBJECTIVE BOX
LEENA BAILEY  79y, Male    All available historical data reviewed    OVERNIGHT EVENTS:    feels well and has no new complaints  No fevers   pain LLE reduced  ROS:  General: Denies rigors, nightsweats  HEENT: Denies headache, rhinorrhea, sore throat, eye pain  CV: Denies CP, palpitations  PULM: Denies wheezing, hemoptysis  GI: Denies hematemesis, hematochezia, melena  : Denies discharge, hematuria  MSK: arthralgias, myalgias  SKIN: Denies rash, lesions  NEURO: weakness  PSYCH: Denies depression, anxiety    VITALS:  T(F): 98.6, Max: 98.6 (11-20-24 @ 05:06)  HR: 76  BP: 105/67  RR: 18Vital Signs Last 24 Hrs  T(C): 37 (20 Nov 2024 05:06), Max: 37 (20 Nov 2024 05:06)  T(F): 98.6 (20 Nov 2024 05:06), Max: 98.6 (20 Nov 2024 05:06)  HR: 76 (20 Nov 2024 05:06) (76 - 77)  BP: 105/67 (20 Nov 2024 05:06) (105/67 - 112/65)  BP(mean): --  RR: 18 (20 Nov 2024 05:06) (18 - 18)  SpO2: 96% (20 Nov 2024 05:06) (96% - 96%)        TESTS & MEASUREMENTS:                        8.8    11.36 )-----------( 239      ( 20 Nov 2024 06:43 )             27.7     11-20    132[L]  |  92[L]  |  22[H]  ----------------------------<  87  4.5   |  26  |  1.3    Ca    8.9      20 Nov 2024 06:43  Mg     2.3     11-20    TPro  6.9  /  Alb  3.7  /  TBili  0.9  /  DBili  x   /  AST  32  /  ALT  24  /  AlkPhos  272[H]  11-20    LIVER FUNCTIONS - ( 20 Nov 2024 06:43 )  Alb: 3.7 g/dL / Pro: 6.9 g/dL / ALK PHOS: 272 U/L / ALT: 24 U/L / AST: 32 U/L / GGT: x             Urinalysis with Rflx Culture (collected 11-15-24 @ 10:04)    Culture - Blood (collected 11-15-24 @ 08:30)  Source: .Blood BLOOD  Preliminary Report (11-19-24 @ 14:01):    No growth at 4 days    Culture - Blood (collected 11-15-24 @ 08:30)  Source: .Blood BLOOD  Preliminary Report (11-19-24 @ 14:01):    No growth at 4 days      Urinalysis Basic - ( 20 Nov 2024 06:43 )    Color: x / Appearance: x / SG: x / pH: x  Gluc: 87 mg/dL / Ketone: x  / Bili: x / Urobili: x   Blood: x / Protein: x / Nitrite: x   Leuk Esterase: x / RBC: x / WBC x   Sq Epi: x / Non Sq Epi: x / Bacteria: x          Social History:  Tobacco Use: No  Alcohol Use: No  Drug Use: No    RADIOLOGY & ADDITIONAL TESTS:  Personal review of radiological diagnostics performed  Echo and EKG results noted when applicable.     MEDICATIONS:  atorvastatin 20 milliGRAM(s) Oral at bedtime  cefepime   IVPB 2000 milliGRAM(s) IV Intermittent every 12 hours  chlorhexidine 2% Cloths 1 Application(s) Topical <User Schedule>  cyanocobalamin 1000 MICROGram(s) Oral daily  heparin   Injectable 5000 Unit(s) SubCutaneous every 8 hours  oxycodone    5 mG/acetaminophen 325 mG 1 Tablet(s) Oral every 6 hours PRN  polyethylene glycol 3350 17 Gram(s) Oral daily PRN  senna 2 Tablet(s) Oral at bedtime  silver sulfADIAZINE 1% Cream 1 Application(s) Topical two times a day  voriconazole IVPB 250 milliGRAM(s) IV Intermittent every 12 hours      ANTIBIOTICS:  cefepime   IVPB 2000 milliGRAM(s) IV Intermittent every 12 hours  voriconazole IVPB 250 milliGRAM(s) IV Intermittent every 12 hours

## 2024-11-20 NOTE — PROGRESS NOTE ADULT - ASSESSMENT
79yoM PMHx DM, HLD,  recent admission for left leg cellulitis (discharged Nov 6), now presents to ED bilateral lower extremity wounds. Patient states left lower  extremity wound not improving, developed new wound to right lower extremity over the past 2 days. During hospitalization, pt was first treated with Unasyn, cultures grew Pseudomonas PUTIA and was switched to Cefepime. Pt also received Prednisone 40 mg  for 3-4 days to reduce  inflammation. Patient states he has a visiting nurse for wound care, last visited 2 days ago. Pt lives alone, able to ambulate slowly with walker. Patient complains of bilateral leg pain.  Denies chest pain, sob, fever, chills, dysuria, nausea, vomiting    IMPRESSION/RECOMMENDATIONS  Immunosuppression/Immunosenescence ( above age 60 yrs there is a exponential decline in immunity which could result in poor clinical outcomes.  Acute cellulitis on chronic Janet venous stasis changes  Cellulitis has responded well to ABx  LLE cellulitis with superficial ulcers laterally  No abscess/OM  11/1 WCx LLE : Fusarium, C parapsilosis, Pseudomonas putida. Independent analysis of BCX results and interpretation of sensitivity results.  CBC 11.3 , CMP ( Cr 1.3  ) reviewed .   11/15 CXR no opacities: no PNA  11/15 BCx NG    DM stable  HLD stable  Morbid obesity    -BCX  -local LE Silvadene cream q12h  -Cefepime 2 gm iv q12h ( I am aware of his allergies )> change to PO levoquine 500 mg q24h till 11/27 ( watch for QTc prolongation  -Voriconazole 250 mg iv q12h> change to po [id number] mg q12h till 11/27    Discussion of management/test results/antibiotic regimen  with primary team.

## 2024-11-20 NOTE — PROGRESS NOTE ADULT - SUBJECTIVE AND OBJECTIVE BOX
24H events:    Patient is a 79y old Male who presents with a chief complaint of   Primary diagnosis of Cellulitis    Today is hospital day 5d. This morning patient was seen and examined at bedside, resting comfortably in bed.    No acute or major events overnight.    PAST MEDICAL & SURGICAL HISTORY  Diabetes    HLD (hyperlipidemia)    BPH without urinary obstruction    S/P cataract surgery      SOCIAL HISTORY:  Social History:      ALLERGIES:  No Known Allergies    MEDICATIONS:  STANDING MEDICATIONS  atorvastatin 20 milliGRAM(s) Oral at bedtime  chlorhexidine 2% Cloths 1 Application(s) Topical <User Schedule>  cyanocobalamin 1000 MICROGram(s) Oral daily  heparin   Injectable 5000 Unit(s) SubCutaneous every 8 hours  levoFLOXacin  Tablet 500 milliGRAM(s) Oral every 24 hours  senna 2 Tablet(s) Oral at bedtime  silver sulfADIAZINE 1% Cream 1 Application(s) Topical two times a day  voriconazole 200 milliGRAM(s) Oral every 12 hours    PRN MEDICATIONS  acetaminophen     Tablet .. 650 milliGRAM(s) Oral every 6 hours PRN  polyethylene glycol 3350 17 Gram(s) Oral daily PRN    VITALS:   T(F): 98.6  HR: 81  BP: 111/65  RR: 18  SpO2: 96%    PHYSICAL EXAM:  Constitutional: NAD, awake and alert  HEENT: PERR, EOMI, Normal Hearing, MMM  Neck: Soft and supple, No LAD, No JVD  Respiratory: Breath sounds are clear bilaterally, No wheezing, rales or rhonchi  Cardiovascular: S1 and S2, regular rate and rhythm, no Murmurs, gallops or rubs  Gastrointestinal: Bowel Sounds present, soft, nontender, nondistended, no guarding, no rebound  Extremities: b/l Le edema, warmth. wounds wrapped    LABS:                        8.8    11.36 )-----------( 239      ( 20 Nov 2024 06:43 )             27.7     11-20    132[L]  |  92[L]  |  22[H]  ----------------------------<  87  4.5   |  26  |  1.3    Ca    8.9      20 Nov 2024 06:43  Mg     2.3     11-20    TPro  6.9  /  Alb  3.7  /  TBili  0.9  /  DBili  x   /  AST  32  /  ALT  24  /  AlkPhos  272[H]  11-20      Urinalysis Basic - ( 20 Nov 2024 06:43 )    Color: x / Appearance: x / SG: x / pH: x  Gluc: 87 mg/dL / Ketone: x  / Bili: x / Urobili: x   Blood: x / Protein: x / Nitrite: x   Leuk Esterase: x / RBC: x / WBC x   Sq Epi: x / Non Sq Epi: x / Bacteria: x

## 2024-11-20 NOTE — DISCHARGE NOTE NURSING/CASE MANAGEMENT/SOCIAL WORK - PATIENT PORTAL LINK FT
You can access the FollowMyHealth Patient Portal offered by [facility] by registering at the following website: [email or web address]. By joining [facility]’s FollowMyHealth portal, you will also be able to view your health information using other applications (apps) compatible with our system.

## 2024-11-20 NOTE — DISCHARGE NOTE NURSING/CASE MANAGEMENT/SOCIAL WORK - NSDCPEFALRISK_GEN_ALL_CORE
For information on Fall & Injury Prevention, visit: [email or web address] OR  [email or web address] OR  https://www.cdc.gov/steadi/patient.html

## 2024-11-21 ENCOUNTER — APPOINTMENT (OUTPATIENT)
Dept: BURN CARE | Facility: CLINIC | Age: 79
End: 2024-11-21

## 2024-11-21 LAB
ANION GAP SERPL CALC-SCNC: 18 MMOL/L — HIGH (ref 7–14)
BASOPHILS # BLD AUTO: 0.04 K/UL — SIGNIFICANT CHANGE UP (ref 0–0.2)
BASOPHILS NFR BLD AUTO: 0.4 % — SIGNIFICANT CHANGE UP (ref 0–1)
BUN SERPL-MCNC: 26 MG/DL — HIGH (ref 10–20)
CALCIUM SERPL-MCNC: 8.5 MG/DL — SIGNIFICANT CHANGE UP (ref 8.4–10.5)
CHLORIDE SERPL-SCNC: 92 MMOL/L — LOW (ref 98–110)
CO2 SERPL-SCNC: 22 MMOL/L — SIGNIFICANT CHANGE UP (ref 17–32)
CREAT SERPL-MCNC: 1.6 MG/DL — HIGH (ref 0.7–1.5)
EGFR: 43 ML/MIN/1.73M2 — LOW
EOSINOPHIL # BLD AUTO: 0.63 K/UL — SIGNIFICANT CHANGE UP (ref 0–0.7)
EOSINOPHIL NFR BLD AUTO: 6.2 % — SIGNIFICANT CHANGE UP (ref 0–8)
GLUCOSE SERPL-MCNC: 74 MG/DL — SIGNIFICANT CHANGE UP (ref 70–99)
HCT VFR BLD CALC: 26.2 % — LOW (ref 42–52)
HGB BLD-MCNC: 8.4 G/DL — LOW (ref 14–18)
IMM GRANULOCYTES NFR BLD AUTO: 0.7 % — HIGH (ref 0.1–0.3)
LYMPHOCYTES # BLD AUTO: 1.01 K/UL — LOW (ref 1.2–3.4)
LYMPHOCYTES # BLD AUTO: 10 % — LOW (ref 20.5–51.1)
MAGNESIUM SERPL-MCNC: 2.3 MG/DL — SIGNIFICANT CHANGE UP (ref 1.8–2.4)
MCHC RBC-ENTMCNC: 30.7 PG — SIGNIFICANT CHANGE UP (ref 27–31)
MCHC RBC-ENTMCNC: 32.1 G/DL — SIGNIFICANT CHANGE UP (ref 32–37)
MCV RBC AUTO: 95.6 FL — HIGH (ref 80–94)
MONOCYTES # BLD AUTO: 1.2 K/UL — HIGH (ref 0.1–0.6)
MONOCYTES NFR BLD AUTO: 11.9 % — HIGH (ref 1.7–9.3)
NEUTROPHILS # BLD AUTO: 7.17 K/UL — HIGH (ref 1.4–6.5)
NEUTROPHILS NFR BLD AUTO: 70.8 % — SIGNIFICANT CHANGE UP (ref 42.2–75.2)
NRBC # BLD: 0 /100 WBCS — SIGNIFICANT CHANGE UP (ref 0–0)
PLATELET # BLD AUTO: 214 K/UL — SIGNIFICANT CHANGE UP (ref 130–400)
PMV BLD: 10 FL — SIGNIFICANT CHANGE UP (ref 7.4–10.4)
POTASSIUM SERPL-MCNC: 4.3 MMOL/L — SIGNIFICANT CHANGE UP (ref 3.5–5)
POTASSIUM SERPL-SCNC: 4.3 MMOL/L — SIGNIFICANT CHANGE UP (ref 3.5–5)
RBC # BLD: 2.74 M/UL — LOW (ref 4.7–6.1)
RBC # FLD: 15.8 % — HIGH (ref 11.5–14.5)
SODIUM SERPL-SCNC: 132 MMOL/L — LOW (ref 135–146)
WBC # BLD: 10.12 K/UL — SIGNIFICANT CHANGE UP (ref 4.8–10.8)
WBC # FLD AUTO: 10.12 K/UL — SIGNIFICANT CHANGE UP (ref 4.8–10.8)

## 2024-11-21 PROCEDURE — 99232 SBSQ HOSP IP/OBS MODERATE 35: CPT

## 2024-11-21 RX ORDER — 0.9 % SODIUM CHLORIDE 0.9 %
1000 INTRAVENOUS SOLUTION INTRAVENOUS
Refills: 0 | Status: DISCONTINUED | OUTPATIENT
Start: 2024-11-21 | End: 2024-11-23

## 2024-11-21 RX ADMIN — ACETAMINOPHEN 500MG 650 MILLIGRAM(S): 500 TABLET, COATED ORAL at 01:09

## 2024-11-21 RX ADMIN — ACETAMINOPHEN 500MG 650 MILLIGRAM(S): 500 TABLET, COATED ORAL at 07:41

## 2024-11-21 RX ADMIN — VORICONAZOLE 200 MILLIGRAM(S): 10 INJECTION, POWDER, LYOPHILIZED, FOR SOLUTION INTRAVENOUS at 19:27

## 2024-11-21 RX ADMIN — Medication 1 APPLICATION(S): at 06:33

## 2024-11-21 RX ADMIN — VORICONAZOLE 200 MILLIGRAM(S): 10 INJECTION, POWDER, LYOPHILIZED, FOR SOLUTION INTRAVENOUS at 06:34

## 2024-11-21 RX ADMIN — CHLORHEXIDINE GLUCONATE 1 APPLICATION(S): 1.2 RINSE ORAL at 06:34

## 2024-11-21 RX ADMIN — Medication 1000 MICROGRAM(S): at 11:48

## 2024-11-21 RX ADMIN — Medication 5000 UNIT(S): at 21:44

## 2024-11-21 RX ADMIN — Medication 5000 UNIT(S): at 06:34

## 2024-11-21 RX ADMIN — ACETAMINOPHEN 500MG 650 MILLIGRAM(S): 500 TABLET, COATED ORAL at 09:30

## 2024-11-21 RX ADMIN — ACETAMINOPHEN 500MG 650 MILLIGRAM(S): 500 TABLET, COATED ORAL at 08:45

## 2024-11-21 RX ADMIN — Medication 2 TABLET(S): at 21:44

## 2024-11-21 RX ADMIN — Medication 1 APPLICATION(S): at 18:52

## 2024-11-21 RX ADMIN — Medication 20 MILLIGRAM(S): at 21:42

## 2024-11-21 RX ADMIN — Medication 5000 UNIT(S): at 14:24

## 2024-11-21 NOTE — PHYSICAL THERAPY INITIAL EVALUATION ADULT - LEVEL OF INDEPENDENCE: GAIT, REHAB EVAL
pt initially seen ambulatiing holding on to furniture and door knob. pt was eduacted o use RW for afe ambulation. pt agreeable for the same/supervision

## 2024-11-21 NOTE — OCCUPATIONAL THERAPY INITIAL EVALUATION ADULT - TRANSFER TRAINING, PT EVAL
Pt will perform sit<>stand transfer using appropriate AD with supervision by dc. Pt will perform bed<>chair transfer using appropriate AD with supervision by dc.

## 2024-11-21 NOTE — PHYSICAL THERAPY INITIAL EVALUATION ADULT - GENERAL OBSERVATIONS, REHAB EVAL
PT  [id number]. Chart reviewed and case discussed with SNOW Dyer. Pt seen sitting at b/s chair. In NAD. + IV lock, + dressing B distal LE

## 2024-11-21 NOTE — PHYSICAL THERAPY INITIAL EVALUATION ADULT - LEVEL OF INDEPENDENCE: STAIR NEGOTIATION, REHAB EVAL
NA: pt declined stair negotiation stating he does not want to do it now. Pt educated on safe stair negotiation methods.

## 2024-11-21 NOTE — OCCUPATIONAL THERAPY INITIAL EVALUATION ADULT - LIVES WITH, PROFILE
Pt lives with son in a  with ~4-5 steps(BHRs) to enter, bedroom/bathroom up with another 1FOS. +bath tub with grab bars, no shower chair/children

## 2024-11-21 NOTE — PROGRESS NOTE ADULT - SUBJECTIVE AND OBJECTIVE BOX
LEENA BAILEY  79y  Male      Patient is a 79y old  Male who presents with a chief complaint of     INTERVAL HPI/OVERNIGHT EVENTS:  Continues to have pain in b/l lower extremities, not worsening.    T(C): 36.8 (11-21-24 @ 14:34), Max: 37 (11-21-24 @ 01:25)  HR: 68 (11-21-24 @ 14:34) (63 - 81)  BP: 103/52 (11-21-24 @ 14:34) (103/52 - 110/60)  RR: 18 (11-21-24 @ 14:34) (18 - 18)  SpO2: 100% (11-21-24 @ 14:34) (97% - 100%)  Wt(kg): --Vital Signs Last 24 Hrs  T(C): 36.8 (21 Nov 2024 14:34), Max: 37 (21 Nov 2024 01:25)  T(F): 98.3 (21 Nov 2024 14:34), Max: 98.6 (21 Nov 2024 01:25)  HR: 68 (21 Nov 2024 14:34) (63 - 81)  BP: 103/52 (21 Nov 2024 14:34) (103/52 - 110/60)  BP(mean): --  RR: 18 (21 Nov 2024 14:34) (18 - 18)  SpO2: 100% (21 Nov 2024 14:34) (97% - 100%)    Parameters below as of 21 Nov 2024 01:25  Patient On (Oxygen Delivery Method): room air            PHYSICAL EXAM:  GENERAL: NAD, sitting up in bed  PSYCH: no agitation, baseline mentation  NERVOUS SYSTEM:  Alert, freely moving all extremities  PULMONARY: Clear to percussion bilaterally  CARDIOVASCULAR: Regular rate and rhythm  GI: Soft, Nontender  EXTREMITIES:  dressings clean/dry/intact    Consultant(s) Notes Reviewed:  [x ] YES  [ ] NO    Discussed with Consultants/Other Providers [ x] YES     LABS                          8.4    10.12 )-----------( 214      ( 21 Nov 2024 07:30 )             26.2     11-21    132[L]  |  92[L]  |  26[H]  ----------------------------<  74  4.3   |  22  |  1.6[H]    Ca    8.5      21 Nov 2024 07:30  Mg     2.3     11-21    TPro  6.9  /  Alb  3.7  /  TBili  0.9  /  DBili  x   /  AST  32  /  ALT  24  /  AlkPhos  272[H]  11-20      Urinalysis Basic - ( 21 Nov 2024 07:30 )    Color: x / Appearance: x / SG: x / pH: x  Gluc: 74 mg/dL / Ketone: x  / Bili: x / Urobili: x   Blood: x / Protein: x / Nitrite: x   Leuk Esterase: x / RBC: x / WBC x   Sq Epi: x / Non Sq Epi: x / Bacteria: x        Lactate Trend        CAPILLARY BLOOD GLUCOSE      POCT Blood Glucose.: 123 mg/dL (20 Nov 2024 20:42)        RADIOLOGY & ADDITIONAL TESTS:    Imaging Personally Reviewed:  [ ] YES  [ ] NO    HEALTH ISSUES - PROBLEM Dx:

## 2024-11-21 NOTE — PROGRESS NOTE ADULT - ASSESSMENT
79yoM PMHx DM, HLD, recent admission for left leg cellulitis (discharged Nov 6), presented to ED for bilateral lower extremity wounds and admitted for bilateral LE cellulitis.    Patient developed RENEA today.  Encouraged oral hydration, repeat labs in AM.    #Acute B/L Cellulitis on Chronic venous stasis  #Non improving wound on Left leg. New wound on RLE.  Infectious disease following  -recent admission for LLE cellulitis, now progressing to RLE with pitting edema, edema improved s/p lasix  -pt was previously  treated with unasyn/ cefepime, prednisone 40 x 3 days  -s/p Aztreonam, flagyl, vanc in ED  Skin/wound culture 11/1 grew pseudomonas putida group (pan-susceptible), rare candida parapsilosis, few fusarium species  Blood cultures 11/15 negative  Change cefepime to levofloxacin 500mg daily on 11/20, EOT 11/27  Voriconazole IV changed to PO 200mg BID, EOT 11/27  Transthoracic echocardiogram 11/16: Normal global left ventricular systolic function. EF of 69 %. Grade I diastolic dysfunction.  Severe aortic valve stenosis. Mean PG 30 mmHg, DOUGLAS 0.8 cm^2.  Estimated pulmonary artery systolic pressure is 40.0 mmHg assuming a right atrial pressure of 3 mmHg, which is consistent with borderline pulmonary hypertension.  Doppler arterial of b/l LE 10/31: Normal arterial flow in the bilateral lower extremities.  Doppler venouse of b/l LE 11/16: No evidence of deep venous thrombosis in either lower extremity.  -wound care consulted  Continue silver sulfadiazine cream twice daily, please educate the patient on how to apply this and have him place the cream himself to ensure he will be able to apply it at home  Percocet 5mg PRN Q6H if tylenol not providing sufficient relief    #Macrocytic Anemia  - Hgb stable  - iron studies wnl, folate wnl, b12 was on lower side  - started on B12 supplements last admission  TSH 2.30  -Outpt fu with GI    #Hx of DM2, however A1C 5.5  - on Januvia at home  -ISS inpatient     #HLD  -Statin was held last admission for transaminitis now resolved  -resume statin  #Elevated ALP - outpatient follow up    #BPH - per pt, not on Tamsulosin     #DVT ppx-Heparin Subq     Dispo: PT/OT consulted.  Possible discharge tomorrow if recommended to have home health, will need wound care instructions.

## 2024-11-21 NOTE — OCCUPATIONAL THERAPY INITIAL EVALUATION ADULT - ADL RETRAINING, OT EVAL
Pt will perform UB dressing task with independent by dc. Pt will perform LB dressing task with using AEs independently by dc.

## 2024-11-21 NOTE — OCCUPATIONAL THERAPY INITIAL EVALUATION ADULT - PERTINENT HX OF CURRENT PROBLEM, REHAB EVAL
Pt is an 79yoM PMHx DM, HLD,  recent admission for left leg cellulitis (discharged Nov 6), now presents to ED bilateral lower extremity wounds. Patient states left lower  extremity wound not improving, developed new wound to right lower extremity over the past 2 days. During hospitalization, pt was first treated with Unasyn, cultures grew Pseudomonas PUTIA and was switched to Cefepime. Pt also received Prednisone 40 mg  for 3-4 days to reduce  inflammation. Patient states he has a visiting nurse for wound care, last visited 2 days ago. Pt lives alone, able to ambulate slowly with walker. Patient complains of bilateral leg pain.  Denies chest pain, sob, fever, chills, dysuria, nausea, vomiting

## 2024-11-21 NOTE — PHYSICAL THERAPY INITIAL EVALUATION ADULT - PERTINENT HX OF CURRENT PROBLEM, REHAB EVAL
79 year old male with pmhx of DM and HLD presented to the ED today with swelling and wounds on his LLE for the last month. He has pain at the site, no impairments with walking. No loss of sensation, weakness, or difficulty with movements

## 2024-11-21 NOTE — OCCUPATIONAL THERAPY INITIAL EVALUATION ADULT - GENERAL OBSERVATIONS, REHAB EVAL
Pt encountered seated at b/s recliner reported 9/10 pain in left LE +IV locked +b/l legs dressing. Pt agreed to OT eval with pain level remained the same level to left leg. Pt left seated at b/s recliner, SNOW santiago.

## 2024-11-22 LAB
ALBUMIN SERPL ELPH-MCNC: 3.5 G/DL — SIGNIFICANT CHANGE UP (ref 3.5–5.2)
ALP SERPL-CCNC: 273 U/L — HIGH (ref 30–115)
ALT FLD-CCNC: 17 U/L — SIGNIFICANT CHANGE UP (ref 0–41)
ANION GAP SERPL CALC-SCNC: 13 MMOL/L — SIGNIFICANT CHANGE UP (ref 7–14)
AST SERPL-CCNC: 27 U/L — SIGNIFICANT CHANGE UP (ref 0–41)
BASOPHILS # BLD AUTO: 0.06 K/UL — SIGNIFICANT CHANGE UP (ref 0–0.2)
BASOPHILS NFR BLD AUTO: 0.4 % — SIGNIFICANT CHANGE UP (ref 0–1)
BILIRUB SERPL-MCNC: 1.2 MG/DL — SIGNIFICANT CHANGE UP (ref 0.2–1.2)
BUN SERPL-MCNC: 28 MG/DL — HIGH (ref 10–20)
CALCIUM SERPL-MCNC: 8.5 MG/DL — SIGNIFICANT CHANGE UP (ref 8.4–10.5)
CHLORIDE SERPL-SCNC: 91 MMOL/L — LOW (ref 98–110)
CO2 SERPL-SCNC: 26 MMOL/L — SIGNIFICANT CHANGE UP (ref 17–32)
CREAT SERPL-MCNC: 1.5 MG/DL — SIGNIFICANT CHANGE UP (ref 0.7–1.5)
EGFR: 47 ML/MIN/1.73M2 — LOW
EOSINOPHIL # BLD AUTO: 0.8 K/UL — HIGH (ref 0–0.7)
EOSINOPHIL NFR BLD AUTO: 5.3 % — SIGNIFICANT CHANGE UP (ref 0–8)
GLUCOSE BLDC GLUCOMTR-MCNC: 93 MG/DL — SIGNIFICANT CHANGE UP (ref 70–99)
GLUCOSE SERPL-MCNC: 73 MG/DL — SIGNIFICANT CHANGE UP (ref 70–99)
HCT VFR BLD CALC: 26.2 % — LOW (ref 42–52)
HGB BLD-MCNC: 8.3 G/DL — LOW (ref 14–18)
IMM GRANULOCYTES NFR BLD AUTO: 0.9 % — HIGH (ref 0.1–0.3)
LACTATE SERPL-SCNC: 1.4 MMOL/L — SIGNIFICANT CHANGE UP (ref 0.7–2)
LYMPHOCYTES # BLD AUTO: 1.41 K/UL — SIGNIFICANT CHANGE UP (ref 1.2–3.4)
LYMPHOCYTES # BLD AUTO: 9.3 % — LOW (ref 20.5–51.1)
MAGNESIUM SERPL-MCNC: 2.5 MG/DL — HIGH (ref 1.8–2.4)
MCHC RBC-ENTMCNC: 30.5 PG — SIGNIFICANT CHANGE UP (ref 27–31)
MCHC RBC-ENTMCNC: 31.7 G/DL — LOW (ref 32–37)
MCV RBC AUTO: 96.3 FL — HIGH (ref 80–94)
MONOCYTES # BLD AUTO: 1.53 K/UL — HIGH (ref 0.1–0.6)
MONOCYTES NFR BLD AUTO: 10.1 % — HIGH (ref 1.7–9.3)
NEUTROPHILS # BLD AUTO: 11.27 K/UL — HIGH (ref 1.4–6.5)
NEUTROPHILS NFR BLD AUTO: 74 % — SIGNIFICANT CHANGE UP (ref 42.2–75.2)
NRBC # BLD: 0 /100 WBCS — SIGNIFICANT CHANGE UP (ref 0–0)
PLATELET # BLD AUTO: 282 K/UL — SIGNIFICANT CHANGE UP (ref 130–400)
PMV BLD: 10 FL — SIGNIFICANT CHANGE UP (ref 7.4–10.4)
POTASSIUM SERPL-MCNC: 4.6 MMOL/L — SIGNIFICANT CHANGE UP (ref 3.5–5)
POTASSIUM SERPL-SCNC: 4.6 MMOL/L — SIGNIFICANT CHANGE UP (ref 3.5–5)
PROT SERPL-MCNC: 6.6 G/DL — SIGNIFICANT CHANGE UP (ref 6–8)
RBC # BLD: 2.72 M/UL — LOW (ref 4.7–6.1)
RBC # FLD: 15.6 % — HIGH (ref 11.5–14.5)
SODIUM SERPL-SCNC: 130 MMOL/L — LOW (ref 135–146)
WBC # BLD: 15.21 K/UL — HIGH (ref 4.8–10.8)
WBC # FLD AUTO: 15.21 K/UL — HIGH (ref 4.8–10.8)

## 2024-11-22 PROCEDURE — 99232 SBSQ HOSP IP/OBS MODERATE 35: CPT

## 2024-11-22 RX ADMIN — Medication 20 MILLIGRAM(S): at 21:33

## 2024-11-22 RX ADMIN — VORICONAZOLE 200 MILLIGRAM(S): 10 INJECTION, POWDER, LYOPHILIZED, FOR SOLUTION INTRAVENOUS at 17:58

## 2024-11-22 RX ADMIN — Medication 1000 MICROGRAM(S): at 13:02

## 2024-11-22 RX ADMIN — CHLORHEXIDINE GLUCONATE 1 APPLICATION(S): 1.2 RINSE ORAL at 05:47

## 2024-11-22 RX ADMIN — Medication 5000 UNIT(S): at 05:46

## 2024-11-22 RX ADMIN — Medication 1 APPLICATION(S): at 17:59

## 2024-11-22 RX ADMIN — Medication 2 TABLET(S): at 21:34

## 2024-11-22 RX ADMIN — Medication 5000 UNIT(S): at 14:40

## 2024-11-22 RX ADMIN — VORICONAZOLE 200 MILLIGRAM(S): 10 INJECTION, POWDER, LYOPHILIZED, FOR SOLUTION INTRAVENOUS at 05:46

## 2024-11-22 RX ADMIN — Medication 5000 UNIT(S): at 21:34

## 2024-11-22 RX ADMIN — Medication 1 APPLICATION(S): at 05:46

## 2024-11-22 NOTE — PROGRESS NOTE ADULT - ASSESSMENT
Assessment and Plan:   · Assessment	  Assessment and Plan:   · Assessment    Pt is a 89 yo female pt with PMHx DM, RA, HLD, hypothyroidism, dementia, arachnoid cyst, s/p  shunt admitted to medicine on 11/19 for diagnosed metabolic encephalopathy due to unclear etiology. Pt came into the ED on 11/18 due to generalized weakness, difficulty walking, and an episode of slipping out of bed.    #Metabolic Encephalopathy Unclear etiology   -pt still did not regain full appetite; increased dose of mirtazapine from 7.5mg to 15mg because family refuses interventions such as PEG or tube feeding after discussion with son and daughter  -c/w Added dexamethasone 4mg daily  -c/w liberalizing her diet - she can have ice cream as dietary supplement if we are able to provide this here, also discussed with family that they can provider with milkshakes.  Discussed potential for megace and dronabinol, but would defer this for now.  -ordered out of bed activity BID, as per request from daughter to aim for regaining some mobility    -s/p Fall (slid from bed) / Generalized Weakness   -elevated BUN at 74 on admission, improved with IV hydration with BUN now 26  -dietician consult from yesterday included recommendations for:  ---c/w ensure TID  ---hold off Glucerna (due to poor PO intake)  ---consult SLP for diet texture  encouragement and assistance of pt for PO intake  -PT recommended rehab  -initial w/u included the following:  ---Negative trauma w/up and CT Brain   ---Doubted UTI s/p completion of Abx course and no dysurea at this time - d/c abx f/u Urine cxs   ---EEG findings consistent with diffuse electrocerebral dysfunction secondary to nonspecific etiology  ---B12 >2000 as of 11/20/24 6:17 / FA / TSH   ---Iron studies: TIBC 168[H]; unsaturated iron binding capacity 77[H]; %saturation, iron 54[H] as of 11/20/24 6:17 / FA / TSH     #Nausea with poor PO intake  -mirtazapine dose increased from 7.5mg (11/20/24) to 15mg (11/21/24)  -c/w zofran  -c/w miralax for constipation    #LLE healing wound with ecchymosis  -wound is well-healing and ecchymosis has decreased since admission, 2/2 of fall beside bed at home on 11/18  -wound characterized as ecchymosis of L lower leg of medio-distal aspect with brown discoloration; 5cm x 2cm healing scab of distal aspect with a 1.5xm x 1.5cm circular healing scab of proximal aspect; pt reports no pain to palpation    #Type II MI - demand ischemia  -Fluid overloaded on presentation, elevated PBNP, improved  -Cardiology consulted, suggested trop elevated in setting of RENEA, ECG unremarkable, and IVC on TTE small and collapsing, recommended outpatient follow up w/ Dr. Us  Echocardiogram 11/19:   1. Hyperdynamic global left ventricular systolic function with a biplane   EF of 72%. Mild (grade 1) diastolic dysfunction. No regional wall motion   abnormalities noted.   2. Normal right ventricular size and function.   3. Mildly enlarged left atrium.   4. Degenerative mitral valve.   5. Mitral annular calcification.   6. Sclerotic aortic valve with decreased opening.   7. No echocardiographic evidence of pulmonary hypertension.   8. There is no evidence of pericardial effusion    #Hypoalbuminemia   -albumin stable in 2.7 on 11/22/24, 11/20/24, and 11/19/24, likely due to poor PO intake    #Troponemia 2/2 CKD/RENEA   -troponin downtrended 69 --> 64; stopped trending  -unlikely type 2 MI  -cardio already consulted and is aware    #H/o Hypothyroidism   -c/w levothyroxine increased to 100mcg QD from 88mcg QD due to high TSH (11)  -free T4 0.7; free T3 1.14 as of 11/21/24   -will need outpatient FU post d/c    #MISC  -DVT ppx: Heparin sq q12h   -GI ppx: miralax  -Activity: out of bed BID  -Social: Discussed case and plan with daughter in detail and explained to her the plan and she is agreeable with current plan of care / Margarita [phone]  -Pending: PT/OT/above w/up and update daughter   -Dispo: sub-acute rehab   Assessment and Plan:   · Assessment	    79yoM PMHx DM, HLD,  recent admission for left leg cellulitis (discharged Nov 6), now presents to ED bilateral lower extremity wounds. Patient states left lower  extremity wound not improving, developed new wound to right lower extremity over the past 2 days. During prior hospitalization, pt was first treated with Unasyn, cultures grew Pseudomonas PUTIA and was switched to Cefepime. Pt also received Prednisone 40 mg  for 3-4 days to reduce  inflammation. Patient states he has a visiting nurse for wound care, last visited 2 days ago. Pt lives alone, able to ambulate slowly with walker. Patient complains of bilateral leg pain.  Denies chest pain, sob, fever, chills, dysuria, nausea, vomiting    #Acute B/L Cellulitis on Chronic venous stasis  -wound undressed on both legs for examination: +B/L edema, +mild pain upon palpation of B/L malleoli; no new ulcers or wound; no drainage; +new wound dressings with silvadene and gauze applied  -WBC elevated 15.21 as of 11/22/24 7:54  -ordered US of LLE to r/o abscess  -will F/U with ID  -c/w levofloxacin 500 mg QD, EOT: 11/27/24  -c/w voriconazole PO 200mg BID, EOT 11/27  -c/w silver sulfadiazine cream twice daily, please educate the patient on how to apply this and have him place the cream himself to ensure he will be able to apply it at home  -c/w Pain control with tylenol for now, can change to percocet 5mg PRN Q6H if tylenol not providing sufficient relief    -recent admission for LLE cellulitis, now progressing to RLE with pitting edema, edema improved s/p lasix  -pt was previously  treated with unasyn/ cefepime, prednisone 40 x 3 days  -s/p Aztreonam, flagyl, vanc in ED  -Skin/wound culture 11/1 grew pseudomonas putida group (pan-susceptible), rare candida parapsilosis, few fusarium species  -Blood cultures 11/15 negative    -Transthoracic echocardiogram 11/16: Normal global left ventricular systolic function. EF of 69 %. Grade I diastolic dysfunction.  -Severe aortic valve stenosis. Mean PG 30 mmHg, DOUGLAS 0.8 cm^2. Estimated pulmonary artery systolic pressure is 40.0 mmHg assuming a right atrial pressure of 3 mmHg, which is consistent with borderline pulmonary hypertension.  -Doppler arterial of b/l LE 10/31: Normal arterial flow in the bilateral lower extremities.  -Doppler venous of b/l LE 11/16: No evidence of deep venous thrombosis in either lower extremity.  -wound care consulted    #Macrocytic Anemia  -Hgb stable (8.3 as of 11/22/24 7:54)  -iron studies wnl, folate wnl, b12 was on lower side  -started on B12 supplements last admission  -Outpt fu with GI    #Hx of DM2, however A1C 5.5  -on Januvia at home  -ISS inpatient     #HLD  -Statin was held last admission for transaminitis now resolved  -resume statin  #Elevated ALP - outpatient follow up  #BPH - per pt, not on Tamsulosin     DVT ppx: Heparin Subq   GI ppx: miralax  Dispo: PT/OT consulted.  Possible discharge tomorrow if recommended to have home health, will need wound care instructions.     Assessment and Plan:   · Assessment	    79yoM PMHx DM, HLD,  recent admission for left leg cellulitis (discharged Nov 6), now presents to ED bilateral lower extremity wounds. Patient states left lower  extremity wound not improving, developed new wound to right lower extremity over the past 2 days. During prior hospitalization, pt was first treated with Unasyn, cultures grew Pseudomonas PUTIA and was switched to Cefepime. Pt also received Prednisone 40 mg  for 3-4 days to reduce  inflammation. Patient states he has a visiting nurse for wound care, last visited 2 days ago. Pt lives alone, able to ambulate slowly with walker. Patient complains of bilateral leg pain.  Denies chest pain, sob, fever, chills, dysuria, nausea, vomiting    #Acute B/L Cellulitis on Chronic venous stasis  -wound undressed on both legs for examination: +B/L edema, +mild pain upon palpation of B/L malleoli; no new ulcers or wound; no drainage; +new wound dressings with silvadene and gauze applied  -WBC elevated 15.21 as of 11/22/24 7:54  -ordered US of LLE to r/o abscess  -will F/U with ID  -c/w levofloxacin 500 mg QD, EOT: 11/27/24  -c/w voriconazole PO 200mg BID, EOT 11/27  -c/w silver sulfadiazine cream twice daily, please educate the patient on how to apply this and have him place the cream himself to ensure he will be able to apply it at home  -c/w Pain control with tylenol for now, can change to percocet 5mg PRN Q6H if tylenol not providing sufficient relief    -recent admission for LLE cellulitis, now progressing to RLE with pitting edema, edema improved s/p lasix  -pt was previously  treated with unasyn/ cefepime, prednisone 40 x 3 days  -s/p Aztreonam, flagyl, vanc in ED  -Skin/wound culture 11/1 grew pseudomonas putida group (pan-susceptible), rare candida parapsilosis, few fusarium species  -Blood cultures 11/15 negative    -Transthoracic echocardiogram 11/16: Normal global left ventricular systolic function. EF of 69 %. Grade I diastolic dysfunction.  -Severe aortic valve stenosis. Mean PG 30 mmHg, DOUGLAS 0.8 cm^2. Estimated pulmonary artery systolic pressure is 40.0 mmHg assuming a right atrial pressure of 3 mmHg, which is consistent with borderline pulmonary hypertension.  -Doppler arterial of b/l LE 10/31: Normal arterial flow in the bilateral lower extremities.  -Doppler venous of b/l LE 11/16: No evidence of deep venous thrombosis in either lower extremity.  -wound care consulted    #mild RENEA  -Cr 1.6, dropped to Cr 1.5 as of 11/22/24 7:54  -monitor daily CMP    #Macrocytic Anemia  -Hgb stable (8.3 as of 11/22/24 7:54)  -iron studies wnl, folate wnl, b12 was on lower side  -started on B12 supplements last admission  -Outpt fu with GI    #Hx of DM2, however A1C 5.5  -on Januvia at home  -ISS inpatient     #HLD  -Statin was held last admission for transaminitis now resolved  -resume statin  #Elevated ALP - outpatient follow up  #BPH - per pt, not on Tamsulosin     DVT ppx: Heparin Subq   GI ppx: miralax  Dispo: PT/OT consulted.  Possible discharge tomorrow if recommended to have home health, will need wound care instructions.

## 2024-11-22 NOTE — PROGRESS NOTE ADULT - SUBJECTIVE AND OBJECTIVE BOX
Progress Note:   · Provider Specialty	Internal Medicine      · Subjective and Objective:   24H events:    Patient is a 79y old Male who presents with a chief complaint of   Primary diagnosis of Cellulitis    Today is hospital day 5d. This morning patient was seen and examined at bedside, resting comfortably in bed.    No acute or major events overnight.    PAST MEDICAL & SURGICAL HISTORY  Diabetes    HLD (hyperlipidemia)    BPH without urinary obstruction    S/P cataract surgery      SOCIAL HISTORY:  Social History:      ALLERGIES:  No Known Allergies    MEDICATIONS:  STANDING MEDICATIONS  atorvastatin 20 milliGRAM(s) Oral at bedtime  chlorhexidine 2% Cloths 1 Application(s) Topical <User Schedule>  cyanocobalamin 1000 MICROGram(s) Oral daily  heparin   Injectable 5000 Unit(s) SubCutaneous every 8 hours  levoFLOXacin  Tablet 500 milliGRAM(s) Oral every 24 hours  senna 2 Tablet(s) Oral at bedtime  silver sulfADIAZINE 1% Cream 1 Application(s) Topical two times a day  voriconazole 200 milliGRAM(s) Oral every 12 hours    PRN MEDICATIONS  acetaminophen     Tablet .. 650 milliGRAM(s) Oral every 6 hours PRN  polyethylene glycol 3350 17 Gram(s) Oral daily PRN    VITALS:   T(F): 98.6  HR: 81  BP: 111/65  RR: 18  SpO2: 96%    PHYSICAL EXAM:  Constitutional: NAD, awake and alert  HEENT: PERR, EOMI, Normal Hearing, MMM  Neck: Soft and supple, No LAD, No JVD  Respiratory: Breath sounds are clear bilaterally, No wheezing, rales or rhonchi  Cardiovascular: S1 and S2, regular rate and rhythm, no Murmurs, gallops or rubs  Gastrointestinal: Bowel Sounds present, soft, nontender, nondistended, no guarding, no rebound  Extremities: b/l Le edema, warmth. wounds wrapped    LABS:                        8.8    11.36 )-----------( 239      ( 20 Nov 2024 06:43 )             27.7     11-20    132[L]  |  92[L]  |  22[H]  ----------------------------<  87  4.5   |  26  |  1.3    Ca    8.9      20 Nov 2024 06:43  Mg     2.3     11-20    TPro  6.9  /  Alb  3.7  /  TBili  0.9  /  DBili  x   /  AST  32  /  ALT  24  /  AlkPhos  272[H]  11-20      Urinalysis Basic - ( 20 Nov 2024 06:43 )    Color: x / Appearance: x / SG: x / pH: x  Gluc: 87 mg/dL / Ketone: x  / Bili: x / Urobili: x   Blood: x / Protein: x / Nitrite: x   Leuk Esterase: x / RBC: x / WBC x   Sq Epi: x / Non Sq Epi: x / Bacteria: x Progress Note:   · Provider Specialty	Internal Medicine    · Subjective and Objective:   Today is hospital day 8d. This morning patient was seen and examined at bedside, resting comfortably in his chair next to his bed. No acute or major overnight events. Pt states he has i    Patient is a 79y old Male who presents with a chief complaint of LLE rash and wound    Primary diagnosis of Cellulitis    PAST MEDICAL & SURGICAL HISTORY  DM  HLD  BPH without urinary obstruction  s/p cataract surgery    SOCIAL HISTORY:  Social History:    ALLERGIES:  No Known Allergies    MEDICATIONS:  STANDING MEDICATIONS  atorvastatin 20 milliGRAM(s) Oral at bedtime  chlorhexidine 2% Cloths 1 Application(s) Topical <User Schedule>  cyanocobalamin 1000 MICROGram(s) Oral daily  heparin   Injectable 5000 Unit(s) SubCutaneous every 8 hours  levoFLOXacin  Tablet 500 milliGRAM(s) Oral every 24 hours  senna 2 Tablet(s) Oral at bedtime  silver sulfADIAZINE 1% Cream 1 Application(s) Topical two times a day  voriconazole 200 milliGRAM(s) Oral every 12 hours    PRN MEDICATIONS  acetaminophen     Tablet .. 650 milliGRAM(s) Oral every 6 hours PRN  polyethylene glycol 3350 17 Gram(s) Oral daily PRN    VITALS:   T(F): 98.6  HR: 81  BP: 111/65  RR: 18  SpO2: 96%    PHYSICAL EXAM:  Constitutional: NAD, awake and alert  HEENT: PERR, EOMI, Normal Hearing, MMM  Neck: Soft and supple, No LAD, No JVD  Respiratory: Breath sounds are clear bilaterally, No wheezing, rales or rhonchi  Cardiovascular: S1 and S2, regular rate and rhythm, no Murmurs, gallops or rubs  Gastrointestinal: Bowel Sounds present, soft, nontender, nondistended, no guarding, no rebound  Extremities: b/l Le edema, warmth. wounds wrapped    LABS:                        8.8    11.36 )-----------( 239      ( 20 Nov 2024 06:43 )             27.7     11-20    132[L]  |  92[L]  |  22[H]  ----------------------------<  87  4.5   |  26  |  1.3    Ca    8.9      20 Nov 2024 06:43    · Subjective and Objective:   Progress Note:   · Provider Specialty	Internal Medicine    · Subjective and Objective:   Patient is a 89y old Female who presents with a chief complaint of weakness    Primary diagnosis of metabolic encephalopathy    Today is hospital day 5d. This morning patient was seen and examined at bedside, resting comfortably in bed. No acute or major events overnight. Pt had difficulty sleeping and thus was given seroquel and ambien. VS stable. No acute or major overnight events. ROS was not able to be obtained since pt was sleeping. Pt had 1 BM yesterday and voided 100 mL today. Pt's ecchymosis and wounds on LLE from the fall on 11/18 prior to admission appears more healed than yesterday.  Mg     2.3     11-20    TPro  6.9  /  Alb  3.7  /  TBili  0.9  /  DBili  x   /  AST  32  /  ALT  24  /  AlkPhos  272[H]  11-20      Urinalysis Basic - ( 20 Nov 2024 06:43 )    Color: x / Appearance: x / SG: x / pH: x  Gluc: 87 mg/dL / Ketone: x  / Bili: x / Urobili: x   Blood: x / Protein: x / Nitrite: x   Leuk Esterase: x / RBC: x / WBC x   Sq Epi: x / Non Sq Epi: x / Bacteria: x Progress Note:   · Provider Specialty	Internal Medicine    · Subjective and Objective:   Today is hospital day 8d. This morning patient was seen and examined at bedside, resting comfortably in his chair next to his bed. No acute or major overnight events. Pt states he still has pain on his lower legs bilaterally but that it is intermittent.    Patient is a 79y old Male who presents with a chief complaint of LLE rash and wound    Primary diagnosis of Cellulitis    PAST MEDICAL & SURGICAL HISTORY  DM  HLD  BPH without urinary obstruction  s/p cataract surgery    SOCIAL HISTORY:  Social History:    ALLERGIES:  No Known Allergies    MEDICATIONS:  STANDING MEDICATIONS  atorvastatin 20 mg, PO, QHS  chlorhexidine 2% Cloths 1 Application(s) Topical  cyanocobalamin 1000 mcg, PO, QD  heparin Injectable 5000 Unit(s) subQ q8hrs  levofloxacin Tablet 500 mg, PO, QD  senna 2 Tablet(s), PO, QHS  silver sulfADIAZINE 1% Cream 1 Application(s) Topical BID  voriconazole 200 mg, PO, q12hrs    PRN MEDICATIONS  acetaminophen Tablet . 650 mg, PO, q6rhs, PRN  polyethylene glycol 3350 17 Gram(s), PO, QD PRN    VITALS:   (11/22/24 4:43)    T(F): 98.1  HR: 72  BP: 100/55  RR: 18  SpO2: 98%    PHYSICAL EXAM:  Constitutional: NAD, awake and alert, conversant  HEENT: Normal Hearing  Respiratory: Breath sounds are clear bilaterally, No wheezing, rales or rhonchi  Cardiovascular: S1 and S2, regular rate and rhythm; no murmurs, gallops or rubs  Gastrointestinal: soft, nontender; nondistended; no guarding, no rebound tenderness; normal bowels sounds  Extremities: +wounds unwrapped for examination: +B/L edema, no new ulcers or wound; no drainage;   Neuro: A&Ox3; 5/5 muscular strength B/L of UE and 5/5    LABS:                        8.8    11.36 )-----------( 239      ( 20 Nov 2024 06:43 )             27.7     11-20    132[L]  |  92[L]  |  22[H]  ----------------------------<  87  4.5   |  26  |  1.3    Ca    8.9      20 Nov 2024 06:43    · Subjective and Objective:   Progress Note:   · Provider Specialty	Internal Medicine    · Subjective and Objective:   Patient is a 89y old Female who presents with a chief complaint of weakness    Primary diagnosis of metabolic encephalopathy    Today is hospital day 5d. This morning patient was seen and examined at bedside, resting comfortably in bed. No acute or major events overnight. Pt had difficulty sleeping and thus was given seroquel and ambien. VS stable. No acute or major overnight events. ROS was not able to be obtained since pt was sleeping. Pt had 1 BM yesterday and voided 100 mL today. Pt's ecchymosis and wounds on LLE from the fall on 11/18 prior to admission appears more healed than yesterday.  Mg     2.3     11-20    TPro  6.9  /  Alb  3.7  /  TBili  0.9  /  DBili  x   /  AST  32  /  ALT  24  /  AlkPhos  272[H]  11-20      Urinalysis Basic - ( 20 Nov 2024 06:43 )    Color: x / Appearance: x / SG: x / pH: x  Gluc: 87 mg/dL / Ketone: x  / Bili: x / Urobili: x   Blood: x / Protein: x / Nitrite: x   Leuk Esterase: x / RBC: x / WBC x   Sq Epi: x / Non Sq Epi: x / Bacteria: x Progress Note:   · Provider Specialty	Internal Medicine    · Subjective and Objective:   Today is hospital day 8d. This morning patient was seen and examined at bedside, resting comfortably in his chair next to his bed. No acute or major overnight events. Pt states he still has pain on his lower legs bilaterally but that it is intermittent.    Patient is a 79y old Male who presents with a chief complaint of LLE rash and wound    Primary diagnosis of Cellulitis    PAST MEDICAL & SURGICAL HISTORY  DM  HLD  BPH without urinary obstruction  s/p cataract surgery    SOCIAL HISTORY:  Social History:    ALLERGIES:  No Known Allergies    MEDICATIONS:  STANDING MEDICATIONS  atorvastatin 20 mg, PO, QHS  chlorhexidine 2% Cloths 1 Application(s) Topical  cyanocobalamin 1000 mcg, PO, QD  heparin Injectable 5000 Unit(s) subQ q8hrs  levofloxacin Tablet 500 mg, PO, QD  senna 2 Tablet(s), PO, QHS  silver sulfADIAZINE 1% Cream 1 Application(s) Topical BID  voriconazole 200 mg, PO, q12hrs    PRN MEDICATIONS  acetaminophen Tablet . 650 mg, PO, q6rhs, PRN  polyethylene glycol 3350 17 Gram(s), PO, QD PRN    VITALS:   (11/22/24 4:43)    T(F): 98.1  HR: 72  BP: 100/55  RR: 18  SpO2: 98%    PHYSICAL EXAM:  Constitutional: NAD, awake and alert, conversant  HEENT: Normal Hearing  Respiratory: Breath sounds are clear bilaterally, No wheezing, rales or rhonchi  Cardiovascular: S1 and S2, regular rate and rhythm; no murmurs, gallops or rubs  Gastrointestinal: soft, nontender; nondistended; no guarding, no rebound tenderness; normal bowels sounds  Extremities: +wounds unwrapped for examination: +B/L edema, +mild pain upon palpation of B/L malleoli; no new ulcers or wound; no drainage; +new wound dressings with silvadene and gauze applied  Neuro: A&Ox3; 5/5 muscular strength B/L of UE and 5/5    LABS:  (11/22/24 7:54)                     8.3    15.21[H] )-----------( 282                  26.2     0.9 auto immature granulocyte %    (11/22/24 7:54)    130[L]  |  91[L]  |  28[H]  ----------------------------<  73  4.6   |    |  1.5    Ca    8.5     (11/22/24 7:54)   Progress Note:   · Provider Specialty	Internal Medicine    · Subjective and Objective:   Today is hospital day 8d. This morning patient was seen and examined at bedside, resting comfortably in his chair next to his bed. No acute or major overnight events. Pt states he still has pain on his lower legs bilaterally but that it is intermittent.    Patient is a 79y old Male who presents with a chief complaint of LLE rash and wound    Primary diagnosis of Cellulitis    PAST MEDICAL & SURGICAL HISTORY  DM  HLD  BPH without urinary obstruction  s/p cataract surgery    SOCIAL HISTORY:  Social History:    ALLERGIES:  No Known Allergies    MEDICATIONS:  STANDING MEDICATIONS  atorvastatin 20 mg, PO, QHS  chlorhexidine 2% Cloths 1 Application(s) Topical  cyanocobalamin 1000 mcg, PO, QD  heparin Injectable 5000 Unit(s) subQ q8hrs  levofloxacin Tablet 500 mg, PO, QD  senna 2 Tablet(s), PO, QHS  silver sulfADIAZINE 1% Cream 1 Application(s) Topical BID  voriconazole 200 mg, PO, q12hrs    PRN MEDICATIONS  acetaminophen Tablet . 650 mg, PO, q6rhs, PRN  polyethylene glycol 3350 17 Gram(s), PO, QD PRN    VITALS:   (11/22/24 4:43)    T(F): 98.1  HR: 72  BP: 100/55  RR: 18  SpO2: 98%    PHYSICAL EXAM:  Constitutional: NAD, awake and alert, conversant  HEENT: Normal Hearing  Respiratory: Breath sounds are clear bilaterally, No wheezing, rales or rhonchi  Cardiovascular: S1 and S2, regular rate and rhythm; no murmurs, gallops or rubs  Gastrointestinal: soft, nontender; nondistended; no guarding, no rebound tenderness; normal bowels sounds  Extremities: +wounds unwrapped for examination: +B/L edema, +mild pain upon palpation of B/L malleoli; no new ulcers or wound; no drainage; +new wound dressings with silvadene and gauze applied  Neuro: A&Ox3; 5/5 muscular strength B/L of UE and 5/5    LABS:  (11/22/24 7:54)                     8.3    15.21[H] )-----------( 282                  26.2     0.9 auto immature granulocyte %    (11/22/24 7:54)    130[L]  |  91[L]  |  28[H]  ----------------------------<  73  4.6   |    |  1.5    Ca    8.5     (11/22/24 7:54)  Cr 1.5   Progress Note:   · Provider Specialty	Internal Medicine    · Subjective and Objective:   Today is hospital day 8d. This morning patient was seen and examined at bedside, resting comfortably in his chair next to his bed. No acute or major overnight events. Pt states he still has pain on his lower legs bilaterally but that it is intermittent.    Patient is a 79y old Male who presents with a chief complaint of LLE rash and wound    Primary diagnosis of Cellulitis    PAST MEDICAL & SURGICAL HISTORY  DM  HLD  BPH without urinary obstruction  s/p cataract surgery    SOCIAL HISTORY:  Social History:    ALLERGIES:  No Known Allergies    MEDICATIONS:  STANDING MEDICATIONS  atorvastatin 20 mg, PO, QHS  chlorhexidine 2% Cloths 1 Application(s) Topical  cyanocobalamin 1000 mcg, PO, QD  heparin Injectable 5000 Unit(s) subQ q8hrs  levofloxacin Tablet 500 mg, PO, QD  oxycodone 5mg/acetaminophen 325 mg, PO, q6hrs PRN  senna 2 Tablet(s), PO, QHS  silver sulfADIAZINE 1% Cream 1 Application(s) Topical BID  voriconazole 200 mg, PO, q12hrs    PRN MEDICATIONS  acetaminophen Tablet . 650 mg, PO, q6rhs, PRN  polyethylene glycol 3350 17 Gram(s), PO, QD PRN    VITALS:   (11/22/24 4:43)    T(F): 98.1  HR: 72  BP: 100/55  RR: 18  SpO2: 98%    PHYSICAL EXAM:  Constitutional: NAD, awake and alert, conversant  HEENT: Normal Hearing  Respiratory: Breath sounds are clear bilaterally, No wheezing, rales or rhonchi  Cardiovascular: S1 and S2, regular rate and rhythm; no murmurs, gallops or rubs  Gastrointestinal: soft, nontender; nondistended; no guarding, no rebound tenderness; normal bowels sounds  Extremities: +wounds unwrapped for examination: +B/L edema, +mild pain upon palpation of B/L malleoli; no new ulcers or wound; no drainage; +new wound dressings with silvadene and gauze applied  Neuro: A&Ox3; 5/5 muscular strength B/L of UE and 5/5    LABS:  (11/22/24 7:54)                     8.3    15.21[H] )-----------( 282                  26.2     0.9 auto immature granulocyte %    (11/22/24 7:54)    130[L]  |  91[L]  |  28[H]  ----------------------------<  73  4.6   |    |  1.5    Ca    8.5     (11/22/24 7:54)  Cr 1.5

## 2024-11-23 LAB
ALBUMIN SERPL ELPH-MCNC: 3.6 G/DL — SIGNIFICANT CHANGE UP (ref 3.5–5.2)
ALP SERPL-CCNC: 315 U/L — HIGH (ref 30–115)
ALT FLD-CCNC: 16 U/L — SIGNIFICANT CHANGE UP (ref 0–41)
ANION GAP SERPL CALC-SCNC: 14 MMOL/L — SIGNIFICANT CHANGE UP (ref 7–14)
AST SERPL-CCNC: 29 U/L — SIGNIFICANT CHANGE UP (ref 0–41)
BASOPHILS # BLD AUTO: 0.07 K/UL — SIGNIFICANT CHANGE UP (ref 0–0.2)
BASOPHILS NFR BLD AUTO: 0.5 % — SIGNIFICANT CHANGE UP (ref 0–1)
BILIRUB SERPL-MCNC: 0.7 MG/DL — SIGNIFICANT CHANGE UP (ref 0.2–1.2)
BUN SERPL-MCNC: 28 MG/DL — HIGH (ref 10–20)
CALCIUM SERPL-MCNC: 8.8 MG/DL — SIGNIFICANT CHANGE UP (ref 8.4–10.5)
CHLORIDE SERPL-SCNC: 90 MMOL/L — LOW (ref 98–110)
CO2 SERPL-SCNC: 26 MMOL/L — SIGNIFICANT CHANGE UP (ref 17–32)
CREAT SERPL-MCNC: 1.5 MG/DL — SIGNIFICANT CHANGE UP (ref 0.7–1.5)
EGFR: 47 ML/MIN/1.73M2 — LOW
EOSINOPHIL # BLD AUTO: 0.95 K/UL — HIGH (ref 0–0.7)
EOSINOPHIL NFR BLD AUTO: 6.5 % — SIGNIFICANT CHANGE UP (ref 0–8)
GLUCOSE BLDC GLUCOMTR-MCNC: 100 MG/DL — HIGH (ref 70–99)
GLUCOSE BLDC GLUCOMTR-MCNC: 116 MG/DL — HIGH (ref 70–99)
GLUCOSE BLDC GLUCOMTR-MCNC: 92 MG/DL — SIGNIFICANT CHANGE UP (ref 70–99)
GLUCOSE BLDC GLUCOMTR-MCNC: 97 MG/DL — SIGNIFICANT CHANGE UP (ref 70–99)
GLUCOSE SERPL-MCNC: 73 MG/DL — SIGNIFICANT CHANGE UP (ref 70–99)
HCT VFR BLD CALC: 26.7 % — LOW (ref 42–52)
HGB BLD-MCNC: 8.3 G/DL — LOW (ref 14–18)
IMM GRANULOCYTES NFR BLD AUTO: 1.2 % — HIGH (ref 0.1–0.3)
LYMPHOCYTES # BLD AUTO: 1.92 K/UL — SIGNIFICANT CHANGE UP (ref 1.2–3.4)
LYMPHOCYTES # BLD AUTO: 13.2 % — LOW (ref 20.5–51.1)
MAGNESIUM SERPL-MCNC: 2.5 MG/DL — HIGH (ref 1.8–2.4)
MCHC RBC-ENTMCNC: 30.2 PG — SIGNIFICANT CHANGE UP (ref 27–31)
MCHC RBC-ENTMCNC: 31.1 G/DL — LOW (ref 32–37)
MCV RBC AUTO: 97.1 FL — HIGH (ref 80–94)
MONOCYTES # BLD AUTO: 1.24 K/UL — HIGH (ref 0.1–0.6)
MONOCYTES NFR BLD AUTO: 8.5 % — SIGNIFICANT CHANGE UP (ref 1.7–9.3)
NEUTROPHILS # BLD AUTO: 10.17 K/UL — HIGH (ref 1.4–6.5)
NEUTROPHILS NFR BLD AUTO: 70.1 % — SIGNIFICANT CHANGE UP (ref 42.2–75.2)
NRBC # BLD: 0 /100 WBCS — SIGNIFICANT CHANGE UP (ref 0–0)
PLATELET # BLD AUTO: 283 K/UL — SIGNIFICANT CHANGE UP (ref 130–400)
PMV BLD: 10.4 FL — SIGNIFICANT CHANGE UP (ref 7.4–10.4)
POTASSIUM SERPL-MCNC: 4.9 MMOL/L — SIGNIFICANT CHANGE UP (ref 3.5–5)
POTASSIUM SERPL-SCNC: 4.9 MMOL/L — SIGNIFICANT CHANGE UP (ref 3.5–5)
PROT SERPL-MCNC: 7.2 G/DL — SIGNIFICANT CHANGE UP (ref 6–8)
RBC # BLD: 2.75 M/UL — LOW (ref 4.7–6.1)
RBC # FLD: 15.8 % — HIGH (ref 11.5–14.5)
SODIUM SERPL-SCNC: 130 MMOL/L — LOW (ref 135–146)
WBC # BLD: 14.52 K/UL — HIGH (ref 4.8–10.8)
WBC # FLD AUTO: 14.52 K/UL — HIGH (ref 4.8–10.8)

## 2024-11-23 PROCEDURE — 99232 SBSQ HOSP IP/OBS MODERATE 35: CPT

## 2024-11-23 RX ADMIN — Medication 5000 UNIT(S): at 21:32

## 2024-11-23 RX ADMIN — VORICONAZOLE 200 MILLIGRAM(S): 10 INJECTION, POWDER, LYOPHILIZED, FOR SOLUTION INTRAVENOUS at 05:37

## 2024-11-23 RX ADMIN — Medication 20 MILLIGRAM(S): at 21:32

## 2024-11-23 RX ADMIN — CHLORHEXIDINE GLUCONATE 1 APPLICATION(S): 1.2 RINSE ORAL at 05:38

## 2024-11-23 RX ADMIN — Medication 1 APPLICATION(S): at 18:05

## 2024-11-23 RX ADMIN — Medication 5000 UNIT(S): at 14:22

## 2024-11-23 RX ADMIN — Medication 2 TABLET(S): at 21:30

## 2024-11-23 RX ADMIN — Medication 1 APPLICATION(S): at 05:38

## 2024-11-23 RX ADMIN — Medication 1000 MICROGRAM(S): at 12:26

## 2024-11-23 RX ADMIN — VORICONAZOLE 200 MILLIGRAM(S): 10 INJECTION, POWDER, LYOPHILIZED, FOR SOLUTION INTRAVENOUS at 18:05

## 2024-11-23 RX ADMIN — Medication 5000 UNIT(S): at 07:46

## 2024-11-23 NOTE — PROGRESS NOTE ADULT - ASSESSMENT
79yoM PMHx DM, HLD,  recent admission for left leg cellulitis (discharged Nov 6), now presents to ED bilateral lower extremity wounds.    #Acute B/L Cellulitis on Chronic venous stasis  Infectious disease consulted  Ultrasound ordered to rule out abscess, not yet performed  Levofloxacin 500mg QD EOT 11/27  Voriconazole 200mg BID EOT 11/27  Silver sulfadiazine cream twice daily  Pain control    #RENEA  Monitor creatinine    #Hx of DM  Discontinue insulin, sugars have been controled    #Anemia  Outpatient GI referral

## 2024-11-23 NOTE — PROGRESS NOTE ADULT - SUBJECTIVE AND OBJECTIVE BOX
LEENA BAILEY  79y  Male      Patient is a 79y old  Male who presents with a chief complaint of cellulitis (22 Nov 2024 15:26)      INTERVAL HPI/OVERNIGHT EVENTS:  Pain controlled, no acute events overnight    T(C): 36.6 (11-23-24 @ 13:35), Max: 36.7 (11-22-24 @ 20:32)  HR: 66 (11-23-24 @ 13:35) (66 - 73)  BP: 101/62 (11-23-24 @ 13:35) (96/70 - 118/61)  RR: 18 (11-23-24 @ 13:35) (18 - 18)  SpO2: 98% (11-23-24 @ 13:35) (96% - 99%)  Wt(kg): --Vital Signs Last 24 Hrs  T(C): 36.6 (23 Nov 2024 13:35), Max: 36.7 (22 Nov 2024 20:32)  T(F): 97.8 (23 Nov 2024 13:35), Max: 98 (22 Nov 2024 20:32)  HR: 66 (23 Nov 2024 13:35) (66 - 73)  BP: 101/62 (23 Nov 2024 13:35) (96/70 - 118/61)  BP(mean): --  RR: 18 (23 Nov 2024 13:35) (18 - 18)  SpO2: 98% (23 Nov 2024 13:35) (96% - 99%)    Parameters below as of 23 Nov 2024 13:35  Patient On (Oxygen Delivery Method): room air    PHYSICAL EXAM:  GENERAL: NAD, sitting up in bed  PSYCH: no agitation, baseline mentation  NERVOUS SYSTEM:  Alert, freely moving all extremities  PULMONARY: Clear to percussion bilaterally  CARDIOVASCULAR: Regular rate and rhythm  GI: Soft, Nontender  EXTREMITIES:  Dressings intact b/l  SKIN: No obvious rashes or lesions    Consultant(s) Notes Reviewed:  [x ] YES  [ ] NO    Discussed with Consultants/Other Providers [ x] YES     LABS                          8.3    14.52 )-----------( 283      ( 23 Nov 2024 06:42 )             26.7     11-23    130[L]  |  90[L]  |  28[H]  ----------------------------<  73  4.9   |  26  |  1.5    Ca    8.8      23 Nov 2024 06:42  Mg     2.5     11-23    TPro  7.2  /  Alb  3.6  /  TBili  0.7  /  DBili  x   /  AST  29  /  ALT  16  /  AlkPhos  315[H]  11-23      Urinalysis Basic - ( 23 Nov 2024 06:42 )    Color: x / Appearance: x / SG: x / pH: x  Gluc: 73 mg/dL / Ketone: x  / Bili: x / Urobili: x   Blood: x / Protein: x / Nitrite: x   Leuk Esterase: x / RBC: x / WBC x   Sq Epi: x / Non Sq Epi: x / Bacteria: x        Lactate Trend  11-21 @ 23:39 Lactate:1.4         CAPILLARY BLOOD GLUCOSE      POCT Blood Glucose.: 100 mg/dL (23 Nov 2024 16:46)        RADIOLOGY & ADDITIONAL TESTS:    Imaging Personally Reviewed:  [ ] YES  [ ] NO    HEALTH ISSUES - PROBLEM Dx:

## 2024-11-24 LAB
ALBUMIN SERPL ELPH-MCNC: 3.6 G/DL — SIGNIFICANT CHANGE UP (ref 3.5–5.2)
ALP SERPL-CCNC: 305 U/L — HIGH (ref 30–115)
ALT FLD-CCNC: 15 U/L — SIGNIFICANT CHANGE UP (ref 0–41)
ANION GAP SERPL CALC-SCNC: 15 MMOL/L — HIGH (ref 7–14)
AST SERPL-CCNC: 29 U/L — SIGNIFICANT CHANGE UP (ref 0–41)
BASOPHILS # BLD AUTO: 0.09 K/UL — SIGNIFICANT CHANGE UP (ref 0–0.2)
BASOPHILS NFR BLD AUTO: 0.7 % — SIGNIFICANT CHANGE UP (ref 0–1)
BILIRUB SERPL-MCNC: 0.6 MG/DL — SIGNIFICANT CHANGE UP (ref 0.2–1.2)
BUN SERPL-MCNC: 28 MG/DL — HIGH (ref 10–20)
CALCIUM SERPL-MCNC: 8.7 MG/DL — SIGNIFICANT CHANGE UP (ref 8.4–10.5)
CHLORIDE SERPL-SCNC: 91 MMOL/L — LOW (ref 98–110)
CO2 SERPL-SCNC: 26 MMOL/L — SIGNIFICANT CHANGE UP (ref 17–32)
CREAT SERPL-MCNC: 1.6 MG/DL — HIGH (ref 0.7–1.5)
EGFR: 43 ML/MIN/1.73M2 — LOW
EOSINOPHIL # BLD AUTO: 0.81 K/UL — HIGH (ref 0–0.7)
EOSINOPHIL NFR BLD AUTO: 6 % — SIGNIFICANT CHANGE UP (ref 0–8)
GLUCOSE BLDC GLUCOMTR-MCNC: 102 MG/DL — HIGH (ref 70–99)
GLUCOSE BLDC GLUCOMTR-MCNC: 108 MG/DL — HIGH (ref 70–99)
GLUCOSE BLDC GLUCOMTR-MCNC: 111 MG/DL — HIGH (ref 70–99)
GLUCOSE BLDC GLUCOMTR-MCNC: 112 MG/DL — HIGH (ref 70–99)
GLUCOSE SERPL-MCNC: 91 MG/DL — SIGNIFICANT CHANGE UP (ref 70–99)
HCT VFR BLD CALC: 27.2 % — LOW (ref 42–52)
HGB BLD-MCNC: 8.6 G/DL — LOW (ref 14–18)
IMM GRANULOCYTES NFR BLD AUTO: 2 % — HIGH (ref 0.1–0.3)
LYMPHOCYTES # BLD AUTO: 16.5 % — LOW (ref 20.5–51.1)
LYMPHOCYTES # BLD AUTO: 2.24 K/UL — SIGNIFICANT CHANGE UP (ref 1.2–3.4)
MAGNESIUM SERPL-MCNC: 2.5 MG/DL — HIGH (ref 1.8–2.4)
MCHC RBC-ENTMCNC: 30.1 PG — SIGNIFICANT CHANGE UP (ref 27–31)
MCHC RBC-ENTMCNC: 31.6 G/DL — LOW (ref 32–37)
MCV RBC AUTO: 95.1 FL — HIGH (ref 80–94)
MONOCYTES # BLD AUTO: 1.35 K/UL — HIGH (ref 0.1–0.6)
MONOCYTES NFR BLD AUTO: 9.9 % — HIGH (ref 1.7–9.3)
NEUTROPHILS # BLD AUTO: 8.85 K/UL — HIGH (ref 1.4–6.5)
NEUTROPHILS NFR BLD AUTO: 64.9 % — SIGNIFICANT CHANGE UP (ref 42.2–75.2)
NRBC # BLD: 0 /100 WBCS — SIGNIFICANT CHANGE UP (ref 0–0)
PLATELET # BLD AUTO: 379 K/UL — SIGNIFICANT CHANGE UP (ref 130–400)
PMV BLD: 9.7 FL — SIGNIFICANT CHANGE UP (ref 7.4–10.4)
POTASSIUM SERPL-MCNC: 4.6 MMOL/L — SIGNIFICANT CHANGE UP (ref 3.5–5)
POTASSIUM SERPL-SCNC: 4.6 MMOL/L — SIGNIFICANT CHANGE UP (ref 3.5–5)
PROT SERPL-MCNC: 6.9 G/DL — SIGNIFICANT CHANGE UP (ref 6–8)
RBC # BLD: 2.86 M/UL — LOW (ref 4.7–6.1)
RBC # FLD: 15.8 % — HIGH (ref 11.5–14.5)
SODIUM SERPL-SCNC: 132 MMOL/L — LOW (ref 135–146)
WBC # BLD: 13.61 K/UL — HIGH (ref 4.8–10.8)
WBC # FLD AUTO: 13.61 K/UL — HIGH (ref 4.8–10.8)

## 2024-11-24 PROCEDURE — 99232 SBSQ HOSP IP/OBS MODERATE 35: CPT

## 2024-11-24 PROCEDURE — 76882 US LMTD JT/FCL EVL NVASC XTR: CPT | Mod: 26,LT

## 2024-11-24 RX ORDER — ACETAMINOPHEN 500MG 500 MG/1
650 TABLET, COATED ORAL EVERY 6 HOURS
Refills: 0 | Status: DISCONTINUED | OUTPATIENT
Start: 2024-11-24 | End: 2024-11-26

## 2024-11-24 RX ORDER — OXYCODONE HYDROCHLORIDE 30 MG/1
2.5 TABLET ORAL EVERY 6 HOURS
Refills: 0 | Status: DISCONTINUED | OUTPATIENT
Start: 2024-11-24 | End: 2024-11-26

## 2024-11-24 RX ADMIN — Medication 5000 UNIT(S): at 06:20

## 2024-11-24 RX ADMIN — Medication 20 MILLIGRAM(S): at 21:56

## 2024-11-24 RX ADMIN — Medication 1000 MICROGRAM(S): at 11:30

## 2024-11-24 RX ADMIN — CHLORHEXIDINE GLUCONATE 1 APPLICATION(S): 1.2 RINSE ORAL at 06:20

## 2024-11-24 RX ADMIN — ACETAMINOPHEN 500MG 650 MILLIGRAM(S): 500 TABLET, COATED ORAL at 02:03

## 2024-11-24 RX ADMIN — Medication 1 APPLICATION(S): at 06:17

## 2024-11-24 RX ADMIN — VORICONAZOLE 200 MILLIGRAM(S): 10 INJECTION, POWDER, LYOPHILIZED, FOR SOLUTION INTRAVENOUS at 17:51

## 2024-11-24 RX ADMIN — OXYCODONE HYDROCHLORIDE 2.5 MILLIGRAM(S): 30 TABLET ORAL at 21:56

## 2024-11-24 RX ADMIN — Medication 2 TABLET(S): at 21:57

## 2024-11-24 RX ADMIN — VORICONAZOLE 200 MILLIGRAM(S): 10 INJECTION, POWDER, LYOPHILIZED, FOR SOLUTION INTRAVENOUS at 06:22

## 2024-11-24 RX ADMIN — Medication 5000 UNIT(S): at 21:55

## 2024-11-24 RX ADMIN — OXYCODONE HYDROCHLORIDE 2.5 MILLIGRAM(S): 30 TABLET ORAL at 22:56

## 2024-11-24 RX ADMIN — Medication 1 APPLICATION(S): at 17:52

## 2024-11-24 RX ADMIN — Medication 5000 UNIT(S): at 13:40

## 2024-11-24 RX ADMIN — ACETAMINOPHEN 500MG 650 MILLIGRAM(S): 500 TABLET, COATED ORAL at 01:33

## 2024-11-24 NOTE — PROGRESS NOTE ADULT - SUBJECTIVE AND OBJECTIVE BOX
LEENA BAILEY  79y  Male      Patient is a 79y old  Male who presents with a chief complaint of cellulitis (22 Nov 2024 15:26)      INTERVAL HPI/OVERNIGHT EVENTS:  Patient's pain in left lower extremity at baseline.  Ultrasound of LLE not yet read.    T(C): 36.6 (11-24-24 @ 12:56), Max: 36.6 (11-23-24 @ 19:28)  HR: 70 (11-24-24 @ 12:56) (69 - 70)  BP: 106/54 (11-24-24 @ 12:56) (106/51 - 140/82)  RR: 18 (11-24-24 @ 12:56) (18 - 18)  SpO2: 97% (11-24-24 @ 12:56) (97% - 99%)  Wt(kg): --Vital Signs Last 24 Hrs  T(C): 36.6 (24 Nov 2024 12:56), Max: 36.6 (23 Nov 2024 19:28)  T(F): 97.9 (24 Nov 2024 12:56), Max: 97.9 (24 Nov 2024 12:56)  HR: 70 (24 Nov 2024 12:56) (69 - 70)  BP: 106/54 (24 Nov 2024 12:56) (106/51 - 140/82)  BP(mean): --  RR: 18 (24 Nov 2024 12:56) (18 - 18)  SpO2: 97% (24 Nov 2024 12:56) (97% - 99%)    Parameters below as of 24 Nov 2024 05:03  Patient On (Oxygen Delivery Method): room air          11-23-24 @ 07:01  -  11-24-24 @ 07:00  --------------------------------------------------------  IN: 0 mL / OUT: 250 mL / NET: -250 mL        PHYSICAL EXAM:  GENERAL: NAD, sitting up in bed  PSYCH: no agitation, baseline mentation  NERVOUS SYSTEM:  Alert, freely moving all extremities  PULMONARY: Clear to percussion bilaterally  CARDIOVASCULAR: Regular rate and rhythm  GI: Soft, Nontender  EXTREMITIES:  Dressings c/d/i    Consultant(s) Notes Reviewed:  [x ] YES  [ ] NO    Discussed with Consultants/Other Providers [ x] YES     LABS                          8.6    13.61 )-----------( 379      ( 24 Nov 2024 07:37 )             27.2     11-24    132[L]  |  91[L]  |  28[H]  ----------------------------<  91  4.6   |  26  |  1.6[H]    Ca    8.7      24 Nov 2024 07:37  Mg     2.5     11-24    TPro  6.9  /  Alb  3.6  /  TBili  0.6  /  DBili  x   /  AST  29  /  ALT  15  /  AlkPhos  305[H]  11-24      Urinalysis Basic - ( 24 Nov 2024 07:37 )    Color: x / Appearance: x / SG: x / pH: x  Gluc: 91 mg/dL / Ketone: x  / Bili: x / Urobili: x   Blood: x / Protein: x / Nitrite: x   Leuk Esterase: x / RBC: x / WBC x   Sq Epi: x / Non Sq Epi: x / Bacteria: x        Lactate Trend  11-21 @ 23:39 Lactate:1.4         CAPILLARY BLOOD GLUCOSE      POCT Blood Glucose.: 111 mg/dL (24 Nov 2024 16:46)        RADIOLOGY & ADDITIONAL TESTS:    Imaging Personally Reviewed:  [ ] YES  [ ] NO    HEALTH ISSUES - PROBLEM Dx:

## 2024-11-24 NOTE — PROGRESS NOTE ADULT - ASSESSMENT
79yoM PMHx DM, HLD,  recent admission for left leg cellulitis (discharged Nov 6), now presents to ED bilateral lower extremity wounds.    #Acute B/L Cellulitis on Chronic venous stasis  Infectious disease consulted  Ultrasound ordered to rule out abscess, not yet read  Levofloxacin 500mg QD EOT 11/27  Voriconazole 200mg BID EOT 11/27  Silver sulfadiazine cream twice daily  Pain control    #RENEA  Ordered FENA labs and UA, creatine not improving    #Hx of DM  Discontinue insulin    #Anemia  Outpatient GI referral

## 2024-11-25 LAB
ALBUMIN SERPL ELPH-MCNC: 3.9 G/DL — SIGNIFICANT CHANGE UP (ref 3.5–5.2)
ALP SERPL-CCNC: 308 U/L — HIGH (ref 30–115)
ALT FLD-CCNC: 15 U/L — SIGNIFICANT CHANGE UP (ref 0–41)
ANION GAP SERPL CALC-SCNC: 14 MMOL/L — SIGNIFICANT CHANGE UP (ref 7–14)
APPEARANCE UR: CLEAR — SIGNIFICANT CHANGE UP
AST SERPL-CCNC: 31 U/L — SIGNIFICANT CHANGE UP (ref 0–41)
BASOPHILS # BLD AUTO: 0.1 K/UL — SIGNIFICANT CHANGE UP (ref 0–0.2)
BASOPHILS NFR BLD AUTO: 0.7 % — SIGNIFICANT CHANGE UP (ref 0–1)
BILIRUB SERPL-MCNC: 0.6 MG/DL — SIGNIFICANT CHANGE UP (ref 0.2–1.2)
BILIRUB UR-MCNC: NEGATIVE — SIGNIFICANT CHANGE UP
BUN SERPL-MCNC: 24 MG/DL — HIGH (ref 10–20)
CALCIUM SERPL-MCNC: 9.1 MG/DL — SIGNIFICANT CHANGE UP (ref 8.4–10.5)
CHLORIDE SERPL-SCNC: 91 MMOL/L — LOW (ref 98–110)
CO2 SERPL-SCNC: 26 MMOL/L — SIGNIFICANT CHANGE UP (ref 17–32)
COLOR SPEC: YELLOW — SIGNIFICANT CHANGE UP
CREAT ?TM UR-MCNC: 118 MG/DL — SIGNIFICANT CHANGE UP
CREAT SERPL-MCNC: 1.4 MG/DL — SIGNIFICANT CHANGE UP (ref 0.7–1.5)
DIFF PNL FLD: NEGATIVE — SIGNIFICANT CHANGE UP
EGFR: 51 ML/MIN/1.73M2 — LOW
EOSINOPHIL # BLD AUTO: 0.48 K/UL — SIGNIFICANT CHANGE UP (ref 0–0.7)
EOSINOPHIL NFR BLD AUTO: 3.3 % — SIGNIFICANT CHANGE UP (ref 0–8)
GLUCOSE BLDC GLUCOMTR-MCNC: 104 MG/DL — HIGH (ref 70–99)
GLUCOSE BLDC GLUCOMTR-MCNC: 107 MG/DL — HIGH (ref 70–99)
GLUCOSE BLDC GLUCOMTR-MCNC: 91 MG/DL — SIGNIFICANT CHANGE UP (ref 70–99)
GLUCOSE BLDC GLUCOMTR-MCNC: 93 MG/DL — SIGNIFICANT CHANGE UP (ref 70–99)
GLUCOSE BLDC GLUCOMTR-MCNC: 99 MG/DL — SIGNIFICANT CHANGE UP (ref 70–99)
GLUCOSE SERPL-MCNC: 97 MG/DL — SIGNIFICANT CHANGE UP (ref 70–99)
GLUCOSE UR QL: NEGATIVE MG/DL — SIGNIFICANT CHANGE UP
HCT VFR BLD CALC: 28.4 % — LOW (ref 42–52)
HGB BLD-MCNC: 9 G/DL — LOW (ref 14–18)
IMM GRANULOCYTES NFR BLD AUTO: 2.4 % — HIGH (ref 0.1–0.3)
KETONES UR-MCNC: ABNORMAL MG/DL
LEUKOCYTE ESTERASE UR-ACNC: ABNORMAL
LYMPHOCYTES # BLD AUTO: 16.3 % — LOW (ref 20.5–51.1)
LYMPHOCYTES # BLD AUTO: 2.4 K/UL — SIGNIFICANT CHANGE UP (ref 1.2–3.4)
MAGNESIUM SERPL-MCNC: 2.5 MG/DL — HIGH (ref 1.8–2.4)
MCHC RBC-ENTMCNC: 30.3 PG — SIGNIFICANT CHANGE UP (ref 27–31)
MCHC RBC-ENTMCNC: 31.7 G/DL — LOW (ref 32–37)
MCV RBC AUTO: 95.6 FL — HIGH (ref 80–94)
MONOCYTES # BLD AUTO: 1.51 K/UL — HIGH (ref 0.1–0.6)
MONOCYTES NFR BLD AUTO: 10.2 % — HIGH (ref 1.7–9.3)
NEUTROPHILS # BLD AUTO: 9.91 K/UL — HIGH (ref 1.4–6.5)
NEUTROPHILS NFR BLD AUTO: 67.1 % — SIGNIFICANT CHANGE UP (ref 42.2–75.2)
NITRITE UR-MCNC: NEGATIVE — SIGNIFICANT CHANGE UP
NRBC # BLD: 0 /100 WBCS — SIGNIFICANT CHANGE UP (ref 0–0)
PH UR: 5 — SIGNIFICANT CHANGE UP (ref 5–8)
PLATELET # BLD AUTO: 443 K/UL — HIGH (ref 130–400)
PMV BLD: 9.8 FL — SIGNIFICANT CHANGE UP (ref 7.4–10.4)
POTASSIUM SERPL-MCNC: 4.6 MMOL/L — SIGNIFICANT CHANGE UP (ref 3.5–5)
POTASSIUM SERPL-SCNC: 4.6 MMOL/L — SIGNIFICANT CHANGE UP (ref 3.5–5)
PROT SERPL-MCNC: 7.6 G/DL — SIGNIFICANT CHANGE UP (ref 6–8)
PROT UR-MCNC: NEGATIVE MG/DL — SIGNIFICANT CHANGE UP
RBC # BLD: 2.97 M/UL — LOW (ref 4.7–6.1)
RBC # FLD: 15.9 % — HIGH (ref 11.5–14.5)
SODIUM SERPL-SCNC: 131 MMOL/L — LOW (ref 135–146)
SODIUM UR-SCNC: <20 MMOL/L — SIGNIFICANT CHANGE UP
SP GR SPEC: 1.01 — SIGNIFICANT CHANGE UP (ref 1–1.03)
UROBILINOGEN FLD QL: 0.2 MG/DL — SIGNIFICANT CHANGE UP (ref 0.2–1)
UUN UR-MCNC: 592 MG/DL — SIGNIFICANT CHANGE UP
WBC # BLD: 14.75 K/UL — HIGH (ref 4.8–10.8)
WBC # FLD AUTO: 14.75 K/UL — HIGH (ref 4.8–10.8)

## 2024-11-25 PROCEDURE — 99231 SBSQ HOSP IP/OBS SF/LOW 25: CPT

## 2024-11-25 RX ORDER — LEVOFLOXACIN 250 MG/1
1 TABLET, FILM COATED ORAL
Qty: 0 | Refills: 0 | DISCHARGE
Start: 2024-11-25

## 2024-11-25 RX ORDER — VORICONAZOLE 10 MG/1
1 INJECTION, POWDER, LYOPHILIZED, FOR SOLUTION INTRAVENOUS
Qty: 0 | Refills: 0 | DISCHARGE
Start: 2024-11-25

## 2024-11-25 RX ADMIN — VORICONAZOLE 200 MILLIGRAM(S): 10 INJECTION, POWDER, LYOPHILIZED, FOR SOLUTION INTRAVENOUS at 18:54

## 2024-11-25 RX ADMIN — Medication 20 MILLIGRAM(S): at 21:35

## 2024-11-25 RX ADMIN — Medication 5000 UNIT(S): at 21:36

## 2024-11-25 RX ADMIN — Medication 5000 UNIT(S): at 16:40

## 2024-11-25 RX ADMIN — CHLORHEXIDINE GLUCONATE 1 APPLICATION(S): 1.2 RINSE ORAL at 06:18

## 2024-11-25 RX ADMIN — VORICONAZOLE 200 MILLIGRAM(S): 10 INJECTION, POWDER, LYOPHILIZED, FOR SOLUTION INTRAVENOUS at 06:18

## 2024-11-25 RX ADMIN — ACETAMINOPHEN 500MG 650 MILLIGRAM(S): 500 TABLET, COATED ORAL at 22:56

## 2024-11-25 RX ADMIN — ACETAMINOPHEN 500MG 650 MILLIGRAM(S): 500 TABLET, COATED ORAL at 23:56

## 2024-11-25 RX ADMIN — Medication 2 TABLET(S): at 21:35

## 2024-11-25 RX ADMIN — Medication 1 APPLICATION(S): at 06:17

## 2024-11-25 RX ADMIN — Medication 5000 UNIT(S): at 06:17

## 2024-11-25 RX ADMIN — OXYCODONE HYDROCHLORIDE 2.5 MILLIGRAM(S): 30 TABLET ORAL at 18:54

## 2024-11-25 RX ADMIN — Medication 1000 MICROGRAM(S): at 12:34

## 2024-11-25 RX ADMIN — OXYCODONE HYDROCHLORIDE 2.5 MILLIGRAM(S): 30 TABLET ORAL at 12:33

## 2024-11-25 NOTE — CONSULT NOTE ADULT - ASSESSMENT
ASSESSMENT: Patient is a 79 year old M with  PMH DM, HLD, chronic venous stasis  Vascular surgery consulted for evaluation of cellulitis and chronic venous stasis.    PLAN:   - continue abx tx of cellulitis per ID recs  - in case of ulcer development, cover w/ adaptic, kerlix and ace wraps  - continue current wound care w/ Sulfadine and kerlix, daily dressing changes  - f/u 2 weeks after discharge in vascular clinic  - Patient seen/examined or Plan Discussed with Fellow, Dr. Jones  - Plan to be discussed with Attending, Dr. Olivas    SPECTRA [id number]
79yoM PMHx DM, HLD,  recent admission for left leg cellulitis (discharged Nov 6), now presents to ED bilateral lower extremity wounds. Patient states left lower  extremity wound not improving, developed new wound to right lower extremity over the past 2 days. During hospitalization, pt was first treated with Unasyn, cultures grew Pseudomonas PUTIA and was switched to Cefepime. Pt also received Prednisone 40 mg  for 3-4 days to reduce  inflammation. Patient states he has a visiting nurse for wound care, last visited 2 days ago. Pt lives alone, able to ambulate slowly with walker. Patient complains of bilateral leg pain.  Denies chest pain, sob, fever, chills, dysuria, nausea, vomiting    IMPRESSION/RECOMMENDATIONS  Immunosuppression/Immunosenescence ( above age 60 yrs there is a exponential decline in immunity which could result in poor clinical outcomes.  Acute cellulitis on chronic Janet venous stasis changes  LLE cellulitis with superficial ulcers laterally  No abscess/OM  11/1 WCx LLE : Fusarium, C parapsilosis, Pseudomonas putida. Independent analysis of BCX results and interpretation of sensitivity results.  CBC 14 , CMP ( Cr 1.1  ) reviewed .   11/15 CXR no opacities: no PNA    DM stable  HLD stable  Morbid obesity    -BCX  -local LE Silvadene cream q12h  -Cefepime 2 gm iv q12h ( I am aware of his allergies )  -Voriconazole 500 mg iv q12h for 2 dosis and then 250 mg iv q12h    Discussion of management/test results/antibiotic regimen  with primary team.

## 2024-11-25 NOTE — PROGRESS NOTE ADULT - PROVIDER SPECIALTY LIST ADULT
Internal Medicine
Infectious Disease
Infectious Disease

## 2024-11-25 NOTE — PROGRESS NOTE ADULT - ASSESSMENT
79yoM PMHx DM, HLD,  recent admission for left leg cellulitis (discharged Nov 6), now presents to ED bilateral lower extremity wounds.     #Acute B/L Cellulitis on Chronic venous stasis , DVT ruled out  - recurrent admissions   11/1 WCx LLE : Fusarium, C parapsilosis, Pseudomonas putida. Independent analysis of BCX results and interpretation of sensitivity results.  11/15 BCx NG  Infectious disease consulted  Ultrasound ordered to rule out abscess, not yet read  Levofloxacin 500mg QD EOT 11/27  Voriconazole 200mg BID EOT 11/27  Silver sulfadiazine cream twice daily  Pain control    #RENEA  -Cr baseline 0.9 , Cr now 1.6   -Ordered FENA labs , US renal and UA, creatine not improving    #Macrocytic Anemia  - Hgb stable around 8-9  - iron studies wnl, folate wnl, b12 was on lower side  - started on B12 supplements last admission  -TSH 2.30  -Outpt fu with GI    #Hx of DM2, howeverlast  A1C 5.5  - on Januvia at home  -ISS inpatient     #HLD  -Statin was held last admission for transaminitis now resolved  -resume statin    #Elevated ALP - outpatient follow up    #BPH - per pt, not on Tamsulosin     #DVT ppx-Heparin Subq     Dispo: PT/OT consulted.  US pending read

## 2024-11-25 NOTE — PROGRESS NOTE ADULT - SUBJECTIVE AND OBJECTIVE BOX
SUBJECTIVE:  HPI:  HPI:  79yoM PMHx DM, HLD,  recent admission for left leg cellulitis (discharged Nov 6), now presents to ED bilateral lower extremity wounds. Patient states left lower  extremity wound not improving, developed new wound to right lower extremity over the past 2 days. During hospitalization, pt was first treated with Unasyn, cultures grew Pseudomonas PUTIA and was switched to Cefepime. Pt also received Prednisone 40 mg  for 3-4 days to reduce  inflammation. Patient states he has a visiting nurse for wound care, last visited 2 days ago. Pt lives alone, able to ambulate slowly with walker. Patient complains of bilateral leg pain.  Denies chest pain, sob, fever, chills, dysuria, nausea, vomiting    Vital Signs Last 24 Hrs  T(C): 36.5 (15 Nov 2024 15:35), Max: 37 (15 Nov 2024 08:38)  T(F): 97.7 (15 Nov 2024 15:35), Max: 98.6 (15 Nov 2024 08:38)  HR: 71 (15 Nov 2024 15:35) (71 - 84)  BP: 142/78 (15 Nov 2024 15:35) (105/40 - 176/74)  RR: 18 (15 Nov 2024 15:35) (18 - 18)  SpO2: 99% (15 Nov 2024 15:35) (98% - 99%)    Parameters below as of 15 Nov 2024 15:35  Patient On (Oxygen Delivery Method): room air    Labs: WBC 14k, Hgb 9.1     Xray Tibia + Fibula B/L : No acute osseous abnormalities. Diffuse soft tissue swelling/edema in the   bilateral legs. Lateral distal left leg skin defect.    Chest xray: Mild pulm venous congestion     Admitted for B/L Cellulitis            (15 Nov 2024 15:21)      Patient is a 79y old Male who presents with a chief complaint of cellulitis (22 Nov 2024 15:26)    Currently admitted to medicine with the primary diagnosis of Cellulitis       Today is hospital day 10d.     PAST MEDICAL & SURGICAL HISTORY  Diabetes    HLD (hyperlipidemia)    BPH without urinary obstruction    S/P cataract surgery        ALLERGIES:  No Known Allergies    MEDICATIONS:  ACTIVE MEDICATIONS  acetaminophen     Tablet .. 650 milliGRAM(s) Oral every 6 hours PRN  atorvastatin 20 milliGRAM(s) Oral at bedtime  chlorhexidine 2% Cloths 1 Application(s) Topical <User Schedule>  cyanocobalamin 1000 MICROGram(s) Oral daily  heparin   Injectable 5000 Unit(s) SubCutaneous every 8 hours  levoFLOXacin  Tablet 500 milliGRAM(s) Oral every 24 hours  oxyCODONE    IR 2.5 milliGRAM(s) Oral every 6 hours PRN  polyethylene glycol 3350 17 Gram(s) Oral daily PRN  senna 2 Tablet(s) Oral at bedtime  silver sulfADIAZINE 1% Cream 1 Application(s) Topical two times a day  voriconazole 200 milliGRAM(s) Oral every 12 hours      VITALS:   T(F): 98.3  HR: 87  BP: 148/65  RR: 18  SpO2: 98%    LABS:                        9.0    14.75 )-----------( 443      ( 25 Nov 2024 07:31 )             28.4     11-25    131[L]  |  91[L]  |  24[H]  ----------------------------<  97  4.6   |  26  |  1.4    Ca    9.1      25 Nov 2024 07:31  Mg     2.5     11-25    TPro  7.6  /  Alb  3.9  /  TBili  0.6  /  DBili  x   /  AST  31  /  ALT  15  /  AlkPhos  308[H]  11-25      Urinalysis Basic - ( 25 Nov 2024 07:31 )    Color: x / Appearance: x / SG: x / pH: x  Gluc: 97 mg/dL / Ketone: x  / Bili: x / Urobili: x   Blood: x / Protein: x / Nitrite: x   Leuk Esterase: x / RBC: x / WBC x   Sq Epi: x / Non Sq Epi: x / Bacteria: x                    PHYSICAL EXAM:  GENERAL: NAD, sitting up in bed  PSYCH: no agitation, baseline mentation  NERVOUS SYSTEM:  Alert, freely moving all extremities  PULMONARY: Clear to percussion bilaterally  CARDIOVASCULAR: Regular rate and rhythm  GI: Soft, Nontender  EXTREMITIES:  Dressings c/d/i

## 2024-11-25 NOTE — CONSULT NOTE ADULT - SUBJECTIVE AND OBJECTIVE BOX
VASCULAR SURGERY CONSULT NOTE      HPI:  HPI:  79yoM PMHx DM, HLD,  recent admission for left leg cellulitis (discharged Nov 6), now presents to ED bilateral lower extremity wounds. Patient states left lower  extremity wound not improving, developed new wound to right lower extremity over the past 2 days. During hospitalization, pt was first treated with Unasyn, cultures grew Pseudomonas PUTIA and was switched to Cefepime. Pt also received Prednisone 40 mg  for 3-4 days to reduce  inflammation. Patient states he has a visiting nurse for wound care, last visited 2 days ago. Pt lives alone, able to ambulate slowly with walker. Patient complains of bilateral leg pain.  Denies chest pain, sob, fever, chills, dysuria, nausea, vomiting    Vital Signs Last 24 Hrs  T(C): 36.5 (15 Nov 2024 15:35), Max: 37 (15 Nov 2024 08:38)  T(F): 97.7 (15 Nov 2024 15:35), Max: 98.6 (15 Nov 2024 08:38)  HR: 71 (15 Nov 2024 15:35) (71 - 84)  BP: 142/78 (15 Nov 2024 15:35) (105/40 - 176/74)  RR: 18 (15 Nov 2024 15:35) (18 - 18)  SpO2: 99% (15 Nov 2024 15:35) (98% - 99%)    Parameters below as of 15 Nov 2024 15:35  Patient On (Oxygen Delivery Method): room air    Labs: WBC 14k, Hgb 9.1     Xray Tibia + Fibula B/L : No acute osseous abnormalities. Diffuse soft tissue swelling/edema in the   bilateral legs. Lateral distal left leg skin defect.    Chest xray: Mild pulm venous congestion     Admitted for B/L Cellulitis     Vascular consulted for evaluation of cellulitis and chronic venous stasis. PMH DM, HLD, chronic venous stasis. His wound cultures on 11/1 were positive for pseudomonas putida and rare candida parapsilosis. Admitted to medicine, being treated w/ PO levoquin and PO voriconazole per ID recs.                (15 Nov 2024 15:21)        PAST MEDICAL & SURGICAL HISTORY:  Diabetes      HLD (hyperlipidemia)      BPH without urinary obstruction      S/P cataract surgery        No Known Allergies    Home Medications:  atorvastatin 20 mg oral tablet: 1 tab(s) orally once a day (15 Nov 2024 16:39)  Januvia 100 mg oral tablet: 1 tab(s) orally once a day (15 Nov 2024 16:13)  levoFLOXacin 500 mg oral tablet: 1 tab(s) orally every 24 hours (25 Nov 2024 11:11)  voriconazole 200 mg oral tablet: 1 tab(s) orally every 12 hours (25 Nov 2024 11:11)    No permtinent family history of PVD    REVIEW OF SYSTEMS:  GENERAL:                                         negative  SKIN:                                                 negative  OPTHALMOLOGIC:                          negative  ENMT:                                               negative  RESPIRATORY AND THORAX:        negative  CARDIOVASCULAR:                         negative  GASTROINTESTINAL:                       negative  NEPHROLOGY:                                  negative  MUSCULOSKELETAL:                       negative  NEUROLOGIC:                                   negative  PSYCHIATRIC:                                    negative  HEMATOLOGY/LYMPHATICS:         negative  ENDOCRINE:                                     negative  ALLERGIC/IMMUNOLOGIC:            negative    12 point ROS otherwise normal except as stated in HPI    PHYSICAL EXAM  Vital Signs Last 24 Hrs  T(C): 36.8 (25 Nov 2024 04:29), Max: 36.8 (25 Nov 2024 04:29)  T(F): 98.3 (25 Nov 2024 04:29), Max: 98.3 (25 Nov 2024 04:29)  HR: 87 (25 Nov 2024 04:29) (81 - 87)  BP: 148/65 (25 Nov 2024 04:29) (129/66 - 148/65)  BP(mean): --  RR: 18 (25 Nov 2024 04:29) (18 - 18)  SpO2: 98% (25 Nov 2024 04:29) (98% - 100%)    Parameters below as of 25 Nov 2024 04:29  Patient On (Oxygen Delivery Method): room air        Appearance: NAD  Cardiovascular: HDS  Respiratory: normal respiratory effort	  Skin: erythema of b/l LE  Extremities: b/l LE severely edematous and erythematous. No ulcers or chronic wounds noted. Tender to palpation. Wrapped in Kerlix and ABD pads, silvadene cream being applied q12h.   Vascular: Peripheral pulses palpable bilaterally  Neurologic: Non-focal  Psychiatry: A & O x 3, Mood & affect appropriate    Hes extremely edematous and erythematous on exam but no ulcers or chronic wounds. DP/PT palpable b/l.         MEDICATIONS:   MEDICATIONS  (STANDING):  atorvastatin 20 milliGRAM(s) Oral at bedtime  chlorhexidine 2% Cloths 1 Application(s) Topical <User Schedule>  cyanocobalamin 1000 MICROGram(s) Oral daily  heparin   Injectable 5000 Unit(s) SubCutaneous every 8 hours  levoFLOXacin  Tablet 500 milliGRAM(s) Oral every 24 hours  senna 2 Tablet(s) Oral at bedtime  silver sulfADIAZINE 1% Cream 1 Application(s) Topical two times a day  voriconazole 200 milliGRAM(s) Oral every 12 hours    MEDICATIONS  (PRN):  acetaminophen     Tablet .. 650 milliGRAM(s) Oral every 6 hours PRN Moderate Pain (4 - 6)  oxyCODONE    IR 2.5 milliGRAM(s) Oral every 6 hours PRN Severe Pain (7 - 10)  polyethylene glycol 3350 17 Gram(s) Oral daily PRN for constipation      LAB/STUDIES:                        9.0    14.75 )-----------( 443      ( 25 Nov 2024 07:31 )             28.4     11-25    131[L]  |  91[L]  |  24[H]  ----------------------------<  97  4.6   |  26  |  1.4    Ca    9.1      25 Nov 2024 07:31  Mg     2.5     11-25    TPro  7.6  /  Alb  3.9  /  TBili  0.6  /  DBili  x   /  AST  31  /  ALT  15  /  AlkPhos  308[H]  11-25      LIVER FUNCTIONS - ( 25 Nov 2024 07:31 )  Alb: 3.9 g/dL / Pro: 7.6 g/dL / ALK PHOS: 308 U/L / ALT: 15 U/L / AST: 31 U/L / GGT: x             Urinalysis Basic - ( 25 Nov 2024 07:31 )    Color: x / Appearance: x / SG: x / pH: x  Gluc: 97 mg/dL / Ketone: x  / Bili: x / Urobili: x   Blood: x / Protein: x / Nitrite: x   Leuk Esterase: x / RBC: x / WBC x   Sq Epi: x / Non Sq Epi: x / Bacteria: x        IMAGING:  < from: US Extremity Nonvasc Limited, Left (11.24.24 @ 11:10) >  impression:    Left lower extremity subcutaneous edema. No drainable collection.    < end of copied text >    
  APAS, LEENA  79y, Male  Allergy: No Known Allergies      All historical available data reviewed.    HPI:  HPI:  79yoM PMHx DM, HLD,  recent admission for left leg cellulitis (discharged ), now presents to ED bilateral lower extremity wounds. Patient states left lower  extremity wound not improving, developed new wound to right lower extremity over the past 2 days. During hospitalization, pt was first treated with Unasyn, cultures grew Pseudomonas PUTIA and was switched to Cefepime. Pt also received Prednisone 40 mg  for 3-4 days to reduce  inflammation. Patient states he has a visiting nurse for wound care, last visited 2 days ago. Pt lives alone, able to ambulate slowly with walker. Patient complains of bilateral leg pain.  Denies chest pain, sob, fever, chills, dysuria, nausea, vomiting    Vital Signs Last 24 Hrs  T(C): 36.5 (15 Nov 2024 15:35), Max: 37 (15 Nov 2024 08:38)  T(F): 97.7 (15 Nov 2024 15:35), Max: 98.6 (15 Nov 2024 08:38)  HR: 71 (15 Nov 2024 15:35) (71 - 84)  BP: 142/78 (15 Nov 2024 15:35) (105/40 - 176/74)  RR: 18 (15 Nov 2024 15:35) (18 - 18)  SpO2: 99% (15 Nov 2024 15:35) (98% - 99%)    Parameters below as of 15 Nov 2024 15:35  Patient On (Oxygen Delivery Method): room air    Labs: WBC 14k, Hgb 9.1     Xray Tibia + Fibula B/L : No acute osseous abnormalities. Diffuse soft tissue swelling/edema in the   bilateral legs. Lateral distal left leg skin defect.    Chest xray: Mild pulm venous congestion     Admitted for B/L Cellulitis            (15 Nov 2024 15:21)    FAMILY HISTORY:    PAST MEDICAL & SURGICAL HISTORY:  Diabetes      HLD (hyperlipidemia)      BPH without urinary obstruction      S/P cataract surgery            VITALS:  T(F): 98.1, Max: 98.3 (11-15-24 @ 20:43)  HR: 73  BP: 127/69  RR: 18Vital Signs Last 24 Hrs  T(C): 36.7 (2024 05:40), Max: 36.8 (15 Nov 2024 20:43)  T(F): 98.1 (2024 05:40), Max: 98.3 (15 Nov 2024 20:43)  HR: 73 (2024 05:40) (71 - 83)  BP: 127/69 (2024 05:40) (122/88 - 176/74)  BP(mean): 88 (2024 05:40) (88 - 88)  RR: 18 (2024 05:40) (18 - 18)  SpO2: 97% (2024 05:40) (97% - 99%)    Parameters below as of 15 Nov 2024 15:35  Patient On (Oxygen Delivery Method): room air        TESTS & MEASUREMENTS:                        9.1    14.00 )-----------( 271      ( 15 Nov 2024 08:30 )             29.2     11-15    137  |  102  |  10  ----------------------------<  121[H]  4.7   |  27  |  1.1    Ca    8.7      15 Nov 2024 08:30    TPro  6.7  /  Alb  4.0  /  TBili  0.3  /  DBili  x   /  AST  24  /  ALT  30  /  AlkPhos  99  15    LIVER FUNCTIONS - ( 15 Nov 2024 08:30 )  Alb: 4.0 g/dL / Pro: 6.7 g/dL / ALK PHOS: 99 U/L / ALT: 30 U/L / AST: 24 U/L / GGT: x             Urinalysis with Rflx Culture (collected 11-15-24 @ 10:04)      Urinalysis Basic - ( 15 Nov 2024 10:04 )    Color: Yellow / Appearance: Clear / S.007 / pH: x  Gluc: x / Ketone: Negative mg/dL  / Bili: Negative / Urobili: 0.2 mg/dL   Blood: x / Protein: Negative mg/dL / Nitrite: Negative   Leuk Esterase: Negative / RBC: x / WBC x   Sq Epi: x / Non Sq Epi: x / Bacteria: x          RADIOLOGY & ADDITIONAL TESTS:  Personal review of radiological diagnostics performed  Echo and EKG results noted when applicable.     MEDICATIONS:  atorvastatin 20 milliGRAM(s) Oral at bedtime  cyanocobalamin 1000 MICROGram(s) Oral daily  heparin   Injectable 5000 Unit(s) SubCutaneous every 8 hours  oxycodone    5 mG/acetaminophen 325 mG 1 Tablet(s) Oral every 6 hours PRN  polyethylene glycol 3350 17 Gram(s) Oral daily PRN  senna 2 Tablet(s) Oral at bedtime  silver sulfADIAZINE 1% Cream 1 Application(s) Topical two times a day      ANTIBIOTICS:

## 2024-11-25 NOTE — PROGRESS NOTE ADULT - ATTENDING COMMENTS
79yoM PMHx DM, HLD,  recent admission for left leg cellulitis (discharged Nov 6), now presents to ED bilateral lower extremity wounds. Patient states left lower  extremity wound not improving, developed new wound to right lower extremity over the past 2 days. During prior hospitalization, pt was first treated with Unasyn, cultures grew Pseudomonas PUTIA and was switched to Cefepime. Pt also received Prednisone 40 mg  for 3-4 days to reduce  inflammation. Patient states he has a visiting nurse for wound care, last visited 2 days ago. Pt lives alone, able to ambulate slowly with walker. Patient complains of bilateral leg pain.  Denies chest pain, sob, fever, chills, dysuria, nausea, vomiting    VSS  Labs: WBC 14k, Hgb 9.1   Xray Tibia + Fibula B/L : No acute osseous abnormalities. Diffuse soft tissue swelling/edema in the   bilateral legs. Lateral distal left leg skin defect.  Chest xray: Mild pulm venous congestion     Admitted for B/L LE Cellulitis    A/P:    #Acute B/L Cellulitis on Chronic venous stasis.  #Non improving wound on Left leg. New wound on RLE.   -recent admission for LLE cellulitis, now progressing to RLE with pitting edema   -pt was previously  treated with unasyn/ cefepime, prednisone 40 x 3 days  -Wound cultures last admission grew  Pseudomonas PUTIDA  -s/p Aztreonam, flagyl, vanc in ED  - Gave Lasix 40mg PO QD (11/15-17) to help with  leg pain associated with tightness and swelling.    - Echo : Normal global left ventricular systolic function. EF of 69 %.Grade I diastolic dysfunction.  Severe aortic valve stenosis. Mean PG 30 mmHg, DOUGLAS 0.8 cm^2.  Estimated pulmonary artery systolic pressure is 40.0 mmHg assuming a right atrial pressure of 3 mmHg, which is consistent with borderline pulmonary hypertension.  - Developed slight RENEA with the PO Lasix : Cr 1.0 (11/16) > 1.4 (11/17) > 1.7 (11/18). No more lasix since the 11/17 dose. Patient may be pre load dependent? No need for fluids on 11/18. Patient may auto-correct. Encourage oral hydration. Encouraged to keep legs elevated as much as possible.     - Lungs CTAB on 11/16, 11/17.   -Fu mrsa Nares (NEG On previous admission 10/31)  -Bcx (11/15) NGTD  -Repeat wound Cx not needed since we have data from recent culture on previous admission.  -B/l LE Venous Duplex: Neg.   -ID Consult noted: IV cefepime 2gm Q12. Voriconazole 500mg Q12 x 2 doses, then 250 mg Q12  11/18: Given Cr Cl today of 44.5. Decreased to Cefepime 2gm Q24 for now until Cr Cl improves.   Cellulitis seems to be improving. Legs examined when dressing was being changed.  f/u ID on 11/19 for Abx end date.   -wound care consult   -continue silver sulfadiazine  -Pain control with percocet     #Macrocytic Anemia  - Hgb stable 9.1  - iron studies wnl, folate wnl, b12 was on lower side  - started on B12 supplements last admission  - Check TSH   -Outpt fu with GI      #DM2  - on Januvia at home  -ISS inpatient     #HLD  -Statin was held last admission for transaminitis now resolved  -resume statin     #BPH  - per pt, not on Tamsulosin       #DVT ppx-Heparin Subq
79yoM PMHx DM, HLD, recent admission for left leg cellulitis (discharged Nov 6), presented to ED for bilateral lower extremity wounds and admitted for bilateral LE cellulitis.    #Acute B/L Cellulitis on Chronic venous stasis  #Non improving wound on Left leg. New wound on RLE.  Infectious disease following  -recent admission for LLE cellulitis, now progressing to RLE with pitting edema, edema improved s/p lasix  -pt was previously  treated with unasyn/ cefepime, prednisone 40 x 3 days  -s/p Aztreonam, flagyl, vanc in ED  Skin/wound culture 11/1 grew pseudomonas putida group (pan-susceptible), rare candida parapsilosis, few fusarium species  Blood cultures 11/15 negative  Change cefepime to levofloxacin 500mg daily on 11/20, EOT 11/27  Voriconazole IV changed to PO 200mg BID, EOT 11/27  Transthoracic echocardiogram 11/16: Normal global left ventricular systolic function. EF of 69 %. Grade I diastolic dysfunction.  Severe aortic valve stenosis. Mean PG 30 mmHg, DOUGLAS 0.8 cm^2.  Estimated pulmonary artery systolic pressure is 40.0 mmHg assuming a right atrial pressure of 3 mmHg, which is consistent with borderline pulmonary hypertension.  Doppler arterial of b/l LE 10/31: Normal arterial flow in the bilateral lower extremities.  Doppler venouse of b/l LE 11/16: No evidence of deep venous thrombosis in either lower extremity.  -wound care consulted  Continue silver sulfadiazine cream twice daily, please educate the patient on how to apply this and have him place the cream himself to ensure he will be able to apply it at home  Pain control with tylenol for now, can change to percocet 5mg PRN Q6H if tylenol not providing sufficient relief    #Macrocytic Anemia  - Hgb stable  - iron studies wnl, folate wnl, b12 was on lower side  - started on B12 supplements last admission  TSH 2.30  -Outpt fu with GI    #Hx of DM2, however A1C 5.5  - on Januvia at home  -ISS inpatient     #HLD  -Statin was held last admission for transaminitis now resolved  -resume statin  #Elevated ALP - outpatient follow up    #BPH - per pt, not on Tamsulosin     #DVT ppx-Heparin Subq     Dispo: PT/OT consulted.  Possible discharge tomorrow if recommended to have home health, will need wound care instructions.
Pt seen and examined. He said he is having a lot of pain in his legs though improved from admission. He is being treated for acute cellulitis on chronic LE venous stasis changes with improvement. Cont silvadene creme, cefepime IV and voriconazole per ID recs, appreciate assistance. Will need to d/w ID about duration and possible outpt atbx. Likely d/c to home with wound care once final ID recs are obtained.
79yoM PMHx DM, HLD,  recent admission for left leg cellulitis (discharged Nov 6), now presents to ED bilateral lower extremity wounds.     #B/l cellulitis  ID consulted  Continue levofloxacin and voriconazole    #LE venous insufficiency  Vascular surgery consulted, likely he will follow up outpatient    #RENEA  Creatinine improving  FENA shows hypovolemic    Plan for discharge tomorrow with 3 days of pain medication
79yoM PMHx DM, HLD,  recent admission for left leg cellulitis (discharged Nov 6), now presents to ED bilateral lower extremity wounds.    WBC elevated today.  Ultrasound of left lower extremity to rule out abscess formation from cellulitis.  Trend WBC tomorrow.  Discussed with ID, no changes in antibiotics.  Pain controlled with medication.

## 2024-11-25 NOTE — PROGRESS NOTE ADULT - TIME BILLING
Direct patient care, interdisciplinary rounds
I have personally seen and examined this patient. I have reviewed all pertinent clinical information and reviewed all relevant imaging and diagnostic studies personally.  I counseled the patient about diagnostic and treatment plan.  I discussed my recommendations with the primary team    >=1 chronic illness with severe exarcerbation, progression, treatment side effects.  -The doses of the antimicrobials will be adjusted according to the estimated creatinine clearence ( using the Cockroft-Gault equation ).  -I independently interpreted the most recent microbiological data. I analyzed the culture report and interpreted the MICs. I used my expertise in Infectious Diseases to tailor the antimicrobials.
I have personally seen and examined this patient. I have reviewed all pertinent clinical information and reviewed all relevant imaging and diagnostic studies personally.  I counseled the patient about diagnostic and treatment plan.  I discussed my recommendations with the primary team    >=1 chronic illness with severe exarcerbation, progression, treatment side effects.  -The doses of the antimicrobials will be adjusted according to the estimated creatinine clearence ( using the Cockroft-Gault equation ).  -I independently interpreted the most recent microbiological data. I analyzed the culture report and interpreted the MICs. I used my expertise in Infectious Diseases to tailor the antimicrobials.
Direct patient care, interdisciplinary rounds

## 2024-11-26 ENCOUNTER — TRANSCRIPTION ENCOUNTER (OUTPATIENT)
Age: 79
End: 2024-11-26

## 2024-11-26 VITALS
RESPIRATION RATE: 18 BRPM | OXYGEN SATURATION: 98 % | SYSTOLIC BLOOD PRESSURE: 116 MMHG | DIASTOLIC BLOOD PRESSURE: 56 MMHG | TEMPERATURE: 98 F | HEART RATE: 64 BPM

## 2024-11-26 LAB
ALBUMIN SERPL ELPH-MCNC: 3.5 G/DL — SIGNIFICANT CHANGE UP (ref 3.5–5.2)
ALP SERPL-CCNC: 255 U/L — HIGH (ref 30–115)
ALT FLD-CCNC: 14 U/L — SIGNIFICANT CHANGE UP (ref 0–41)
ANION GAP SERPL CALC-SCNC: 11 MMOL/L — SIGNIFICANT CHANGE UP (ref 7–14)
AST SERPL-CCNC: 32 U/L — SIGNIFICANT CHANGE UP (ref 0–41)
BASOPHILS # BLD AUTO: 0.12 K/UL — SIGNIFICANT CHANGE UP (ref 0–0.2)
BASOPHILS NFR BLD AUTO: 0.9 % — SIGNIFICANT CHANGE UP (ref 0–1)
BILIRUB SERPL-MCNC: 0.5 MG/DL — SIGNIFICANT CHANGE UP (ref 0.2–1.2)
BUN SERPL-MCNC: 20 MG/DL — SIGNIFICANT CHANGE UP (ref 10–20)
CALCIUM SERPL-MCNC: 9 MG/DL — SIGNIFICANT CHANGE UP (ref 8.4–10.5)
CHLORIDE SERPL-SCNC: 95 MMOL/L — LOW (ref 98–110)
CO2 SERPL-SCNC: 27 MMOL/L — SIGNIFICANT CHANGE UP (ref 17–32)
CREAT SERPL-MCNC: 1.3 MG/DL — SIGNIFICANT CHANGE UP (ref 0.7–1.5)
EGFR: 56 ML/MIN/1.73M2 — LOW
EOSINOPHIL # BLD AUTO: 0.66 K/UL — SIGNIFICANT CHANGE UP (ref 0–0.7)
EOSINOPHIL NFR BLD AUTO: 5 % — SIGNIFICANT CHANGE UP (ref 0–8)
GLUCOSE BLDC GLUCOMTR-MCNC: 102 MG/DL — HIGH (ref 70–99)
GLUCOSE BLDC GLUCOMTR-MCNC: 106 MG/DL — HIGH (ref 70–99)
GLUCOSE BLDC GLUCOMTR-MCNC: 113 MG/DL — HIGH (ref 70–99)
GLUCOSE SERPL-MCNC: 92 MG/DL — SIGNIFICANT CHANGE UP (ref 70–99)
HCT VFR BLD CALC: 27.4 % — LOW (ref 42–52)
HGB BLD-MCNC: 8.6 G/DL — LOW (ref 14–18)
IMM GRANULOCYTES NFR BLD AUTO: 3.2 % — HIGH (ref 0.1–0.3)
LYMPHOCYTES # BLD AUTO: 18 % — LOW (ref 20.5–51.1)
LYMPHOCYTES # BLD AUTO: 2.38 K/UL — SIGNIFICANT CHANGE UP (ref 1.2–3.4)
MAGNESIUM SERPL-MCNC: 2.5 MG/DL — HIGH (ref 1.8–2.4)
MCHC RBC-ENTMCNC: 30 PG — SIGNIFICANT CHANGE UP (ref 27–31)
MCHC RBC-ENTMCNC: 31.4 G/DL — LOW (ref 32–37)
MCV RBC AUTO: 95.5 FL — HIGH (ref 80–94)
MONOCYTES # BLD AUTO: 1.8 K/UL — HIGH (ref 0.1–0.6)
MONOCYTES NFR BLD AUTO: 13.6 % — HIGH (ref 1.7–9.3)
NEUTROPHILS # BLD AUTO: 7.86 K/UL — HIGH (ref 1.4–6.5)
NEUTROPHILS NFR BLD AUTO: 59.3 % — SIGNIFICANT CHANGE UP (ref 42.2–75.2)
NRBC # BLD: 0 /100 WBCS — SIGNIFICANT CHANGE UP (ref 0–0)
PHOSPHATE SERPL-MCNC: 3.5 MG/DL — SIGNIFICANT CHANGE UP (ref 2.1–4.9)
PLATELET # BLD AUTO: 434 K/UL — HIGH (ref 130–400)
PMV BLD: 9.4 FL — SIGNIFICANT CHANGE UP (ref 7.4–10.4)
POTASSIUM SERPL-MCNC: 4.7 MMOL/L — SIGNIFICANT CHANGE UP (ref 3.5–5)
POTASSIUM SERPL-SCNC: 4.7 MMOL/L — SIGNIFICANT CHANGE UP (ref 3.5–5)
PROT SERPL-MCNC: 7 G/DL — SIGNIFICANT CHANGE UP (ref 6–8)
RBC # BLD: 2.87 M/UL — LOW (ref 4.7–6.1)
RBC # FLD: 15.8 % — HIGH (ref 11.5–14.5)
SODIUM SERPL-SCNC: 133 MMOL/L — LOW (ref 135–146)
WBC # BLD: 13.25 K/UL — HIGH (ref 4.8–10.8)
WBC # FLD AUTO: 13.25 K/UL — HIGH (ref 4.8–10.8)

## 2024-11-26 PROCEDURE — 99239 HOSP IP/OBS DSCHRG MGMT >30: CPT

## 2024-11-26 PROCEDURE — 76770 US EXAM ABDO BACK WALL COMP: CPT | Mod: 26

## 2024-11-26 RX ORDER — LEVOFLOXACIN 250 MG/1
1 TABLET, FILM COATED ORAL
Qty: 1 | Refills: 0
Start: 2024-11-26 | End: 2024-11-26

## 2024-11-26 RX ORDER — NALOXONE HCL 0.4 MG/ML
1 AMPUL (ML) INJECTION
Qty: 1 | Refills: 0
Start: 2024-11-26

## 2024-11-26 RX ORDER — VORICONAZOLE 10 MG/1
1 INJECTION, POWDER, LYOPHILIZED, FOR SOLUTION INTRAVENOUS
Qty: 2 | Refills: 0
Start: 2024-11-26 | End: 2024-11-26

## 2024-11-26 RX ORDER — OXYCODONE AND ACETAMINOPHEN 5; 325 MG/1; MG/1
1 TABLET ORAL
Qty: 12 | Refills: 0
Start: 2024-11-26 | End: 2024-11-28

## 2024-11-26 RX ADMIN — VORICONAZOLE 200 MILLIGRAM(S): 10 INJECTION, POWDER, LYOPHILIZED, FOR SOLUTION INTRAVENOUS at 05:30

## 2024-11-26 RX ADMIN — Medication 5000 UNIT(S): at 14:43

## 2024-11-26 RX ADMIN — OXYCODONE HYDROCHLORIDE 2.5 MILLIGRAM(S): 30 TABLET ORAL at 05:31

## 2024-11-26 RX ADMIN — OXYCODONE HYDROCHLORIDE 2.5 MILLIGRAM(S): 30 TABLET ORAL at 06:31

## 2024-11-26 RX ADMIN — CHLORHEXIDINE GLUCONATE 1 APPLICATION(S): 1.2 RINSE ORAL at 05:34

## 2024-11-26 RX ADMIN — Medication 1000 MICROGRAM(S): at 12:54

## 2024-11-26 RX ADMIN — Medication 5000 UNIT(S): at 05:28

## 2024-11-26 RX ADMIN — ACETAMINOPHEN 500MG 650 MILLIGRAM(S): 500 TABLET, COATED ORAL at 09:59

## 2024-11-26 RX ADMIN — VORICONAZOLE 200 MILLIGRAM(S): 10 INJECTION, POWDER, LYOPHILIZED, FOR SOLUTION INTRAVENOUS at 18:02

## 2024-11-26 RX ADMIN — Medication 1 APPLICATION(S): at 18:02

## 2024-11-26 RX ADMIN — Medication 1 APPLICATION(S): at 05:27

## 2024-11-26 NOTE — DISCHARGE NOTE PROVIDER - NSDCFUADDAPPT_GEN_ALL_CORE_FT
APPTS ARE READY TO BE MADE: [x ] YES    Best Family or Patient Contact (if needed):    Additional Information about above appointments (if needed):    1: Francis Olivas  2: Francis Olivas  3:     Other comments or requests:    APPTS ARE READY TO BE MADE: [x ] YES    Best Family or Patient Contact (if needed):    Additional Information about above appointments (if needed):    1: Francis Olivas  2: Shaina Shah  3:     Other comments or requests:    APPTS ARE READY TO BE MADE: [x ] YES    Best Family or Patient Contact (if needed):    Additional Information about above appointments (if needed):    1: Francis Olivas  2: Shaina Shah  3: You can call [phone] to schedule an appointment with the internal medicine resident clinic    Other comments or requests:

## 2024-11-26 NOTE — DISCHARGE NOTE PROVIDER - ATTENDING DISCHARGE PHYSICAL EXAMINATION:
Patient seen and examined 11/26/2024.    Vital Signs Last 24 Hrs  T(C): 36.5 (26 Nov 2024 13:31), Max: 36.7 (26 Nov 2024 05:33)  T(F): 97.7 (26 Nov 2024 13:31), Max: 98.1 (26 Nov 2024 05:33)  HR: 64 (26 Nov 2024 13:31) (64 - 85)  BP: 116/56 (26 Nov 2024 13:31) (116/56 - 130/69)  BP(mean): --  RR: 18 (26 Nov 2024 13:31) (18 - 18)  SpO2: 98% (26 Nov 2024 13:31) (98% - 100%)    Parameters below as of 26 Nov 2024 13:31  Patient On (Oxygen Delivery Method): room air    PHYSICAL EXAM:  GENERAL: NAD, sitting up in bed  PSYCH: no agitation, baseline mentation  NERVOUS SYSTEM:  Alert, freely moving all extremities  PULMONARY: Clear to percussion bilaterally  CARDIOVASCULAR: Regular rate and rhythm  GI: Soft, Nontender  EXTREMITIES:  Dressings on bilateral lower extremities clean, dry, intact.

## 2024-11-26 NOTE — DISCHARGE NOTE PROVIDER - PROVIDER TOKENS
PROVIDER:[TOKEN:[[id number]],FOLLOWUP:[2 weeks]],PROVIDER:[TOKEN:[[id number]],FOLLOWUP:[1 week]]

## 2024-11-26 NOTE — DISCHARGE NOTE PROVIDER - HOSPITAL COURSE
79yoM PMHx DM, HLD,  recent admission for left leg cellulitis (discharged Nov 6), now presents to ED bilateral lower extremity wounds.     hospital stay as following     #Acute B/L Cellulitis on Chronic venous stasis , DVT ruled out  - recurrent admissions   11/1 WCx LLE : Fusarium, C parapsilosis, Pseudomonas putida. Independent analysis of BCX results and interpretation of sensitivity results.  11/15 BCx NG  Infectious disease consulted  Ultrasound ordered to rule out abscess, no collection   Levofloxacin 500mg QD EOT 11/27  Voriconazole 200mg BID EOT 11/27  Silver sulfadiazine cream twice daily  Pain control with oxycodne   vascular surgery eval appreciated > f/u OP     #RENEA  -Cr baseline 0.9 , Cr now 1.6   -Ordered FENA labs , US renal and UA, creatine not improving    #Macrocytic Anemia  - Hgb stable around 8-9  - iron studies wnl, folate wnl, b12 was on lower side  - started on B12 supplements last admission  -TSH 2.30  -Outpt fu with GI    #Hx of DM2, howeverlast  A1C 5.5  - on Januvia at home  -ISS inpatient     #HLD  -Statin was held last admission for transaminitis now resolved  -resume statin    #Elevated ALP - outpatient follow up    #BPH - per pt, not on Tamsulosin    79yoM PMHx DM, HLD,  recent admission for left leg cellulitis (discharged Nov 6), now presents to ED bilateral lower extremity wounds.     hospital stay as following     #Acute B/L Cellulitis on Chronic venous stasis , DVT ruled out  - recurrent admissions   11/1 WCx LLE : Fusarium, C parapsilosis, Pseudomonas putida. Independent analysis of BCX results and interpretation of sensitivity results.  11/15 BCx NG  Infectious disease consulted  Ultrasound ordered to rule out abscess, no collection   Levofloxacin 500mg QD EOT 11/27  Voriconazole 200mg BID EOT 11/26  Silver sulfadiazine cream twice daily  Pain control with oxycodne   vascular surgery eval appreciated > f/u OP   wound care w/ Sulfadine and kerlix, daily dressing changes      #RENEA  -Cr baseline 0.9 , Cr now 1.6   -Ordered FENA labs , US renal and UA, creatine not improving    #Macrocytic Anemia  - Hgb stable around 8-9  - iron studies wnl, folate wnl, b12 was on lower side  - started on B12 supplements last admission  -TSH 2.30  -Outpt fu with GI    #Hx of DM2, howeverlast  A1C 5.5  - on Januvia at home  -ISS inpatient     #HLD  -Statin was held last admission for transaminitis now resolved  -resume statin    #Elevated ALP - outpatient follow up    #BPH - per pt, not on Tamsulosin    History of present illness:  79yoM PMHx DM, HLD,  recent admission for left leg cellulitis (discharged Nov 6), now presents to ED bilateral lower extremity wounds. Patient states left lower  extremity wound not improving, developed new wound to right lower extremity over the past 2 days. During hospitalization, pt was first treated with Unasyn, cultures grew Pseudomonas PUTIA and was switched to Cefepime. Pt also received Prednisone 40 mg  for 3-4 days to reduce  inflammation. Patient states he has a visiting nurse for wound care, last visited 2 days ago. Pt lives alone, able to ambulate slowly with walker. Patient complains of bilateral leg pain.  Denies chest pain, sob, fever, chills, dysuria, nausea, vomiting    Vital Signs Last 24 Hrs  T(C): 36.5 (15 Nov 2024 15:35), Max: 37 (15 Nov 2024 08:38)  T(F): 97.7 (15 Nov 2024 15:35), Max: 98.6 (15 Nov 2024 08:38)  HR: 71 (15 Nov 2024 15:35) (71 - 84)  BP: 142/78 (15 Nov 2024 15:35) (105/40 - 176/74)  RR: 18 (15 Nov 2024 15:35) (18 - 18)  SpO2: 99% (15 Nov 2024 15:35) (98% - 99%)    Parameters below as of 15 Nov 2024 15:35  Patient On (Oxygen Delivery Method): room air    Labs: WBC 14k, Hgb 9.1     Xray Tibia + Fibula B/L : No acute osseous abnormalities. Diffuse soft tissue swelling/edema in the   bilateral legs. Lateral distal left leg skin defect.    Chest xray: Mild pulm venous congestion     Admitted for B/L Cellulitis    Hospital Course:  Infectious disease was consulted.  A culture of a skin swab grew pseudomonas putida, rare candida parapsilosis, and few fusarium species.  He was recommended to complete levofloxacin 500mg daily and vorioconazole 200mg twice daily with end of therapy on 11/27/2024.  He continued to complain of stabbing left lateral lower extremity pain.  Vascular surgery was consulted for his venous insufficiency, and recommended outpatient follow up.  He is stable for discharge with home health and to follow up with his primary care provider and vascular surgery.    #Acute B/L Cellulitis on Chronic venous stasis , DVT ruled out  - recurrent admissions  11/1 WCx LLE : Fusarium, C parapsilosis, Pseudomonas putida.  11/15 BCx NG  Infectious disease consulted  Ultrasound ordered to rule out abscess, no collection seen  Levofloxacin 500mg QD EOT 11/27  Voriconazole 200mg BID EOT 11/26  Silver sulfadiazine cream twice daily  Pain control with oxycodone  vascular surgery eval appreciated > f/u OP   wound care w/ Sulfadine and kerlix, daily dressing changes    #Prerenal RENEA  -Cr baseline 0.9 , Cr peaked at 1.6, downtrended to 1.3  -Follow up with PCP    #Macrocytic Anemia  - Hgb stable around 8-9  - iron studies wnl, folate wnl, b12 was on lower side  - started on B12 supplements last admission  -TSH 2.30  -Outpt fu with GI    #Hx of DM2, however last A1C 5.5  - on Januvia at home    #HLD  -Statin was held last admission for transaminitis now resolved  -resume statin    #Elevated ALP - outpatient follow up    #BPH - per pt, not on Tamsulosin History of present illness:  79yoM PMHx DM, HLD,  recent admission for left leg cellulitis (discharged Nov 6), now presents to ED bilateral lower extremity wounds. Patient states left lower  extremity wound not improving, developed new wound to right lower extremity over the past 2 days. During hospitalization, pt was first treated with Unasyn, cultures grew Pseudomonas PUTIA and was switched to Cefepime. Pt also received Prednisone 40 mg  for 3-4 days to reduce  inflammation. Patient states he has a visiting nurse for wound care, last visited 2 days ago. Pt lives alone, able to ambulate slowly with walker. Patient complains of bilateral leg pain.  Denies chest pain, sob, fever, chills, dysuria, nausea, vomiting.    Vital Signs Last 24 Hrs  T(C): 36.5 (15 Nov 2024 15:35), Max: 37 (15 Nov 2024 08:38)  T(F): 97.7 (15 Nov 2024 15:35), Max: 98.6 (15 Nov 2024 08:38)  HR: 71 (15 Nov 2024 15:35) (71 - 84)  BP: 142/78 (15 Nov 2024 15:35) (105/40 - 176/74)  RR: 18 (15 Nov 2024 15:35) (18 - 18)  SpO2: 99% (15 Nov 2024 15:35) (98% - 99%)    Parameters below as of 15 Nov 2024 15:35  Patient On (Oxygen Delivery Method): room air    Labs: WBC 14k, Hgb 9.1     Xray Tibia + Fibula B/L : No acute osseous abnormalities. Diffuse soft tissue swelling/edema in the   bilateral legs. Lateral distal left leg skin defect.    Chest xray: Mild pulm venous congestion     Admitted for B/L Cellulitis    Hospital Course:  Infectious disease was consulted.  A culture of a skin swab grew pseudomonas putida, rare candida parapsilosis, and few fusarium species.  He was recommended to complete levofloxacin 500mg daily and vorioconazole 200mg twice daily with end of therapy on 11/27/2024.  He continued to complain of stabbing left lateral lower extremity pain.  Vascular surgery was consulted for his venous insufficiency, and recommended outpatient follow up.  He is stable for discharge with home health and to follow up with his primary care provider and vascular surgery.    #Acute B/L Cellulitis on Chronic venous stasis , DVT ruled out  - recurrent admissions  11/1 WCx LLE : Fusarium, C parapsilosis, Pseudomonas putida.  11/15 BCx NG  Infectious disease consulted  Ultrasound ordered to rule out abscess, no collection seen  Levofloxacin 500mg QD EOT 11/27  Voriconazole 200mg BID EOT 11/26  Silver sulfadiazine cream twice daily  Pain control with oxycodone  vascular surgery eval appreciated > f/u OP   wound care w/ Sulfadine and kerlix, daily dressing changes    #Prerenal RENEA  -Cr baseline 0.9 , Cr peaked at 1.6, downtrended to 1.3  -Follow up with PCP    #Macrocytic Anemia  - Hgb stable around 8-9  - iron studies wnl, folate wnl, b12 was on lower side  - started on B12 supplements last admission  -TSH 2.30  -Outpt fu with GI    #Hx of DM2, however last A1C 5.5  - on Januvia at home    #HLD  -Statin was held last admission for transaminitis now resolved  -resume statin    #Elevated ALP - outpatient follow up    #BPH - per pt, not on Tamsulosin

## 2024-11-26 NOTE — DISCHARGE NOTE PROVIDER - CARE PROVIDER_API CALL
Francis Olivas  Vascular Surgery  [address] [place] [address], [address]  [place] [address]  Phone: [phone]  Fax: [phone]  Follow Up Time: 2 weeks    Shaina Shah  Geriatric Medicine  [address] [place] [address]  Phone: [phone]  Fax: [phone]  Follow Up Time: 1 week

## 2024-11-26 NOTE — DISCHARGE NOTE PROVIDER - NSDCCPCAREPLAN_GEN_ALL_CORE_FT
PRINCIPAL DISCHARGE DIAGNOSIS  Diagnosis: Cellulitis  Assessment and Plan of Treatment: you came in with Acute bilateral lower extremity cellulitis on chronic venous stasis and you have been treated with antibiotics   please continue the following medications   Levofloxacin 500mg daily until 11/27  Voriconazole 200mg twice daily until 11/27  Silver sulfadiazine cream twice daily  Oxycodone as needed for pain for 3 days   Continue prescribed medications as directed.  Keep the affected leg elevated.  Monitor for signs of worsening cellulitis (increased redness, swelling, pain, fever).  Follow up with Vascular Surgery as scheduled. Dr Olivas , call Phone: [phone]  Follow up with PCP doctor Shah at [facility] clinic as scheduled for anemia , elevated mitzi;one phosphatase , call Phone: [phone]     PRINCIPAL DISCHARGE DIAGNOSIS  Diagnosis: Cellulitis  Assessment and Plan of Treatment: you came in with Acute bilateral lower extremity cellulitis on chronic venous stasis and you have been treated with antibiotics   please continue the following medications   Levofloxacin 500mg daily until 11/27  Silver sulfadiazine cream twice daily  Oxycodone as needed for pain for 3 days   Continue prescribed medications as directed.  Keep the affected leg elevated.  wound care w/ Sulfadine and kerlix, daily dressing changes  Monitor for signs of worsening cellulitis (increased redness, swelling, pain, fever).  Follow up with Vascular Surgery as scheduled. Dr Olivas , call Phone: [phone]  Follow up with PCP doctor Shah at [facility] clinic as scheduled for anemia , elevated mitzi;one phosphatase , call Phone: [phone]     PRINCIPAL DISCHARGE DIAGNOSIS  Diagnosis: Cellulitis  Assessment and Plan of Treatment: you came in with Acute bilateral lower extremity cellulitis on chronic venous stasis and you have been treated with antibiotics   please continue the following medications   Levofloxacin 500mg daily until 11/27  Silver sulfadiazine cream twice daily  Oxycodone as needed for pain for 3 days   Continue prescribed medications as directed.  Keep the affected leg elevated.  wound care w/ Sulfadine and kerlix, daily dressing changes  Monitor for signs of worsening cellulitis (increased redness, swelling, pain, fever).  Follow up with Vascular Surgery as scheduled. Dr Olivas , call Phone: [phone]  Follow up with PCP doctor Shah at [facility] clinic as scheduled for anemia, elevated alkaone phosphatase , call Phone: [phone]  You can call the internal medicine resident clinic at [phone] to establish care for follow up for your leg pain.

## 2024-11-26 NOTE — DISCHARGE NOTE PROVIDER - CARE PROVIDERS DIRECT ADDRESSES
[email or web address]

## 2024-11-26 NOTE — DISCHARGE NOTE PROVIDER - NSDCCAREPROVSEEN_GEN_ALL_CORE_FT
[facility] Medicine  [facility] Vascular   [facility] Infectious  [facility] Medicine  [facility] Vascular   [facility] Infectious   Neeraj Castano

## 2024-11-26 NOTE — DISCHARGE NOTE PROVIDER - NSDCMRMEDTOKEN_GEN_ALL_CORE_FT
atorvastatin 20 mg oral tablet: 1 tab(s) orally once a day  cyanocobalamin 1000 mcg oral tablet: 1 tab(s) orally once a day  Januvia 100 mg oral tablet: 1 tab(s) orally once a day  levoFLOXacin 500 mg oral tablet: 1 tab(s) orally every 24 hours  MiraLax oral powder for reconstitution: 17 gram(s) orally once a day as needed for  constipation  senna (sennosides) 17.2 mg oral tablet: 1 tab(s) orally once a day as needed for  constipation  Silvadene 1% topical cream: Apply topically to affected area once a day  voriconazole 200 mg oral tablet: 1 tab(s) orally every 12 hours   atorvastatin 20 mg oral tablet: 1 tab(s) orally once a day  cyanocobalamin 1000 mcg oral tablet: 1 tab(s) orally once a day  Januvia 100 mg oral tablet: 1 tab(s) orally once a day  levoFLOXacin 500 mg oral tablet: 1 tab(s) orally every 24 hours  MiraLax oral powder for reconstitution: 17 gram(s) orally once a day as needed for  constipation  senna (sennosides) 17.2 mg oral tablet: 1 tab(s) orally once a day as needed for  constipation  Silvadene 1% topical cream: Apply topically to affected area once a day   atorvastatin 20 mg oral tablet: 1 tab(s) orally once a day  cyanocobalamin 1000 mcg oral tablet: 1 tab(s) orally once a day  Januvia 100 mg oral tablet: 1 tab(s) orally once a day  levoFLOXacin 500 mg oral tablet: 1 tab(s) orally every 24 hours  MiraLax oral powder for reconstitution: 17 gram(s) orally once a day as needed for  constipation  Narcan 4 mg/0.1 mL nasal spray: 1 spray(s) intranasally once a day as needed for Opiate reversal  oxycodone-acetaminophen 2.5 mg-300 mg oral tablet: 1 tab(s) orally every 6 hours as needed for  severe pain MDD: 10mg  senna (sennosides) 17.2 mg oral tablet: 1 tab(s) orally once a day as needed for  constipation  Silvadene 1% topical cream: Apply topically to affected area once a day

## 2024-11-27 ENCOUNTER — INPATIENT (INPATIENT)
Facility: HOSPITAL | Age: 79
LOS: 8 days | Discharge: HOME | DRG: 603 | End: 2024-12-06
Attending: STUDENT IN AN ORGANIZED HEALTH CARE EDUCATION/TRAINING PROGRAM | Admitting: HOSPITALIST
Payer: MEDICARE

## 2024-11-27 ENCOUNTER — NON-APPOINTMENT (OUTPATIENT)
Age: 79
End: 2024-11-27

## 2024-11-27 VITALS
HEIGHT: 64 IN | RESPIRATION RATE: 18 BRPM | SYSTOLIC BLOOD PRESSURE: 119 MMHG | DIASTOLIC BLOOD PRESSURE: 76 MMHG | OXYGEN SATURATION: 96 % | HEART RATE: 90 BPM | TEMPERATURE: 98 F

## 2024-11-27 DIAGNOSIS — Z98.49 CATARACT EXTRACTION STATUS, UNSPECIFIED EYE: Chronic | ICD-10-CM

## 2024-11-27 DIAGNOSIS — M79.89 OTHER SPECIFIED SOFT TISSUE DISORDERS: ICD-10-CM

## 2024-11-27 LAB
ALBUMIN SERPL ELPH-MCNC: 3.4 G/DL — LOW (ref 3.5–5.2)
ALBUMIN SERPL ELPH-MCNC: 3.4 G/DL — LOW (ref 3.5–5.2)
ALBUMIN SERPL ELPH-MCNC: 3.6 G/DL — SIGNIFICANT CHANGE UP (ref 3.5–5.2)
ALP SERPL-CCNC: 214 U/L — HIGH (ref 30–115)
ALP SERPL-CCNC: 220 U/L — HIGH (ref 30–115)
ALP SERPL-CCNC: 230 U/L — HIGH (ref 30–115)
ALT FLD-CCNC: 12 U/L — SIGNIFICANT CHANGE UP (ref 0–41)
ALT FLD-CCNC: 14 U/L — SIGNIFICANT CHANGE UP (ref 0–41)
ALT FLD-CCNC: 15 U/L — SIGNIFICANT CHANGE UP (ref 0–41)
ANION GAP SERPL CALC-SCNC: 10 MMOL/L — SIGNIFICANT CHANGE UP (ref 7–14)
ANION GAP SERPL CALC-SCNC: 8 MMOL/L — SIGNIFICANT CHANGE UP (ref 7–14)
ANION GAP SERPL CALC-SCNC: 9 MMOL/L — SIGNIFICANT CHANGE UP (ref 7–14)
AST SERPL-CCNC: 32 U/L — SIGNIFICANT CHANGE UP (ref 0–41)
AST SERPL-CCNC: 46 U/L — HIGH (ref 0–41)
AST SERPL-CCNC: 61 U/L — HIGH (ref 0–41)
BASOPHILS # BLD AUTO: 0.15 K/UL — SIGNIFICANT CHANGE UP (ref 0–0.2)
BASOPHILS NFR BLD AUTO: 1.1 % — HIGH (ref 0–1)
BILIRUB SERPL-MCNC: 0.5 MG/DL — SIGNIFICANT CHANGE UP (ref 0.2–1.2)
BILIRUB SERPL-MCNC: 0.5 MG/DL — SIGNIFICANT CHANGE UP (ref 0.2–1.2)
BILIRUB SERPL-MCNC: 0.7 MG/DL — SIGNIFICANT CHANGE UP (ref 0.2–1.2)
BUN SERPL-MCNC: 24 MG/DL — HIGH (ref 10–20)
BUN SERPL-MCNC: 26 MG/DL — HIGH (ref 10–20)
BUN SERPL-MCNC: 26 MG/DL — HIGH (ref 10–20)
CALCIUM SERPL-MCNC: 8.5 MG/DL — SIGNIFICANT CHANGE UP (ref 8.4–10.5)
CALCIUM SERPL-MCNC: 8.5 MG/DL — SIGNIFICANT CHANGE UP (ref 8.4–10.5)
CALCIUM SERPL-MCNC: 8.6 MG/DL — SIGNIFICANT CHANGE UP (ref 8.4–10.5)
CHLORIDE SERPL-SCNC: 90 MMOL/L — LOW (ref 98–110)
CHLORIDE SERPL-SCNC: 91 MMOL/L — LOW (ref 98–110)
CHLORIDE SERPL-SCNC: 93 MMOL/L — LOW (ref 98–110)
CO2 SERPL-SCNC: 27 MMOL/L — SIGNIFICANT CHANGE UP (ref 17–32)
CO2 SERPL-SCNC: 27 MMOL/L — SIGNIFICANT CHANGE UP (ref 17–32)
CO2 SERPL-SCNC: 30 MMOL/L — SIGNIFICANT CHANGE UP (ref 17–32)
CREAT SERPL-MCNC: 1.7 MG/DL — HIGH (ref 0.7–1.5)
EGFR: 40 ML/MIN/1.73M2 — LOW
EOSINOPHIL # BLD AUTO: 0.37 K/UL — SIGNIFICANT CHANGE UP (ref 0–0.7)
EOSINOPHIL NFR BLD AUTO: 2.7 % — SIGNIFICANT CHANGE UP (ref 0–8)
GLUCOSE BLDC GLUCOMTR-MCNC: 81 MG/DL — SIGNIFICANT CHANGE UP (ref 70–99)
GLUCOSE BLDC GLUCOMTR-MCNC: 99 MG/DL — SIGNIFICANT CHANGE UP (ref 70–99)
GLUCOSE SERPL-MCNC: 104 MG/DL — HIGH (ref 70–99)
GLUCOSE SERPL-MCNC: 95 MG/DL — SIGNIFICANT CHANGE UP (ref 70–99)
GLUCOSE SERPL-MCNC: 96 MG/DL — SIGNIFICANT CHANGE UP (ref 70–99)
HCT VFR BLD CALC: 31.7 % — LOW (ref 42–52)
HGB BLD-MCNC: 10 G/DL — LOW (ref 14–18)
IMM GRANULOCYTES NFR BLD AUTO: 3 % — HIGH (ref 0.1–0.3)
LYMPHOCYTES # BLD AUTO: 1.86 K/UL — SIGNIFICANT CHANGE UP (ref 1.2–3.4)
LYMPHOCYTES # BLD AUTO: 13.7 % — LOW (ref 20.5–51.1)
MCHC RBC-ENTMCNC: 29.9 PG — SIGNIFICANT CHANGE UP (ref 27–31)
MCHC RBC-ENTMCNC: 31.5 G/DL — LOW (ref 32–37)
MCV RBC AUTO: 94.6 FL — HIGH (ref 80–94)
MONOCYTES # BLD AUTO: 1.49 K/UL — HIGH (ref 0.1–0.6)
MONOCYTES NFR BLD AUTO: 11 % — HIGH (ref 1.7–9.3)
NEUTROPHILS # BLD AUTO: 9.29 K/UL — HIGH (ref 1.4–6.5)
NEUTROPHILS NFR BLD AUTO: 68.5 % — SIGNIFICANT CHANGE UP (ref 42.2–75.2)
NRBC # BLD: 0 /100 WBCS — SIGNIFICANT CHANGE UP (ref 0–0)
PLATELET # BLD AUTO: 451 K/UL — HIGH (ref 130–400)
PMV BLD: 9.1 FL — SIGNIFICANT CHANGE UP (ref 7.4–10.4)
POTASSIUM SERPL-MCNC: 4.4 MMOL/L — SIGNIFICANT CHANGE UP (ref 3.5–5)
POTASSIUM SERPL-MCNC: 5.9 MMOL/L — HIGH (ref 3.5–5)
POTASSIUM SERPL-MCNC: 6.7 MMOL/L — CRITICAL HIGH (ref 3.5–5)
POTASSIUM SERPL-SCNC: 4.4 MMOL/L — SIGNIFICANT CHANGE UP (ref 3.5–5)
POTASSIUM SERPL-SCNC: 5.9 MMOL/L — HIGH (ref 3.5–5)
POTASSIUM SERPL-SCNC: 6.7 MMOL/L — CRITICAL HIGH (ref 3.5–5)
PROT SERPL-MCNC: 6.7 G/DL — SIGNIFICANT CHANGE UP (ref 6–8)
PROT SERPL-MCNC: 7 G/DL — SIGNIFICANT CHANGE UP (ref 6–8)
PROT SERPL-MCNC: 7.6 G/DL — SIGNIFICANT CHANGE UP (ref 6–8)
RBC # BLD: 3.35 M/UL — LOW (ref 4.7–6.1)
RBC # FLD: 15.7 % — HIGH (ref 11.5–14.5)
SODIUM SERPL-SCNC: 127 MMOL/L — LOW (ref 135–146)
SODIUM SERPL-SCNC: 129 MMOL/L — LOW (ref 135–146)
SODIUM SERPL-SCNC: 129 MMOL/L — LOW (ref 135–146)
WBC # BLD: 13.57 K/UL — HIGH (ref 4.8–10.8)
WBC # FLD AUTO: 13.57 K/UL — HIGH (ref 4.8–10.8)

## 2024-11-27 PROCEDURE — 80053 COMPREHEN METABOLIC PANEL: CPT

## 2024-11-27 PROCEDURE — 97116 GAIT TRAINING THERAPY: CPT | Mod: GP

## 2024-11-27 PROCEDURE — 85025 COMPLETE CBC W/AUTO DIFF WBC: CPT

## 2024-11-27 PROCEDURE — 97162 PT EVAL MOD COMPLEX 30 MIN: CPT | Mod: GP

## 2024-11-27 PROCEDURE — 93010 ELECTROCARDIOGRAM REPORT: CPT

## 2024-11-27 PROCEDURE — 85027 COMPLETE CBC AUTOMATED: CPT

## 2024-11-27 PROCEDURE — 97110 THERAPEUTIC EXERCISES: CPT | Mod: GP

## 2024-11-27 PROCEDURE — 83735 ASSAY OF MAGNESIUM: CPT

## 2024-11-27 PROCEDURE — 82962 GLUCOSE BLOOD TEST: CPT

## 2024-11-27 PROCEDURE — 93005 ELECTROCARDIOGRAM TRACING: CPT

## 2024-11-27 PROCEDURE — 80048 BASIC METABOLIC PNL TOTAL CA: CPT

## 2024-11-27 PROCEDURE — 99285 EMERGENCY DEPT VISIT HI MDM: CPT

## 2024-11-27 PROCEDURE — 70450 CT HEAD/BRAIN W/O DYE: CPT | Mod: MC

## 2024-11-27 PROCEDURE — 36415 COLL VENOUS BLD VENIPUNCTURE: CPT

## 2024-11-27 PROCEDURE — 99232 SBSQ HOSP IP/OBS MODERATE 35: CPT

## 2024-11-27 RX ORDER — VORICONAZOLE 10 MG/1
200 INJECTION, POWDER, LYOPHILIZED, FOR SOLUTION INTRAVENOUS EVERY 12 HOURS
Refills: 0 | Status: COMPLETED | OUTPATIENT
Start: 2024-11-27 | End: 2024-11-28

## 2024-11-27 RX ORDER — KETOROLAC TROMETHAMINE 30 MG/ML
15 INJECTION INTRAMUSCULAR; INTRAVENOUS ONCE
Refills: 0 | Status: DISCONTINUED | OUTPATIENT
Start: 2024-11-27 | End: 2024-11-27

## 2024-11-27 RX ORDER — PANTOPRAZOLE SODIUM 40 MG/1
40 TABLET, DELAYED RELEASE ORAL
Refills: 0 | Status: DISCONTINUED | OUTPATIENT
Start: 2024-11-27 | End: 2024-12-06

## 2024-11-27 RX ORDER — POLYETHYLENE GLYCOL 3350 17 G/17G
17 POWDER, FOR SOLUTION ORAL DAILY
Refills: 0 | Status: DISCONTINUED | OUTPATIENT
Start: 2024-11-27 | End: 2024-12-06

## 2024-11-27 RX ORDER — SENNOSIDES 8.6 MG
1 TABLET ORAL AT BEDTIME
Refills: 0 | Status: DISCONTINUED | OUTPATIENT
Start: 2024-11-27 | End: 2024-12-06

## 2024-11-27 RX ORDER — ACETAMINOPHEN 500MG 500 MG/1
650 TABLET, COATED ORAL EVERY 6 HOURS
Refills: 0 | Status: DISCONTINUED | OUTPATIENT
Start: 2024-11-27 | End: 2024-12-03

## 2024-11-27 RX ORDER — 0.9 % SODIUM CHLORIDE 0.9 %
1000 INTRAVENOUS SOLUTION INTRAVENOUS
Refills: 0 | Status: DISCONTINUED | OUTPATIENT
Start: 2024-11-27 | End: 2024-12-06

## 2024-11-27 RX ORDER — INFLUENZA VIRUS VACCINE 15; 15; 15; 15 UG/.5ML; UG/.5ML; UG/.5ML; UG/.5ML
0.5 SUSPENSION INTRAMUSCULAR ONCE
Refills: 0 | Status: DISCONTINUED | OUTPATIENT
Start: 2024-11-27 | End: 2024-12-06

## 2024-11-27 RX ORDER — GLUCAGON INJECTION, SOLUTION 0.5 MG/.1ML
1 INJECTION, SOLUTION SUBCUTANEOUS ONCE
Refills: 0 | Status: DISCONTINUED | OUTPATIENT
Start: 2024-11-27 | End: 2024-12-06

## 2024-11-27 RX ORDER — HEPARIN SODIUM,PORCINE 1000/ML
5000 VIAL (ML) INJECTION EVERY 12 HOURS
Refills: 0 | Status: DISCONTINUED | OUTPATIENT
Start: 2024-11-27 | End: 2024-12-06

## 2024-11-27 RX ADMIN — KETOROLAC TROMETHAMINE 15 MILLIGRAM(S): 30 INJECTION INTRAMUSCULAR; INTRAVENOUS at 12:37

## 2024-11-27 RX ADMIN — ACETAMINOPHEN 500MG 650 MILLIGRAM(S): 500 TABLET, COATED ORAL at 21:30

## 2024-11-27 RX ADMIN — Medication 1 APPLICATION(S): at 21:55

## 2024-11-27 RX ADMIN — ACETAMINOPHEN 500MG 650 MILLIGRAM(S): 500 TABLET, COATED ORAL at 20:41

## 2024-11-27 RX ADMIN — Medication 20 MILLIGRAM(S): at 21:56

## 2024-11-27 RX ADMIN — Medication 5000 UNIT(S): at 18:36

## 2024-11-27 RX ADMIN — VORICONAZOLE 200 MILLIGRAM(S): 10 INJECTION, POWDER, LYOPHILIZED, FOR SOLUTION INTRAVENOUS at 21:55

## 2024-11-27 RX ADMIN — Medication 1 TABLET(S): at 21:55

## 2024-11-27 NOTE — ED ADULT NURSE NOTE - NSFALLRISKASMT_ED_ALL_ED_DT
[FreeTextEntry1] : Ms. Barrios returns for evaluation of her cardiomyopathy and coronary artery disease. \par \par Past medical history is remarkable for myocardial infarction, coronary artery disease status post coronary artery bypass surgery in 2007, history of inducible sustained ventricular tachycardia leading to placement of a biventricular/ICD ([employer]), history of ischemic cardiomyopathy, history of diabetes, history of hyperlipidemia,history of hyperthyroidism, history of proliferative diabetic retinopathy, and history of stroke with hemorrhage in 2010 at which time she had a long complicated course requiring craniotomy for intracranial decompression and removal of thrombus. In August of 2010, she had several ICD discharges felt to be due to rapid atrial fibrillation.\par \par I last saw her in 10/2022.\par She has had several episodes of atypical chest pain and underwent cardiac catheterization 2018 leading  to percutaneous intervention of the left circumflex artery and the first obtuse marginal artery with drug eluting stents. \par A year later, she presented with decompensated heart failure has had adjustment of her medication. She had a Cardiomems placed but has not been able to get ongoing readings given her inability to lie flat from her prior stroke and brain surgery. She is now s/p extraction of her desmond lead and ICD generator replacement in 2020.\par \par In 7/12/22, she was seen in the emergency department last week for mild decompensation of heart failure.  Her diuretic has been increased to daily dosing.  Her BNP at this time was 3274\par \par She has been doing well lately. She has been unable to start Jardiance because of a high co-pay.  She continues to report some fatigue, and occasional dyspnea, cough, and lower extremity swelling, though everything is at baseline.  She uses pillows to sleep at night.\par She is now taking torsemide 60 mg po daily.\par \par Her last echo performed in this office was in 4/2022, and demonstrated mild to moderate MR, a severely dilated left atrium, eccentric LVH, moderate to severe global left ventricular systolic dysfunction, and borderline pulmonary pressures.\par 
27-Nov-2024 14:05

## 2024-11-27 NOTE — PATIENT PROFILE ADULT - FUNCTIONAL ASSESSMENT - DAILY ACTIVITY 2.
What Is The Reason For Today's Visit?: Surveillance against skin cancer recurrences How Many Skin Cancers Have You Had?: more than one When Was Your Last Cancer Diagnosed?: 2013 3 = A little assistance

## 2024-11-27 NOTE — ED PROVIDER NOTE - PROGRESS NOTE DETAILS
when eating
ED administration received a message that this patient should be admitted to the hospital for social reasons.Patient states he lives alone

## 2024-11-27 NOTE — H&P ADULT - NS ATTEND AMEND GEN_ALL_CORE FT
79 year old with PMHx DM, HLD,  recent multiple admissions for left leg cellulitis presents s/p fall last night and ambulatory dysfunction     # Dizziness  # S/p fall at home  - will obtain head CT  - fall precautions  - PT/physiatry  - orthostatics  - routine uziel checks  - ekg    #Chronic B/L Cellulitis on chronic venous stasis  11/1 WCx LLE : Fusarium, C parapsilosis, Pseudomonas putida. Independent analysis of BCX results and interpretation of sensitivity results.  - wound care w/ Sulfadine and kerlix, daily dressing changes  - pain control    # Hyponatremia  # Hyperkalemia  - CMP was hemolyzed  - stat repeat CMP ordered    # Possible RENEA  - however, specimen was hemolyzed  - will repeat CMP and treat if needed    # Leukocytosis, trending down  - will monitor    #Chronic Macrocytic Anemia  - Hgb stable  - will trend    #Hx of DM2  - however, last  A1C 5.5 on 10/22  - on Januvia at home  - will start ISS inpatient     #HLD  - continue statin    # Simple renal cysts  - outpatient follow up with urology    #DVT ppx-Heparin  GI ppx: PPI 79 year old with PMHx DM, HLD,  recent multiple admissions for left leg cellulitis presents s/p fall last night and ambulatory dysfunction     # Dizziness  # S/p fall at home  - will obtain head CT  - fall precautions  - PT/physiatry  - orthostatics  - routine uziel checks  - ekg    #Chronic B/L Cellulitis on chronic venous stasis  11/1 WCx LLE : Fusarium, C parapsilosis, Pseudomonas putida. Independent analysis of BCX results and interpretation of sensitivity results.  - wound care w/ Sulfadine and kerlix, daily dressing changes  - pain control    # Hyponatremia  # Hyperkalemia  - CMP was hemolyzed  - stat repeat CMP ordered    # Possible RENEA  - Repeat BUN/Cr    # Leukocytosis, trending down  - will monitor    #Chronic Macrocytic Anemia  - Hgb stable  - will trend    #Hx of DM2  - however, last  A1C 5.5 on 10/22  - on Januvia at home  - will start ISS inpatient     #HLD  - continue statin    # Simple renal cysts  - outpatient follow up with urology    #DVT ppx-Heparin  GI ppx: PPI

## 2024-11-27 NOTE — PATIENT PROFILE ADULT - FALL HARM RISK - CONCLUSION
Airway       Patient location during procedure: OR       Procedure Start/Stop Times: 9/8/2021 10:29 AM  Staff -        Other Anesthesia Staff: Martin Jimenez       Performed By: SRNA  Consent for Airway        Urgency: elective  Indications and Patient Condition       Indications for airway management: jann-procedural         Mask difficulty assessment: 1 - vent by mask    Final Airway Details       Final airway type: supraglottic airway    Supraglottic Airway Details        Type: LMA       Brand: Ambu AuraGain       LMA size: 4    Post intubation assessment        Placement verified by: capnometry, equal breath sounds and chest rise        Number of attempts at approach: 1       Number of other approaches attempted: 0       Secured with: pink tape       Ease of procedure: easy       Dentition: Intact and Unchanged           Fall Risk

## 2024-11-27 NOTE — PATIENT PROFILE ADULT - FALL HARM RISK - RISK INTERVENTIONS

## 2024-11-27 NOTE — H&P ADULT - NSHPLABSRESULTS_GEN_ALL_CORE
10.0   13.57 )-----------( 451      ( 27 Nov 2024 13:35 )             31.7     127[L]  |  90[L]  |  24[H]  ----------------------------<  95  6.7[HH]   |  27  |  1.7[H]    Ca    8.6      27 Nov 2024 13:35  Phos  3.5     11-26  Mg     2.5     11-26    TPro  7.6  /  Alb  3.6  /  TBili  0.7  /  DBili  x   /  AST  61[H]  /  ALT  15  /  AlkPhos  230[H]  11-27        Urinalysis Basic - ( 27 Nov 2024 13:35 )  Color: x / Appearance: x / SG: x / pH: x  Gluc: 95 mg/dL / Ketone: x  / Bili: x / Urobili: x   Blood: x / Protein: x / Nitrite: x   Leuk Esterase: x / RBC: x / WBC x   Sq Epi: x / Non Sq Epi: x / Bacteria: x    CAPILLARY BLOOD GLUCOSE  POCT Blood Glucose.: 113 mg/dL (26 Nov 2024 16:36)  Culture Results:   No growth at 5 days (11-15 @ 08:30)  Culture Results:   No growth at 5 days (11-15 @ 08:30)  Culture Results:   Numerous Pseudomonas putida group  Rare Candida parapsilosis "Susceptibilities not performed"  Few Fusarium species (11-01 @ 14:00)  Culture Results:   No growth at 5 days (10-30 @ 19:09)  Culture Results:   No growth at 5 days (10-30 @ 19:09)

## 2024-11-27 NOTE — ED ADULT NURSE NOTE - NSFALLHARMRISKINTERV_ED_ALL_ED
Assistance OOB with selected safe patient handling equipment if applicable/Communicate risk of Fall with Harm to all staff, patient, and family/Provide visual cue: red socks, yellow wristband, yellow gown, etc/Reinforce activity limits and safety measures with patient and family/Bed in lowest position, wheels locked, appropriate side rails in place/Call bell, personal items and telephone in reach/Instruct patient to call for assistance before getting out of bed/chair/stretcher/Non-slip footwear applied when patient is off stretcher/[place] to call system/Physically safe environment - no spills, clutter or unnecessary equipment/Purposeful Proactive Rounding/Room/bathroom lighting operational, light cord in reach

## 2024-11-27 NOTE — ED ADULT NURSE NOTE - TEMPLATE LIST FOR HEAD TO TOE ASSESSMENT
[FreeTextEntry3] : \par Injection Procedure Note:\par \par The risks, benefits, and alternatives to corticosteroid injection were reviewed with the patient.  Risks outlined include but are not limited to infection, sepsis, bleeding, scarring, skin discoloration, temporary increase in pain, syncopal episode, failure to resolve symptoms, symptoms recurrence, allergic reaction, flare reaction, and elevation of blood sugar in diabetics.  Patient understood the risks and asked to proceed with this treatment course.\par \par Patient Identification\par Name/: Verbal with patient and/or family\par \par Procedure Verification:\par Procedure confirmed with patient or family/designee\par Consent for procedure: Verbal Consent Given\par Relevant documentation completed, reviewed, and signed\par Clinical indications for procedure confirmed\par \par Time-out with all members of procedure team immediately prior to procedure:\par Correct patient identified. Agreement on procedure. Correct side and site.\par \par ULTRASOUND GUIDED SHOULDER SUBACROMIAL INJECTION - LEFT\par After verbal consent and identification of the correct patient and correct site, the posterior left shoulder was prepped using alcohol swabs and betadine. This was allowed time to air dry. After ethyl choride spray for skin anesthesia, a mixture of 1cc DepoMedrol 40mg/ml, 3cc Lidocaine 1%, and 3cc Bupivacaine 0.5% was injected under ultrasound guidance into the subacromial space from posterior using a sterile 22G needle. Visualization of the needle and placement of the injection was performed without any complications.  Ultrasound was used for visualization, precise injection in area of tear / calcific density, and / or prior failure or difficult injection.  The patient tolerated the procedure well. After-care instructions were provided and included instructions to ice the area and to call if redness, pain, or fever develop.\par 
Orthopedic

## 2024-11-27 NOTE — ED PROVIDER NOTE - PHYSICAL EXAMINATION
Lower extremities mild swelling noted to bilateral lower extremities positive pulses.  No erythema noted on exam.    CONSTITUTIONAL: Well-developed; well-nourished; in no acute distress.     CARD: S1, S2, Regular rate and rhythm.   RESP: No wheezes, rales or rhonchi.

## 2024-11-27 NOTE — H&P ADULT - ASSESSMENT
79 year old with PMHx DM, HLD,  recent multiple admissions for left leg cellulitis presents s/p fall last night and ambulatory dysfunction     # Dizziness  # S/p fall at home  - will obtain head CT  - fall precautions  - PT/physiatry  - check orthostatics  - routine uziel checks    #Chronic B/L Cellulitis on chronic venous stasis  11/1 WCx LLE : Fusarium, C parapsilosis, Pseudomonas putida. Independent analysis of BCX results and interpretation of sensitivity results.  11/15 BCx NG  - will finish course of  Levofloxacin 500mg QD and Voriconazole 200mg BID EOT (today is the last day)  - wound care w/ Sulfadine and kerlix, daily dressing changes  - pain control    # Hyponatremia  # Hyperkalemia  - CMP was hemolyzed  - stat repeat CMP ordered    # ? RENEA  - however, specimen was hemolyzed  - will repeat CMP and treat if needed    # Leukocytosis, trending down  - will monitor    #Chronic Macrocytic Anemia  - Hgb stable  - will trend    #Hx of DM2  - however, last  A1C 5.5 on 10/22  - on Januvia at home  - will start [facility] inpatient     #HLD  - continue statin    # Simple renal cysts  - outpatient follow up with urology    #DVT ppx-Heparin  GI ppx: PPI 79 year old with PMHx DM, HLD,  recent multiple admissions for left leg cellulitis presents s/p fall last night and ambulatory dysfunction     # Dizziness  # S/p fall at home  - will obtain head CT  - fall precautions  - PT/physiatry  - check orthostatics  - routine uziel checks  - check ekg    #Chronic B/L Cellulitis on chronic venous stasis  11/1 WCx LLE : Fusarium, C parapsilosis, Pseudomonas putida. Independent analysis of BCX results and interpretation of sensitivity results.  11/15 BCx NG  - will finish course of  Levofloxacin 500mg QD and Voriconazole 200mg BID EOT (today is the last day)  - wound care w/ Sulfadine and kerlix, daily dressing changes  - pain control    # Hyponatremia  # Hyperkalemia  - CMP was hemolyzed  - stat repeat CMP ordered    # ? RENEA  - however, specimen was hemolyzed  - will repeat CMP and treat if needed    # Leukocytosis, trending down  - will monitor    #Chronic Macrocytic Anemia  - Hgb stable  - will trend    #Hx of DM2  - however, last  A1C 5.5 on 10/22  - on Januvia at home  - will start [facility] inpatient     #HLD  - continue statin    # Simple renal cysts  - outpatient follow up with urology    #DVT ppx-Heparin  GI ppx: PPI

## 2024-11-27 NOTE — ED PROVIDER NOTE - CLINICAL SUMMARY MEDICAL DECISION MAKING FREE TEXT BOX
Patient presented from home, unable to care for himself or his ongoing cellulitis to his LE wounds. (+) fall PTA and unable to ambulate. Otherwise on arrival patient afebrile, HD stable, neurovascularly intact but (+) cellulitic changes to b/l LE. No external evidence of trauma on exam. Labs obtained and showed (+) slightly RENEA with mild hyponatremia but no evidence of acidosis or other significant abnormalities. Given the above, patient will require admission for further management. Patient agreeable with plan. HD stable at time of admission.

## 2024-11-27 NOTE — ED PROVIDER NOTE - CONSIDERATION OF ADMISSION OBSERVATION
Consideration of Admission/Observation Patient with recent discharge but unable to care for himself at home and unable to care for his lower extremity wounds - will require admission

## 2024-11-27 NOTE — ED PROVIDER NOTE - OBJECTIVE STATEMENT
79-year-old male presents to the ED for evaluation.  Has been treated for cellulitis of lower extremities.  Patient was recently admitted and discharged yesterday.  Patient states legs are about the same however he cannot be home by himself.

## 2024-11-27 NOTE — ED PROVIDER NOTE - AVIAN FLU SYMPTOMS
[facility]  form received on 01/19/23  to be completed by PCP  Copy made and placed in PCP folder  Forms to be delivered to PCP mailbox at assigned time        Work form 1-1 No

## 2024-11-27 NOTE — H&P ADULT - HISTORY OF PRESENT ILLNESS
79 year old with PMHx DM, HLD,  recent multiple admissions for left leg cellulitis presents to ED s/p fall last night. Pt was discharged home yesterday on PO levoquine 500 mg q24h till 11/27 and po Voriconazole 200 mg q12h till 11/27 . Pt states last night  he got dizzy and felt like room was spinning, and fell backwards hitting his head. He denies LOC or any bleeding. He was able to stand up on his own. Pt also reports LE weakness and difficulty ambulating secondary to that. Currently pt denies CP, SOB, N/V/D/C, abd pain, urinary complaints.

## 2024-11-27 NOTE — ED PROVIDER NOTE - CCCP TRG CHIEF CMPLNT
Hyponatremia likely secondary to poor PO salt intake.  - Na on admission 133  - Nutrition consult, diet w/ ensure started  - Monitor BMP  - Nephrology consulted lower leg pain/injury

## 2024-11-27 NOTE — H&P ADULT - NSHPPHYSICALEXAM_GEN_ALL_CORE
VITALS:   T(C): 36.4 (11-27-24 @ 11:16), Max: 36.4 (11-27-24 @ 11:16)  HR: 90 (11-27-24 @ 11:16) (90 - 90)  BP: 119/76 (11-27-24 @ 11:16) (119/76 - 119/76)  RR: 18 (11-27-24 @ 11:16) (18 - 18)  SpO2: 96% (11-27-24 @ 11:16) (96% - 96%)    GENERAL: NAD, lying in bed comfortably  HEAD:  Atraumatic, Normocephalic  EYES: EOMI, PERRLA  ENT: Moist mucous membranes  NECK: Supple  CHEST/LUNG: Clear to auscultation bilaterally; No wheezing, or rubs. Unlabored respirations, small bleeding scab to left chest area  HEART: Regular rate and rhythm; No rubs, or gallops  ABDOMEN: Bowel sounds present; Soft, Nontender, Nondistended  : no suprapubic tenderness  EXTREMITIES:  b/l LE edema and erythema noted, warm to palpation  NERVOUS SYSTEM:  Alert & Oriented X3, speech clear  SKIN: erythema and edema to LE bilaterally

## 2024-11-28 LAB
ALBUMIN SERPL ELPH-MCNC: 3.2 G/DL — LOW (ref 3.5–5.2)
ALP SERPL-CCNC: 190 U/L — HIGH (ref 30–115)
ALT FLD-CCNC: 11 U/L — SIGNIFICANT CHANGE UP (ref 0–41)
ANION GAP SERPL CALC-SCNC: 8 MMOL/L — SIGNIFICANT CHANGE UP (ref 7–14)
AST SERPL-CCNC: 29 U/L — SIGNIFICANT CHANGE UP (ref 0–41)
BASOPHILS # BLD AUTO: 0.12 K/UL — SIGNIFICANT CHANGE UP (ref 0–0.2)
BASOPHILS NFR BLD AUTO: 0.8 % — SIGNIFICANT CHANGE UP (ref 0–1)
BILIRUB SERPL-MCNC: 0.4 MG/DL — SIGNIFICANT CHANGE UP (ref 0.2–1.2)
BUN SERPL-MCNC: 27 MG/DL — HIGH (ref 10–20)
CALCIUM SERPL-MCNC: 8.2 MG/DL — LOW (ref 8.4–10.5)
CHLORIDE SERPL-SCNC: 93 MMOL/L — LOW (ref 98–110)
CO2 SERPL-SCNC: 27 MMOL/L — SIGNIFICANT CHANGE UP (ref 17–32)
CREAT SERPL-MCNC: 1.4 MG/DL — SIGNIFICANT CHANGE UP (ref 0.7–1.5)
EGFR: 51 ML/MIN/1.73M2 — LOW
EOSINOPHIL # BLD AUTO: 0.46 K/UL — SIGNIFICANT CHANGE UP (ref 0–0.7)
EOSINOPHIL NFR BLD AUTO: 3.2 % — SIGNIFICANT CHANGE UP (ref 0–8)
GLUCOSE BLDC GLUCOMTR-MCNC: 108 MG/DL — HIGH (ref 70–99)
GLUCOSE BLDC GLUCOMTR-MCNC: 87 MG/DL — SIGNIFICANT CHANGE UP (ref 70–99)
GLUCOSE BLDC GLUCOMTR-MCNC: 93 MG/DL — SIGNIFICANT CHANGE UP (ref 70–99)
GLUCOSE BLDC GLUCOMTR-MCNC: 99 MG/DL — SIGNIFICANT CHANGE UP (ref 70–99)
GLUCOSE SERPL-MCNC: 75 MG/DL — SIGNIFICANT CHANGE UP (ref 70–99)
HCT VFR BLD CALC: 27 % — LOW (ref 42–52)
HGB BLD-MCNC: 8.6 G/DL — LOW (ref 14–18)
IMM GRANULOCYTES NFR BLD AUTO: 2.1 % — HIGH (ref 0.1–0.3)
LYMPHOCYTES # BLD AUTO: 14.2 % — LOW (ref 20.5–51.1)
LYMPHOCYTES # BLD AUTO: 2.06 K/UL — SIGNIFICANT CHANGE UP (ref 1.2–3.4)
MAGNESIUM SERPL-MCNC: 2.2 MG/DL — SIGNIFICANT CHANGE UP (ref 1.8–2.4)
MCHC RBC-ENTMCNC: 30.3 PG — SIGNIFICANT CHANGE UP (ref 27–31)
MCHC RBC-ENTMCNC: 31.9 G/DL — LOW (ref 32–37)
MCV RBC AUTO: 95.1 FL — HIGH (ref 80–94)
MONOCYTES # BLD AUTO: 1.87 K/UL — HIGH (ref 0.1–0.6)
MONOCYTES NFR BLD AUTO: 12.9 % — HIGH (ref 1.7–9.3)
NEUTROPHILS # BLD AUTO: 9.65 K/UL — HIGH (ref 1.4–6.5)
NEUTROPHILS NFR BLD AUTO: 66.8 % — SIGNIFICANT CHANGE UP (ref 42.2–75.2)
NRBC # BLD: 0 /100 WBCS — SIGNIFICANT CHANGE UP (ref 0–0)
PLATELET # BLD AUTO: 459 K/UL — HIGH (ref 130–400)
PMV BLD: 9.5 FL — SIGNIFICANT CHANGE UP (ref 7.4–10.4)
POTASSIUM SERPL-MCNC: 5.2 MMOL/L — HIGH (ref 3.5–5)
POTASSIUM SERPL-SCNC: 5.2 MMOL/L — HIGH (ref 3.5–5)
PROT SERPL-MCNC: 6.4 G/DL — SIGNIFICANT CHANGE UP (ref 6–8)
RBC # BLD: 2.84 M/UL — LOW (ref 4.7–6.1)
RBC # FLD: 15.4 % — HIGH (ref 11.5–14.5)
SODIUM SERPL-SCNC: 128 MMOL/L — LOW (ref 135–146)
WBC # BLD: 14.47 K/UL — HIGH (ref 4.8–10.8)
WBC # FLD AUTO: 14.47 K/UL — HIGH (ref 4.8–10.8)

## 2024-11-28 PROCEDURE — 99233 SBSQ HOSP IP/OBS HIGH 50: CPT

## 2024-11-28 PROCEDURE — 70450 CT HEAD/BRAIN W/O DYE: CPT | Mod: 26

## 2024-11-28 PROCEDURE — 93010 ELECTROCARDIOGRAM REPORT: CPT | Mod: 76

## 2024-11-28 RX ADMIN — PANTOPRAZOLE SODIUM 40 MILLIGRAM(S): 40 TABLET, DELAYED RELEASE ORAL at 05:46

## 2024-11-28 RX ADMIN — Medication 1000 MICROGRAM(S): at 11:57

## 2024-11-28 RX ADMIN — Medication 2 MILLIGRAM(S): at 02:05

## 2024-11-28 RX ADMIN — Medication 2 MILLIGRAM(S): at 20:20

## 2024-11-28 RX ADMIN — Medication 5000 UNIT(S): at 05:47

## 2024-11-28 RX ADMIN — Medication 2 MILLIGRAM(S): at 02:15

## 2024-11-28 RX ADMIN — VORICONAZOLE 200 MILLIGRAM(S): 10 INJECTION, POWDER, LYOPHILIZED, FOR SOLUTION INTRAVENOUS at 05:46

## 2024-11-28 RX ADMIN — Medication 5000 UNIT(S): at 17:32

## 2024-11-28 RX ADMIN — Medication 2 MILLIGRAM(S): at 19:28

## 2024-11-28 RX ADMIN — Medication 1 APPLICATION(S): at 17:24

## 2024-11-28 RX ADMIN — Medication 1 APPLICATION(S): at 05:47

## 2024-11-28 RX ADMIN — Medication 20 MILLIGRAM(S): at 21:04

## 2024-11-28 NOTE — PHYSICAL THERAPY INITIAL EVALUATION ADULT - ASSISTIVE DEVICE FOR TRANSFER: GAIT, REHAB EVAL
Follow Up:  Fever    Interval History/ROS: Fever overnight, had chills and malaise. Received antibiotics this morning. Feels better now. No diarrhea. Throat is still sore.     Allergies  tetanus toxoid (Unknown)  Zosyn (Unknown)        ANTIMICROBIALS:      OTHER MEDS:  ALBUTerol    90 MICROgram(s) HFA Inhaler 1 Puff(s) Inhalation every 4 hours  benzocaine 15 mG/menthol 3.6 mG (Sugar-Free) Lozenge 1 Lozenge Oral every 6 hours PRN  cholecalciferol 1000 Unit(s) Oral daily  dextrose 40% Gel 15 Gram(s) Oral once  dextrose 5%. 1000 milliLiter(s) IV Continuous <Continuous>  dextrose 5%. 1000 milliLiter(s) IV Continuous <Continuous>  dextrose 50% Injectable 25 Gram(s) IV Push once  dextrose 50% Injectable 12.5 Gram(s) IV Push once  dextrose 50% Injectable 25 Gram(s) IV Push once  enoxaparin Injectable 40 milliGRAM(s) SubCutaneous daily  FIRST- Mouthwash  BLM 5 milliLiter(s) Swish and Swallow every 4 hours  gabapentin 300 milliGRAM(s) Oral three times a day PRN  glucagon  Injectable 1 milliGRAM(s) IntraMuscular once  influenza   Vaccine 0.5 milliLiter(s) IntraMuscular once  insulin lispro (ADMELOG) corrective regimen sliding scale   SubCutaneous three times a day before meals  insulin lispro (ADMELOG) corrective regimen sliding scale   SubCutaneous at bedtime  lactated ringers. 1000 milliLiter(s) IV Continuous <Continuous>  lidocaine 2% Viscous 5 milliLiter(s) Swish and Spit every 6 hours PRN  LORazepam     Tablet 1 milliGRAM(s) Oral once  melatonin 3 milliGRAM(s) Oral at bedtime  mometasone 220 MICROgram(s) Inhaler 1 Puff(s) Inhalation daily  montelukast 10 milliGRAM(s) Oral daily  pantoprazole    Tablet 40 milliGRAM(s) Oral before breakfast  tetracaine/benzocaine/butamben Spray 1 Spray(s) Topical every 6 hours PRN  tiotropium 18 MICROgram(s) Capsule 1 Capsule(s) Inhalation daily      Vital Signs Last 24 Hrs  T(C): 37.1 (10 Sep 2021 12:08), Max: 38.2 (09 Sep 2021 20:57)  T(F): 98.8 (10 Sep 2021 12:08), Max: 100.7 (09 Sep 2021 20:57)  HR: 111 (10 Sep 2021 12:57) (101 - 127)  BP: 129/79 (10 Sep 2021 12:08) (116/65 - 141/82)  BP(mean): --  RR: 18 (10 Sep 2021 12:08) (18 - 18)  SpO2: 93% (10 Sep 2021 12:57) (88% - 96%)    Physical Exam:  General: awake, alert, non toxic, obese   Head: atraumatic, normocephalic  Eye: normal conjunctiva  ENT: soft palate mucositis. right buccal mucosa ulcer   Cardio: regular rate   Respiratory: nonlabored on nasal cannula, grossly clear bilaterally, no wheezing  abd: soft, bowel sounds present, no tenderness  Skin: jaundiced   psych: normal affect                          9.4    8.43  )-----------( 84       ( 10 Sep 2021 09:14 )             28.6       10    133<L>  |  99  |  26<H>  ----------------------------<  110<H>  4.1   |  19<L>  |  1.39<H>    Ca    9.1      10 Sep 2021 09:14  Phos  2.9     09-10  Mg     1.7     09-10    TPro  5.7<L>  /  Alb  2.4<L>  /  TBili  7.9<H>  /  DBili  x   /  AST  233<H>  /  ALT  68<H>  /  AlkPhos  1148<H>  09-10      Urinalysis Basic - ( 10 Sep 2021 07:44 )    Color: Dark Yellow / Appearance: Slightly Turbid / S.018 / pH: x  Gluc: x / Ketone: Negative  / Bili: Moderate / Urobili: Negative   Blood: x / Protein: 100 / Nitrite: Negative   Leuk Esterase: Negative / RBC: 47 /hpf / WBC 6 /HPF   Sq Epi: x / Non Sq Epi: 1 /hpf / Bacteria: Negative        MICROBIOLOGY:  GI PCR Panel, Stool (collected 21 @ 14:58)  Source: .Stool nico robles  Final Report (21 @ 18:12):    GI PCR Results: NOT detected    *******Please Note:*******    GI panel PCR evaluates for:    Campylobacter, Plesiomonas shigelloides, Salmonella,    Vibrio, Yersinia enterocolitica, Enteroaggregative    Escherichia coli (EAEC), Enteropathogenic E.coli (EPEC),    Enterotoxigenic E. coli (ETEC) lt/st, Shiga-like    toxin-producing E. coli (STEC) stx1/stx2,    Shigella/ Enteroinvasive E. coli (EIEC), Cryptosporidium,    Cyclospora cayetanensis, Entamoeba histolytica,    Giardia lamblia, Adenovirus F 40/41, Astrovirus,    Norovirus GI/GII, Rotavirus A, Sapovirus    Culture - Urine (collected 21 @ 00:18)  Source: Clean Catch Clean Catch (Midstream)  Final Report (21 @ 18:51):    <10,000 CFU/mL Normal Urogenital Grace    Culture - Blood (collected 21 @ 18:29)  Source: .Blood Blood-Peripheral  Preliminary Report (21 @ 19:01):    No growth to date.    Culture - Blood (collected 21 @ 18:29)  Source: .Blood Blood-Peripheral  Preliminary Report (21 @ 19:01):    No growth to date.    C Diff by PCR Result: NotDetec ( @ 05:02)    C Diff by PCR Result: NotDetec (21 @ 05:02)  Clostridium difficile GDH Toxins A&amp;B, EIA:   Indeterminate (21 @ 15:40)  Clostridium difficile GDH Interpretation: This specimen is positive for C. Difficile glutamate dehydrogenase (GDH)  antigen and negative for C. Difficile Toxins A & B, by EIA.  GDH is a  highly sensitive screening marker for C. Difficile that is produced in  large amounts by all C. Difficilestrains, both toxigenic and  nontoxigenic.  Specimens that are GDH positive and toxin negative will be  tested further by PCR to detect toxin gene sequences associated with  toxin producing C. Difficle. (21 @ 15:40)    RADIOLOGY:  Images below reviewed personally  US Abdomen Upper Quadrant Right (21 @ 17:44)   Liver metastases.  Cholelithiasis. No gallbladder wall thickening. No gross biliary ductal dilatation. Normal caliber common bile duct.   rolling walker

## 2024-11-28 NOTE — PROGRESS NOTE ADULT - SUBJECTIVE AND OBJECTIVE BOX
LEENA BAILEY 79y Male  MRN#: [id number]     SUBJECTIVE  Patient is a 79y old Male who presents with a chief complaint of   Interval/Overnight Events:    Today is hospital day 1d, and this morning he is lying in bed without distress.   No acute overnight events.     OBJECTIVE  PAST MEDICAL & SURGICAL HISTORY  Diabetes    HLD (hyperlipidemia)    BPH without urinary obstruction    S/P cataract surgery      ALLERGIES:  No Known Allergies    MEDICATIONS:  STANDING MEDICATIONS  atorvastatin 20 milliGRAM(s) Oral at bedtime  cyanocobalamin 1000 MICROGram(s) Oral daily  dextrose 5%. 1000 milliLiter(s) IV Continuous <Continuous>  dextrose 5%. 1000 milliLiter(s) IV Continuous <Continuous>  dextrose 50% Injectable 25 Gram(s) IV Push once  dextrose 50% Injectable 12.5 Gram(s) IV Push once  dextrose 50% Injectable 25 Gram(s) IV Push once  glucagon  Injectable 1 milliGRAM(s) IntraMuscular once  heparin   Injectable 5000 Unit(s) SubCutaneous every 12 hours  influenza  Vaccine (HIGH DOSE) 0.5 milliLiter(s) IntraMuscular once  insulin lispro (ADMELOG) corrective regimen sliding scale   SubCutaneous three times a day before meals  pantoprazole    Tablet 40 milliGRAM(s) Oral before breakfast  senna 1 Tablet(s) Oral at bedtime  silver sulfADIAZINE 1% Cream 1 Application(s) Topical two times a day    PRN MEDICATIONS  acetaminophen     Tablet .. 650 milliGRAM(s) Oral every 6 hours PRN  dextrose Oral Gel 15 Gram(s) Oral once PRN  morphine  - Injectable 2 milliGRAM(s) IV Push every 6 hours PRN  oxycodone    5 mG/acetaminophen 325 mG 0.5 Tablet(s) Oral every 6 hours PRN  polyethylene glycol 3350 17 Gram(s) Oral daily PRN    HOME MEDICATIONS  Home Medications:  atorvastatin 20 mg oral tablet: 1 tab(s) orally once a day (15 Nov 2024 16:39)  Januvia 100 mg oral tablet: 1 tab(s) orally once a day (15 Nov 2024 16:13)      LABS:                        8.6    14.47 )-----------( 459      ( 28 Nov 2024 07:10 )             27.0     11-28    128[L]  |  93[L]  |  27[H]  ----------------------------<  75  5.2[H]   |  27  |  1.4    Ca    8.2[L]      28 Nov 2024 07:10  Mg     2.2     11-28    TPro  6.4  /  Alb  3.2[L]  /  TBili  0.4  /  DBili  x   /  AST  29  /  ALT  11  /  AlkPhos  190[H]  11-28    LIVER FUNCTIONS - ( 28 Nov 2024 07:10 )  Alb: 3.2 g/dL / Pro: 6.4 g/dL / ALK PHOS: 190 U/L / ALT: 11 U/L / AST: 29 U/L / GGT: x             Urinalysis Basic - ( 28 Nov 2024 07:10 )    Color: x / Appearance: x / SG: x / pH: x  Gluc: 75 mg/dL / Ketone: x  / Bili: x / Urobili: x   Blood: x / Protein: x / Nitrite: x   Leuk Esterase: x / RBC: x / WBC x   Sq Epi: x / Non Sq Epi: x / Bacteria: x                CAPILLARY BLOOD GLUCOSE      POCT Blood Glucose.: 87 mg/dL (28 Nov 2024 11:30)      PHYSICAL EXAM:  T(C): 36.7 (11-28-24 @ 05:01), Max: 37.2 (11-27-24 @ 20:18)  HR: 85 (11-28-24 @ 05:01) (71 - 85)  BP: 131/70 (11-28-24 @ 05:01) (123/67 - 147/83)  RR: 18 (11-28-24 @ 05:01) (18 - 18)  SpO2: 97% (11-28-24 @ 05:01) (97% - 100%)    GENERAL: NAD, well-developed, 79y  RESPIRATORY: Clear to auscultation bilaterally  CARDIOVASCULAR: Regular rate and rhythm  GASTROINTESTINAL: Abdomen Soft, Nontender, Nondistended  MUSCULOSKELETAL:  No cyanosis, Chronic LE changes, wrapped in bandage  PSYCH: AAOx3, affect appropriate  NEUROLOGY: non-focal, cognition grossly intact, MAEE    ADMISSION SUMMARY  Patient is a 79y old Male who presents with a chief complaint of

## 2024-11-28 NOTE — PROGRESS NOTE ADULT - ASSESSMENT
79 year old with PMHx DM, HLD,  recent multiple admissions for left leg cellulitis presents s/p fall last night and ambulatory dysfunction.    # Dizziness  # S/p fall at home  - CTH performed, f/u read  - EKG afib on admission  - monitor on telemetry  - cardiology eval  - fall precautions  - PT/physiatry  - check orthostatics  - routine uziel checks    #Chronic B/L Cellulitis on chronic venous stasis  11/1 WCx LLE : Fusarium, C parapsilosis, Pseudomonas putida. Independent analysis of BCX results and interpretation of sensitivity results.  11/15 BCx NG  - finished course of Levofloxacin 500mg QD and Voriconazole 200mg BID end date 11/27  - wound care w/ Sulfadine and kerlix, daily dressing changes  - pain control    # Hyponatremia  # Hyperkalemia  - lokelma x1 dose  - encourage PO hydration  - monitor BMP    #RENEA  - improving  - monitor Cr    # Leukocytosis, trending down  - will monitor    #Chronic Macrocytic Anemia  - Hgb stable  - will trend    #Hx of DM2  - however, last  A1C 5.5 on 10/22  - on Januvia at home  - will start [facility] inpatient     #HLD  - continue statin    # Simple renal cysts  - outpatient follow up with urology    Misc:  DVT ppx: heparin subQ  GI ppx: Protonix  Diet: CC  Activity: IAT  Code: Full Code  Dispo: Acute

## 2024-11-28 NOTE — PHYSICAL THERAPY INITIAL EVALUATION ADULT - GENERAL OBSERVATIONS, REHAB EVAL
16:00-16:25. Chart reviewed; confirmed with RN to see the pt for PT. Pt ready for PT; received in chair with no complain of pain and in NAD. +hep lock, +tele. Agreeable for PT evaluation.

## 2024-11-28 NOTE — PHYSICAL THERAPY INITIAL EVALUATION ADULT - WEIGHT-BEARING RESTRICTIONS: GAIT, REHAB EVAL
6/10/2025    Return to ***    Name: Sasha Thurston        : 1981    To Whom It May Concern,    Sasha Thurston had surgery on *** and is:    {Return to School/Work:3327}    The patient may return to work on ***.    If there are any further questions, regarding this patient's care, please contact the patient directly.    Sincerely,    Mateo Walton MD  
full weight-bearing

## 2024-11-28 NOTE — CONSULT NOTE ADULT - NS ATTEND AMEND GEN_ALL_CORE FT
79 year old with PMHx DM, HLD, recent multiple admissions for left leg cellulitis presents to ED s/p fall last night. Cardiology consulted for possible new onset Afib on ECG No symptoms now. He denies palpitations. Possible brief afib. Not on monitor. Monitor. Check ortho bp. Ambulate with assistance. Possible out patient [facility]. Outpatient F/U re [facility].

## 2024-11-28 NOTE — PHYSICAL THERAPY INITIAL EVALUATION ADULT - LIVES WITH, PROFILE
in a private home with 5 steps to enter with L handrail going up and 10 steps inside with L handrail going up./alone

## 2024-11-29 LAB
ALBUMIN SERPL ELPH-MCNC: 3.7 G/DL — SIGNIFICANT CHANGE UP (ref 3.5–5.2)
ALP SERPL-CCNC: 174 U/L — HIGH (ref 30–115)
ALT FLD-CCNC: 11 U/L — SIGNIFICANT CHANGE UP (ref 0–41)
ANION GAP SERPL CALC-SCNC: 13 MMOL/L — SIGNIFICANT CHANGE UP (ref 7–14)
AST SERPL-CCNC: 27 U/L — SIGNIFICANT CHANGE UP (ref 0–41)
BASOPHILS # BLD AUTO: 0.11 K/UL — SIGNIFICANT CHANGE UP (ref 0–0.2)
BASOPHILS NFR BLD AUTO: 0.9 % — SIGNIFICANT CHANGE UP (ref 0–1)
BILIRUB SERPL-MCNC: 0.4 MG/DL — SIGNIFICANT CHANGE UP (ref 0.2–1.2)
BUN SERPL-MCNC: 17 MG/DL — SIGNIFICANT CHANGE UP (ref 10–20)
CALCIUM SERPL-MCNC: 8.7 MG/DL — SIGNIFICANT CHANGE UP (ref 8.4–10.5)
CHLORIDE SERPL-SCNC: 95 MMOL/L — LOW (ref 98–110)
CO2 SERPL-SCNC: 23 MMOL/L — SIGNIFICANT CHANGE UP (ref 17–32)
CREAT SERPL-MCNC: 1 MG/DL — SIGNIFICANT CHANGE UP (ref 0.7–1.5)
EGFR: 76 ML/MIN/1.73M2 — SIGNIFICANT CHANGE UP
EOSINOPHIL # BLD AUTO: 0.26 K/UL — SIGNIFICANT CHANGE UP (ref 0–0.7)
EOSINOPHIL NFR BLD AUTO: 2.1 % — SIGNIFICANT CHANGE UP (ref 0–8)
GLUCOSE BLDC GLUCOMTR-MCNC: 78 MG/DL — SIGNIFICANT CHANGE UP (ref 70–99)
GLUCOSE BLDC GLUCOMTR-MCNC: 94 MG/DL — SIGNIFICANT CHANGE UP (ref 70–99)
GLUCOSE BLDC GLUCOMTR-MCNC: 96 MG/DL — SIGNIFICANT CHANGE UP (ref 70–99)
GLUCOSE BLDC GLUCOMTR-MCNC: 98 MG/DL — SIGNIFICANT CHANGE UP (ref 70–99)
GLUCOSE SERPL-MCNC: 74 MG/DL — SIGNIFICANT CHANGE UP (ref 70–99)
HCT VFR BLD CALC: 28.4 % — LOW (ref 42–52)
HGB BLD-MCNC: 8.9 G/DL — LOW (ref 14–18)
IMM GRANULOCYTES NFR BLD AUTO: 2.5 % — HIGH (ref 0.1–0.3)
LYMPHOCYTES # BLD AUTO: 16.5 % — LOW (ref 20.5–51.1)
LYMPHOCYTES # BLD AUTO: 2.04 K/UL — SIGNIFICANT CHANGE UP (ref 1.2–3.4)
MAGNESIUM SERPL-MCNC: 2.2 MG/DL — SIGNIFICANT CHANGE UP (ref 1.8–2.4)
MCHC RBC-ENTMCNC: 29.4 PG — SIGNIFICANT CHANGE UP (ref 27–31)
MCHC RBC-ENTMCNC: 31.3 G/DL — LOW (ref 32–37)
MCV RBC AUTO: 93.7 FL — SIGNIFICANT CHANGE UP (ref 80–94)
MONOCYTES # BLD AUTO: 1.42 K/UL — HIGH (ref 0.1–0.6)
MONOCYTES NFR BLD AUTO: 11.5 % — HIGH (ref 1.7–9.3)
NEUTROPHILS # BLD AUTO: 8.24 K/UL — HIGH (ref 1.4–6.5)
NEUTROPHILS NFR BLD AUTO: 66.5 % — SIGNIFICANT CHANGE UP (ref 42.2–75.2)
NRBC # BLD: 0 /100 WBCS — SIGNIFICANT CHANGE UP (ref 0–0)
PLATELET # BLD AUTO: 499 K/UL — HIGH (ref 130–400)
PMV BLD: 8.8 FL — SIGNIFICANT CHANGE UP (ref 7.4–10.4)
POTASSIUM SERPL-MCNC: 4.5 MMOL/L — SIGNIFICANT CHANGE UP (ref 3.5–5)
POTASSIUM SERPL-SCNC: 4.5 MMOL/L — SIGNIFICANT CHANGE UP (ref 3.5–5)
PROT SERPL-MCNC: 7 G/DL — SIGNIFICANT CHANGE UP (ref 6–8)
RBC # BLD: 3.03 M/UL — LOW (ref 4.7–6.1)
RBC # FLD: 15.9 % — HIGH (ref 11.5–14.5)
SODIUM SERPL-SCNC: 131 MMOL/L — LOW (ref 135–146)
WBC # BLD: 12.38 K/UL — HIGH (ref 4.8–10.8)
WBC # FLD AUTO: 12.38 K/UL — HIGH (ref 4.8–10.8)

## 2024-11-29 PROCEDURE — 99222 1ST HOSP IP/OBS MODERATE 55: CPT

## 2024-11-29 PROCEDURE — 99233 SBSQ HOSP IP/OBS HIGH 50: CPT

## 2024-11-29 RX ORDER — SODIUM CHLORIDE 9 MG/ML
1000 INJECTION, SOLUTION INTRAMUSCULAR; INTRAVENOUS; SUBCUTANEOUS
Refills: 0 | Status: DISCONTINUED | OUTPATIENT
Start: 2024-11-29 | End: 2024-11-29

## 2024-11-29 RX ADMIN — SODIUM CHLORIDE 75 MILLILITER(S): 9 INJECTION, SOLUTION INTRAMUSCULAR; INTRAVENOUS; SUBCUTANEOUS at 05:08

## 2024-11-29 RX ADMIN — Medication 2 MILLIGRAM(S): at 20:30

## 2024-11-29 RX ADMIN — Medication 2 MILLIGRAM(S): at 03:12

## 2024-11-29 RX ADMIN — Medication 5000 UNIT(S): at 05:09

## 2024-11-29 RX ADMIN — ACETAMINOPHEN 500MG 650 MILLIGRAM(S): 500 TABLET, COATED ORAL at 21:12

## 2024-11-29 RX ADMIN — Medication 5000 UNIT(S): at 17:08

## 2024-11-29 RX ADMIN — Medication 1000 MICROGRAM(S): at 12:07

## 2024-11-29 RX ADMIN — Medication 20 MILLIGRAM(S): at 21:12

## 2024-11-29 RX ADMIN — Medication 2 MILLIGRAM(S): at 19:32

## 2024-11-29 RX ADMIN — Medication 1 APPLICATION(S): at 05:09

## 2024-11-29 RX ADMIN — Medication 1 APPLICATION(S): at 17:08

## 2024-11-29 RX ADMIN — ACETAMINOPHEN 500MG 650 MILLIGRAM(S): 500 TABLET, COATED ORAL at 22:00

## 2024-11-29 RX ADMIN — Medication 2 MILLIGRAM(S): at 02:19

## 2024-11-29 RX ADMIN — PANTOPRAZOLE SODIUM 40 MILLIGRAM(S): 40 TABLET, DELAYED RELEASE ORAL at 05:09

## 2024-11-29 NOTE — CHART NOTE - NSCHARTNOTEFT_GEN_A_CORE
No acute intervention from vascular surgery for this patient. Please recall as needed and continue care per primary team.
left vm 11/27 - DK / pt admitted in hospital 11/29 - DK (PCP & Vascular Referral)

## 2024-11-29 NOTE — PROGRESS NOTE ADULT - ASSESSMENT
79 year old with PMHx DM, HLD,  recent multiple admissions for left leg cellulitis presents s/p fall last night and ambulatory dysfunction.    # Dizziness  # S/p fall at home  - CTH negative  - EKG afib on admission, repeat NSR w/ 1st degree AV block  - monitor on telemetry  - cardiology eval noted  - check orthostatics  - consider [facility] outpatient  - fall precautions  - PT/physiatry    #Chronic B/L Cellulitis on chronic venous stasis  11/1 WCx LLE : Fusarium, C parapsilosis, Pseudomonas putida. Independent analysis of BCX results and interpretation of sensitivity results.  11/15 BCx NG  - finished course of Levofloxacin 500mg QD and Voriconazole 200mg BID end date 11/27  - wound care w/ Sulfadine and kerlix, daily dressing changes  - pain control    # Hyponatremia  # Hyperkalemia  - lokelma x1 dose  - encourage PO hydration  - monitor BMP    #RENEA  - improving  - monitor Cr    # Leukocytosis, trending down  - will monitor    #Chronic Macrocytic Anemia  - Hgb stable  - will trend    #Hx of DM2  - however, last  A1C 5.5 on 10/22  - on Januvia at home  - will start ISS inpatient     #HLD  - continue statin    # Simple renal cysts  - outpatient follow up with urology    Misc:  DVT ppx: heparin subQ  GI ppx: Protonix  Diet: CC  Activity: IAT  Code: Full Code  Dispo: Acute   79 year old with PMHx DM, HLD,  recent multiple admissions for left leg cellulitis presents s/p fall last night and ambulatory dysfunction.    # Dizziness  # S/p fall at home  - CTH negative  - EKG afib on admission, repeat NSR w/ 1st degree AV block  - monitor on telemetry  - cardiology eval noted  - orthostatic negative  - consider [facility] outpatient  - fall precautions  - PT eval -> rehab placement    #Chronic B/L Cellulitis on chronic venous stasis  11/1 WCx LLE : Fusarium, C parapsilosis, Pseudomonas putida. Independent analysis of BCX results and interpretation of sensitivity results.  11/15 BCx NG  - finished course of Levofloxacin 500mg QD and Voriconazole 200mg BID end date 11/27  - wound care w/ Sulfadine and kerlix, daily dressing changes  - pain control    # Hyponatremia  # Hyperkalemia  - lokelma x1 dose  - encourage PO hydration  - monitor BMP    #RENEA  - improving  - monitor Cr    # Leukocytosis, trending down  - will monitor    #Chronic Macrocytic Anemia  - Hgb stable  - will trend    #Hx of DM2  - however, last  A1C 5.5 on 10/22  - on Januvia at home  - will start [facility] inpatient     #HLD  - continue statin    # Simple renal cysts  - outpatient follow up with urology    Misc:  DVT ppx: heparin subQ  GI ppx: Protonix  Diet: CC  Activity: IAT  Code: Full Code  Dispo: tele, anticipate dc in 24 hours

## 2024-11-29 NOTE — PROGRESS NOTE ADULT - SUBJECTIVE AND OBJECTIVE BOX
LEENA BAILEY 79y Male  MRN#: [id number]     SUBJECTIVE  Patient is a 79y old Male who presents with a chief complaint of   Interval/Overnight Events:    Today is hospital day 2d, and this morning he is lying in bed without distress.   No acute overnight events.     OBJECTIVE  PAST MEDICAL & SURGICAL HISTORY  Diabetes    HLD (hyperlipidemia)    BPH without urinary obstruction    S/P cataract surgery      ALLERGIES:  No Known Allergies    MEDICATIONS:  STANDING MEDICATIONS  atorvastatin 20 milliGRAM(s) Oral at bedtime  cyanocobalamin 1000 MICROGram(s) Oral daily  dextrose 5%. 1000 milliLiter(s) IV Continuous <Continuous>  dextrose 5%. 1000 milliLiter(s) IV Continuous <Continuous>  dextrose 50% Injectable 25 Gram(s) IV Push once  dextrose 50% Injectable 12.5 Gram(s) IV Push once  dextrose 50% Injectable 25 Gram(s) IV Push once  glucagon  Injectable 1 milliGRAM(s) IntraMuscular once  heparin   Injectable 5000 Unit(s) SubCutaneous every 12 hours  influenza  Vaccine (HIGH DOSE) 0.5 milliLiter(s) IntraMuscular once  insulin lispro (ADMELOG) corrective regimen sliding scale   SubCutaneous three times a day before meals  pantoprazole    Tablet 40 milliGRAM(s) Oral before breakfast  senna 1 Tablet(s) Oral at bedtime  silver sulfADIAZINE 1% Cream 1 Application(s) Topical two times a day  sodium chloride 0.9%. 1000 milliLiter(s) IV Continuous <Continuous>    PRN MEDICATIONS  acetaminophen     Tablet .. 650 milliGRAM(s) Oral every 6 hours PRN  dextrose Oral Gel 15 Gram(s) Oral once PRN  morphine  - Injectable 2 milliGRAM(s) IV Push every 6 hours PRN  oxycodone    5 mG/acetaminophen 325 mG 0.5 Tablet(s) Oral every 6 hours PRN  polyethylene glycol 3350 17 Gram(s) Oral daily PRN    HOME MEDICATIONS  Home Medications:  atorvastatin 20 mg oral tablet: 1 tab(s) orally once a day (15 Nov 2024 16:39)  Januvia 100 mg oral tablet: 1 tab(s) orally once a day (15 Nov 2024 16:13)      LABS:                        8.9    12.38 )-----------( 499      ( 29 Nov 2024 08:28 )             28.4     11-29    131[L]  |  95[L]  |  17  ----------------------------<  74  4.5   |  23  |  1.0    Ca    8.7      29 Nov 2024 08:28  Mg     2.2     11-29    TPro  7.0  /  Alb  3.7  /  TBili  0.4  /  DBili  x   /  AST  27  /  ALT  11  /  AlkPhos  174[H]  11-29    LIVER FUNCTIONS - ( 29 Nov 2024 08:28 )  Alb: 3.7 g/dL / Pro: 7.0 g/dL / ALK PHOS: 174 U/L / ALT: 11 U/L / AST: 27 U/L / GGT: x             Urinalysis Basic - ( 29 Nov 2024 08:28 )    Color: x / Appearance: x / SG: x / pH: x  Gluc: 74 mg/dL / Ketone: x  / Bili: x / Urobili: x   Blood: x / Protein: x / Nitrite: x   Leuk Esterase: x / RBC: x / WBC x   Sq Epi: x / Non Sq Epi: x / Bacteria: x                CAPILLARY BLOOD GLUCOSE      POCT Blood Glucose.: 78 mg/dL (29 Nov 2024 07:48)      PHYSICAL EXAM:  T(C): 36.9 (11-29-24 @ 05:02), Max: 37.1 (11-28-24 @ 20:32)  HR: 60 (11-29-24 @ 05:02) (60 - 69)  BP: 144/80 (11-29-24 @ 05:02) (105/56 - 144/80)  RR: 18 (11-29-24 @ 05:02) (18 - 18)  SpO2: 99% (11-29-24 @ 09:31) (96% - 99%)    GENERAL: NAD, well-developed, 79y  RESPIRATORY: Clear to auscultation bilaterally  CARDIOVASCULAR: Regular rate and rhythm  GASTROINTESTINAL: Abdomen Soft, Nontender, Nondistended  MUSCULOSKELETAL:  No cyanosis, Chronic LE changes, wrapped in bandage  PSYCH: AAOx3, affect appropriate  NEUROLOGY: non-focal, cognition grossly intact, MAEE    ADMISSION SUMMARY  Patient is a 79y old Male who presents with a chief complaint of   LEENA BAILEY 79y Male  MRN#: [id number]     SUBJECTIVE  Patient is a 79y old Male who presents with a chief complaint of   Interval/Overnight Events:    Today is hospital day 2d, and this morning he is lying in bed without distress.   No acute overnight events.     Episodes of Bradycardia on telemetry monitor. Pt asymptomatic.     OBJECTIVE  PAST MEDICAL & SURGICAL HISTORY  Diabetes    HLD (hyperlipidemia)    BPH without urinary obstruction    S/P cataract surgery      ALLERGIES:  No Known Allergies    MEDICATIONS:  STANDING MEDICATIONS  atorvastatin 20 milliGRAM(s) Oral at bedtime  cyanocobalamin 1000 MICROGram(s) Oral daily  dextrose 5%. 1000 milliLiter(s) IV Continuous <Continuous>  dextrose 5%. 1000 milliLiter(s) IV Continuous <Continuous>  dextrose 50% Injectable 25 Gram(s) IV Push once  dextrose 50% Injectable 12.5 Gram(s) IV Push once  dextrose 50% Injectable 25 Gram(s) IV Push once  glucagon  Injectable 1 milliGRAM(s) IntraMuscular once  heparin   Injectable 5000 Unit(s) SubCutaneous every 12 hours  influenza  Vaccine (HIGH DOSE) 0.5 milliLiter(s) IntraMuscular once  insulin lispro (ADMELOG) corrective regimen sliding scale   SubCutaneous three times a day before meals  pantoprazole    Tablet 40 milliGRAM(s) Oral before breakfast  senna 1 Tablet(s) Oral at bedtime  silver sulfADIAZINE 1% Cream 1 Application(s) Topical two times a day  sodium chloride 0.9%. 1000 milliLiter(s) IV Continuous <Continuous>    PRN MEDICATIONS  acetaminophen     Tablet .. 650 milliGRAM(s) Oral every 6 hours PRN  dextrose Oral Gel 15 Gram(s) Oral once PRN  morphine  - Injectable 2 milliGRAM(s) IV Push every 6 hours PRN  oxycodone    5 mG/acetaminophen 325 mG 0.5 Tablet(s) Oral every 6 hours PRN  polyethylene glycol 3350 17 Gram(s) Oral daily PRN    HOME MEDICATIONS  Home Medications:  atorvastatin 20 mg oral tablet: 1 tab(s) orally once a day (15 Nov 2024 16:39)  Januvia 100 mg oral tablet: 1 tab(s) orally once a day (15 Nov 2024 16:13)      LABS:                        8.9    12.38 )-----------( 499      ( 29 Nov 2024 08:28 )             28.4     11-29    131[L]  |  95[L]  |  17  ----------------------------<  74  4.5   |  23  |  1.0    Ca    8.7      29 Nov 2024 08:28  Mg     2.2     11-29    TPro  7.0  /  Alb  3.7  /  TBili  0.4  /  DBili  x   /  AST  27  /  ALT  11  /  AlkPhos  174[H]  11-29    LIVER FUNCTIONS - ( 29 Nov 2024 08:28 )  Alb: 3.7 g/dL / Pro: 7.0 g/dL / ALK PHOS: 174 U/L / ALT: 11 U/L / AST: 27 U/L / GGT: x             Urinalysis Basic - ( 29 Nov 2024 08:28 )    Color: x / Appearance: x / SG: x / pH: x  Gluc: 74 mg/dL / Ketone: x  / Bili: x / Urobili: x   Blood: x / Protein: x / Nitrite: x   Leuk Esterase: x / RBC: x / WBC x   Sq Epi: x / Non Sq Epi: x / Bacteria: x                CAPILLARY BLOOD GLUCOSE      POCT Blood Glucose.: 78 mg/dL (29 Nov 2024 07:48)      PHYSICAL EXAM:  T(C): 36.9 (11-29-24 @ 05:02), Max: 37.1 (11-28-24 @ 20:32)  HR: 60 (11-29-24 @ 05:02) (60 - 69)  BP: 144/80 (11-29-24 @ 05:02) (105/56 - 144/80)  RR: 18 (11-29-24 @ 05:02) (18 - 18)  SpO2: 99% (11-29-24 @ 09:31) (96% - 99%)    GENERAL: NAD, well-developed, 79y  RESPIRATORY: Clear to auscultation bilaterally  CARDIOVASCULAR: Regular rate and rhythm  GASTROINTESTINAL: Abdomen Soft, Nontender, Nondistended  MUSCULOSKELETAL:  No cyanosis, Chronic LE changes, wrapped in bandage  PSYCH: AAOx3, affect appropriate  NEUROLOGY: non-focal, cognition grossly intact, MAEE    ADMISSION SUMMARY  Patient is a 79y old Male who presents with a chief complaint of

## 2024-11-30 LAB
ANION GAP SERPL CALC-SCNC: 11 MMOL/L — SIGNIFICANT CHANGE UP (ref 7–14)
BUN SERPL-MCNC: 20 MG/DL — SIGNIFICANT CHANGE UP (ref 10–20)
CALCIUM SERPL-MCNC: 9 MG/DL — SIGNIFICANT CHANGE UP (ref 8.4–10.5)
CHLORIDE SERPL-SCNC: 98 MMOL/L — SIGNIFICANT CHANGE UP (ref 98–110)
CO2 SERPL-SCNC: 25 MMOL/L — SIGNIFICANT CHANGE UP (ref 17–32)
CREAT SERPL-MCNC: 1 MG/DL — SIGNIFICANT CHANGE UP (ref 0.7–1.5)
EGFR: 76 ML/MIN/1.73M2 — SIGNIFICANT CHANGE UP
GLUCOSE BLDC GLUCOMTR-MCNC: 102 MG/DL — HIGH (ref 70–99)
GLUCOSE BLDC GLUCOMTR-MCNC: 106 MG/DL — HIGH (ref 70–99)
GLUCOSE BLDC GLUCOMTR-MCNC: 112 MG/DL — HIGH (ref 70–99)
GLUCOSE BLDC GLUCOMTR-MCNC: 114 MG/DL — HIGH (ref 70–99)
GLUCOSE SERPL-MCNC: 95 MG/DL — SIGNIFICANT CHANGE UP (ref 70–99)
HCT VFR BLD CALC: 28.4 % — LOW (ref 42–52)
HGB BLD-MCNC: 8.8 G/DL — LOW (ref 14–18)
MCHC RBC-ENTMCNC: 29.2 PG — SIGNIFICANT CHANGE UP (ref 27–31)
MCHC RBC-ENTMCNC: 31 G/DL — LOW (ref 32–37)
MCV RBC AUTO: 94.4 FL — HIGH (ref 80–94)
NRBC # BLD: 0 /100 WBCS — SIGNIFICANT CHANGE UP (ref 0–0)
PLATELET # BLD AUTO: 494 K/UL — HIGH (ref 130–400)
PMV BLD: 9.2 FL — SIGNIFICANT CHANGE UP (ref 7.4–10.4)
POTASSIUM SERPL-MCNC: 5.1 MMOL/L — HIGH (ref 3.5–5)
POTASSIUM SERPL-SCNC: 5.1 MMOL/L — HIGH (ref 3.5–5)
RBC # BLD: 3.01 M/UL — LOW (ref 4.7–6.1)
RBC # FLD: 15.9 % — HIGH (ref 11.5–14.5)
SODIUM SERPL-SCNC: 134 MMOL/L — LOW (ref 135–146)
WBC # BLD: 10.47 K/UL — SIGNIFICANT CHANGE UP (ref 4.8–10.8)
WBC # FLD AUTO: 10.47 K/UL — SIGNIFICANT CHANGE UP (ref 4.8–10.8)

## 2024-11-30 PROCEDURE — 99232 SBSQ HOSP IP/OBS MODERATE 35: CPT

## 2024-11-30 RX ORDER — TRAMADOL HYDROCHLORIDE 300 MG/1
50 CAPSULE ORAL EVERY 6 HOURS
Refills: 0 | Status: DISCONTINUED | OUTPATIENT
Start: 2024-11-30 | End: 2024-12-06

## 2024-11-30 RX ADMIN — Medication 2 MILLIGRAM(S): at 03:41

## 2024-11-30 RX ADMIN — Medication 2 MILLIGRAM(S): at 02:21

## 2024-11-30 RX ADMIN — TRAMADOL HYDROCHLORIDE 50 MILLIGRAM(S): 300 CAPSULE ORAL at 12:32

## 2024-11-30 RX ADMIN — Medication 5000 UNIT(S): at 17:18

## 2024-11-30 RX ADMIN — TRAMADOL HYDROCHLORIDE 50 MILLIGRAM(S): 300 CAPSULE ORAL at 20:04

## 2024-11-30 RX ADMIN — Medication 1000 MICROGRAM(S): at 12:30

## 2024-11-30 RX ADMIN — Medication 20 MILLIGRAM(S): at 21:12

## 2024-11-30 RX ADMIN — TRAMADOL HYDROCHLORIDE 50 MILLIGRAM(S): 300 CAPSULE ORAL at 02:00

## 2024-11-30 RX ADMIN — Medication 5000 UNIT(S): at 05:56

## 2024-11-30 RX ADMIN — ACETAMINOPHEN 500MG 650 MILLIGRAM(S): 500 TABLET, COATED ORAL at 22:50

## 2024-11-30 RX ADMIN — Medication 1 APPLICATION(S): at 05:58

## 2024-11-30 RX ADMIN — TRAMADOL HYDROCHLORIDE 50 MILLIGRAM(S): 300 CAPSULE ORAL at 13:18

## 2024-11-30 RX ADMIN — PANTOPRAZOLE SODIUM 40 MILLIGRAM(S): 40 TABLET, DELAYED RELEASE ORAL at 05:57

## 2024-11-30 RX ADMIN — ACETAMINOPHEN 500MG 650 MILLIGRAM(S): 500 TABLET, COATED ORAL at 21:12

## 2024-11-30 RX ADMIN — Medication 1 APPLICATION(S): at 17:14

## 2024-11-30 NOTE — PROGRESS NOTE ADULT - SUBJECTIVE AND OBJECTIVE BOX
LEENA BAILEY 79y Male  MRN#: [id number]     SUBJECTIVE  Patient is a 79y old Male who presents with a chief complaint of   Interval/Overnight Events:    Today is hospital day 3d, and this morning he is lying in bed without distress.   No acute overnight events.     OBJECTIVE  PAST MEDICAL & SURGICAL HISTORY  Diabetes    HLD (hyperlipidemia)    BPH without urinary obstruction    S/P cataract surgery      ALLERGIES:  No Known Allergies    MEDICATIONS:  STANDING MEDICATIONS  atorvastatin 20 milliGRAM(s) Oral at bedtime  cyanocobalamin 1000 MICROGram(s) Oral daily  dextrose 5%. 1000 milliLiter(s) IV Continuous <Continuous>  dextrose 5%. 1000 milliLiter(s) IV Continuous <Continuous>  dextrose 50% Injectable 25 Gram(s) IV Push once  dextrose 50% Injectable 12.5 Gram(s) IV Push once  dextrose 50% Injectable 25 Gram(s) IV Push once  glucagon  Injectable 1 milliGRAM(s) IntraMuscular once  heparin   Injectable 5000 Unit(s) SubCutaneous every 12 hours  influenza  Vaccine (HIGH DOSE) 0.5 milliLiter(s) IntraMuscular once  insulin lispro (ADMELOG) corrective regimen sliding scale   SubCutaneous three times a day before meals  pantoprazole    Tablet 40 milliGRAM(s) Oral before breakfast  senna 1 Tablet(s) Oral at bedtime  silver sulfADIAZINE 1% Cream 1 Application(s) Topical two times a day    PRN MEDICATIONS  acetaminophen     Tablet .. 650 milliGRAM(s) Oral every 6 hours PRN  dextrose Oral Gel 15 Gram(s) Oral once PRN  morphine  - Injectable 2 milliGRAM(s) IV Push every 6 hours PRN  polyethylene glycol 3350 17 Gram(s) Oral daily PRN  traMADol 50 milliGRAM(s) Oral every 6 hours PRN    HOME MEDICATIONS  Home Medications:  atorvastatin 20 mg oral tablet: 1 tab(s) orally once a day (15 Nov 2024 16:39)  Januvia 100 mg oral tablet: 1 tab(s) orally once a day (15 Nov 2024 16:13)      LABS:                        8.8    10.47 )-----------( 494      ( 30 Nov 2024 08:06 )             28.4     11-30    134[L]  |  98  |  20  ----------------------------<  95  5.1[H]   |  25  |  1.0    Ca    9.0      30 Nov 2024 08:06  Mg     2.2     11-29    TPro  7.0  /  Alb  3.7  /  TBili  0.4  /  DBili  x   /  AST  27  /  ALT  11  /  AlkPhos  174[H]  11-29    LIVER FUNCTIONS - ( 29 Nov 2024 08:28 )  Alb: 3.7 g/dL / Pro: 7.0 g/dL / ALK PHOS: 174 U/L / ALT: 11 U/L / AST: 27 U/L / GGT: x             Urinalysis Basic - ( 30 Nov 2024 08:06 )    Color: x / Appearance: x / SG: x / pH: x  Gluc: 95 mg/dL / Ketone: x  / Bili: x / Urobili: x   Blood: x / Protein: x / Nitrite: x   Leuk Esterase: x / RBC: x / WBC x   Sq Epi: x / Non Sq Epi: x / Bacteria: x                CAPILLARY BLOOD GLUCOSE      POCT Blood Glucose.: 114 mg/dL (30 Nov 2024 11:46)      PHYSICAL EXAM:  T(C): 36.8 (11-30-24 @ 05:01), Max: 37.1 (11-29-24 @ 20:20)  HR: 63 (11-30-24 @ 05:01) (59 - 67)  BP: 104/63 (11-30-24 @ 05:01) (104/63 - 123/76)  RR: 16 (11-30-24 @ 05:01) (16 - 16)  SpO2: 98% (11-30-24 @ 05:01) (97% - 98%)    GENERAL: NAD, well-developed, 79y  RESPIRATORY: Clear to auscultation bilaterally  CARDIOVASCULAR: Regular rate and rhythm  GASTROINTESTINAL: Abdomen Soft, Nontender, Nondistended  MUSCULOSKELETAL:  No cyanosis, Chronic LE changes, wrapped in bandage  PSYCH: AAOx3, affect appropriate  NEUROLOGY: non-focal, cognition grossly intact, MAEE      ADMISSION SUMMARY  Patient is a 79y old Male who presents with a chief complaint of

## 2024-11-30 NOTE — PROGRESS NOTE ADULT - ASSESSMENT
79 year old with PMHx DM, HLD,  recent multiple admissions for left leg cellulitis presents s/p fall last night and ambulatory dysfunction.    # Dizziness  # S/p fall at home  - CTH negative  - EKG afib on admission, repeat NSR w/ 1st degree AV block  - cardiology eval noted  - orthostatic negative  - consider [facility] outpatient  - fall precautions  - PT eval -> rehab placement    #Chronic B/L Cellulitis on chronic venous stasis  11/1 WCx LLE : Fusarium, C parapsilosis, Pseudomonas putida. Independent analysis of BCX results and interpretation of sensitivity results.  11/15 BCx NG  - finished course of Levofloxacin 500mg QD and Voriconazole 200mg BID end date 11/27  - wound care w/ Sulfadine and kerlix, daily dressing changes  - pain control    # Hyponatremia  # Hyperkalemia  - lokelma x1 dose  - encourage PO hydration  - monitor BMP    #RENEA  - improving  - monitor Cr    # Leukocytosis, trending down  - will monitor    #Chronic Macrocytic Anemia  - Hgb stable  - will trend    #Hx of DM2  - however, last  A1C 5.5 on 10/22  - on Januvia at home  - will start [facility] inpatient     #HLD  - continue statin    # Simple renal cysts  - outpatient follow up with urology    Misc:  DVT ppx: heparin subQ  GI ppx: Protonix  Diet: CC  Activity: IAT  Code: Full Code  Dispo: dc planning to rehab

## 2024-12-01 LAB
GLUCOSE BLDC GLUCOMTR-MCNC: 103 MG/DL — HIGH (ref 70–99)
GLUCOSE BLDC GLUCOMTR-MCNC: 105 MG/DL — HIGH (ref 70–99)
GLUCOSE BLDC GLUCOMTR-MCNC: 107 MG/DL — HIGH (ref 70–99)
GLUCOSE BLDC GLUCOMTR-MCNC: 125 MG/DL — HIGH (ref 70–99)

## 2024-12-01 PROCEDURE — 99232 SBSQ HOSP IP/OBS MODERATE 35: CPT

## 2024-12-01 RX ADMIN — Medication 1 APPLICATION(S): at 17:56

## 2024-12-01 RX ADMIN — Medication 5000 UNIT(S): at 17:55

## 2024-12-01 RX ADMIN — ACETAMINOPHEN 500MG 650 MILLIGRAM(S): 500 TABLET, COATED ORAL at 21:11

## 2024-12-01 RX ADMIN — PANTOPRAZOLE SODIUM 40 MILLIGRAM(S): 40 TABLET, DELAYED RELEASE ORAL at 05:13

## 2024-12-01 RX ADMIN — Medication 1 APPLICATION(S): at 05:16

## 2024-12-01 RX ADMIN — Medication 5000 UNIT(S): at 05:13

## 2024-12-01 RX ADMIN — Medication 20 MILLIGRAM(S): at 21:11

## 2024-12-01 RX ADMIN — TRAMADOL HYDROCHLORIDE 50 MILLIGRAM(S): 300 CAPSULE ORAL at 02:05

## 2024-12-01 RX ADMIN — ACETAMINOPHEN 500MG 650 MILLIGRAM(S): 500 TABLET, COATED ORAL at 04:15

## 2024-12-01 RX ADMIN — Medication 1000 MICROGRAM(S): at 11:36

## 2024-12-01 RX ADMIN — TRAMADOL HYDROCHLORIDE 50 MILLIGRAM(S): 300 CAPSULE ORAL at 18:38

## 2024-12-01 RX ADMIN — TRAMADOL HYDROCHLORIDE 50 MILLIGRAM(S): 300 CAPSULE ORAL at 03:15

## 2024-12-01 RX ADMIN — ACETAMINOPHEN 500MG 650 MILLIGRAM(S): 500 TABLET, COATED ORAL at 03:45

## 2024-12-01 RX ADMIN — TRAMADOL HYDROCHLORIDE 50 MILLIGRAM(S): 300 CAPSULE ORAL at 11:35

## 2024-12-01 RX ADMIN — TRAMADOL HYDROCHLORIDE 50 MILLIGRAM(S): 300 CAPSULE ORAL at 12:59

## 2024-12-01 RX ADMIN — TRAMADOL HYDROCHLORIDE 50 MILLIGRAM(S): 300 CAPSULE ORAL at 17:55

## 2024-12-01 NOTE — PROGRESS NOTE ADULT - SUBJECTIVE AND OBJECTIVE BOX
LEENA BAILEY 79y Male  MRN#: [id number]     SUBJECTIVE  Patient is a 79y old Male who presents with a chief complaint of   Interval/Overnight Events:    Today is hospital day 4d, and this morning he is lying in bed without distress.   No acute overnight events.     OBJECTIVE  PAST MEDICAL & SURGICAL HISTORY  Diabetes    HLD (hyperlipidemia)    BPH without urinary obstruction    S/P cataract surgery      ALLERGIES:  No Known Allergies    MEDICATIONS:  STANDING MEDICATIONS  atorvastatin 20 milliGRAM(s) Oral at bedtime  cyanocobalamin 1000 MICROGram(s) Oral daily  dextrose 5%. 1000 milliLiter(s) IV Continuous <Continuous>  dextrose 5%. 1000 milliLiter(s) IV Continuous <Continuous>  dextrose 50% Injectable 25 Gram(s) IV Push once  dextrose 50% Injectable 12.5 Gram(s) IV Push once  dextrose 50% Injectable 25 Gram(s) IV Push once  glucagon  Injectable 1 milliGRAM(s) IntraMuscular once  heparin   Injectable 5000 Unit(s) SubCutaneous every 12 hours  influenza  Vaccine (HIGH DOSE) 0.5 milliLiter(s) IntraMuscular once  insulin lispro (ADMELOG) corrective regimen sliding scale   SubCutaneous three times a day before meals  pantoprazole    Tablet 40 milliGRAM(s) Oral before breakfast  senna 1 Tablet(s) Oral at bedtime  silver sulfADIAZINE 1% Cream 1 Application(s) Topical two times a day    PRN MEDICATIONS  acetaminophen     Tablet .. 650 milliGRAM(s) Oral every 6 hours PRN  dextrose Oral Gel 15 Gram(s) Oral once PRN  polyethylene glycol 3350 17 Gram(s) Oral daily PRN  traMADol 50 milliGRAM(s) Oral every 6 hours PRN    HOME MEDICATIONS  Home Medications:  atorvastatin 20 mg oral tablet: 1 tab(s) orally once a day (15 Nov 2024 16:39)  Januvia 100 mg oral tablet: 1 tab(s) orally once a day (15 Nov 2024 16:13)      LABS:                        8.8    10.47 )-----------( 494      ( 30 Nov 2024 08:06 )             28.4     11-30    134[L]  |  98  |  20  ----------------------------<  95  5.1[H]   |  25  |  1.0    Ca    9.0      30 Nov 2024 08:06  Mg     2.2     11-29    TPro  7.0  /  Alb  3.7  /  TBili  0.4  /  DBili  x   /  AST  27  /  ALT  11  /  AlkPhos  174[H]  11-29    LIVER FUNCTIONS - ( 29 Nov 2024 08:28 )  Alb: 3.7 g/dL / Pro: 7.0 g/dL / ALK PHOS: 174 U/L / ALT: 11 U/L / AST: 27 U/L / GGT: x             Urinalysis Basic - ( 30 Nov 2024 08:06 )    Color: x / Appearance: x / SG: x / pH: x  Gluc: 95 mg/dL / Ketone: x  / Bili: x / Urobili: x   Blood: x / Protein: x / Nitrite: x   Leuk Esterase: x / RBC: x / WBC x   Sq Epi: x / Non Sq Epi: x / Bacteria: x                CAPILLARY BLOOD GLUCOSE      POCT Blood Glucose.: 106 mg/dL (30 Nov 2024 20:54)      PHYSICAL EXAM:  T(C): 36.4 (12-01-24 @ 04:23), Max: 36.6 (11-30-24 @ 21:00)  HR: 60 (12-01-24 @ 04:23) (60 - 68)  BP: 137/74 (12-01-24 @ 04:23) (105/56 - 137/74)  RR: 18 (12-01-24 @ 04:23) (16 - 18)  SpO2: 99% (12-01-24 @ 04:23) (99% - 100%)    GENERAL: NAD, well-developed, 79y  RESPIRATORY: Clear to auscultation bilaterally  CARDIOVASCULAR: Regular rate and rhythm  GASTROINTESTINAL: Abdomen Soft, Nontender, Nondistended  MUSCULOSKELETAL:  No cyanosis, Chronic LE changes, wrapped in bandage  PSYCH: AAOx3, affect appropriate  NEUROLOGY: non-focal, cognition grossly intact, MAEE      ADMISSION SUMMARY  Patient is a 79y old Male who presents with a chief complaint of

## 2024-12-02 ENCOUNTER — TRANSCRIPTION ENCOUNTER (OUTPATIENT)
Age: 79
End: 2024-12-02

## 2024-12-02 DIAGNOSIS — L03.115 CELLULITIS OF RIGHT LOWER LIMB: ICD-10-CM

## 2024-12-02 DIAGNOSIS — I87.8 OTHER SPECIFIED DISORDERS OF VEINS: ICD-10-CM

## 2024-12-02 DIAGNOSIS — Z79.84 LONG TERM (CURRENT) USE OF ORAL HYPOGLYCEMIC DRUGS: ICD-10-CM

## 2024-12-02 DIAGNOSIS — E78.5 HYPERLIPIDEMIA, UNSPECIFIED: ICD-10-CM

## 2024-12-02 DIAGNOSIS — N40.0 BENIGN PROSTATIC HYPERPLASIA WITHOUT LOWER URINARY TRACT SYMPTOMS: ICD-10-CM

## 2024-12-02 DIAGNOSIS — E66.01 MORBID (SEVERE) OBESITY DUE TO EXCESS CALORIES: ICD-10-CM

## 2024-12-02 DIAGNOSIS — N17.9 ACUTE KIDNEY FAILURE, UNSPECIFIED: ICD-10-CM

## 2024-12-02 DIAGNOSIS — D64.9 ANEMIA, UNSPECIFIED: ICD-10-CM

## 2024-12-02 DIAGNOSIS — E11.9 TYPE 2 DIABETES MELLITUS WITHOUT COMPLICATIONS: ICD-10-CM

## 2024-12-02 DIAGNOSIS — L03.116 CELLULITIS OF LEFT LOWER LIMB: ICD-10-CM

## 2024-12-02 LAB
GLUCOSE BLDC GLUCOMTR-MCNC: 111 MG/DL — HIGH (ref 70–99)
GLUCOSE BLDC GLUCOMTR-MCNC: 116 MG/DL — HIGH (ref 70–99)
GLUCOSE BLDC GLUCOMTR-MCNC: 94 MG/DL — SIGNIFICANT CHANGE UP (ref 70–99)
GLUCOSE BLDC GLUCOMTR-MCNC: 99 MG/DL — SIGNIFICANT CHANGE UP (ref 70–99)

## 2024-12-02 PROCEDURE — 99223 1ST HOSP IP/OBS HIGH 75: CPT

## 2024-12-02 PROCEDURE — 99232 SBSQ HOSP IP/OBS MODERATE 35: CPT

## 2024-12-02 RX ORDER — TRAMADOL HYDROCHLORIDE 300 MG/1
1 CAPSULE ORAL
Qty: 0 | Refills: 0 | DISCHARGE
Start: 2024-12-02

## 2024-12-02 RX ORDER — FUROSEMIDE 40 MG/1
40 TABLET ORAL DAILY
Refills: 0 | Status: DISCONTINUED | OUTPATIENT
Start: 2024-12-02 | End: 2024-12-06

## 2024-12-02 RX ORDER — ACETAMINOPHEN 500MG 500 MG/1
2 TABLET, COATED ORAL
Qty: 0 | Refills: 0 | DISCHARGE
Start: 2024-12-02

## 2024-12-02 RX ORDER — CHLORHEXIDINE GLUCONATE 1.2 MG/ML
1 RINSE ORAL
Refills: 0 | Status: DISCONTINUED | OUTPATIENT
Start: 2024-12-02 | End: 2024-12-06

## 2024-12-02 RX ADMIN — Medication 1 APPLICATION(S): at 17:01

## 2024-12-02 RX ADMIN — Medication 1 TABLET(S): at 21:40

## 2024-12-02 RX ADMIN — ACETAMINOPHEN 500MG 650 MILLIGRAM(S): 500 TABLET, COATED ORAL at 05:50

## 2024-12-02 RX ADMIN — PANTOPRAZOLE SODIUM 40 MILLIGRAM(S): 40 TABLET, DELAYED RELEASE ORAL at 04:44

## 2024-12-02 RX ADMIN — Medication 5000 UNIT(S): at 17:02

## 2024-12-02 RX ADMIN — Medication 1 APPLICATION(S): at 04:44

## 2024-12-02 RX ADMIN — Medication 5000 UNIT(S): at 04:44

## 2024-12-02 RX ADMIN — TRAMADOL HYDROCHLORIDE 50 MILLIGRAM(S): 300 CAPSULE ORAL at 01:50

## 2024-12-02 RX ADMIN — FUROSEMIDE 40 MILLIGRAM(S): 40 TABLET ORAL at 17:00

## 2024-12-02 RX ADMIN — TRAMADOL HYDROCHLORIDE 50 MILLIGRAM(S): 300 CAPSULE ORAL at 11:22

## 2024-12-02 RX ADMIN — TRAMADOL HYDROCHLORIDE 50 MILLIGRAM(S): 300 CAPSULE ORAL at 17:43

## 2024-12-02 RX ADMIN — TRAMADOL HYDROCHLORIDE 50 MILLIGRAM(S): 300 CAPSULE ORAL at 00:55

## 2024-12-02 RX ADMIN — ACETAMINOPHEN 500MG 650 MILLIGRAM(S): 500 TABLET, COATED ORAL at 04:43

## 2024-12-02 RX ADMIN — Medication 20 MILLIGRAM(S): at 21:40

## 2024-12-02 RX ADMIN — TRAMADOL HYDROCHLORIDE 50 MILLIGRAM(S): 300 CAPSULE ORAL at 12:01

## 2024-12-02 RX ADMIN — ACETAMINOPHEN 500MG 650 MILLIGRAM(S): 500 TABLET, COATED ORAL at 04:24

## 2024-12-02 RX ADMIN — TRAMADOL HYDROCHLORIDE 50 MILLIGRAM(S): 300 CAPSULE ORAL at 18:13

## 2024-12-02 RX ADMIN — Medication 1000 MICROGRAM(S): at 11:22

## 2024-12-02 NOTE — CONSULT NOTE ADULT - ASSESSMENT
79 year old with PMHx DM, HLD, recent multiple admissions for left leg cellulitis presents to ED s/p fall last night. Cardiology consulted for possible new onset Afib on ECG      Impression:  #?Paroxysmal AFIB  #Severe AS  #Chronic B/L Cellulitis on chronic venous stasis  #HLD, DM      Pt currently endorses dizziness when ambulating but denies any CP, SOB, palpitations  Initial ECG showed possible Afib. Repeat ECG's show sinus with 1st degree AV block, PAC's and RBBB  TTE 11/16/24: EF 69%, normal global LVSF, LVMF grade 1DD, Severe AS  Does not appear grossly volume overloaded on exam        Recommendations:  -Repeat ECG in am  -Can consider telemetry monitoring x24hrs  -Consider [facility] as outpatient  -Outpatient structural cardiology f/u, for eval for TAVR  -Check orthostatic VS. If positive give IVF  -Fall precautions  -Monitor lytes  
IMPRESSION: Rehab of debility cellulitis, chronic venous insuff. He amb25 ft RW cg assist. He requires subacute rehab.     PRECAUTIONS: [ x ] Cardiac  [  ] Respiratory  [  ] Seizures [  ] Contact Isolation  [  ] Droplet Isolation  [  ] Other    Weight Bearing Status:     RECOMMENDATION:    Out of Bed to Chair     DVT/Decubiti Prophylaxis    REHAB PLAN:     [ x  ] Bedside P/T 3-5 times a week   [   ]   Bedside O/T  2-3 times a week             [   ] No Rehab Therapy Indicated                   [   ]  Speech Therapy   Conditioning/ROM                                    ADL  Bed Mobility                                               Conditioning/ROM  Transfers                                                     Bed Mobility  Sitting /Standing Balance                         Transfers                                        Gait Training                                               Sitting/Standing Balance  Stair Training [   ]Applicable                    Home equipment Eval                                                                        Splinting  [   ] Only      GOALS:   ADL   [ x  ]   Independent                    Transfers  [ x  ] Independent                          Ambulation  [ x  ] Independent     [   x ] With device                            [  x ]  CG                                                         [   ]  CG                                                                  [   ] CG                            [    ] Min A                                                   [   ] Min A                                                              [   ] Min  A          DISCHARGE PLAN:   [   ]  Good candidate for Intensive Rehabilitation/Hospital based-[facility]A [facility]                                             Will tolerate 3hrs Intensive Rehab Daily                                       [  x  ]  Short Term Rehab in Skilled Nursing Facility                                       [    ]  Home with Outpatient or  services                                         [    ]  Possible Candidate for Intensive Hospital based Rehab                                       
                            High risk for pressure injury development or progression   Skin assessed-    B / l Lower extremity  venous stasis dermatitis with edema and  ruptured or intact blisters                             No odor,   increased warmth, tenderness, induration, fluctuance, nor crepitus              Wound and skin care recs.  continue silvadene as ordered  Pressure  injury  preventive  measures  Skin  and incontinence care   Assess skin  and inform primary provider of any changes   Case discussed with primary Rn  Wound/ ostomy specialist  to f/u as needed     Offloading: [x ] Frequent position changes [ ] Devices/Equipment  Cleansing: [ ] Saline [x ] Soap/Water [ ] Other: ______  Topicals: [ ] Barrier Cream [ ] Antimicrobial [ ] Enzymatic Wound Debridement [x] Moist  wound Healing   Dressings: [ ] Dry, sterile [ ] Allevyn  Foam [ ] Absorbant Pads [ ] Collagenase    Other Recs.   Per Primary team   Total time for bedside assessment  , review of medical records  and  discussion of plan of care with primary team greater than 35 min

## 2024-12-02 NOTE — DISCHARGE NOTE PROVIDER - NSDCMRMEDTOKEN_GEN_ALL_CORE_FT
atorvastatin 20 mg oral tablet: 1 tab(s) orally once a day  cyanocobalamin 1000 mcg oral tablet: 1 tab(s) orally once a day  Januvia 100 mg oral tablet: 1 tab(s) orally once a day  levoFLOXacin 500 mg oral tablet: 1 tab(s) orally every 24 hours  MiraLax oral powder for reconstitution: 17 gram(s) orally once a day as needed for  constipation  Narcan 4 mg/0.1 mL nasal spray: 1 spray(s) intranasally once a day as needed for Opiate reversal  oxycodone-acetaminophen 2.5 mg-300 mg oral tablet: 1 tab(s) orally every 6 hours as needed for  severe pain MDD: 10mg  senna (sennosides) 17.2 mg oral tablet: 1 tab(s) orally once a day as needed for  constipation  Silvadene 1% topical cream: Apply topically to affected area once a day   acetaminophen 325 mg oral tablet: 2 tab(s) orally every 6 hours As needed Temp greater or equal to 38C (100.4F), Mild Pain (1 - 3)  atorvastatin 20 mg oral tablet: 1 tab(s) orally once a day  cyanocobalamin 1000 mcg oral tablet: 1 tab(s) orally once a day  Januvia 100 mg oral tablet: 1 tab(s) orally once a day  MiraLax oral powder for reconstitution: 17 gram(s) orally once a day as needed for  constipation  senna (sennosides) 17.2 mg oral tablet: 1 tab(s) orally once a day as needed for  constipation  Silvadene 1% topical cream: Apply topically to affected area once a day  traMADol 50 mg oral tablet: 1 tab(s) orally every 6 hours As needed Moderate Pain (4 - 6)   acetaminophen 325 mg oral tablet: 2 tab(s) orally every 6 hours As needed Temp greater or equal to 38C (100.4F), Mild Pain (1 - 3)  acetaminophen 325 mg oral tablet: 3 tab(s) orally every 8 hours  atorvastatin 20 mg oral tablet: 1 tab(s) orally once a day  cyanocobalamin 1000 mcg oral tablet: 1 tab(s) orally once a day  furosemide 40 mg oral tablet: 1 tab(s) orally once a day  Januvia 100 mg oral tablet: 1 tab(s) orally once a day  MiraLax oral powder for reconstitution: 17 gram(s) orally once a day as needed for  constipation  senna (sennosides) 17.2 mg oral tablet: 1 tab(s) orally once a day as needed for  constipation  Silvadene 1% topical cream: Apply topically to affected area once a day  traMADol 50 mg oral tablet: 1 tab(s) orally every 6 hours As needed Moderate Pain (4 - 6)   acetaminophen 325 mg oral tablet: 3 tab(s) orally every 8 hours  atorvastatin 20 mg oral tablet: 1 tab(s) orally once a day  cyanocobalamin 1000 mcg oral tablet: 1 tab(s) orally once a day  furosemide 40 mg oral tablet: 1 tab(s) orally once a day  Januvia 100 mg oral tablet: 1 tab(s) orally once a day  MiraLax oral powder for reconstitution: 17 gram(s) orally once a day as needed for  constipation  senna (sennosides) 17.2 mg oral tablet: 1 tab(s) orally once a day as needed for  constipation  Silvadene 1% topical cream: Apply topically to affected area once a day  traMADol 50 mg oral tablet: 1 tab(s) orally every 6 hours As needed Moderate Pain (4 - 6)

## 2024-12-02 NOTE — CONSULT NOTE ADULT - CONSULT REASON
Lower extremity wound assessment and treatment recommendation
Possible Afib on ECG
debility  cellulitis  bilateral chronic venous insuff

## 2024-12-02 NOTE — DISCHARGE NOTE PROVIDER - ATTENDING DISCHARGE PHYSICAL EXAMINATION:
GENERAL: NAD, well-developed, 79y  RESPIRATORY: Clear to auscultation bilaterally  CARDIOVASCULAR: Regular rate and rhythm  GASTROINTESTINAL: Abdomen Soft, Nontender, Nondistended  MUSCULOSKELETAL:  No cyanosis, Chronic LE changes, wrapped in bandage  PSYCH: AAOx3, affect appropriate  NEUROLOGY: non-focal, cognition grossly intact, MAEE   GENERAL: No acute distress, well-develoed  HEAD:  Atraumatic, Normocephalic  EYES:  conjunctiva and sclera clear  NECK: Supple, n no JVD  CHEST/LUNG: CTAB; No wheezes,  HEART: Regular rate and rhythm; No murmurs, rubs, or gallops  ABDOMEN: Soft, non-tender, non-distended; normal bowel sound  EXTREMITIES: B/L LE cellulitis improved  with chronic venous stasis   NEUROLOGY: A&O x2 no focal deficits  SKIN: No rashes or lesions

## 2024-12-02 NOTE — DISCHARGE NOTE PROVIDER - CARE PROVIDER_API CALL
Jose Abreu  Internal Medicine  [address] [place] [address]  Phone: [phone]  Fax: [phone]  Follow Up Time: Routine

## 2024-12-02 NOTE — DISCHARGE NOTE PROVIDER - HOSPITAL COURSE
HPI:  79 year old with PMHx DM, HLD,  recent multiple admissions for left leg cellulitis presents to ED s/p fall last night. Pt was discharged home yesterday on PO levoquine 500 mg q24h till 11/27 and po Voriconazole 200 mg q12h till 11/27 . Pt states last night  he got dizzy and felt like room was spinning, and fell backwards hitting his head. He denies LOC or any bleeding. He was able to stand up on his own. Pt also reports LE weakness and difficulty ambulating secondary to that. Currently pt denies CP, SOB, N/V/D/C, abd pain, urinary complaints.    Hospital Course:  Pt admitted for dizziness s/p fall at home. CTH negative. EKG afib on admission, repeat EKG NSR w/ 1st degree AV block. Monitored on tele, evaluated by cardiology. Orthostatic vitals negative. Pt evaluated by PT and was recommended rehab. Pt medically stable and will be discharged to rehab.    # Dizziness  # S/p fall at home  - CTH negative  - EKG afib on admission, repeat NSR w/ 1st degree AV block  - cardiology eval noted  - orthostatic negative  - consider [facility] outpatient  - fall precautions  - PT eval -> rehab placement    #Chronic B/L Cellulitis on chronic venous stasis  11/1 WCx LLE : Fusarium, C parapsilosis, Pseudomonas putida. Independent analysis of BCX results and interpretation of sensitivity results.  11/15 BCx NG  - finished course of Levofloxacin 500mg QD and Voriconazole 200mg BID end date 11/27  - wound care w/ Sulfadine and kerlix, daily dressing changes  - pain control    # Hyponatremia  # Hyperkalemia  - lokelma x1 dose  - encourage PO hydration  - monitor BMP    #RENEA  - improving  - monitor Cr    # Leukocytosis, trending down  - will monitor    #Chronic Macrocytic Anemia  - Hgb stable  - will trend    #Hx of DM2  - however, last  A1C 5.5 on 10/22  - on Januvia at home  - will start [facility] inpatient     #HLD  - continue statin    # Simple renal cysts  - outpatient follow up with urology HPI:  79 year old with PMHx DM, HLD,  recent multiple admissions for left leg cellulitis presents to ED s/p fall last night. Pt was discharged home yesterday on PO levoquine 500 mg q24h till 11/27 and po Voriconazole 200 mg q12h till 11/27 . Pt states last night  he got dizzy and felt like room was spinning, and fell backwards hitting his head. He denies LOC or any bleeding. He was able to stand up on his own. Pt also reports LE weakness and difficulty ambulating secondary to that. Currently pt denies CP, SOB, N/V/D/C, abd pain, urinary complaints.    Hospital Course:  Pt admitted for dizziness s/p fall at home. CTH negative. EKG afib on admission, repeat EKG NSR w/ 1st degree AV block. Monitored on tele, evaluated by cardiology. Orthostatic vitals negative. Pt evaluated by PT and was recommended rehab. Pt medically stable and will be discharged to rehab.    # Dizziness  # S/p fall at home  - CTH negative  - EKG afib on admission, repeat NSR w/ 1st degree AV block  - cardiology eval noted  - orthostatic negative  - consider [facility] outpatient  - fall precautions  - PT eval -> rehab placement    #Chronic B/L Cellulitis on chronic venous stasis  11/1 WCx LLE : Fusarium, C parapsilosis, Pseudomonas putida.   Independent analysis of BCX results and interpretation of sensitivity results.  11/15 BCx NG. finished course of Levofloxacin 500mg QD and Voriconazole 200mg BID end date 11/27  - wound care w/ Sulfadine and kerlix, daily dressing changes  - pain control

## 2024-12-02 NOTE — DISCHARGE NOTE PROVIDER - NSDCCPCAREPLAN_GEN_ALL_CORE_FT
PRINCIPAL DISCHARGE DIAGNOSIS  Diagnosis: Leg swelling  Assessment and Plan of Treatment: Swelling in the legs, ankles, and feet is called edema. It is common after you sit or stand for a while. Long plane flights or car rides often cause swelling in the legs and feet. You may also have swelling if you have to stand for long periods of time at your job. Problems with the veins in the legs (varicose veins) and changes in hormones can also cause swelling. Sometimes the swelling in the ankles and feet is caused by a more serious problem, such as heart failure, infection, blood clots, or liver or kidney disease.  Follow-up care is a key part of your treatment and safety. Be sure to make and go to all appointments, and call your doctor or nurse advice line ([phone] in most provinces and territories) if you are having problems. It's also a good idea to know your test results and keep a list of the medicines you take.  How can you care for yourself at home?  If your doctor gave you medicine, take it as prescribed. Call your doctor or nurse advice line if you think you are having a problem with your medicine.  Whenever you are resting, raise your legs up. Try to keep the swollen area higher than the level of your heart.  Take breaks from standing or sitting in one position.  Walk around to increase the blood flow in your lower legs.  Move your feet and ankles often while you stand, or tighten and relax your leg muscles.  Wear support stockings. Put them on in the morning, before swelling gets worse.  Eat a balanced diet. Lose weight if you need to.  Limit the amount of salt (sodium) in your diet. Salt holds fluid in the body and may increase swelling.  When should you call for help?  	  Call 911 anytime you think you may need emergency care. For example, call if:  You have symptoms of a blood clot in your lung (called a pulmonary embolism). These may include:  Sudden chest pain.  Trouble breathing.  Coughing up blood.  Call your doctor or nurse advice line now or seek immediate medical care if:  You have signs of a blood clot, such as:  Pain in your calf, back of the knee, thigh, o      SECONDARY DISCHARGE DIAGNOSES  Diagnosis: Fall  Assessment and Plan of Treatment: Fall prevention includes ways to make your home and other areas safer. It also includes ways you can move more carefully to prevent a fall. Health conditions that cause changes in your blood pressure, vision, or muscle strength and coordination may increase your risk for falls. Medicines may also increase your risk for falls if they make you dizzy, weak, or sleepy.  Seek Medical Attention If:  You have fallen and are unconscious.  fallen and cannot move part of your body.  You have fallen and have pain or a headache.  Fall prevention tips:  Stand or sit up slowly.  Use assistive devices as directed.   You may need to have grab bars put in your bathroom near the toilet or in the shower.  Wear shoes that fit well and have soles that . Wear shoes both inside and outside. Do not wear shoes with high heels.  Wear a personal alarm that can call 911 in an emergency.   Manage your medical conditions. Keep all appointments with your healthcare providers. Visit your eye doctor as directed.  Home safety tips:   Put nonslip strips on your bath or shower floor to prevent you from slipping.  Use a shower seat so you do not need to stand while you shower. Sit on the toilet or a chair in your bathroom to dry yourself and put on clothing. This will prevent you from losing your balance from drying or dressing yourself while you are standing.  Keep paths clear. Remove books, shoes, and other objects from walkways and stairs. Place cords for telephones and lamps out of the way so that you do not need to walk over them. Tape them down if you cannot move them. Remove small rugs or secure it with double-sided tape to prevent you from tripping.  Install bright lights in your home. Use night lights to help light paths to the bathroom or kitchen. Always turn on the light before you start walking.  Keep items you use often on shelves within reach. Do not use a step stool to help you reach an item.  Place reflective tape on the edges of your stairs. To see better.

## 2024-12-02 NOTE — PROGRESS NOTE ADULT - SUBJECTIVE AND OBJECTIVE BOX
LEENA BAILEY 79y Male  MRN#: [id number]     SUBJECTIVE  Patient is a 79y old Male who presents with a chief complaint of   Interval/Overnight Events:    Today is hospital day 5d, and this morning he is lying in bed without distress.   No acute overnight events.     OBJECTIVE  PAST MEDICAL & SURGICAL HISTORY  Diabetes    HLD (hyperlipidemia)    BPH without urinary obstruction    S/P cataract surgery      ALLERGIES:  No Known Allergies    MEDICATIONS:  STANDING MEDICATIONS  atorvastatin 20 milliGRAM(s) Oral at bedtime  chlorhexidine 2% Cloths 1 Application(s) Topical <User Schedule>  cyanocobalamin 1000 MICROGram(s) Oral daily  dextrose 5%. 1000 milliLiter(s) IV Continuous <Continuous>  dextrose 5%. 1000 milliLiter(s) IV Continuous <Continuous>  dextrose 50% Injectable 25 Gram(s) IV Push once  dextrose 50% Injectable 12.5 Gram(s) IV Push once  dextrose 50% Injectable 25 Gram(s) IV Push once  glucagon  Injectable 1 milliGRAM(s) IntraMuscular once  heparin   Injectable 5000 Unit(s) SubCutaneous every 12 hours  influenza  Vaccine (HIGH DOSE) 0.5 milliLiter(s) IntraMuscular once  insulin lispro (ADMELOG) corrective regimen sliding scale   SubCutaneous three times a day before meals  pantoprazole    Tablet 40 milliGRAM(s) Oral before breakfast  senna 1 Tablet(s) Oral at bedtime  silver sulfADIAZINE 1% Cream 1 Application(s) Topical two times a day    PRN MEDICATIONS  acetaminophen     Tablet .. 650 milliGRAM(s) Oral every 6 hours PRN  dextrose Oral Gel 15 Gram(s) Oral once PRN  polyethylene glycol 3350 17 Gram(s) Oral daily PRN  traMADol 50 milliGRAM(s) Oral every 6 hours PRN    HOME MEDICATIONS  Home Medications:  acetaminophen 325 mg oral tablet: 2 tab(s) orally every 6 hours As needed Temp greater or equal to 38C (100.4F), Mild Pain (1 - 3) (02 Dec 2024 12:37)  atorvastatin 20 mg oral tablet: 1 tab(s) orally once a day (15 Nov 2024 16:39)  Januvia 100 mg oral tablet: 1 tab(s) orally once a day (15 Nov 2024 16:13)  traMADol 50 mg oral tablet: 1 tab(s) orally every 6 hours As needed Moderate Pain (4 - 6) (02 Dec 2024 12:37)      LABS:                          CAPILLARY BLOOD GLUCOSE      POCT Blood Glucose.: 116 mg/dL (02 Dec 2024 11:08)      PHYSICAL EXAM:  T(C): 36.9 (12-02-24 @ 13:51), Max: 36.9 (12-02-24 @ 13:51)  HR: 72 (12-02-24 @ 13:51) (72 - 74)  BP: 127/67 (12-02-24 @ 13:51) (127/67 - 132/68)  RR: 18 (12-02-24 @ 04:45) (16 - 18)  SpO2: 96% (12-02-24 @ 13:51) (96% - 99%)    GENERAL: NAD, well-developed, 79y  RESPIRATORY: Clear to auscultation bilaterally  CARDIOVASCULAR: Regular rate and rhythm  GASTROINTESTINAL: Abdomen Soft, Nontender, Nondistended  MUSCULOSKELETAL:  No cyanosis, Chronic LE changes, wrapped in bandage  PSYCH: AAOx3, affect appropriate  NEUROLOGY: non-focal, cognition grossly intact, MAEE    ADMISSION SUMMARY  Patient is a 79y old Male who presents with a chief complaint of

## 2024-12-02 NOTE — CONSULT NOTE ADULT - SUBJECTIVE AND OBJECTIVE BOX
HPI:  79 year old with PMHx DM, HLD,  recent multiple admissions for left leg cellulitis presents to ED s/p fall last night. Pt was discharged home yesterday on PO levoquine 500 mg q24h till 11/27 and po Voriconazole 200 mg q12h till 11/27 . Pt states last night  he got dizzy and felt like room was spinning, and fell backwards hitting his head. He denies LOC or any bleeding. He was able to stand up on his own. Pt also reports LE weakness and difficulty ambulating secondary to that. Currently pt denies CP, SOB, N/V/D/C, abd pain, urinary complaints.   (27 Nov 2024 15:17)        HPI-Cardiology   Pt with the above Hx and HPI, evaluated at bedside. Currently admitted in telemetry s/p fall from home. Cardiology consulted for eval for dizziness and possible Afib on ECG. Pt states that he didn't fall at home but slid down, and denies any head trauma  or LOC. States that he recently started getting dizzy when ambulating, and denies any similar symptoms in the past. Denies any other cardiac related symptoms. Radiology tests and hospital records, were reviewed, as well as previous notes on this patient.      PAST MEDICAL & SURGICAL HISTORY  Diabetes    HLD (hyperlipidemia)    BPH without urinary obstruction    S/P cataract surgery          ALLERGIES:  No Known Allergies      MEDICATIONS:  MEDICATIONS  (STANDING):  atorvastatin 20 milliGRAM(s) Oral at bedtime  cyanocobalamin 1000 MICROGram(s) Oral daily  dextrose 5%. 1000 milliLiter(s) (100 mL/Hr) IV Continuous <Continuous>  dextrose 5%. 1000 milliLiter(s) (50 mL/Hr) IV Continuous <Continuous>  dextrose 50% Injectable 25 Gram(s) IV Push once  dextrose 50% Injectable 12.5 Gram(s) IV Push once  dextrose 50% Injectable 25 Gram(s) IV Push once  glucagon  Injectable 1 milliGRAM(s) IntraMuscular once  heparin   Injectable 5000 Unit(s) SubCutaneous every 12 hours  influenza  Vaccine (HIGH DOSE) 0.5 milliLiter(s) IntraMuscular once  insulin lispro (ADMELOG) corrective regimen sliding scale   SubCutaneous three times a day before meals  pantoprazole    Tablet 40 milliGRAM(s) Oral before breakfast  senna 1 Tablet(s) Oral at bedtime  silver sulfADIAZINE 1% Cream 1 Application(s) Topical two times a day    MEDICATIONS  (PRN):  acetaminophen     Tablet .. 650 milliGRAM(s) Oral every 6 hours PRN Temp greater or equal to 38C (100.4F), Mild Pain (1 - 3)  dextrose Oral Gel 15 Gram(s) Oral once PRN Blood Glucose LESS THAN 70 milliGRAM(s)/deciliter  morphine  - Injectable 2 milliGRAM(s) IV Push every 6 hours PRN Moderate Pain (4 - 6)  oxycodone    5 mG/acetaminophen 325 mG 0.5 Tablet(s) Oral every 6 hours PRN Severe Pain (7 - 10)  polyethylene glycol 3350 17 Gram(s) Oral daily PRN for constipation      HOME MEDICATIONS:  Home Medications:  atorvastatin 20 mg oral tablet: 1 tab(s) orally once a day (15 Nov 2024 16:39)  Januvia 100 mg oral tablet: 1 tab(s) orally once a day (15 Nov 2024 16:13)      VITALS:   T(F): 98 (11-28 @ 14:50), Max: 99 (11-27 @ 20:18)  HR: 68 (11-28 @ 14:50) (64 - 90)  BP: 105/57 (11-28 @ 14:50) (105/57 - 147/83)  BP(mean): --  RR: 18 (11-28 @ 14:50) (18 - 18)  SpO2: 99% (11-28 @ 14:50) (96% - 100%)    I&O's Summary      REVIEW OF SYSTEMS:  See HPI      PHYSICAL EXAM:  NEURO: patient is awake , alert and oriented  GEN: Not in acute distress  NECK: no thyroid enlargement, no JVD  LUNGS: Clear to auscultation bilaterally   CARDIOVASCULAR: S1/S2 present, RRR , no murmurs or rubs, no carotid bruits,  + PP bilaterally  ABD: Soft, non-tender, non-distended, +BS  EXT: B/L LE pitting edema 2+  SKIN: Intact    LABS:                        8.6    14.47 )-----------( 459      ( 28 Nov 2024 07:10 )             27.0     11-28    128[L]  |  93[L]  |  27[H]  ----------------------------<  75  5.2[H]   |  27  |  1.4    Ca    8.2[L]      28 Nov 2024 07:10  Mg     2.2     11-28    TPro  6.4  /  Alb  3.2[L]  /  TBili  0.4  /  DBili  x   /  AST  29  /  ALT  11  /  AlkPhos  190[H]  11-28            RADIOLOGY:  < from: CT Head No Cont (11.28.24 @ 00:56) >    IMPRESSION:  No evidence of acute transcortical infarct, acute intracranial   hemorrhage, or mass effect.    --- End of Report ---    < end of copied text >            < from: TTE Echo Complete w/o Contrast w/ Doppler (11.16.24 @ 08:07) >    Summary:   1. Normal global left ventricular systolic function.   2. LV Ejection Fraction by Guerin's Method with a biplane EF of 69 %.   3. Spectral Doppler shows impaired relaxation pattern of left   ventricular myocardial filling (Grade I diastolic dysfunction).   4. Mildly enlarged left atrium.   5. Normal right atrial size.   6. Mild mitral valve regurgitation.   7. Mild thickening of the anterior and posterior mitral valve leaflets.   8. Mild tricuspid regurgitation.   9. Severe aortic valve stenosis. Mean PG 30 mmHg, DOUGLAS 0.8 cm^2.  10. Estimated pulmonary artery systolic pressure is 40.0 mmHg assuming a   right atrial pressure of 3 mmHg, which is consistent with borderline   pulmonary hypertension.    < end of copied text >          
HPI:  79 year old with PMHx DM, HLD,  recent multiple admissions for left leg cellulitis presents to ED s/p fall last night. Pt was discharged home yesterday on PO levoquine 500 mg q24h till 11/27 and po Voriconazole 200 mg q12h till 11/27 . Pt states last night  he got dizzy and felt like room was spinning, and fell backwards hitting his head. He denies LOC or any bleeding. He was able to stand up on his own. Pt also reports LE weakness and difficulty ambulating secondary to that. Currently pt denies CP, SOB, N/V/D/C, abd pain, urinary complaints.   (27 Nov 2024 15:17)      PAST MEDICAL & SURGICAL HISTORY:  Diabetes      HLD (hyperlipidemia)      BPH without urinary obstruction      S/P cataract surgery          Hospital Course:    TODAY'S SUBJECTIVE & REVIEW OF SYMPTOMS:     Constitutional WNL   Cardio WNL   Resp WNL   GI WNL  Heme WNL  Endo WNL  Skin WNL  MSK WNL  Neuro WNL  Cognitive WNL  Psych WNL      MEDICATIONS  (STANDING):  atorvastatin 20 milliGRAM(s) Oral at bedtime  cyanocobalamin 1000 MICROGram(s) Oral daily  dextrose 5%. 1000 milliLiter(s) (100 mL/Hr) IV Continuous <Continuous>  dextrose 5%. 1000 milliLiter(s) (50 mL/Hr) IV Continuous <Continuous>  dextrose 50% Injectable 25 Gram(s) IV Push once  dextrose 50% Injectable 12.5 Gram(s) IV Push once  dextrose 50% Injectable 25 Gram(s) IV Push once  glucagon  Injectable 1 milliGRAM(s) IntraMuscular once  heparin   Injectable 5000 Unit(s) SubCutaneous every 12 hours  influenza  Vaccine (HIGH DOSE) 0.5 milliLiter(s) IntraMuscular once  insulin lispro (ADMELOG) corrective regimen sliding scale   SubCutaneous three times a day before meals  pantoprazole    Tablet 40 milliGRAM(s) Oral before breakfast  senna 1 Tablet(s) Oral at bedtime  silver sulfADIAZINE 1% Cream 1 Application(s) Topical two times a day    MEDICATIONS  (PRN):  acetaminophen     Tablet .. 650 milliGRAM(s) Oral every 6 hours PRN Temp greater or equal to 38C (100.4F), Mild Pain (1 - 3)  dextrose Oral Gel 15 Gram(s) Oral once PRN Blood Glucose LESS THAN 70 milliGRAM(s)/deciliter  morphine  - Injectable 2 milliGRAM(s) IV Push every 6 hours PRN Moderate Pain (4 - 6)  oxycodone    5 mG/acetaminophen 325 mG 0.5 Tablet(s) Oral every 6 hours PRN Severe Pain (7 - 10)  polyethylene glycol 3350 17 Gram(s) Oral daily PRN for constipation      FAMILY HISTORY:      Allergies    No Known Allergies    Intolerances        SOCIAL HISTORY:    [  ] Etoh  [  ] Smoking  [  ] Substance abuse     Home Environment:  [ x ] Home Alone  [  ] Lives with Family  [  ] Home Health Aid    Dwelling:  [  ] Apartment  [x  ] Private House  [  ] Adult Home  [  ] Skilled Nursing Facility      [  ] Short Term  [  ] Long Term  [x  ] Stairs-5 PREET and 10 inside       Elevator [  ]    FUNCTIONAL STATUS PTA: (Check all that apply)  Ambulation: [   ]Independent    [  ] Dependent     [  ] Non-Ambulatory  Assistive Device: [  ] SA Cane  [  ]  Q Cane  [  ] Walker  [  ]  Wheelchair  ADL : [  ] Independent  [  ]  Dependent       Vital Signs Last 24 Hrs  T(C): 36.6 (29 Nov 2024 13:52), Max: 37.1 (28 Nov 2024 20:32)  T(F): 97.9 (29 Nov 2024 13:52), Max: 98.7 (28 Nov 2024 20:32)  HR: 59 (29 Nov 2024 13:52) (59 - 63)  BP: 116/53 (29 Nov 2024 13:52) (105/56 - 144/80)  BP(mean): --  RR: 16 (29 Nov 2024 13:52) (16 - 18)  SpO2: 98% (29 Nov 2024 13:52) (96% - 99%)    Parameters below as of 29 Nov 2024 13:52  Patient On (Oxygen Delivery Method): room air          PHYSICAL EXAM: Alert & Oriented X3  GENERAL: NAD, well-groomed, well-developed  HEAD:  Atraumatic, Normocephalic  EYES: EOMI, PERRLA, conjunctiva and sclera clear  NECK: Supple, No JVD, Normal thyroid  CHEST/LUNG: Clear bilaterally; No rales, rhonchi, wheezing, or rubs  HEART: Regular rate and rhythm; No murmurs, rubs, or gallops  ABDOMEN: Soft, Nontender, Nondistended; Bowel sounds present  EXTREMITIES:  2+ Peripheral Pulses, No clubbing, cyanosis, or edema    NERVOUS SYSTEM:  Cranial Nerves 2-12 intact [  ] Abnormal  [  ]  ROM: WFL all extremities [  ]  Abnormal [  ]  Motor Strength: WFL all extremities  [  ]  Abnormal [  x] 4/5 Les  Sensation: intact to light touch [ x ] Abnormal [  ]  Reflexes: Symmetric [  ]  Abnormal [  ]    FUNCTIONAL STATUS:  Bed Mobility: Independent [  ]  Supervision [  ]  Needs Assistance [  ]  N/A [  ]  Transfers: Independent [  ]  Supervision [  ]  Needs Assistance [  ]  N/A [  ]   Ambulation: Independent [  ]  Supervision [  ]  Needs Assistance [  ]  N/A [  ]  ADL: Independent [  ] Requires Assistance [  ] N/A [  ]      LABS:                        8.9    12.38 )-----------( 499      ( 29 Nov 2024 08:28 )             28.4     11-29    131[L]  |  95[L]  |  17  ----------------------------<  74  4.5   |  23  |  1.0    Ca    8.7      29 Nov 2024 08:28  Mg     2.2     11-29    TPro  7.0  /  Alb  3.7  /  TBili  0.4  /  DBili  x   /  AST  27  /  ALT  11  /  AlkPhos  174[H]  11-29      Urinalysis Basic - ( 29 Nov 2024 08:28 )    Color: x / Appearance: x / SG: x / pH: x  Gluc: 74 mg/dL / Ketone: x  / Bili: x / Urobili: x   Blood: x / Protein: x / Nitrite: x   Leuk Esterase: x / RBC: x / WBC x   Sq Epi: x / Non Sq Epi: x / Bacteria: x        RADIOLOGY & ADDITIONAL STUDIES:    Assesment:
Patient  is 79 year old with PMHx DM, HLD,  recent multiple admissions for left leg cellulitis presents to ED s/p fall last night. Pt was discharged home yesterday on PO levoquine 500 mg q24h till 11/27 and po Voriconazole 200 mg q12h till 11/27 . Pt states last night  he got dizzy and felt like room was spinning, and fell backwards hitting his head. He denies LOC or any bleeding. He was able to stand up on his own. Pt also reports LE weakness and difficulty ambulating secondary to that. Currently pt denies CP, SOB, N/V/D/C, abd pain, urinary complaints.           PAST MEDICAL & SURGICAL HISTORY:  Diabetes  HLD (hyperlipidemia)  BPH without urinary obstruction  S/P cataract surgery    REVIEW OF SYSTEMS: All other systems negative    MEDICATIONS  (STANDING):  atorvastatin 20 milliGRAM(s) Oral at bedtime  chlorhexidine 2% Cloths 1 Application(s) Topical <User Schedule>  cyanocobalamin 1000 MICROGram(s) Oral daily  dextrose 5%. 1000 milliLiter(s) (100 mL/Hr) IV Continuous <Continuous>  dextrose 5%. 1000 milliLiter(s) (50 mL/Hr) IV Continuous <Continuous>  dextrose 50% Injectable 25 Gram(s) IV Push once  dextrose 50% Injectable 12.5 Gram(s) IV Push once  dextrose 50% Injectable 25 Gram(s) IV Push once  glucagon  Injectable 1 milliGRAM(s) IntraMuscular once  heparin   Injectable 5000 Unit(s) SubCutaneous every 12 hours  influenza  Vaccine (HIGH DOSE) 0.5 milliLiter(s) IntraMuscular once  insulin lispro (ADMELOG) corrective regimen sliding scale   SubCutaneous three times a day before meals  pantoprazole    Tablet 40 milliGRAM(s) Oral before breakfast  senna 1 Tablet(s) Oral at bedtime  silver sulfADIAZINE 1% Cream 1 Application(s) Topical two times a day    MEDICATIONS  (PRN):  acetaminophen     Tablet .. 650 milliGRAM(s) Oral every 6 hours PRN Temp greater or equal to 38C (100.4F), Mild Pain (1 - 3)  dextrose Oral Gel 15 Gram(s) Oral once PRN Blood Glucose LESS THAN 70 milliGRAM(s)/deciliter  polyethylene glycol 3350 17 Gram(s) Oral daily PRN for constipation  traMADol 50 milliGRAM(s) Oral every 6 hours PRN Moderate Pain (4 - 6)      Allergies  No Known Allergies  Intolerances        SOCIAL HISTORY:   From Home     FAMILY HISTORY     PHYSICAL EXAM:  Vital Signs Last 24 Hrs  T(C): 36.9 (02 Dec 2024 13:51), Max: 36.9 (02 Dec 2024 13:51)  T(F): 98.4 (02 Dec 2024 13:51), Max: 98.4 (02 Dec 2024 13:51)  HR: 72 (02 Dec 2024 13:51) (72 - 74)  BP: 127/67 (02 Dec 2024 13:51) (127/67 - 132/68)  BP(mean): --  RR: 18 (02 Dec 2024 04:45) (16 - 18)  SpO2: 96% (02 Dec 2024 13:51) (96% - 99%)    Parameters below as of 02 Dec 2024 13:51  Patient On (Oxygen Delivery Method): room air        General : NAD    HEENT:  NC/AT, PERRL, EOMI, sclera clear, mucosa moist, throat clear, trachea midline, neck supple  Cardiovascular: RRR   Respiratory: CTA  Gastrointestinal :  Soft NT/ND (+)BS   Neurology:  Weakened strength & sensation   Psych: Calm  Musculoskeletal:  limited  Vascular: B/ L LE edema   Skin:   Venous stasis dermatitis     LABS/ CULTURES/ RADIOLOGY:      134  |  98  |  20  ----------------------------<  95      [11-30-24 @ 08:06]  5.1   |  25  |  1.0        Ca     9.0     [11-30-24 @ 08:06]

## 2024-12-03 LAB
ANION GAP SERPL CALC-SCNC: 14 MMOL/L — SIGNIFICANT CHANGE UP (ref 7–14)
BUN SERPL-MCNC: 13 MG/DL — SIGNIFICANT CHANGE UP (ref 10–20)
CALCIUM SERPL-MCNC: 9.3 MG/DL — SIGNIFICANT CHANGE UP (ref 8.4–10.5)
CHLORIDE SERPL-SCNC: 94 MMOL/L — LOW (ref 98–110)
CO2 SERPL-SCNC: 25 MMOL/L — SIGNIFICANT CHANGE UP (ref 17–32)
CREAT SERPL-MCNC: 0.8 MG/DL — SIGNIFICANT CHANGE UP (ref 0.7–1.5)
EGFR: 89 ML/MIN/1.73M2 — SIGNIFICANT CHANGE UP
GLUCOSE BLDC GLUCOMTR-MCNC: 105 MG/DL — HIGH (ref 70–99)
GLUCOSE BLDC GLUCOMTR-MCNC: 105 MG/DL — HIGH (ref 70–99)
GLUCOSE BLDC GLUCOMTR-MCNC: 107 MG/DL — HIGH (ref 70–99)
GLUCOSE BLDC GLUCOMTR-MCNC: 115 MG/DL — HIGH (ref 70–99)
GLUCOSE SERPL-MCNC: 87 MG/DL — SIGNIFICANT CHANGE UP (ref 70–99)
HCT VFR BLD CALC: 30.3 % — LOW (ref 42–52)
HGB BLD-MCNC: 9.2 G/DL — LOW (ref 14–18)
MAGNESIUM SERPL-MCNC: 1.7 MG/DL — LOW (ref 1.8–2.4)
MCHC RBC-ENTMCNC: 28.5 PG — SIGNIFICANT CHANGE UP (ref 27–31)
MCHC RBC-ENTMCNC: 30.4 G/DL — LOW (ref 32–37)
MCV RBC AUTO: 93.8 FL — SIGNIFICANT CHANGE UP (ref 80–94)
NRBC # BLD: 0 /100 WBCS — SIGNIFICANT CHANGE UP (ref 0–0)
PLATELET # BLD AUTO: 526 K/UL — HIGH (ref 130–400)
PMV BLD: 9.9 FL — SIGNIFICANT CHANGE UP (ref 7.4–10.4)
POTASSIUM SERPL-MCNC: 4.8 MMOL/L — SIGNIFICANT CHANGE UP (ref 3.5–5)
POTASSIUM SERPL-SCNC: 4.8 MMOL/L — SIGNIFICANT CHANGE UP (ref 3.5–5)
RBC # BLD: 3.23 M/UL — LOW (ref 4.7–6.1)
RBC # FLD: 15.6 % — HIGH (ref 11.5–14.5)
SODIUM SERPL-SCNC: 133 MMOL/L — LOW (ref 135–146)
WBC # BLD: 13.42 K/UL — HIGH (ref 4.8–10.8)
WBC # FLD AUTO: 13.42 K/UL — HIGH (ref 4.8–10.8)

## 2024-12-03 PROCEDURE — 99232 SBSQ HOSP IP/OBS MODERATE 35: CPT

## 2024-12-03 RX ORDER — ACETAMINOPHEN 500MG 500 MG/1
975 TABLET, COATED ORAL EVERY 8 HOURS
Refills: 0 | Status: DISCONTINUED | OUTPATIENT
Start: 2024-12-03 | End: 2024-12-06

## 2024-12-03 RX ORDER — OXYCODONE HYDROCHLORIDE 30 MG/1
5 TABLET ORAL EVERY 4 HOURS
Refills: 0 | Status: DISCONTINUED | OUTPATIENT
Start: 2024-12-03 | End: 2024-12-06

## 2024-12-03 RX ORDER — OXYCODONE HYDROCHLORIDE 30 MG/1
5 TABLET ORAL ONCE
Refills: 0 | Status: DISCONTINUED | OUTPATIENT
Start: 2024-12-03 | End: 2024-12-03

## 2024-12-03 RX ADMIN — TRAMADOL HYDROCHLORIDE 50 MILLIGRAM(S): 300 CAPSULE ORAL at 13:13

## 2024-12-03 RX ADMIN — Medication 1 APPLICATION(S): at 05:31

## 2024-12-03 RX ADMIN — ACETAMINOPHEN 500MG 975 MILLIGRAM(S): 500 TABLET, COATED ORAL at 22:21

## 2024-12-03 RX ADMIN — OXYCODONE HYDROCHLORIDE 5 MILLIGRAM(S): 30 TABLET ORAL at 21:24

## 2024-12-03 RX ADMIN — PANTOPRAZOLE SODIUM 40 MILLIGRAM(S): 40 TABLET, DELAYED RELEASE ORAL at 05:30

## 2024-12-03 RX ADMIN — CHLORHEXIDINE GLUCONATE 1 APPLICATION(S): 1.2 RINSE ORAL at 05:31

## 2024-12-03 RX ADMIN — Medication 5000 UNIT(S): at 05:30

## 2024-12-03 RX ADMIN — OXYCODONE HYDROCHLORIDE 5 MILLIGRAM(S): 30 TABLET ORAL at 16:00

## 2024-12-03 RX ADMIN — Medication 20 MILLIGRAM(S): at 21:53

## 2024-12-03 RX ADMIN — Medication 25 GRAM(S): at 13:06

## 2024-12-03 RX ADMIN — Medication 1 TABLET(S): at 21:50

## 2024-12-03 RX ADMIN — ACETAMINOPHEN 500MG 975 MILLIGRAM(S): 500 TABLET, COATED ORAL at 21:51

## 2024-12-03 RX ADMIN — Medication 25 GRAM(S): at 10:42

## 2024-12-03 RX ADMIN — FUROSEMIDE 40 MILLIGRAM(S): 40 TABLET ORAL at 05:30

## 2024-12-03 RX ADMIN — Medication 5000 UNIT(S): at 18:02

## 2024-12-03 RX ADMIN — Medication 1000 MICROGRAM(S): at 11:30

## 2024-12-03 RX ADMIN — TRAMADOL HYDROCHLORIDE 50 MILLIGRAM(S): 300 CAPSULE ORAL at 14:45

## 2024-12-03 RX ADMIN — OXYCODONE HYDROCHLORIDE 5 MILLIGRAM(S): 30 TABLET ORAL at 20:54

## 2024-12-03 NOTE — PROGRESS NOTE ADULT - SUBJECTIVE AND OBJECTIVE BOX
LEENA BAILEY 79y Male  MRN#: [id number]     SUBJECTIVE  Patient is a 79y old Male who presents with a chief complaint of   Interval/Overnight Events:    Today is hospital day 6d, and this morning he is lying in bed without distress.   No acute overnight events.     OBJECTIVE  PAST MEDICAL & SURGICAL HISTORY  Diabetes    HLD (hyperlipidemia)    BPH without urinary obstruction    S/P cataract surgery      ALLERGIES:  No Known Allergies    MEDICATIONS:  STANDING MEDICATIONS  atorvastatin 20 milliGRAM(s) Oral at bedtime  chlorhexidine 2% Cloths 1 Application(s) Topical <User Schedule>  cyanocobalamin 1000 MICROGram(s) Oral daily  dextrose 5%. 1000 milliLiter(s) IV Continuous <Continuous>  dextrose 5%. 1000 milliLiter(s) IV Continuous <Continuous>  dextrose 50% Injectable 25 Gram(s) IV Push once  dextrose 50% Injectable 12.5 Gram(s) IV Push once  dextrose 50% Injectable 25 Gram(s) IV Push once  furosemide    Tablet 40 milliGRAM(s) Oral daily  glucagon  Injectable 1 milliGRAM(s) IntraMuscular once  heparin   Injectable 5000 Unit(s) SubCutaneous every 12 hours  influenza  Vaccine (HIGH DOSE) 0.5 milliLiter(s) IntraMuscular once  insulin lispro (ADMELOG) corrective regimen sliding scale   SubCutaneous three times a day before meals  pantoprazole    Tablet 40 milliGRAM(s) Oral before breakfast  senna 1 Tablet(s) Oral at bedtime  silver sulfADIAZINE 1% Cream 1 Application(s) Topical two times a day    PRN MEDICATIONS  acetaminophen     Tablet .. 650 milliGRAM(s) Oral every 6 hours PRN  dextrose Oral Gel 15 Gram(s) Oral once PRN  polyethylene glycol 3350 17 Gram(s) Oral daily PRN  traMADol 50 milliGRAM(s) Oral every 6 hours PRN    HOME MEDICATIONS  Home Medications:  acetaminophen 325 mg oral tablet: 2 tab(s) orally every 6 hours As needed Temp greater or equal to 38C (100.4F), Mild Pain (1 - 3) (02 Dec 2024 12:37)  atorvastatin 20 mg oral tablet: 1 tab(s) orally once a day (15 Nov 2024 16:39)  Januvia 100 mg oral tablet: 1 tab(s) orally once a day (15 Nov 2024 16:13)  traMADol 50 mg oral tablet: 1 tab(s) orally every 6 hours As needed Moderate Pain (4 - 6) (02 Dec 2024 12:37)      LABS:                        9.2    13.42 )-----------( 526      ( 03 Dec 2024 06:40 )             30.3     12-03    133[L]  |  94[L]  |  13  ----------------------------<  87  4.8   |  25  |  0.8    Ca    9.3      03 Dec 2024 06:40  Mg     1.7     12-03          Urinalysis Basic - ( 03 Dec 2024 06:40 )    Color: x / Appearance: x / SG: x / pH: x  Gluc: 87 mg/dL / Ketone: x  / Bili: x / Urobili: x   Blood: x / Protein: x / Nitrite: x   Leuk Esterase: x / RBC: x / WBC x   Sq Epi: x / Non Sq Epi: x / Bacteria: x                CAPILLARY BLOOD GLUCOSE      POCT Blood Glucose.: 105 mg/dL (03 Dec 2024 11:36)      PHYSICAL EXAM:  T(C): 36.9 (12-03-24 @ 04:45), Max: 36.9 (12-03-24 @ 04:45)  HR: 74 (12-03-24 @ 04:45) (74 - 75)  BP: 126/72 (12-03-24 @ 04:45) (126/72 - 136/68)  RR: 18 (12-03-24 @ 04:45) (18 - 18)  SpO2: 98% (12-03-24 @ 04:45) (98% - 98%)    GENERAL: NAD, well-developed, 79y  RESPIRATORY: Clear to auscultation bilaterally  CARDIOVASCULAR: Regular rate and rhythm  GASTROINTESTINAL: Abdomen Soft, Nontender, Nondistended  MUSCULOSKELETAL:  No cyanosis, Chronic LE changes, wrapped in bandage  PSYCH: AAOx3, affect appropriate  NEUROLOGY: non-focal, cognition grossly intact, MAEE    ADMISSION SUMMARY  Patient is a 79y old Male who presents with a chief complaint of

## 2024-12-03 NOTE — PROGRESS NOTE ADULT - ASSESSMENT
79 year old with PMHx DM, HLD,  recent multiple admissions for left leg cellulitis presents s/p fall last night and ambulatory dysfunction.    # Dizziness  # S/p fall at home  - CTH negative  - EKG afib on admission, repeat NSR w/ 1st degree AV block  - cardiology eval noted  - orthostatic negative  - consider [facility] outpatient  - fall precautions  - PT eval -> rehab placement    #Chronic B/L Cellulitis on chronic venous stasis  11/1 WCx LLE : Fusarium, C parapsilosis, Pseudomonas putida. Independent analysis of BCX results and interpretation of sensitivity results.  11/15 BCx NG  - finished course of Levofloxacin 500mg QD and Voriconazole 200mg BID end date 11/27  - wound care w/ Sulfadine and kerlix, daily dressing changes  - lasix 40mg PO daily  - pain control    # Hyponatremia  # Hyperkalemia  - lokelma x1 dose  - encourage PO hydration  - monitor BMP    #RENEA  - improving  - monitor Cr    # Leukocytosis, trending down  - will monitor    #Chronic Macrocytic Anemia  - Hgb stable  - monitor H/H    #Hx of DM2  - however, last  A1C 5.5 on 10/22  - on Januvia at home  - sliding scale    #HLD  - continue statin    # Simple renal cysts  - outpatient follow up with urology    Misc:  DVT ppx: heparin subQ  GI ppx: Protonix  Diet: CC  Activity: IAT  Code: Full Code  Dispo: dc planning to rehab, pending auth/[id number]

## 2024-12-04 LAB
ANION GAP SERPL CALC-SCNC: 14 MMOL/L — SIGNIFICANT CHANGE UP (ref 7–14)
BUN SERPL-MCNC: 16 MG/DL — SIGNIFICANT CHANGE UP (ref 10–20)
CALCIUM SERPL-MCNC: 9.1 MG/DL — SIGNIFICANT CHANGE UP (ref 8.4–10.5)
CHLORIDE SERPL-SCNC: 92 MMOL/L — LOW (ref 98–110)
CO2 SERPL-SCNC: 25 MMOL/L — SIGNIFICANT CHANGE UP (ref 17–32)
CREAT SERPL-MCNC: 0.9 MG/DL — SIGNIFICANT CHANGE UP (ref 0.7–1.5)
EGFR: 86 ML/MIN/1.73M2 — SIGNIFICANT CHANGE UP
GLUCOSE BLDC GLUCOMTR-MCNC: 111 MG/DL — HIGH (ref 70–99)
GLUCOSE BLDC GLUCOMTR-MCNC: 120 MG/DL — HIGH (ref 70–99)
GLUCOSE BLDC GLUCOMTR-MCNC: 124 MG/DL — HIGH (ref 70–99)
GLUCOSE BLDC GLUCOMTR-MCNC: 131 MG/DL — HIGH (ref 70–99)
GLUCOSE SERPL-MCNC: 92 MG/DL — SIGNIFICANT CHANGE UP (ref 70–99)
HCT VFR BLD CALC: 30 % — LOW (ref 42–52)
HGB BLD-MCNC: 9.6 G/DL — LOW (ref 14–18)
MAGNESIUM SERPL-MCNC: 2.1 MG/DL — SIGNIFICANT CHANGE UP (ref 1.8–2.4)
MCHC RBC-ENTMCNC: 29.4 PG — SIGNIFICANT CHANGE UP (ref 27–31)
MCHC RBC-ENTMCNC: 32 G/DL — SIGNIFICANT CHANGE UP (ref 32–37)
MCV RBC AUTO: 92 FL — SIGNIFICANT CHANGE UP (ref 80–94)
NRBC # BLD: 0 /100 WBCS — SIGNIFICANT CHANGE UP (ref 0–0)
PLATELET # BLD AUTO: 560 K/UL — HIGH (ref 130–400)
PMV BLD: 9.7 FL — SIGNIFICANT CHANGE UP (ref 7.4–10.4)
POTASSIUM SERPL-MCNC: 4.5 MMOL/L — SIGNIFICANT CHANGE UP (ref 3.5–5)
POTASSIUM SERPL-SCNC: 4.5 MMOL/L — SIGNIFICANT CHANGE UP (ref 3.5–5)
RBC # BLD: 3.26 M/UL — LOW (ref 4.7–6.1)
RBC # FLD: 15.6 % — HIGH (ref 11.5–14.5)
SODIUM SERPL-SCNC: 131 MMOL/L — LOW (ref 135–146)
WBC # BLD: 12.83 K/UL — HIGH (ref 4.8–10.8)
WBC # FLD AUTO: 12.83 K/UL — HIGH (ref 4.8–10.8)

## 2024-12-04 PROCEDURE — 99232 SBSQ HOSP IP/OBS MODERATE 35: CPT

## 2024-12-04 RX ADMIN — ACETAMINOPHEN 500MG 975 MILLIGRAM(S): 500 TABLET, COATED ORAL at 18:13

## 2024-12-04 RX ADMIN — Medication 5000 UNIT(S): at 18:16

## 2024-12-04 RX ADMIN — OXYCODONE HYDROCHLORIDE 5 MILLIGRAM(S): 30 TABLET ORAL at 01:58

## 2024-12-04 RX ADMIN — OXYCODONE HYDROCHLORIDE 5 MILLIGRAM(S): 30 TABLET ORAL at 02:28

## 2024-12-04 RX ADMIN — FUROSEMIDE 40 MILLIGRAM(S): 40 TABLET ORAL at 05:28

## 2024-12-04 RX ADMIN — OXYCODONE HYDROCHLORIDE 5 MILLIGRAM(S): 30 TABLET ORAL at 22:17

## 2024-12-04 RX ADMIN — Medication 1 APPLICATION(S): at 05:29

## 2024-12-04 RX ADMIN — Medication 1000 MICROGRAM(S): at 18:13

## 2024-12-04 RX ADMIN — PANTOPRAZOLE SODIUM 40 MILLIGRAM(S): 40 TABLET, DELAYED RELEASE ORAL at 05:28

## 2024-12-04 RX ADMIN — Medication 20 MILLIGRAM(S): at 21:47

## 2024-12-04 RX ADMIN — ACETAMINOPHEN 500MG 975 MILLIGRAM(S): 500 TABLET, COATED ORAL at 21:47

## 2024-12-04 RX ADMIN — ACETAMINOPHEN 500MG 975 MILLIGRAM(S): 500 TABLET, COATED ORAL at 05:58

## 2024-12-04 RX ADMIN — Medication 1 APPLICATION(S): at 18:21

## 2024-12-04 RX ADMIN — ACETAMINOPHEN 500MG 975 MILLIGRAM(S): 500 TABLET, COATED ORAL at 05:28

## 2024-12-04 RX ADMIN — ACETAMINOPHEN 500MG 975 MILLIGRAM(S): 500 TABLET, COATED ORAL at 22:17

## 2024-12-04 RX ADMIN — Medication 5000 UNIT(S): at 05:28

## 2024-12-04 RX ADMIN — CHLORHEXIDINE GLUCONATE 1 APPLICATION(S): 1.2 RINSE ORAL at 05:29

## 2024-12-04 RX ADMIN — OXYCODONE HYDROCHLORIDE 5 MILLIGRAM(S): 30 TABLET ORAL at 22:47

## 2024-12-04 RX ADMIN — Medication 1 TABLET(S): at 21:47

## 2024-12-04 NOTE — PROGRESS NOTE ADULT - SUBJECTIVE AND OBJECTIVE BOX
Patient is a 79y old  Male who presents with a chief complaint of     MEDICATIONS  (STANDING):  acetaminophen     Tablet .. 975 milliGRAM(s) Oral every 8 hours  atorvastatin 20 milliGRAM(s) Oral at bedtime  chlorhexidine 2% Cloths 1 Application(s) Topical <User Schedule>  cyanocobalamin 1000 MICROGram(s) Oral daily  dextrose 5%. 1000 milliLiter(s) (100 mL/Hr) IV Continuous <Continuous>  dextrose 5%. 1000 milliLiter(s) (50 mL/Hr) IV Continuous <Continuous>  dextrose 50% Injectable 25 Gram(s) IV Push once  dextrose 50% Injectable 12.5 Gram(s) IV Push once  dextrose 50% Injectable 25 Gram(s) IV Push once  furosemide    Tablet 40 milliGRAM(s) Oral daily  glucagon  Injectable 1 milliGRAM(s) IntraMuscular once  heparin   Injectable 5000 Unit(s) SubCutaneous every 12 hours  influenza  Vaccine (HIGH DOSE) 0.5 milliLiter(s) IntraMuscular once  insulin lispro (ADMELOG) corrective regimen sliding scale   SubCutaneous three times a day before meals  pantoprazole    Tablet 40 milliGRAM(s) Oral before breakfast  senna 1 Tablet(s) Oral at bedtime  silver sulfADIAZINE 1% Cream 1 Application(s) Topical two times a day    MEDICATIONS  (PRN):  dextrose Oral Gel 15 Gram(s) Oral once PRN Blood Glucose LESS THAN 70 milliGRAM(s)/deciliter  oxyCODONE    IR 5 milliGRAM(s) Oral every 4 hours PRN Severe Pain (7 - 10)  polyethylene glycol 3350 17 Gram(s) Oral daily PRN for constipation  traMADol 50 milliGRAM(s) Oral every 6 hours PRN Moderate Pain (4 - 6)      CAPILLARY BLOOD GLUCOSE  POCT Blood Glucose.: 120 mg/dL (04 Dec 2024 11:28)  POCT Blood Glucose.: 124 mg/dL (04 Dec 2024 08:30)  POCT Blood Glucose.: 105 mg/dL (03 Dec 2024 20:57)  POCT Blood Glucose.: 107 mg/dL (03 Dec 2024 16:16)    I&O's Summary    03 Dec 2024 07:01  -  04 Dec 2024 07:00  --------------------------------------------------------  IN: 0 mL / OUT: 300 mL / NET: -300 mL      PHYSICAL EXAM:  Vital Signs Last 24 Hrs  T(C): 36.5 (04 Dec 2024 13:21), Max: 36.7 (03 Dec 2024 20:51)  T(F): 97.7 (04 Dec 2024 13:21), Max: 98.1 (03 Dec 2024 20:51)  HR: 83 (04 Dec 2024 13:21) (73 - 83)  BP: 134/69 (04 Dec 2024 13:21) (127/69 - 134/69)  BP(mean): --  RR: 18 (04 Dec 2024 13:21) (18 - 18)  SpO2: 98% (04 Dec 2024 13:21) (98% - 99%)    Parameters below as of 04 Dec 2024 13:21  Patient On (Oxygen Delivery Method): room air      GENERAL: No acute distress, well-develoed  HEAD:  Atraumatic, Normocephalic  EYES:  conjunctiva and sclera clear  NECK: Supple, n no JVD  CHEST/LUNG: CTAB; No wheezes,  HEART: Regular rate and rhythm; No murmurs, rubs, or gallops  ABDOMEN: Soft, non-tender, non-distended; normal bowel sound  EXTREMITIES: B/L LE cellulitis improved  with chronic venous stasis   NEUROLOGY: A&O x2 no focal deficits  SKIN: No rashes or lesions    LABS:                        9.6    12.83 )-----------( 560      ( 04 Dec 2024 06:19 )             30.0     12-04    131[L]  |  92[L]  |  16  ----------------------------<  92  4.5   |  25  |  0.9    Ca    9.1      04 Dec 2024 06:19  Mg     2.1     12-04      Urinalysis Basic - ( 04 Dec 2024 06:19 )    Color: x / Appearance: x / SG: x / pH: x  Gluc: 92 mg/dL / Ketone: x  / Bili: x / Urobili: x   Blood: x / Protein: x / Nitrite: x   Leuk Esterase: x / RBC: x / WBC x   Sq Epi: x / Non Sq Epi: x / Bacteria: x

## 2024-12-04 NOTE — PROGRESS NOTE ADULT - ASSESSMENT
79 year old with PMHx DM, HLD,  recent multiple admissions for left leg cellulitis presents s/p fall last night and ambulatory dysfunction.    Dizziness   S/p fall at home  - CTH negative  - EKG afib on admission, repeat NSR w/ 1st degree AV block  - cardiology eval noted>>- consider [facility] outpatient  - orthostatic negative  - fall precautions  - PT eval -> rehab placement>>Fast appeal done today     Chronic B/L Cellulitis on chronic venous stasis  11/1 WCx LLE : Fusarium, C parapsilosis, Pseudomonas putida. Independent analysis of BCX results and interpretation of sensitivity results.  11/15 BCx NG: finished course of Levofloxacin 500mg QD and Voriconazole 200mg BID end date 11/27  - wound care w/ Sulfadine and kerlix, daily dressing changes  - lasix 40mg PO daily  - pain control    Hyponatremia, monitor, on Lasix     Chronic Macrocytic Anemia: Hgb stable  - monitor H/H    Hx of DM2  - however, last  A1C 5.5 on 10/22  - on Januvia at home  - sliding scale    HLD: continue statin  Simple renal cysts:  outpatient follow up with urology      DVT ppx: heparin subQ  GI ppx: Protonix  Code: Full Code  Dispo: dc planning to rehab pending appeal process from insurance

## 2024-12-05 LAB
BASOPHILS # BLD AUTO: 0.09 K/UL — SIGNIFICANT CHANGE UP (ref 0–0.2)
BASOPHILS NFR BLD AUTO: 0.5 % — SIGNIFICANT CHANGE UP (ref 0–1)
EOSINOPHIL # BLD AUTO: 0.07 K/UL — SIGNIFICANT CHANGE UP (ref 0–0.7)
EOSINOPHIL NFR BLD AUTO: 0.4 % — SIGNIFICANT CHANGE UP (ref 0–8)
GLUCOSE BLDC GLUCOMTR-MCNC: 105 MG/DL — HIGH (ref 70–99)
GLUCOSE BLDC GLUCOMTR-MCNC: 107 MG/DL — HIGH (ref 70–99)
GLUCOSE BLDC GLUCOMTR-MCNC: 116 MG/DL — HIGH (ref 70–99)
GLUCOSE BLDC GLUCOMTR-MCNC: 154 MG/DL — HIGH (ref 70–99)
HCT VFR BLD CALC: 32.3 % — LOW (ref 42–52)
HGB BLD-MCNC: 10.3 G/DL — LOW (ref 14–18)
IMM GRANULOCYTES NFR BLD AUTO: 1.2 % — HIGH (ref 0.1–0.3)
LYMPHOCYTES # BLD AUTO: 1.96 K/UL — SIGNIFICANT CHANGE UP (ref 1.2–3.4)
LYMPHOCYTES # BLD AUTO: 11.3 % — LOW (ref 20.5–51.1)
MCHC RBC-ENTMCNC: 29.3 PG — SIGNIFICANT CHANGE UP (ref 27–31)
MCHC RBC-ENTMCNC: 31.9 G/DL — LOW (ref 32–37)
MCV RBC AUTO: 92 FL — SIGNIFICANT CHANGE UP (ref 80–94)
MONOCYTES # BLD AUTO: 2.21 K/UL — HIGH (ref 0.1–0.6)
MONOCYTES NFR BLD AUTO: 12.7 % — HIGH (ref 1.7–9.3)
NEUTROPHILS # BLD AUTO: 12.81 K/UL — HIGH (ref 1.4–6.5)
NEUTROPHILS NFR BLD AUTO: 73.9 % — SIGNIFICANT CHANGE UP (ref 42.2–75.2)
NRBC # BLD: 0 /100 WBCS — SIGNIFICANT CHANGE UP (ref 0–0)
PLATELET # BLD AUTO: 563 K/UL — HIGH (ref 130–400)
PMV BLD: 9.8 FL — SIGNIFICANT CHANGE UP (ref 7.4–10.4)
RBC # BLD: 3.51 M/UL — LOW (ref 4.7–6.1)
RBC # FLD: 15.7 % — HIGH (ref 11.5–14.5)
WBC # BLD: 17.35 K/UL — HIGH (ref 4.8–10.8)
WBC # FLD AUTO: 17.35 K/UL — HIGH (ref 4.8–10.8)

## 2024-12-05 PROCEDURE — 99232 SBSQ HOSP IP/OBS MODERATE 35: CPT

## 2024-12-05 RX ADMIN — Medication 20 MILLIGRAM(S): at 21:46

## 2024-12-05 RX ADMIN — ACETAMINOPHEN 500MG 975 MILLIGRAM(S): 500 TABLET, COATED ORAL at 21:46

## 2024-12-05 RX ADMIN — ACETAMINOPHEN 500MG 975 MILLIGRAM(S): 500 TABLET, COATED ORAL at 13:15

## 2024-12-05 RX ADMIN — Medication 1 APPLICATION(S): at 06:39

## 2024-12-05 RX ADMIN — Medication 5000 UNIT(S): at 17:34

## 2024-12-05 RX ADMIN — Medication 1 APPLICATION(S): at 17:33

## 2024-12-05 RX ADMIN — Medication 1: at 17:36

## 2024-12-05 RX ADMIN — ACETAMINOPHEN 500MG 975 MILLIGRAM(S): 500 TABLET, COATED ORAL at 13:16

## 2024-12-05 RX ADMIN — Medication 5000 UNIT(S): at 06:43

## 2024-12-05 RX ADMIN — PANTOPRAZOLE SODIUM 40 MILLIGRAM(S): 40 TABLET, DELAYED RELEASE ORAL at 06:38

## 2024-12-05 RX ADMIN — Medication 1000 MICROGRAM(S): at 11:41

## 2024-12-05 RX ADMIN — ACETAMINOPHEN 500MG 975 MILLIGRAM(S): 500 TABLET, COATED ORAL at 21:49

## 2024-12-05 RX ADMIN — ACETAMINOPHEN 500MG 975 MILLIGRAM(S): 500 TABLET, COATED ORAL at 06:38

## 2024-12-05 RX ADMIN — CHLORHEXIDINE GLUCONATE 1 APPLICATION(S): 1.2 RINSE ORAL at 06:38

## 2024-12-05 RX ADMIN — ACETAMINOPHEN 500MG 975 MILLIGRAM(S): 500 TABLET, COATED ORAL at 07:23

## 2024-12-05 RX ADMIN — FUROSEMIDE 40 MILLIGRAM(S): 40 TABLET ORAL at 06:38

## 2024-12-05 RX ADMIN — Medication 1 TABLET(S): at 21:46

## 2024-12-05 NOTE — PROGRESS NOTE ADULT - ASSESSMENT
· 	  79 year old with PMHx DM, HLD,  recent multiple admissions for left leg cellulitis presents s/p fall last night and ambulatory dysfunction.    Dizziness   S/p fall at home  - CTH negative  - EKG afib on admission, repeat NSR w/ 1st degree AV block  - cardiology eval noted>>- consider [facility] outpatient  - orthostatic negative  - fall precautions  - PT eval -> rehab placement>>Fast appeal done today     Chronic B/L Cellulitis on chronic venous stasis  11/1 WCx LLE : Fusarium, C parapsilosis, Pseudomonas putida. Independent analysis of BCX results and interpretation of sensitivity results.  11/15 BCx NG: finished course of Levofloxacin 500mg QD and Voriconazole 200mg BID end date 11/27  - wound care w/ Sulfadine and kerlix, daily dressing changes  - lasix 40mg PO daily  - pain control    Hyponatremia, monitor, on Lasix     Chronic Macrocytic Anemia: Hgb stable  - monitor H/H    Hx of DM2  - however, last  A1C 5.5 on 10/22  - on Januvia at home  - sliding scale    HLD: continue statin  Simple renal cysts:  outpatient follow up with urology    DVT ppx: heparin subQ  GI ppx: Protonix  Code: Full Code  Dispo: juanc[facility]os planning to rehab pending appeal process from insurance

## 2024-12-05 NOTE — PROGRESS NOTE ADULT - SUBJECTIVE AND OBJECTIVE BOX
Patient is a 79y old  Male who presents with a chief complaint of Cellulitis (04 Dec 2024 15:38)        MEDICATIONS  (STANDING):  acetaminophen     Tablet .. 975 milliGRAM(s) Oral every 8 hours  atorvastatin 20 milliGRAM(s) Oral at bedtime  chlorhexidine 2% Cloths 1 Application(s) Topical <User Schedule>  cyanocobalamin 1000 MICROGram(s) Oral daily  dextrose 5%. 1000 milliLiter(s) (100 mL/Hr) IV Continuous <Continuous>  dextrose 5%. 1000 milliLiter(s) (50 mL/Hr) IV Continuous <Continuous>  dextrose 50% Injectable 25 Gram(s) IV Push once  dextrose 50% Injectable 12.5 Gram(s) IV Push once  dextrose 50% Injectable 25 Gram(s) IV Push once  furosemide    Tablet 40 milliGRAM(s) Oral daily  glucagon  Injectable 1 milliGRAM(s) IntraMuscular once  heparin   Injectable 5000 Unit(s) SubCutaneous every 12 hours  influenza  Vaccine (HIGH DOSE) 0.5 milliLiter(s) IntraMuscular once  insulin lispro (ADMELOG) corrective regimen sliding scale   SubCutaneous three times a day before meals  pantoprazole    Tablet 40 milliGRAM(s) Oral before breakfast  senna 1 Tablet(s) Oral at bedtime  silver sulfADIAZINE 1% Cream 1 Application(s) Topical two times a day    MEDICATIONS  (PRN):  dextrose Oral Gel 15 Gram(s) Oral once PRN Blood Glucose LESS THAN 70 milliGRAM(s)/deciliter  oxyCODONE    IR 5 milliGRAM(s) Oral every 4 hours PRN Severe Pain (7 - 10)  polyethylene glycol 3350 17 Gram(s) Oral daily PRN for constipation  traMADol 50 milliGRAM(s) Oral every 6 hours PRN Moderate Pain (4 - 6)      CAPILLARY BLOOD GLUCOSE  POCT Blood Glucose.: 116 mg/dL (05 Dec 2024 11:33)  POCT Blood Glucose.: 107 mg/dL (05 Dec 2024 07:40)  POCT Blood Glucose.: 111 mg/dL (04 Dec 2024 20:50)  POCT Blood Glucose.: 131 mg/dL (04 Dec 2024 16:33)    I&O's Summary      PHYSICAL EXAM:  Vital Signs Last 24 Hrs  T(C): 36.8 (05 Dec 2024 04:45), Max: 36.8 (05 Dec 2024 04:45)  T(F): 98.2 (05 Dec 2024 04:45), Max: 98.2 (05 Dec 2024 04:45)  HR: 93 (05 Dec 2024 04:45) (91 - 93)  BP: 120/66 (05 Dec 2024 04:45) (120/66 - 127/75)  BP(mean): --  RR: 18 (05 Dec 2024 04:45) (18 - 18)  SpO2: 100% (05 Dec 2024 04:45) (100% - 100%)    Parameters below as of 05 Dec 2024 04:45  Patient On (Oxygen Delivery Method): room air        GENERAL: No acute distress, well-develoed  HEAD:  Atraumatic, Normocephalic  EYES:  conjunctiva and sclera clear  NECK: Supple, n no JVD  CHEST/LUNG: CTAB; No wheezes,  HEART: Regular rate and rhythm; No murmurs, rubs, or gallops  ABDOMEN: Soft, non-tender, non-distended; normal bowel sound  EXTREMITIES: B/L LE cellulitis improved  with chronic venous stasis   NEUROLOGY: A&O x2 no focal deficits  SKIN: No rashes or lesions    LABS:                        10.3   17.35 )-----------( 563      ( 05 Dec 2024 06:32 )             32.3     12-04    131[L]  |  92[L]  |  16  ----------------------------<  92  4.5   |  25  |  0.9    Ca    9.1      04 Dec 2024 06:19  Mg     2.1     12-04        Urinalysis Basic - ( 04 Dec 2024 06:19 )    Color: x / Appearance: x / SG: x / pH: x  Gluc: 92 mg/dL / Ketone: x  / Bili: x / Urobili: x   Blood: x / Protein: x / Nitrite: x   Leuk Esterase: x / RBC: x / WBC x   Sq Epi: x / Non Sq Epi: x / Bacteria: x

## 2024-12-06 ENCOUNTER — TRANSCRIPTION ENCOUNTER (OUTPATIENT)
Age: 79
End: 2024-12-06

## 2024-12-06 VITALS
TEMPERATURE: 98 F | RESPIRATION RATE: 18 BRPM | OXYGEN SATURATION: 100 % | SYSTOLIC BLOOD PRESSURE: 122 MMHG | HEART RATE: 89 BPM | DIASTOLIC BLOOD PRESSURE: 71 MMHG

## 2024-12-06 LAB
GLUCOSE BLDC GLUCOMTR-MCNC: 117 MG/DL — HIGH (ref 70–99)
GLUCOSE BLDC GLUCOMTR-MCNC: 128 MG/DL — HIGH (ref 70–99)

## 2024-12-06 PROCEDURE — 99239 HOSP IP/OBS DSCHRG MGMT >30: CPT

## 2024-12-06 RX ORDER — ACETAMINOPHEN 500MG 500 MG/1
3 TABLET, COATED ORAL
Qty: 0 | Refills: 0 | DISCHARGE
Start: 2024-12-06

## 2024-12-06 RX ORDER — FUROSEMIDE 40 MG/1
1 TABLET ORAL
Qty: 0 | Refills: 0 | DISCHARGE
Start: 2024-12-06

## 2024-12-06 RX ADMIN — Medication 1 APPLICATION(S): at 05:04

## 2024-12-06 RX ADMIN — FUROSEMIDE 40 MILLIGRAM(S): 40 TABLET ORAL at 05:02

## 2024-12-06 RX ADMIN — ACETAMINOPHEN 500MG 975 MILLIGRAM(S): 500 TABLET, COATED ORAL at 13:43

## 2024-12-06 RX ADMIN — Medication 1000 MICROGRAM(S): at 11:05

## 2024-12-06 RX ADMIN — CHLORHEXIDINE GLUCONATE 1 APPLICATION(S): 1.2 RINSE ORAL at 05:05

## 2024-12-06 RX ADMIN — PANTOPRAZOLE SODIUM 40 MILLIGRAM(S): 40 TABLET, DELAYED RELEASE ORAL at 05:02

## 2024-12-06 RX ADMIN — OXYCODONE HYDROCHLORIDE 5 MILLIGRAM(S): 30 TABLET ORAL at 08:36

## 2024-12-06 RX ADMIN — ACETAMINOPHEN 500MG 975 MILLIGRAM(S): 500 TABLET, COATED ORAL at 05:03

## 2024-12-06 RX ADMIN — OXYCODONE HYDROCHLORIDE 5 MILLIGRAM(S): 30 TABLET ORAL at 10:02

## 2024-12-06 RX ADMIN — Medication 5000 UNIT(S): at 05:02

## 2024-12-06 RX ADMIN — OXYCODONE HYDROCHLORIDE 5 MILLIGRAM(S): 30 TABLET ORAL at 03:39

## 2024-12-06 RX ADMIN — OXYCODONE HYDROCHLORIDE 5 MILLIGRAM(S): 30 TABLET ORAL at 04:06

## 2024-12-06 NOTE — DISCHARGE NOTE NURSING/CASE MANAGEMENT/SOCIAL WORK - NSDCPEFALRISK_GEN_ALL_CORE
For information on Fall & Injury Prevention, visit: [email or web address] OR  [email or web address] OR  https://www.cdc.gov/steadi/patient.html

## 2024-12-06 NOTE — DISCHARGE NOTE NURSING/CASE MANAGEMENT/SOCIAL WORK - PATIENT PORTAL LINK FT
You can access the FollowMyHealth Patient Portal offered by [facility] by registering at the following website: [email or web address]. By joining [employer]’s FollowMyHealth portal, you will also be able to view your health information using other applications (apps) compatible with our system.

## 2024-12-06 NOTE — DISCHARGE NOTE NURSING/CASE MANAGEMENT/SOCIAL WORK - FINANCIAL ASSISTANCE
[facility] provides services at a reduced cost to those who are determined to be eligible through [facility]’s financial assistance program. Information regarding [facility]’s financial assistance program can be found by going to [email or web address] or by calling [phone].

## 2024-12-12 DIAGNOSIS — E11.9 TYPE 2 DIABETES MELLITUS WITHOUT COMPLICATIONS: ICD-10-CM

## 2024-12-12 DIAGNOSIS — R42 DIZZINESS AND GIDDINESS: ICD-10-CM

## 2024-12-12 DIAGNOSIS — I35.0 NONRHEUMATIC AORTIC (VALVE) STENOSIS: ICD-10-CM

## 2024-12-12 DIAGNOSIS — L03.115 CELLULITIS OF RIGHT LOWER LIMB: ICD-10-CM

## 2024-12-12 DIAGNOSIS — N17.9 ACUTE KIDNEY FAILURE, UNSPECIFIED: ICD-10-CM

## 2024-12-12 DIAGNOSIS — R60.0 LOCALIZED EDEMA: ICD-10-CM

## 2024-12-12 DIAGNOSIS — N28.1 CYST OF KIDNEY, ACQUIRED: ICD-10-CM

## 2024-12-12 DIAGNOSIS — N40.0 BENIGN PROSTATIC HYPERPLASIA WITHOUT LOWER URINARY TRACT SYMPTOMS: ICD-10-CM

## 2024-12-12 DIAGNOSIS — D53.9 NUTRITIONAL ANEMIA, UNSPECIFIED: ICD-10-CM

## 2024-12-12 DIAGNOSIS — E87.1 HYPO-OSMOLALITY AND HYPONATREMIA: ICD-10-CM

## 2024-12-12 DIAGNOSIS — L03.116 CELLULITIS OF LEFT LOWER LIMB: ICD-10-CM

## 2024-12-12 DIAGNOSIS — I87.8 OTHER SPECIFIED DISORDERS OF VEINS: ICD-10-CM

## 2024-12-12 DIAGNOSIS — M79.89 OTHER SPECIFIED SOFT TISSUE DISORDERS: ICD-10-CM

## 2024-12-12 DIAGNOSIS — I48.0 PAROXYSMAL ATRIAL FIBRILLATION: ICD-10-CM

## 2024-12-12 DIAGNOSIS — E87.5 HYPERKALEMIA: ICD-10-CM

## 2025-01-01 ENCOUNTER — RESULT REVIEW (OUTPATIENT)
Age: 80
End: 2025-01-01

## 2025-01-01 ENCOUNTER — INPATIENT (INPATIENT)
Facility: HOSPITAL | Age: 80
LOS: 1 days | DRG: 684 | End: 2025-01-16
Attending: INTERNAL MEDICINE | Admitting: INTERNAL MEDICINE
Payer: MEDICARE

## 2025-01-01 VITALS
DIASTOLIC BLOOD PRESSURE: 119 MMHG | HEIGHT: 65 IN | TEMPERATURE: 84 F | WEIGHT: 149.91 LBS | RESPIRATION RATE: 25 BRPM | OXYGEN SATURATION: 88 % | HEART RATE: 160 BPM | SYSTOLIC BLOOD PRESSURE: 153 MMHG

## 2025-01-01 VITALS — OXYGEN SATURATION: 62 %

## 2025-01-01 DIAGNOSIS — N17.9 ACUTE KIDNEY FAILURE, UNSPECIFIED: ICD-10-CM

## 2025-01-01 DIAGNOSIS — Z98.49 CATARACT EXTRACTION STATUS, UNSPECIFIED EYE: Chronic | ICD-10-CM

## 2025-01-01 DIAGNOSIS — J96.01 ACUTE RESPIRATORY FAILURE WITH HYPOXIA: ICD-10-CM

## 2025-01-01 DIAGNOSIS — S12.000A UNSPECIFIED DISPLACED FRACTURE OF FIRST CERVICAL VERTEBRA, INITIAL ENCOUNTER FOR CLOSED FRACTURE: ICD-10-CM

## 2025-01-01 DIAGNOSIS — Z51.5 ENCOUNTER FOR PALLIATIVE CARE: ICD-10-CM

## 2025-01-01 DIAGNOSIS — A41.9 SEPSIS, UNSPECIFIED ORGANISM: ICD-10-CM

## 2025-01-01 DIAGNOSIS — Z71.89 OTHER SPECIFIED COUNSELING: ICD-10-CM

## 2025-01-01 LAB
A1C WITH ESTIMATED AVERAGE GLUCOSE RESULT: 6.1 % — HIGH (ref 4–5.6)
ALBUMIN SERPL ELPH-MCNC: 1.2 G/DL — LOW (ref 3.5–5.2)
ALBUMIN SERPL ELPH-MCNC: 1.6 G/DL — LOW (ref 3.5–5.2)
ALBUMIN SERPL ELPH-MCNC: 1.6 G/DL — LOW (ref 3.5–5.2)
ALBUMIN SERPL ELPH-MCNC: 2.2 G/DL — LOW (ref 3.5–5.2)
ALBUMIN SERPL ELPH-MCNC: 2.6 G/DL — LOW (ref 3.5–5.2)
ALBUMIN SERPL ELPH-MCNC: 3.6 G/DL — SIGNIFICANT CHANGE UP (ref 3.5–5.2)
ALP SERPL-CCNC: 124 U/L — HIGH (ref 30–115)
ALP SERPL-CCNC: 126 U/L — HIGH (ref 30–115)
ALP SERPL-CCNC: 129 U/L — HIGH (ref 30–115)
ALP SERPL-CCNC: 135 U/L — HIGH (ref 30–115)
ALP SERPL-CCNC: 142 U/L — HIGH (ref 30–115)
ALP SERPL-CCNC: 171 U/L — HIGH (ref 30–115)
ALT FLD-CCNC: 131 U/L — HIGH (ref 0–41)
ALT FLD-CCNC: 346 U/L — HIGH (ref 0–41)
ALT FLD-CCNC: 486 U/L — HIGH (ref 0–41)
ALT FLD-CCNC: 763 U/L — HIGH (ref 0–41)
ALT FLD-CCNC: 85 U/L — HIGH (ref 0–41)
ALT FLD-CCNC: 952 U/L — HIGH (ref 0–41)
AMMONIA BLD-MCNC: 50 UMOL/L — SIGNIFICANT CHANGE UP (ref 11–55)
ANION GAP SERPL CALC-SCNC: 20 MMOL/L — HIGH (ref 7–14)
ANION GAP SERPL CALC-SCNC: 27 MMOL/L — HIGH (ref 7–14)
ANION GAP SERPL CALC-SCNC: 27 MMOL/L — HIGH (ref 7–14)
ANION GAP SERPL CALC-SCNC: 31 MMOL/L — HIGH (ref 7–14)
ANION GAP SERPL CALC-SCNC: 31 MMOL/L — HIGH (ref 7–14)
ANION GAP SERPL CALC-SCNC: 33 MMOL/L — HIGH (ref 7–14)
ANION GAP SERPL CALC-SCNC: 33 MMOL/L — HIGH (ref 7–14)
ANION GAP SERPL CALC-SCNC: 34 MMOL/L — HIGH (ref 7–14)
ANION GAP SERPL CALC-SCNC: 34 MMOL/L — HIGH (ref 7–14)
APTT BLD: 27.1 SEC — SIGNIFICANT CHANGE UP (ref 27–39.2)
APTT BLD: 33.6 SEC — SIGNIFICANT CHANGE UP (ref 27–39.2)
APTT BLD: 45.5 SEC — HIGH (ref 27–39.2)
AST SERPL-CCNC: 1145 U/L — HIGH (ref 0–41)
AST SERPL-CCNC: 1715 U/L — HIGH (ref 0–41)
AST SERPL-CCNC: 2317 U/L — HIGH (ref 0–41)
AST SERPL-CCNC: 343 U/L — HIGH (ref 0–41)
AST SERPL-CCNC: 484 U/L — HIGH (ref 0–41)
AST SERPL-CCNC: 823 U/L — HIGH (ref 0–41)
BASE EXCESS BLDA CALC-SCNC: -20.1 MMOL/L — LOW (ref -2–3)
BASE EXCESS BLDA CALC-SCNC: -24.5 MMOL/L — LOW (ref -2–3)
BASE EXCESS BLDV CALC-SCNC: -22.4 MMOL/L — LOW (ref -2–3)
BASE EXCESS BLDV CALC-SCNC: -5.8 MMOL/L — LOW (ref -2–3)
BASOPHILS # BLD AUTO: 0 K/UL — SIGNIFICANT CHANGE UP (ref 0–0.2)
BASOPHILS # BLD AUTO: 0.03 K/UL — SIGNIFICANT CHANGE UP (ref 0–0.2)
BASOPHILS # BLD AUTO: 0.05 K/UL — SIGNIFICANT CHANGE UP (ref 0–0.2)
BASOPHILS # BLD AUTO: 0.05 K/UL — SIGNIFICANT CHANGE UP (ref 0–0.2)
BASOPHILS # BLD AUTO: 0.09 K/UL — SIGNIFICANT CHANGE UP (ref 0–0.2)
BASOPHILS NFR BLD AUTO: 0 % — SIGNIFICANT CHANGE UP (ref 0–1)
BASOPHILS NFR BLD AUTO: 0.1 % — SIGNIFICANT CHANGE UP (ref 0–1)
BASOPHILS NFR BLD AUTO: 0.2 % — SIGNIFICANT CHANGE UP (ref 0–1)
BILIRUB SERPL-MCNC: 0.7 MG/DL — SIGNIFICANT CHANGE UP (ref 0.2–1.2)
BILIRUB SERPL-MCNC: 0.8 MG/DL — SIGNIFICANT CHANGE UP (ref 0.2–1.2)
BLD GP AB SCN SERPL QL: SIGNIFICANT CHANGE UP
BUN SERPL-MCNC: 66 MG/DL — CRITICAL HIGH (ref 10–20)
BUN SERPL-MCNC: 67 MG/DL — CRITICAL HIGH (ref 10–20)
BUN SERPL-MCNC: 68 MG/DL — CRITICAL HIGH (ref 10–20)
BUN SERPL-MCNC: 70 MG/DL — CRITICAL HIGH (ref 10–20)
BUN SERPL-MCNC: 70 MG/DL — CRITICAL HIGH (ref 10–20)
BUN SERPL-MCNC: 73 MG/DL — CRITICAL HIGH (ref 10–20)
BUN SERPL-MCNC: 75 MG/DL — CRITICAL HIGH (ref 10–20)
BUN SERPL-MCNC: 76 MG/DL — CRITICAL HIGH (ref 10–20)
BUN SERPL-MCNC: 76 MG/DL — CRITICAL HIGH (ref 10–20)
CA-I SERPL-SCNC: 0.78 MMOL/L — LOW (ref 1.15–1.33)
CA-I SERPL-SCNC: 0.92 MMOL/L — LOW (ref 1.15–1.33)
CALCIUM SERPL-MCNC: 4.4 MG/DL — CRITICAL LOW (ref 8.4–10.5)
CALCIUM SERPL-MCNC: 5 MG/DL — CRITICAL LOW (ref 8.4–10.4)
CALCIUM SERPL-MCNC: 5.2 MG/DL — CRITICAL LOW (ref 8.4–10.5)
CALCIUM SERPL-MCNC: 5.3 MG/DL — CRITICAL LOW (ref 8.4–10.5)
CALCIUM SERPL-MCNC: 5.6 MG/DL — CRITICAL LOW (ref 8.4–10.5)
CALCIUM SERPL-MCNC: 6.4 MG/DL — LOW (ref 8.4–10.4)
CALCIUM SERPL-MCNC: 6.8 MG/DL — LOW (ref 8.4–10.5)
CALCIUM SERPL-MCNC: 7.2 MG/DL — LOW (ref 8.4–10.5)
CALCIUM SERPL-MCNC: 8.5 MG/DL — SIGNIFICANT CHANGE UP (ref 8.4–10.4)
CHLORIDE SERPL-SCNC: 88 MMOL/L — LOW (ref 98–110)
CHLORIDE SERPL-SCNC: 91 MMOL/L — LOW (ref 98–110)
CHLORIDE SERPL-SCNC: 92 MMOL/L — LOW (ref 98–110)
CHLORIDE SERPL-SCNC: 94 MMOL/L — LOW (ref 98–110)
CHLORIDE SERPL-SCNC: 94 MMOL/L — LOW (ref 98–110)
CHLORIDE SERPL-SCNC: 96 MMOL/L — LOW (ref 98–110)
CHLORIDE SERPL-SCNC: 96 MMOL/L — LOW (ref 98–110)
CHLORIDE SERPL-SCNC: 97 MMOL/L — LOW (ref 98–110)
CHLORIDE SERPL-SCNC: 97 MMOL/L — LOW (ref 98–110)
CK SERPL-CCNC: HIGH U/L (ref 0–225)
CO2 SERPL-SCNC: 10 MMOL/L — LOW (ref 17–32)
CO2 SERPL-SCNC: 10 MMOL/L — LOW (ref 17–32)
CO2 SERPL-SCNC: 12 MMOL/L — LOW (ref 17–32)
CO2 SERPL-SCNC: 17 MMOL/L — SIGNIFICANT CHANGE UP (ref 17–32)
CO2 SERPL-SCNC: 17 MMOL/L — SIGNIFICANT CHANGE UP (ref 17–32)
CO2 SERPL-SCNC: 21 MMOL/L — SIGNIFICANT CHANGE UP (ref 17–32)
CO2 SERPL-SCNC: 6 MMOL/L — CRITICAL LOW (ref 17–32)
CREAT SERPL-MCNC: 3.7 MG/DL — HIGH (ref 0.7–1.5)
CREAT SERPL-MCNC: 3.9 MG/DL — HIGH (ref 0.7–1.5)
CREAT SERPL-MCNC: 3.9 MG/DL — HIGH (ref 0.7–1.5)
CREAT SERPL-MCNC: 4 MG/DL — HIGH (ref 0.7–1.5)
CREAT SERPL-MCNC: 4 MG/DL — HIGH (ref 0.7–1.5)
CREAT SERPL-MCNC: 4.1 MG/DL — CRITICAL HIGH (ref 0.7–1.5)
CREAT SERPL-MCNC: 4.3 MG/DL — CRITICAL HIGH (ref 0.7–1.5)
CREAT SERPL-MCNC: 4.4 MG/DL — CRITICAL HIGH (ref 0.7–1.5)
CREAT SERPL-MCNC: 4.6 MG/DL — CRITICAL HIGH (ref 0.7–1.5)
D DIMER BLD IA.RAPID-MCNC: HIGH NG/ML DDU
EGFR: 12 ML/MIN/1.73M2 — LOW
EGFR: 13 ML/MIN/1.73M2 — LOW
EGFR: 13 ML/MIN/1.73M2 — LOW
EGFR: 14 ML/MIN/1.73M2 — LOW
EGFR: 15 ML/MIN/1.73M2 — LOW
EGFR: 15 ML/MIN/1.73M2 — LOW
EGFR: 16 ML/MIN/1.73M2 — LOW
EOSINOPHIL # BLD AUTO: 0 K/UL — SIGNIFICANT CHANGE UP (ref 0–0.7)
EOSINOPHIL # BLD AUTO: 0 K/UL — SIGNIFICANT CHANGE UP (ref 0–0.7)
EOSINOPHIL # BLD AUTO: 0.01 K/UL — SIGNIFICANT CHANGE UP (ref 0–0.7)
EOSINOPHIL # BLD AUTO: 0.07 K/UL — SIGNIFICANT CHANGE UP (ref 0–0.7)
EOSINOPHIL # BLD AUTO: 0.1 K/UL — SIGNIFICANT CHANGE UP (ref 0–0.7)
EOSINOPHIL NFR BLD AUTO: 0 % — SIGNIFICANT CHANGE UP (ref 0–8)
EOSINOPHIL NFR BLD AUTO: 0.3 % — SIGNIFICANT CHANGE UP (ref 0–8)
EOSINOPHIL NFR BLD AUTO: 0.3 % — SIGNIFICANT CHANGE UP (ref 0–8)
ESTIMATED AVERAGE GLUCOSE: 128 MG/DL — HIGH (ref 68–114)
ETHANOL SERPL-MCNC: <10 MG/DL — SIGNIFICANT CHANGE UP
FIBRINOGEN PPP-MCNC: 337 MG/DL — SIGNIFICANT CHANGE UP (ref 200–435)
GAS PNL BLDA: SIGNIFICANT CHANGE UP
GAS PNL BLDV: 129 MMOL/L — LOW (ref 136–145)
GAS PNL BLDV: 130 MMOL/L — LOW (ref 136–145)
GAS PNL BLDV: SIGNIFICANT CHANGE UP
GLUCOSE BLDC GLUCOMTR-MCNC: 110 MG/DL — HIGH (ref 70–99)
GLUCOSE BLDC GLUCOMTR-MCNC: 112 MG/DL — HIGH (ref 70–99)
GLUCOSE BLDC GLUCOMTR-MCNC: 113 MG/DL — HIGH (ref 70–99)
GLUCOSE BLDC GLUCOMTR-MCNC: 120 MG/DL — HIGH (ref 70–99)
GLUCOSE BLDC GLUCOMTR-MCNC: 128 MG/DL — HIGH (ref 70–99)
GLUCOSE BLDC GLUCOMTR-MCNC: 154 MG/DL — HIGH (ref 70–99)
GLUCOSE BLDC GLUCOMTR-MCNC: 200 MG/DL — HIGH (ref 70–99)
GLUCOSE BLDC GLUCOMTR-MCNC: 217 MG/DL — HIGH (ref 70–99)
GLUCOSE BLDC GLUCOMTR-MCNC: 220 MG/DL — HIGH (ref 70–99)
GLUCOSE BLDC GLUCOMTR-MCNC: 24 MG/DL — CRITICAL LOW (ref 70–99)
GLUCOSE BLDC GLUCOMTR-MCNC: 35 MG/DL — CRITICAL LOW (ref 70–99)
GLUCOSE BLDC GLUCOMTR-MCNC: 43 MG/DL — CRITICAL LOW (ref 70–99)
GLUCOSE BLDC GLUCOMTR-MCNC: 73 MG/DL — SIGNIFICANT CHANGE UP (ref 70–99)
GLUCOSE BLDC GLUCOMTR-MCNC: 92 MG/DL — SIGNIFICANT CHANGE UP (ref 70–99)
GLUCOSE SERPL-MCNC: 102 MG/DL — HIGH (ref 70–99)
GLUCOSE SERPL-MCNC: 109 MG/DL — HIGH (ref 70–99)
GLUCOSE SERPL-MCNC: 117 MG/DL — HIGH (ref 70–99)
GLUCOSE SERPL-MCNC: 130 MG/DL — HIGH (ref 70–99)
GLUCOSE SERPL-MCNC: 137 MG/DL — HIGH (ref 70–99)
GLUCOSE SERPL-MCNC: 142 MG/DL — HIGH (ref 70–99)
GLUCOSE SERPL-MCNC: 178 MG/DL — HIGH (ref 70–99)
GLUCOSE SERPL-MCNC: 36 MG/DL — CRITICAL LOW (ref 70–99)
GLUCOSE SERPL-MCNC: 374 MG/DL — HIGH (ref 70–99)
HCO3 BLDA-SCNC: 6 MMOL/L — CRITICAL LOW (ref 21–28)
HCO3 BLDA-SCNC: 9 MMOL/L — CRITICAL LOW (ref 21–28)
HCO3 BLDV-SCNC: 11 MMOL/L — LOW (ref 22–29)
HCO3 BLDV-SCNC: 21 MMOL/L — LOW (ref 22–29)
HCT VFR BLD CALC: 27.4 % — LOW (ref 42–52)
HCT VFR BLD CALC: 32.7 % — LOW (ref 42–52)
HCT VFR BLD CALC: 33.8 % — LOW (ref 42–52)
HCT VFR BLD CALC: 36.8 % — LOW (ref 42–52)
HCT VFR BLD CALC: 36.9 % — LOW (ref 42–52)
HCT VFR BLDA CALC: 31 % — LOW (ref 39–51)
HCT VFR BLDA CALC: 55 % — HIGH (ref 39–51)
HGB BLD CALC-MCNC: 10.4 G/DL — LOW (ref 12.6–17.4)
HGB BLD CALC-MCNC: 18.2 G/DL — HIGH (ref 12.6–17.4)
HGB BLD-MCNC: 10.5 G/DL — LOW (ref 14–18)
HGB BLD-MCNC: 11.6 G/DL — LOW (ref 14–18)
HGB BLD-MCNC: 11.8 G/DL — LOW (ref 14–18)
HGB BLD-MCNC: 8.1 G/DL — LOW (ref 14–18)
HGB BLD-MCNC: 9.4 G/DL — LOW (ref 14–18)
HOROWITZ INDEX BLDA+IHG-RTO: 40 — SIGNIFICANT CHANGE UP
HOROWITZ INDEX BLDV+IHG-RTO: 40 — SIGNIFICANT CHANGE UP
IMM GRANULOCYTES NFR BLD AUTO: 1.9 % — HIGH (ref 0.1–0.3)
IMM GRANULOCYTES NFR BLD AUTO: 2.1 % — HIGH (ref 0.1–0.3)
IMM GRANULOCYTES NFR BLD AUTO: 2.2 % — HIGH (ref 0.1–0.3)
IMM GRANULOCYTES NFR BLD AUTO: 2.8 % — HIGH (ref 0.1–0.3)
INR BLD: 1.12 RATIO — SIGNIFICANT CHANGE UP (ref 0.65–1.3)
INR BLD: 1.37 RATIO — HIGH (ref 0.65–1.3)
INR BLD: 2.21 RATIO — HIGH (ref 0.65–1.3)
LACTATE BLDV-MCNC: 7.3 MMOL/L — CRITICAL HIGH (ref 0.5–2)
LACTATE BLDV-MCNC: >16 MMOL/L — CRITICAL HIGH (ref 0.5–2)
LACTATE SERPL-SCNC: 13.5 MMOL/L — CRITICAL HIGH (ref 0.7–2)
LACTATE SERPL-SCNC: 7.3 MMOL/L — CRITICAL HIGH (ref 0.7–2)
LIDOCAIN IGE QN: 44 U/L — SIGNIFICANT CHANGE UP (ref 7–60)
LYMPHOCYTES # BLD AUTO: 1.07 K/UL — LOW (ref 1.2–3.4)
LYMPHOCYTES # BLD AUTO: 1.35 K/UL — SIGNIFICANT CHANGE UP (ref 1.2–3.4)
LYMPHOCYTES # BLD AUTO: 1.43 K/UL — SIGNIFICANT CHANGE UP (ref 1.2–3.4)
LYMPHOCYTES # BLD AUTO: 1.55 K/UL — SIGNIFICANT CHANGE UP (ref 1.2–3.4)
LYMPHOCYTES # BLD AUTO: 1.56 K/UL — SIGNIFICANT CHANGE UP (ref 1.2–3.4)
LYMPHOCYTES # BLD AUTO: 3.5 % — LOW (ref 20.5–51.1)
LYMPHOCYTES # BLD AUTO: 3.9 % — LOW (ref 20.5–51.1)
LYMPHOCYTES # BLD AUTO: 4.8 % — LOW (ref 20.5–51.1)
LYMPHOCYTES # BLD AUTO: 6.5 % — LOW (ref 20.5–51.1)
LYMPHOCYTES # BLD AUTO: 6.7 % — LOW (ref 20.5–51.1)
MAGNESIUM SERPL-MCNC: 2.5 MG/DL — HIGH (ref 1.8–2.4)
MAGNESIUM SERPL-MCNC: 2.6 MG/DL — HIGH (ref 1.8–2.4)
MAGNESIUM SERPL-MCNC: 2.6 MG/DL — HIGH (ref 1.8–2.4)
MAGNESIUM SERPL-MCNC: 2.7 MG/DL — HIGH (ref 1.8–2.4)
MAGNESIUM SERPL-MCNC: 2.7 MG/DL — HIGH (ref 1.8–2.4)
MCHC RBC-ENTMCNC: 26.9 PG — LOW (ref 27–31)
MCHC RBC-ENTMCNC: 27.1 PG — SIGNIFICANT CHANGE UP (ref 27–31)
MCHC RBC-ENTMCNC: 27.3 PG — SIGNIFICANT CHANGE UP (ref 27–31)
MCHC RBC-ENTMCNC: 27.4 PG — SIGNIFICANT CHANGE UP (ref 27–31)
MCHC RBC-ENTMCNC: 27.4 PG — SIGNIFICANT CHANGE UP (ref 27–31)
MCHC RBC-ENTMCNC: 28.7 G/DL — LOW (ref 32–37)
MCHC RBC-ENTMCNC: 29.6 G/DL — LOW (ref 32–37)
MCHC RBC-ENTMCNC: 31.1 G/DL — LOW (ref 32–37)
MCHC RBC-ENTMCNC: 31.4 G/DL — LOW (ref 32–37)
MCHC RBC-ENTMCNC: 32.1 G/DL — SIGNIFICANT CHANGE UP (ref 32–37)
MCV RBC AUTO: 85 FL — SIGNIFICANT CHANGE UP (ref 80–94)
MCV RBC AUTO: 87.1 FL — SIGNIFICANT CHANGE UP (ref 80–94)
MCV RBC AUTO: 87.2 FL — SIGNIFICANT CHANGE UP (ref 80–94)
MCV RBC AUTO: 92.6 FL — SIGNIFICANT CHANGE UP (ref 80–94)
MCV RBC AUTO: 93.7 FL — SIGNIFICANT CHANGE UP (ref 80–94)
MONOCYTES # BLD AUTO: 0.55 K/UL — SIGNIFICANT CHANGE UP (ref 0.1–0.6)
MONOCYTES # BLD AUTO: 1.32 K/UL — HIGH (ref 0.1–0.6)
MONOCYTES # BLD AUTO: 1.62 K/UL — HIGH (ref 0.1–0.6)
MONOCYTES # BLD AUTO: 1.85 K/UL — HIGH (ref 0.1–0.6)
MONOCYTES # BLD AUTO: 2.69 K/UL — HIGH (ref 0.1–0.6)
MONOCYTES NFR BLD AUTO: 2.7 % — SIGNIFICANT CHANGE UP (ref 1.7–9.3)
MONOCYTES NFR BLD AUTO: 5.3 % — SIGNIFICANT CHANGE UP (ref 1.7–9.3)
MONOCYTES NFR BLD AUTO: 5.5 % — SIGNIFICANT CHANGE UP (ref 1.7–9.3)
MONOCYTES NFR BLD AUTO: 5.7 % — SIGNIFICANT CHANGE UP (ref 1.7–9.3)
MONOCYTES NFR BLD AUTO: 7.4 % — SIGNIFICANT CHANGE UP (ref 1.7–9.3)
MRSA PCR RESULT.: POSITIVE
NEUTROPHILS # BLD AUTO: 17.67 K/UL — HIGH (ref 1.4–6.5)
NEUTROPHILS # BLD AUTO: 20.52 K/UL — HIGH (ref 1.4–6.5)
NEUTROPHILS # BLD AUTO: 27.83 K/UL — HIGH (ref 1.4–6.5)
NEUTROPHILS # BLD AUTO: 28 K/UL — HIGH (ref 1.4–6.5)
NEUTROPHILS # BLD AUTO: 31.32 K/UL — HIGH (ref 1.4–6.5)
NEUTROPHILS NFR BLD AUTO: 85.6 % — HIGH (ref 42.2–75.2)
NEUTROPHILS NFR BLD AUTO: 86.1 % — HIGH (ref 42.2–75.2)
NEUTROPHILS NFR BLD AUTO: 86.5 % — HIGH (ref 42.2–75.2)
NEUTROPHILS NFR BLD AUTO: 88.3 % — HIGH (ref 42.2–75.2)
NEUTROPHILS NFR BLD AUTO: 91.2 % — HIGH (ref 42.2–75.2)
NRBC # BLD: 0 /100 WBCS — SIGNIFICANT CHANGE UP (ref 0–0)
PCO2 BLDA: 27 MMHG — LOW (ref 35–48)
PCO2 BLDA: 29 MMHG — LOW (ref 35–48)
PCO2 BLDV: 44 MMHG — SIGNIFICANT CHANGE UP (ref 42–55)
PCO2 BLDV: 53 MMHG — SIGNIFICANT CHANGE UP (ref 42–55)
PH BLDA: 6.97 — CRITICAL LOW (ref 7.35–7.45)
PH BLDA: 7.08 — CRITICAL LOW (ref 7.35–7.45)
PH BLDV: 6.92 — CRITICAL LOW (ref 7.32–7.43)
PH BLDV: 7.28 — LOW (ref 7.32–7.43)
PHOSPHATE SERPL-MCNC: 11.3 MG/DL — HIGH (ref 2.1–4.9)
PHOSPHATE SERPL-MCNC: 11.7 MG/DL — HIGH (ref 2.1–4.9)
PHOSPHATE SERPL-MCNC: 16.1 MG/DL — HIGH (ref 2.1–4.9)
PLATELET # BLD AUTO: 125 K/UL — LOW (ref 130–400)
PLATELET # BLD AUTO: 145 K/UL — SIGNIFICANT CHANGE UP (ref 130–400)
PLATELET # BLD AUTO: 355 K/UL — SIGNIFICANT CHANGE UP (ref 130–400)
PLATELET # BLD AUTO: 387 K/UL — SIGNIFICANT CHANGE UP (ref 130–400)
PLATELET # BLD AUTO: 388 K/UL — SIGNIFICANT CHANGE UP (ref 130–400)
PMV BLD: 10.2 FL — SIGNIFICANT CHANGE UP (ref 7.4–10.4)
PMV BLD: 10.3 FL — SIGNIFICANT CHANGE UP (ref 7.4–10.4)
PMV BLD: 10.4 FL — SIGNIFICANT CHANGE UP (ref 7.4–10.4)
PMV BLD: 9.7 FL — SIGNIFICANT CHANGE UP (ref 7.4–10.4)
PMV BLD: 9.9 FL — SIGNIFICANT CHANGE UP (ref 7.4–10.4)
PO2 BLDA: 208 MMHG — HIGH (ref 83–108)
PO2 BLDA: 220 MMHG — HIGH (ref 83–108)
PO2 BLDV: 28 MMHG — SIGNIFICANT CHANGE UP (ref 25–45)
PO2 BLDV: 50 MMHG — HIGH (ref 25–45)
POTASSIUM BLDV-SCNC: 4.5 MMOL/L — SIGNIFICANT CHANGE UP (ref 3.5–5.1)
POTASSIUM BLDV-SCNC: 6.7 MMOL/L — CRITICAL HIGH (ref 3.5–5.1)
POTASSIUM SERPL-MCNC: 5.7 MMOL/L — HIGH (ref 3.5–5)
POTASSIUM SERPL-MCNC: 5.8 MMOL/L — HIGH (ref 3.5–5)
POTASSIUM SERPL-MCNC: 6.1 MMOL/L — CRITICAL HIGH (ref 3.5–5)
POTASSIUM SERPL-MCNC: 6.1 MMOL/L — CRITICAL HIGH (ref 3.5–5)
POTASSIUM SERPL-MCNC: 6.6 MMOL/L — CRITICAL HIGH (ref 3.5–5)
POTASSIUM SERPL-MCNC: 6.8 MMOL/L — CRITICAL HIGH (ref 3.5–5)
POTASSIUM SERPL-MCNC: 6.8 MMOL/L — CRITICAL HIGH (ref 3.5–5)
POTASSIUM SERPL-MCNC: 7 MMOL/L — CRITICAL HIGH (ref 3.5–5)
POTASSIUM SERPL-MCNC: 7.8 MMOL/L — CRITICAL HIGH (ref 3.5–5)
POTASSIUM SERPL-SCNC: 5.7 MMOL/L — HIGH (ref 3.5–5)
POTASSIUM SERPL-SCNC: 5.8 MMOL/L — HIGH (ref 3.5–5)
POTASSIUM SERPL-SCNC: 6.1 MMOL/L — CRITICAL HIGH (ref 3.5–5)
POTASSIUM SERPL-SCNC: 6.1 MMOL/L — CRITICAL HIGH (ref 3.5–5)
POTASSIUM SERPL-SCNC: 6.6 MMOL/L — CRITICAL HIGH (ref 3.5–5)
POTASSIUM SERPL-SCNC: 6.8 MMOL/L — CRITICAL HIGH (ref 3.5–5)
POTASSIUM SERPL-SCNC: 6.8 MMOL/L — CRITICAL HIGH (ref 3.5–5)
POTASSIUM SERPL-SCNC: 7 MMOL/L — CRITICAL HIGH (ref 3.5–5)
POTASSIUM SERPL-SCNC: 7.8 MMOL/L — CRITICAL HIGH (ref 3.5–5)
PROCALCITONIN SERPL-MCNC: 4.86 NG/ML — HIGH (ref 0.02–0.1)
PROT SERPL-MCNC: 2.6 G/DL — LOW (ref 6–8)
PROT SERPL-MCNC: 3.5 G/DL — LOW (ref 6–8)
PROT SERPL-MCNC: 3.5 G/DL — LOW (ref 6–8)
PROT SERPL-MCNC: 4.4 G/DL — LOW (ref 6–8)
PROT SERPL-MCNC: 5.4 G/DL — LOW (ref 6–8)
PROT SERPL-MCNC: 7.5 G/DL — SIGNIFICANT CHANGE UP (ref 6–8)
PROTHROM AB SERPL-ACNC: 13.2 SEC — HIGH (ref 9.95–12.87)
PROTHROM AB SERPL-ACNC: 16.3 SEC — HIGH (ref 9.95–12.87)
PROTHROM AB SERPL-ACNC: 26.5 SEC — HIGH (ref 9.95–12.87)
RBC # BLD: 2.96 M/UL — LOW (ref 4.7–6.1)
RBC # BLD: 3.49 M/UL — LOW (ref 4.7–6.1)
RBC # BLD: 3.88 M/UL — LOW (ref 4.7–6.1)
RBC # BLD: 4.23 M/UL — LOW (ref 4.7–6.1)
RBC # BLD: 4.33 M/UL — LOW (ref 4.7–6.1)
RBC # FLD: 16.6 % — HIGH (ref 11.5–14.5)
RBC # FLD: 17.2 % — HIGH (ref 11.5–14.5)
RBC # FLD: 17.3 % — HIGH (ref 11.5–14.5)
RBC # FLD: 17.3 % — HIGH (ref 11.5–14.5)
RBC # FLD: 17.5 % — HIGH (ref 11.5–14.5)
SAO2 % BLDA: 100 % — HIGH (ref 94–98)
SAO2 % BLDA: 99.9 % — HIGH (ref 94–98)
SAO2 % BLDV: 29.7 % — LOW (ref 67–88)
SAO2 % BLDV: 63.4 % — LOW (ref 67–88)
SODIUM SERPL-SCNC: 134 MMOL/L — LOW (ref 135–146)
SODIUM SERPL-SCNC: 135 MMOL/L — SIGNIFICANT CHANGE UP (ref 135–146)
SODIUM SERPL-SCNC: 135 MMOL/L — SIGNIFICANT CHANGE UP (ref 135–146)
SODIUM SERPL-SCNC: 136 MMOL/L — SIGNIFICANT CHANGE UP (ref 135–146)
SODIUM SERPL-SCNC: 137 MMOL/L — SIGNIFICANT CHANGE UP (ref 135–146)
SODIUM SERPL-SCNC: 138 MMOL/L — SIGNIFICANT CHANGE UP (ref 135–146)
SODIUM SERPL-SCNC: 138 MMOL/L — SIGNIFICANT CHANGE UP (ref 135–146)
SODIUM SERPL-SCNC: 139 MMOL/L — SIGNIFICANT CHANGE UP (ref 135–146)
SODIUM SERPL-SCNC: 140 MMOL/L — SIGNIFICANT CHANGE UP (ref 135–146)
T4 AB SER-ACNC: 7.8 UG/DL — SIGNIFICANT CHANGE UP (ref 4.6–12)
TROPONIN T, HIGH SENSITIVITY RESULT: 158 NG/L — CRITICAL HIGH (ref 6–21)
TROPONIN T, HIGH SENSITIVITY RESULT: 216 NG/L — CRITICAL HIGH (ref 6–21)
TROPONIN T, HIGH SENSITIVITY RESULT: 532 NG/L — CRITICAL HIGH (ref 6–21)
TROPONIN T, HIGH SENSITIVITY RESULT: 779 NG/L — CRITICAL HIGH (ref 6–21)
TROPONIN T, HIGH SENSITIVITY RESULT: 810 NG/L — CRITICAL HIGH (ref 6–21)
TROPONIN T, HIGH SENSITIVITY RESULT: 840 NG/L — CRITICAL HIGH (ref 6–21)
TSH SERPL-MCNC: 4.05 UIU/ML — SIGNIFICANT CHANGE UP (ref 0.27–4.2)
WBC # BLD: 20.06 K/UL — HIGH (ref 4.8–10.8)
WBC # BLD: 23.98 K/UL — HIGH (ref 4.8–10.8)
WBC # BLD: 30.51 K/UL — HIGH (ref 4.8–10.8)
WBC # BLD: 32.36 K/UL — HIGH (ref 4.8–10.8)
WBC # BLD: 36.39 K/UL — HIGH (ref 4.8–10.8)
WBC # FLD AUTO: 20.06 K/UL — HIGH (ref 4.8–10.8)
WBC # FLD AUTO: 23.98 K/UL — HIGH (ref 4.8–10.8)
WBC # FLD AUTO: 30.51 K/UL — HIGH (ref 4.8–10.8)
WBC # FLD AUTO: 32.36 K/UL — HIGH (ref 4.8–10.8)
WBC # FLD AUTO: 36.39 K/UL — HIGH (ref 4.8–10.8)

## 2025-01-01 PROCEDURE — 36415 COLL VENOUS BLD VENIPUNCTURE: CPT

## 2025-01-01 PROCEDURE — 99223 1ST HOSP IP/OBS HIGH 75: CPT

## 2025-01-01 PROCEDURE — 73590 X-RAY EXAM OF LOWER LEG: CPT | Mod: 26,50

## 2025-01-01 PROCEDURE — 80053 COMPREHEN METABOLIC PANEL: CPT

## 2025-01-01 PROCEDURE — 82330 ASSAY OF CALCIUM: CPT

## 2025-01-01 PROCEDURE — 71260 CT THORAX DX C+: CPT | Mod: 26

## 2025-01-01 PROCEDURE — 82803 BLOOD GASES ANY COMBINATION: CPT

## 2025-01-01 PROCEDURE — 71045 X-RAY EXAM CHEST 1 VIEW: CPT | Mod: 26

## 2025-01-01 PROCEDURE — 85025 COMPLETE CBC W/AUTO DIFF WBC: CPT

## 2025-01-01 PROCEDURE — 93010 ELECTROCARDIOGRAM REPORT: CPT | Mod: 77

## 2025-01-01 PROCEDURE — 84145 PROCALCITONIN (PCT): CPT

## 2025-01-01 PROCEDURE — 86900 BLOOD TYPING SEROLOGIC ABO: CPT

## 2025-01-01 PROCEDURE — 99291 CRITICAL CARE FIRST HOUR: CPT

## 2025-01-01 PROCEDURE — 86850 RBC ANTIBODY SCREEN: CPT

## 2025-01-01 PROCEDURE — 93005 ELECTROCARDIOGRAM TRACING: CPT

## 2025-01-01 PROCEDURE — 86022 PLATELET ANTIBODIES: CPT

## 2025-01-01 PROCEDURE — 95819 EEG AWAKE AND ASLEEP: CPT | Mod: 26

## 2025-01-01 PROCEDURE — 83605 ASSAY OF LACTIC ACID: CPT

## 2025-01-01 PROCEDURE — 93010 ELECTROCARDIOGRAM REPORT: CPT

## 2025-01-01 PROCEDURE — 84295 ASSAY OF SERUM SODIUM: CPT

## 2025-01-01 PROCEDURE — 85379 FIBRIN DEGRADATION QUANT: CPT

## 2025-01-01 PROCEDURE — 82962 GLUCOSE BLOOD TEST: CPT

## 2025-01-01 PROCEDURE — 84100 ASSAY OF PHOSPHORUS: CPT

## 2025-01-01 PROCEDURE — 86901 BLOOD TYPING SEROLOGIC RH(D): CPT

## 2025-01-01 PROCEDURE — 85018 HEMOGLOBIN: CPT

## 2025-01-01 PROCEDURE — 31500 INSERT EMERGENCY AIRWAY: CPT

## 2025-01-01 PROCEDURE — 71045 X-RAY EXAM CHEST 1 VIEW: CPT

## 2025-01-01 PROCEDURE — 85730 THROMBOPLASTIN TIME PARTIAL: CPT

## 2025-01-01 PROCEDURE — 70450 CT HEAD/BRAIN W/O DYE: CPT | Mod: 26

## 2025-01-01 PROCEDURE — 94002 VENT MGMT INPAT INIT DAY: CPT

## 2025-01-01 PROCEDURE — 36556 INSERT NON-TUNNEL CV CATH: CPT

## 2025-01-01 PROCEDURE — 82550 ASSAY OF CK (CPK): CPT

## 2025-01-01 PROCEDURE — 74177 CT ABD & PELVIS W/CONTRAST: CPT | Mod: 26

## 2025-01-01 PROCEDURE — 83036 HEMOGLOBIN GLYCOSYLATED A1C: CPT

## 2025-01-01 PROCEDURE — 83735 ASSAY OF MAGNESIUM: CPT

## 2025-01-01 PROCEDURE — 99291 CRITICAL CARE FIRST HOUR: CPT | Mod: 25

## 2025-01-01 PROCEDURE — 72170 X-RAY EXAM OF PELVIS: CPT | Mod: 26

## 2025-01-01 PROCEDURE — 84132 ASSAY OF SERUM POTASSIUM: CPT

## 2025-01-01 PROCEDURE — 82140 ASSAY OF AMMONIA: CPT

## 2025-01-01 PROCEDURE — 93306 TTE W/DOPPLER COMPLETE: CPT | Mod: 26

## 2025-01-01 PROCEDURE — 85610 PROTHROMBIN TIME: CPT

## 2025-01-01 PROCEDURE — 87640 STAPH A DNA AMP PROBE: CPT

## 2025-01-01 PROCEDURE — 95819 EEG AWAKE AND ASLEEP: CPT

## 2025-01-01 PROCEDURE — 70486 CT MAXILLOFACIAL W/O DYE: CPT | Mod: 26

## 2025-01-01 PROCEDURE — 84484 ASSAY OF TROPONIN QUANT: CPT

## 2025-01-01 PROCEDURE — 99233 SBSQ HOSP IP/OBS HIGH 50: CPT

## 2025-01-01 PROCEDURE — 80048 BASIC METABOLIC PNL TOTAL CA: CPT

## 2025-01-01 PROCEDURE — 73560 X-RAY EXAM OF KNEE 1 OR 2: CPT | Mod: 26,50

## 2025-01-01 PROCEDURE — 93306 TTE W/DOPPLER COMPLETE: CPT

## 2025-01-01 PROCEDURE — 85014 HEMATOCRIT: CPT

## 2025-01-01 PROCEDURE — 87641 MR-STAPH DNA AMP PROBE: CPT

## 2025-01-01 PROCEDURE — 85384 FIBRINOGEN ACTIVITY: CPT

## 2025-01-01 PROCEDURE — 72125 CT NECK SPINE W/O DYE: CPT | Mod: 26

## 2025-01-01 RX ORDER — DEXTROSE MONOHYDRATE 25 G/50ML
50 INJECTION, SOLUTION INTRAVENOUS ONCE
Refills: 0 | Status: COMPLETED | OUTPATIENT
Start: 2025-01-01 | End: 2025-01-01

## 2025-01-01 RX ORDER — SODIUM BICARBONATE 84 MG/ML
0.33 INJECTION, SOLUTION INTRAVENOUS
Qty: 150 | Refills: 0 | Status: DISCONTINUED | OUTPATIENT
Start: 2025-01-01 | End: 2025-01-01

## 2025-01-01 RX ORDER — VANCOMYCIN HYDROCHLORIDE 5 G/100ML
1000 INJECTION, POWDER, LYOPHILIZED, FOR SOLUTION INTRAVENOUS EVERY 24 HOURS
Refills: 0 | Status: DISCONTINUED | OUTPATIENT
Start: 2025-01-01 | End: 2025-01-01

## 2025-01-01 RX ORDER — HYDROCORTISONE 100 MG/60ML
100 ENEMA RECTAL ONCE
Refills: 0 | Status: COMPLETED | OUTPATIENT
Start: 2025-01-01 | End: 2025-01-01

## 2025-01-01 RX ORDER — DEXTROSE MONOHYDRATE 25 G/50ML
12.5 INJECTION, SOLUTION INTRAVENOUS ONCE
Refills: 0 | Status: DISCONTINUED | OUTPATIENT
Start: 2025-01-01 | End: 2025-01-01

## 2025-01-01 RX ORDER — CALCIUM GLUCONATE 94 MG/ML
2 INJECTION, SOLUTION INTRAVENOUS ONCE
Refills: 0 | Status: COMPLETED | OUTPATIENT
Start: 2025-01-01 | End: 2025-01-01

## 2025-01-01 RX ORDER — SODIUM ZIRCONIUM CYCLOSILICATE 10 G/10G
10 POWDER, FOR SUSPENSION ORAL EVERY 8 HOURS
Refills: 0 | Status: DISCONTINUED | OUTPATIENT
Start: 2025-01-01 | End: 2025-01-01

## 2025-01-01 RX ORDER — HEPARIN SODIUM 1000 [USP'U]/ML
5000 INJECTION, SOLUTION INTRAVENOUS; SUBCUTANEOUS EVERY 8 HOURS
Refills: 0 | Status: DISCONTINUED | OUTPATIENT
Start: 2025-01-01 | End: 2025-01-01

## 2025-01-01 RX ORDER — SODIUM CHLORIDE 9 MG/ML
1000 INJECTION, SOLUTION INTRAVENOUS
Refills: 0 | Status: DISCONTINUED | OUTPATIENT
Start: 2025-01-01 | End: 2025-01-01

## 2025-01-01 RX ORDER — PANTOPRAZOLE 40 MG/1
40 TABLET, DELAYED RELEASE ORAL
Refills: 0 | Status: DISCONTINUED | OUTPATIENT
Start: 2025-01-01 | End: 2025-01-01

## 2025-01-01 RX ORDER — SODIUM BICARBONATE 84 MG/ML
50 INJECTION, SOLUTION INTRAVENOUS
Refills: 0 | Status: COMPLETED | OUTPATIENT
Start: 2025-01-01 | End: 2025-01-01

## 2025-01-01 RX ORDER — GINKGO BILOBA 40 MG
0 CAPSULE ORAL
Refills: 0 | DISCHARGE

## 2025-01-01 RX ORDER — PANTOPRAZOLE 40 MG/1
40 TABLET, DELAYED RELEASE ORAL DAILY
Refills: 0 | Status: DISCONTINUED | OUTPATIENT
Start: 2025-01-01 | End: 2025-01-01

## 2025-01-01 RX ORDER — HEPARIN SODIUM,PORCINE/PF 1 UNIT/ML
500 SYRINGE (ML) INTRAVENOUS ONCE
Refills: 0 | Status: COMPLETED | OUTPATIENT
Start: 2025-01-01 | End: 2025-01-01

## 2025-01-01 RX ORDER — VANCOMYCIN HYDROCHLORIDE 5 G/100ML
1500 INJECTION, POWDER, LYOPHILIZED, FOR SOLUTION INTRAVENOUS ONCE
Refills: 0 | Status: DISCONTINUED | OUTPATIENT
Start: 2025-01-01 | End: 2025-01-01

## 2025-01-01 RX ORDER — INSULIN HUMAN 100 [IU]/ML
8 INJECTION, SOLUTION SUBCUTANEOUS ONCE
Refills: 0 | Status: COMPLETED | OUTPATIENT
Start: 2025-01-01 | End: 2025-01-01

## 2025-01-01 RX ORDER — NOREPINEPHRINE BITARTRATE 1 MG/ML
0.05 INJECTION INTRAVENOUS
Qty: 16 | Refills: 0 | Status: DISCONTINUED | OUTPATIENT
Start: 2025-01-01 | End: 2025-01-01

## 2025-01-01 RX ORDER — SODIUM CHLORIDE 9 MG/ML
1000 INJECTION, SOLUTION INTRAVENOUS ONCE
Refills: 0 | Status: COMPLETED | OUTPATIENT
Start: 2025-01-01 | End: 2025-01-01

## 2025-01-01 RX ORDER — CALCIUM CHLORIDE INJECTION 100 MG/ML
2000 INJECTION, SOLUTION INTRAVENOUS ONCE
Refills: 0 | Status: COMPLETED | OUTPATIENT
Start: 2025-01-01 | End: 2025-01-01

## 2025-01-01 RX ORDER — SODIUM BICARBONATE 84 MG/ML
50 INJECTION, SOLUTION INTRAVENOUS ONCE
Refills: 0 | Status: COMPLETED | OUTPATIENT
Start: 2025-01-01 | End: 2025-01-01

## 2025-01-01 RX ORDER — DEXTROSE MONOHYDRATE 25 G/50ML
25 INJECTION, SOLUTION INTRAVENOUS ONCE
Refills: 0 | Status: DISCONTINUED | OUTPATIENT
Start: 2025-01-01 | End: 2025-01-01

## 2025-01-01 RX ORDER — FENTANYL 75 UG/H
0.5 PATCH, EXTENDED RELEASE TRANSDERMAL
Qty: 2500 | Refills: 0 | Status: DISCONTINUED | OUTPATIENT
Start: 2025-01-01 | End: 2025-01-01

## 2025-01-01 RX ORDER — PHENYLEPHRINE HCL IN 0.9% NACL 0.4MG/10ML
0.1 SYRINGE (ML) INTRAVENOUS
Qty: 160 | Refills: 0 | Status: DISCONTINUED | OUTPATIENT
Start: 2025-01-01 | End: 2025-01-01

## 2025-01-01 RX ORDER — NOREPINEPHRINE BITARTRATE 1 MG/ML
0.05 INJECTION INTRAVENOUS
Qty: 32 | Refills: 0 | Status: DISCONTINUED | OUTPATIENT
Start: 2025-01-01 | End: 2025-01-01

## 2025-01-01 RX ORDER — DEXTROSE MONOHYDRATE 25 G/50ML
15 INJECTION, SOLUTION INTRAVENOUS ONCE
Refills: 0 | Status: DISCONTINUED | OUTPATIENT
Start: 2025-01-01 | End: 2025-01-01

## 2025-01-01 RX ORDER — SODIUM BICARBONATE 84 MG/ML
0.42 INJECTION, SOLUTION INTRAVENOUS
Qty: 150 | Refills: 0 | Status: DISCONTINUED | OUTPATIENT
Start: 2025-01-01 | End: 2025-01-01

## 2025-01-01 RX ORDER — ASPIRIN 81 MG
1 TABLET, DELAYED RELEASE (ENTERIC COATED) ORAL
Refills: 0 | DISCHARGE

## 2025-01-01 RX ORDER — IPRATROPIUM BROMIDE AND ALBUTEROL SULFATE .5; 2.5 MG/3ML; MG/3ML
3 SOLUTION RESPIRATORY (INHALATION) ONCE
Refills: 0 | Status: DISCONTINUED | OUTPATIENT
Start: 2025-01-01 | End: 2025-01-01

## 2025-01-01 RX ORDER — VASOPRESSIN 20 UNIT/ML
0.04 AMPUL (ML) INJECTION
Qty: 40 | Refills: 0 | Status: DISCONTINUED | OUTPATIENT
Start: 2025-01-01 | End: 2025-01-01

## 2025-01-01 RX ORDER — DEXMEDETOMIDINE HYDROCHLORIDE 4 UG/ML
0.2 INJECTION, SOLUTION INTRAVENOUS
Qty: 400 | Refills: 0 | Status: DISCONTINUED | OUTPATIENT
Start: 2025-01-01 | End: 2025-01-01

## 2025-01-01 RX ORDER — INSULIN HUMAN 100 [IU]/ML
5 INJECTION, SOLUTION SUBCUTANEOUS ONCE
Refills: 0 | Status: COMPLETED | OUTPATIENT
Start: 2025-01-01 | End: 2025-01-01

## 2025-01-01 RX ORDER — CALCIUM GLUCONATE 94 MG/ML
1 INJECTION, SOLUTION INTRAVENOUS ONCE
Refills: 0 | Status: COMPLETED | OUTPATIENT
Start: 2025-01-01 | End: 2025-01-01

## 2025-01-01 RX ORDER — ALTEPLASE 100 MG
2 KIT INTRAVENOUS ONCE
Refills: 0 | Status: COMPLETED | OUTPATIENT
Start: 2025-01-01 | End: 2025-01-01

## 2025-01-01 RX ORDER — INSULIN HUMAN 100 [IU]/ML
10 INJECTION, SOLUTION SUBCUTANEOUS ONCE
Refills: 0 | Status: COMPLETED | OUTPATIENT
Start: 2025-01-01 | End: 2025-01-01

## 2025-01-01 RX ORDER — DEXMEDETOMIDINE HYDROCHLORIDE 4 UG/ML
0.2 INJECTION, SOLUTION INTRAVENOUS
Qty: 200 | Refills: 0 | Status: DISCONTINUED | OUTPATIENT
Start: 2025-01-01 | End: 2025-01-01

## 2025-01-01 RX ORDER — ETOMIDATE 40 MG/20ML
10 INJECTION, SOLUTION INTRAVENOUS ONCE
Refills: 0 | Status: COMPLETED | OUTPATIENT
Start: 2025-01-01 | End: 2025-01-01

## 2025-01-01 RX ORDER — MUPIROCIN 2 %
1 OINTMENT (GRAM) TOPICAL EVERY 12 HOURS
Refills: 0 | Status: DISCONTINUED | OUTPATIENT
Start: 2025-01-01 | End: 2025-01-01

## 2025-01-01 RX ORDER — CHLORHEXIDINE GLUCONATE 1.2 MG/ML
1 RINSE ORAL DAILY
Refills: 0 | Status: DISCONTINUED | OUTPATIENT
Start: 2025-01-01 | End: 2025-01-01

## 2025-01-01 RX ORDER — GLUCAGON INJECTION, SOLUTION 0.5 MG/.1ML
1 INJECTION, SOLUTION SUBCUTANEOUS ONCE
Refills: 0 | Status: DISCONTINUED | OUTPATIENT
Start: 2025-01-01 | End: 2025-01-01

## 2025-01-01 RX ORDER — HYDROCORTISONE 100 MG/60ML
50 ENEMA RECTAL EVERY 6 HOURS
Refills: 0 | Status: DISCONTINUED | OUTPATIENT
Start: 2025-01-01 | End: 2025-01-01

## 2025-01-01 RX ORDER — VANCOMYCIN HYDROCHLORIDE 5 G/100ML
1000 INJECTION, POWDER, LYOPHILIZED, FOR SOLUTION INTRAVENOUS ONCE
Refills: 0 | Status: COMPLETED | OUTPATIENT
Start: 2025-01-01 | End: 2025-01-01

## 2025-01-01 RX ORDER — CHLORHEXIDINE GLUCONATE 1.2 MG/ML
15 RINSE ORAL EVERY 12 HOURS
Refills: 0 | Status: DISCONTINUED | OUTPATIENT
Start: 2025-01-01 | End: 2025-01-01

## 2025-01-01 RX ORDER — VASOPRESSIN 20 UNIT/ML
0.02 AMPUL (ML) INJECTION
Qty: 40 | Refills: 0 | Status: DISCONTINUED | OUTPATIENT
Start: 2025-01-01 | End: 2025-01-01

## 2025-01-01 RX ORDER — NOREPINEPHRINE BITARTRATE 1 MG/ML
0.05 INJECTION INTRAVENOUS
Qty: 8 | Refills: 0 | Status: DISCONTINUED | OUTPATIENT
Start: 2025-01-01 | End: 2025-01-01

## 2025-01-01 RX ORDER — ROCURONIUM BROMIDE 50 MG/5 ML
50 SYRINGE (ML) INTRAVENOUS ONCE
Refills: 0 | Status: COMPLETED | OUTPATIENT
Start: 2025-01-01 | End: 2025-01-01

## 2025-01-01 RX ORDER — INSULIN LISPRO 100/ML
VIAL (ML) SUBCUTANEOUS
Refills: 0 | Status: DISCONTINUED | OUTPATIENT
Start: 2025-01-01 | End: 2025-01-01

## 2025-01-01 RX ORDER — MEROPENEM 1 G/20ML
500 INJECTION, POWDER, FOR SOLUTION INTRAVENOUS ONCE
Refills: 0 | Status: COMPLETED | OUTPATIENT
Start: 2025-01-01 | End: 2025-01-01

## 2025-01-01 RX ORDER — FLUDROCORTISONE ACETATE 0.1 MG/1
0.1 TABLET ORAL DAILY
Refills: 0 | Status: DISCONTINUED | OUTPATIENT
Start: 2025-01-01 | End: 2025-01-01

## 2025-01-01 RX ADMIN — FENTANYL 3.4 MICROGRAM(S)/KG/HR: 75 PATCH, EXTENDED RELEASE TRANSDERMAL at 19:55

## 2025-01-01 RX ADMIN — INSULIN HUMAN 5 UNIT(S): 100 INJECTION, SOLUTION SUBCUTANEOUS at 07:13

## 2025-01-01 RX ADMIN — FENTANYL 3.4 MICROGRAM(S)/KG/HR: 75 PATCH, EXTENDED RELEASE TRANSDERMAL at 01:50

## 2025-01-01 RX ADMIN — ALTEPLASE 2 MILLIGRAM(S): KIT at 02:45

## 2025-01-01 RX ADMIN — SODIUM BICARBONATE 50 MILLIEQUIVALENT(S): 84 INJECTION, SOLUTION INTRAVENOUS at 04:04

## 2025-01-01 RX ADMIN — Medication 50 MILLIGRAM(S): at 06:20

## 2025-01-01 RX ADMIN — Medication 100 MILLIGRAM(S): at 05:49

## 2025-01-01 RX ADMIN — DEXTROSE MONOHYDRATE 50 MILLILITER(S): 25 INJECTION, SOLUTION INTRAVENOUS at 11:20

## 2025-01-01 RX ADMIN — NOREPINEPHRINE BITARTRATE 6.38 MICROGRAM(S)/KG/MIN: 1 INJECTION INTRAVENOUS at 19:54

## 2025-01-01 RX ADMIN — Medication 0: at 13:00

## 2025-01-01 RX ADMIN — Medication 1.28 MICROGRAM(S)/KG/MIN: at 11:21

## 2025-01-01 RX ADMIN — SODIUM CHLORIDE 100 MILLILITER(S): 9 INJECTION, SOLUTION INTRAVENOUS at 01:50

## 2025-01-01 RX ADMIN — Medication 100 MILLIGRAM(S): at 21:43

## 2025-01-01 RX ADMIN — SODIUM ZIRCONIUM CYCLOSILICATE 10 GRAM(S): 10 POWDER, FOR SUSPENSION ORAL at 14:25

## 2025-01-01 RX ADMIN — SODIUM CHLORIDE 1000 MILLILITER(S): 9 INJECTION, SOLUTION INTRAVENOUS at 20:13

## 2025-01-01 RX ADMIN — SODIUM BICARBONATE 50 MILLIEQUIVALENT(S): 84 INJECTION, SOLUTION INTRAVENOUS at 03:56

## 2025-01-01 RX ADMIN — Medication 0: at 17:33

## 2025-01-01 RX ADMIN — HEPARIN SODIUM 5000 UNIT(S): 1000 INJECTION, SOLUTION INTRAVENOUS; SUBCUTANEOUS at 13:00

## 2025-01-01 RX ADMIN — HEPARIN SODIUM 5000 UNIT(S): 1000 INJECTION, SOLUTION INTRAVENOUS; SUBCUTANEOUS at 21:17

## 2025-01-01 RX ADMIN — DEXMEDETOMIDINE HYDROCHLORIDE 3.4 MICROGRAM(S)/KG/HR: 4 INJECTION, SOLUTION INTRAVENOUS at 19:49

## 2025-01-01 RX ADMIN — CALCIUM GLUCONATE 200 GRAM(S): 94 INJECTION, SOLUTION INTRAVENOUS at 14:19

## 2025-01-01 RX ADMIN — FENTANYL 3.54 MICROGRAM(S)/KG/HR: 75 PATCH, EXTENDED RELEASE TRANSDERMAL at 11:21

## 2025-01-01 RX ADMIN — SODIUM CHLORIDE 1000 MILLILITER(S): 9 INJECTION, SOLUTION INTRAVENOUS at 21:22

## 2025-01-01 RX ADMIN — INSULIN HUMAN 8 UNIT(S): 100 INJECTION, SOLUTION SUBCUTANEOUS at 21:46

## 2025-01-01 RX ADMIN — NOREPINEPHRINE BITARTRATE 3.19 MICROGRAM(S)/KG/MIN: 1 INJECTION INTRAVENOUS at 01:50

## 2025-01-01 RX ADMIN — CALCIUM GLUCONATE 200 GRAM(S): 94 INJECTION, SOLUTION INTRAVENOUS at 05:07

## 2025-01-01 RX ADMIN — INSULIN HUMAN 10 UNIT(S): 100 INJECTION, SOLUTION SUBCUTANEOUS at 11:18

## 2025-01-01 RX ADMIN — INSULIN HUMAN 10 UNIT(S): 100 INJECTION, SOLUTION SUBCUTANEOUS at 04:13

## 2025-01-01 RX ADMIN — DEXTROSE MONOHYDRATE 50 MILLILITER(S): 25 INJECTION, SOLUTION INTRAVENOUS at 15:00

## 2025-01-01 RX ADMIN — MEROPENEM 100 MILLIGRAM(S): 1 INJECTION, POWDER, FOR SOLUTION INTRAVENOUS at 08:15

## 2025-01-01 RX ADMIN — SODIUM BICARBONATE 50 MILLIEQUIVALENT(S): 84 INJECTION, SOLUTION INTRAVENOUS at 04:01

## 2025-01-01 RX ADMIN — SODIUM BICARBONATE 150 MEQ/KG/HR: 84 INJECTION, SOLUTION INTRAVENOUS at 12:59

## 2025-01-01 RX ADMIN — DEXTROSE MONOHYDRATE 50 MILLILITER(S): 25 INJECTION, SOLUTION INTRAVENOUS at 06:00

## 2025-01-01 RX ADMIN — Medication 100 MILLIGRAM(S): at 17:21

## 2025-01-01 RX ADMIN — HYDROCORTISONE 50 MILLIGRAM(S): 100 ENEMA RECTAL at 13:00

## 2025-01-01 RX ADMIN — DEXTROSE MONOHYDRATE 50 MILLILITER(S): 25 INJECTION, SOLUTION INTRAVENOUS at 22:01

## 2025-01-01 RX ADMIN — SODIUM ZIRCONIUM CYCLOSILICATE 10 GRAM(S): 10 POWDER, FOR SUSPENSION ORAL at 04:20

## 2025-01-01 RX ADMIN — Medication 1.28 MICROGRAM(S)/KG/MIN: at 01:51

## 2025-01-01 RX ADMIN — HEPARIN SODIUM 5000 UNIT(S): 1000 INJECTION, SOLUTION INTRAVENOUS; SUBCUTANEOUS at 14:35

## 2025-01-01 RX ADMIN — ETOMIDATE 10 MILLIGRAM(S): 40 INJECTION, SOLUTION INTRAVENOUS at 06:20

## 2025-01-01 RX ADMIN — CALCIUM CHLORIDE INJECTION 2000 MILLIGRAM(S): 100 INJECTION, SOLUTION INTRAVENOUS at 06:25

## 2025-01-01 RX ADMIN — Medication 1.28 MICROGRAM(S)/KG/MIN: at 07:15

## 2025-01-01 RX ADMIN — CHLORHEXIDINE GLUCONATE 15 MILLILITER(S): 1.2 RINSE ORAL at 17:21

## 2025-01-01 RX ADMIN — SODIUM CHLORIDE 100 MILLILITER(S): 9 INJECTION, SOLUTION INTRAVENOUS at 15:00

## 2025-01-01 RX ADMIN — CALCIUM GLUCONATE 100 GRAM(S): 94 INJECTION, SOLUTION INTRAVENOUS at 15:00

## 2025-01-01 RX ADMIN — NOREPINEPHRINE BITARTRATE 3.31 MICROGRAM(S)/KG/MIN: 1 INJECTION INTRAVENOUS at 11:22

## 2025-01-01 RX ADMIN — Medication 500 UNIT(S): at 01:10

## 2025-01-01 RX ADMIN — Medication 3 UNIT(S)/MIN: at 01:50

## 2025-01-01 RX ADMIN — SODIUM CHLORIDE 1000 MILLILITER(S): 9 INJECTION, SOLUTION INTRAVENOUS at 06:00

## 2025-01-01 RX ADMIN — INSULIN HUMAN 5 UNIT(S): 100 INJECTION, SOLUTION SUBCUTANEOUS at 14:29

## 2025-01-01 RX ADMIN — PANTOPRAZOLE 40 MILLIGRAM(S): 40 TABLET, DELAYED RELEASE ORAL at 08:04

## 2025-01-01 RX ADMIN — DEXTROSE MONOHYDRATE 50 MILLILITER(S): 25 INJECTION, SOLUTION INTRAVENOUS at 07:13

## 2025-01-01 RX ADMIN — NOREPINEPHRINE BITARTRATE 6.38 MICROGRAM(S)/KG/MIN: 1 INJECTION INTRAVENOUS at 07:15

## 2025-01-01 RX ADMIN — VANCOMYCIN HYDROCHLORIDE 250 MILLIGRAM(S): 5 INJECTION, POWDER, LYOPHILIZED, FOR SOLUTION INTRAVENOUS at 22:01

## 2025-01-01 RX ADMIN — INSULIN HUMAN 10 UNIT(S): 100 INJECTION, SOLUTION SUBCUTANEOUS at 15:00

## 2025-01-01 RX ADMIN — CHLORHEXIDINE GLUCONATE 15 MILLILITER(S): 1.2 RINSE ORAL at 05:50

## 2025-01-01 RX ADMIN — SODIUM BICARBONATE 50 MILLIEQUIVALENT(S): 84 INJECTION, SOLUTION INTRAVENOUS at 11:18

## 2025-01-01 RX ADMIN — FLUDROCORTISONE ACETATE 0.1 MILLIGRAM(S): 0.1 TABLET ORAL at 13:01

## 2025-01-01 RX ADMIN — SODIUM CHLORIDE 150 MILLILITER(S): 9 INJECTION, SOLUTION INTRAVENOUS at 08:05

## 2025-01-01 RX ADMIN — HEPARIN SODIUM 5000 UNIT(S): 1000 INJECTION, SOLUTION INTRAVENOUS; SUBCUTANEOUS at 05:30

## 2025-01-01 RX ADMIN — FENTANYL 3.4 MICROGRAM(S)/KG/HR: 75 PATCH, EXTENDED RELEASE TRANSDERMAL at 07:15

## 2025-01-01 RX ADMIN — HEPARIN SODIUM 5000 UNIT(S): 1000 INJECTION, SOLUTION INTRAVENOUS; SUBCUTANEOUS at 05:51

## 2025-01-01 RX ADMIN — Medication 6 UNIT(S)/MIN: at 11:21

## 2025-01-01 RX ADMIN — Medication 1: at 08:00

## 2025-01-01 RX ADMIN — VANCOMYCIN HYDROCHLORIDE 250 MILLIGRAM(S): 5 INJECTION, POWDER, LYOPHILIZED, FOR SOLUTION INTRAVENOUS at 14:44

## 2025-01-01 RX ADMIN — DEXMEDETOMIDINE HYDROCHLORIDE 3.54 MICROGRAM(S)/KG/HR: 4 INJECTION, SOLUTION INTRAVENOUS at 01:50

## 2025-01-01 RX ADMIN — HYDROCORTISONE 100 MILLIGRAM(S): 100 ENEMA RECTAL at 21:44

## 2025-01-01 RX ADMIN — DEXTROSE MONOHYDRATE 50 MILLILITER(S): 25 INJECTION, SOLUTION INTRAVENOUS at 04:14

## 2025-01-01 RX ADMIN — DEXMEDETOMIDINE HYDROCHLORIDE 3.4 MICROGRAM(S)/KG/HR: 4 INJECTION, SOLUTION INTRAVENOUS at 12:00

## 2025-01-01 RX ADMIN — DEXTROSE MONOHYDRATE 50 MILLILITER(S): 25 INJECTION, SOLUTION INTRAVENOUS at 14:35

## 2025-01-01 RX ADMIN — SODIUM BICARBONATE 200 MEQ/KG/HR: 84 INJECTION, SOLUTION INTRAVENOUS at 11:21

## 2025-01-01 RX ADMIN — PANTOPRAZOLE 40 MILLIGRAM(S): 40 TABLET, DELAYED RELEASE ORAL at 13:01

## 2025-01-14 NOTE — ED ADULT NURSE NOTE - OBJECTIVE STATEMENT
pt found on basement floor, unknown downtime, lives alone. GCS 5, no known medical hx. Pt placed on continuous cardiac and spo2 monitor. Lemon inserted as per mD order, bladder temp reading 87.3 F, Aime mcclellan applied. Fall precautions in place as per hospital policy.

## 2025-01-14 NOTE — ED PROVIDER NOTE - CARE PLAN
Principal Discharge DX:	Acute renal failure due to rhabdomyolysis  Secondary Diagnosis:	Hypoglycemia  Secondary Diagnosis:	Hypothermia  Secondary Diagnosis:	Altered mental status  Secondary Diagnosis:	Leukocytosis (leucocytosis)  Secondary Diagnosis:	Elevated troponin  Secondary Diagnosis:	Hyperkalemia   1

## 2025-01-14 NOTE — ED PROVIDER NOTE - CLINICAL SUMMARY MEDICAL DECISION MAKING FREE TEXT BOX
Attending Statement:  I have personally seen the patient. KAREY Heredia and I provided critical care for  a total of 75 minutes. I provided a substantive portion of the care and the majority of the critical care time. 79-year-old male history of hyperlipidemia, diabetes, chronic cellulitis of bilateral lower extremities with chronic leg edema presents after fall.  Patient was found by son Jj who arrived later in patient's ED course.  Initial report from EMS was unknown age, unknown medical problems, found by bystander at bottom of staircase to basement.    A trauma alert was activated upon patient arrival. On arrival, airway intact, bilateral breath sounds, 2+ proximal pulses.  Very cold to touch with a core temperature of 24 Celsius.  GCS 5 (E1, V2, M2). It was initially unclear what patient's baseline mental status was, however, patient's son arrived and said that he has not heard from his father for most 2 days. States he was just discharged from nursing home a few days ago and felt that this was not a safe discharge since patient usually walks and talks unassisted.    The remainder of his secondary survey was notable for abrasion of the right posterior scalp, c-collar in place, tachycardic to 130s, cold to touch, multiple ulcerations to the bilateral shins worse on the left.  He has pressure like ulcers to his right anterior thigh as per EMS states patient was wrapped around a pole of some sort.    Per chart review, patient had a recent admission for a fall.    A temperature-sensing Lemon was placed, patient was given warmed IV fluids, heat packs to axilla and groin, started on Denise hugger.  His glucose was noted to be low, was given D50.  Patient subsequently had gradual increase in mental status.  GCS is now 11 (E4V2,M5) Given clinical improvement with rewarming and treatment of hypoglycemia, decision was made not to proceed with intubation.    Patient underwent pan scan which showed no acute abnormalities.  Initially resident read CT as a possible acute burst fracture however this appears to be chronic.  Given patient's persistent altered mental status cannot clear C-spine so we will keep in place.  Neurosurgery was consulted who says no acute interventions.  Patient's labs are notable for leukocytosis, elevated lactate, hyper kalemia which is treated with insulin and D50, anion gap secondary to lactic acidosis, elevated creatinine and CK consistent with rhabdomyolysis, transaminitis, elevated troponin likely secondary to demand ischemia, metabolic acidosis.      Hypothermia most likely secondary to exposure as patient was found in a cold on heated basement.  Covered for possible adrenal crisis given hypothermia and hypoglycemia with hydrocortisone.  Patient also has possible sepsis which he was treated for.      His x-rays were independently interpreted by myself as no acute fracture or dislocation.    His EKG was independently interpreted by myself as sinus rhythm, first-degree AV block, left axis, right bundle branch block, rate of 76, NE interval 218, , QTc 591.     Patient admitted to medical ICU.  Cleared by surgery team.    Attending Statement:  I have personally seen the patient. KAREY Heredia and I provided critical care for  a total of 75 minutes. I provided a substantive portion of the care and the majority of the critical care time.

## 2025-01-14 NOTE — CONSULT NOTE ADULT - ASSESSMENT
ASSESSMENT:  78 y/o M with PMH of HTN, HLD, h/o cellulitis, presents as a trauma alert s/p unwitnessed fall (?HT, ?LOC,-AC) and left on the floor for an unknown amount of time. Patient was found down his basement steps with a ladder over his body. Per son, who was eventually by bedside, stated he was recently discharge from nursing home and was at home ambulating with a rolling walker at the very least until Sunday night, after that the son only saw him today in the evening in a fallen state. Unsure for how long he was on the floor. ABCs intact. Initial GCS 6 (E2, V2, M2) with hypothermia of 28*C via mejia temp, improved throughout examination to GCS 9 with reheating. External signs of trauma: multiple skin excoriations at the b/l shins. cord-like compression marks noted on r knee. b/l pressure ulcers on b/l thighs, b/l knees, b/l shins    Injuries Identified  ***pending***    PLAN:   - Trauma imaging: CXR, Pelvic XR, CTH, CT CSpine, CT C/A/P   - Trauma labs (CBC, BMP, Mg, Phos, Coags, Type and Screen, EtOH)   - Final plan pending above results    ----------------------------------------------------------------------------------------------------------  Discussed with surgical attending on call, Dr. Cordova   ASSESSMENT:  80 y/o M with PMH of HTN, HLD, h/o cellulitis, presents as a trauma alert s/p unwitnessed fall (?HT, ?LOC,-AC) and left on the floor for an unknown amount of time. Patient was found down his basement steps with a ladder over his body. Per son, who was eventually by bedside, stated he was recently discharge from nursing home and was at home ambulating with a rolling walker at the very least until Sunday night, after that the son only saw him today in the evening in a fallen state. Unsure for how long he was on the floor. ABCs intact. Initial GCS 6 (E2, V2, M2) with hypothermia of 28*C via mejia temp, improved throughout examination to GCS 9 with reheating. External signs of trauma: multiple skin excoriations at the b/l shins. cord-like compression marks noted on r knee. b/l pressure ulcers on b/l thighs, b/l knees, b/l shins    Injuries Identified  NONE    PLAN:  - No acute traumatic injuries identified on [place]  - Dispo per ED  - Trauma to perform tertiary survey 1/15    ----------------------------------------------------------------------------------------------------------  Discussed with surgical attending on call, Dr. Cordova    [phone]

## 2025-01-14 NOTE — ED PROVIDER NOTE - SECONDARY DIAGNOSIS.
Hypoglycemia Elevated troponin Hyperkalemia Leukocytosis (leucocytosis) Hypothermia Altered mental status

## 2025-01-14 NOTE — ED PROVIDER NOTE - PROGRESS NOTE DETAILS
GCS initially was 5 but rapidly improving after warming up the patient. GCS is 10 now. BF: Pt now awake, saying "oh oh oh". C1 burst fracture noted. C collar remains in place. Neurosurgery consulted. Pt's hypothermia improving on corie hugger and warmed fluids--started 24 F now 29 F BF: C1 fracture is actually chronic. No acute neurosurgical intervention. At time of admission, Pt 89 F, GCS is now 15.

## 2025-01-14 NOTE — ED PROVIDER NOTE - PHYSICAL EXAMINATION
CONSTITUTIONAL: poorly responsive, cold to touch, elderly male.   EYES: PERRL; 3mm b/l  CARDIOVASCULAR: Normal S1, S2; no murmurs, rubs, or gallops.   RESPIRATORY: Normal chest excursion with respiration; breath sounds clear and equal bilaterally; no wheezes, rhonchi, or rales.  GI/: Normal bowel sounds; non-distended; non-tender; no palpable organomegaly.   MS: no significant deformity to all extremities.   SKIN: multiple pressure ulcers (stage 1-2) to b/l legs. large skin tear to LLE without active bleeding. abrasion to right side of scalp occipital region.   NEURO/PSYCH: poorly responsive.

## 2025-01-14 NOTE — CONSULT NOTE ADULT - SUBJECTIVE AND OBJECTIVE BOX
Neurosurgery Consult    Patient is a 79y old  Male who presents with a chief complaint of     HPI: · HPI Objective Statement: 78 yo male hx of HLD/ DM/ chronic cellulitis of legs / legs edema BIBA from home s/p fall. as per EMS, patient was found at the bottom of the staircase at the basement. patient was naked and his legs wrapped around the banisters. as per son ( Jj) who arrived later reported he hasn't heard from his father for almost 2 days. He found his father naked and at the bottom of the staircase to the basement so he called EMS. Patient lives alone and was discharged from NH few days ago.    Interval Hx: Pt noted to be hypothermic and hypoglycemic upon arrival - found down in basement for unknown amount of time. Pt noted to have C spine CT which shows nondisplaced c1 ring fracture unchanged from 11/28/24. Pt currently in c collar.       PAST MEDICAL & SURGICAL HISTORY:  Diabetes  HLD (hyperlipidemia)    BPH without urinary obstruction  S/P cataract surgery          FAMILY HISTORY:      Social History: (-) x 3    Allergies    No Known Allergies    Intolerances        MEDICATIONS  (STANDING):  lactated ringers. 1000 milliLiter(s) (150 mL/Hr) IV Continuous <Continuous>    MEDICATIONS  (PRN):      Review of systems:    Constitutional: No fever, weight loss or fatigue    Eyes: No eye pain or discharge  ENMT:  No difficulty hearing; No sinus or throat pain  Neck: No pain or stiffness  Respiratory: No cough, wheezing, chills or hemoptysis  Cardiovascular: No chest pain, palpitations, shortness of breath, dyspnea on exertion  Gastrointestinal: No abdominal pain, nausea, vomiting or hematemesis; No diarrhea or constipation.   Genitourinary: No dysuria, frequency, hematuria or incontinence  Neurological: As per HPI  Skin: No rashes or lesions   Endocrine: No heat or cold intolerance; No hair loss  Musculoskeletal: No joint pain or swelling  Psychiatric: No depression, anxiety, mood swings  Heme/Lymph: No easy bruising or bleeding gums    Vital Signs Last 24 Hrs  T(C): 33.3 (14 Jan 2025 22:30), Max: 33.3 (14 Jan 2025 22:30)  T(F): 92 (14 Jan 2025 22:30), Max: 92 (14 Jan 2025 22:30)  HR: 90 (14 Jan 2025 22:30) (90 - 160)  BP: 101/55 (14 Jan 2025 22:30) (101/55 - 153/119)  BP(mean): 67 (14 Jan 2025 22:30) (67 - 67)  RR: 20 (14 Jan 2025 22:30) (20 - 25)  SpO2: 98% (14 Jan 2025 22:30) (88% - 98%)    Parameters below as of 14 Jan 2025 22:30  Patient On (Oxygen Delivery Method): nasal cannula  O2 Flow (L/min): 4      Neurologic Examination:  General:  Appearance is consistent with chronologic age.  No abnormal facies.   General: The patient is Awake Alert Eyes open to pain   Cervical Collar in place - [place]   Cranial nerves: EoM - Tracks this examiner Grimaces to pain and says [place]!  Motor examination:   Normal tone, bulk and range of motion.  No tenderness, twitching, tremors or involuntary movements.  Formal Muscle Strength Testing: Moving all extremities spontaneously  Sensory examination:   Intact to pinprick, in all extremities.      Labs:   CBC Full  -  ( 14 Jan 2025 19:50 )  WBC Count : 30.51 K/uL  RBC Count : 4.33 M/uL  Hemoglobin : 11.8 g/dL  Hematocrit : 36.8 %  Platelet Count - Automated : 387 K/uL  Mean Cell Volume : 85.0 fL  Mean Cell Hemoglobin : 27.3 pg  Mean Cell Hemoglobin Concentration : 32.1 g/dL  Auto Neutrophil # : 27.83 K/uL  Auto Lymphocyte # : 1.07 K/uL  Auto Monocyte # : 1.62 K/uL  Auto Eosinophil # : 0.00 K/uL  Auto Basophil # : 0.00 K/uL  Auto Neutrophil % : 91.2 %  Auto Lymphocyte % : 3.5 %  Auto Monocyte % : 5.3 %  Auto Eosinophil % : 0.0 %  Auto Basophil % : 0.0 %    01-14    139  |  88[L]  |  67[HH]  ----------------------------<  130[H]  6.1[HH]   |  17  |  3.9[H]    Ca    8.5      14 Jan 2025 19:50    TPro  7.5  /  Alb  3.6  /  TBili  0.7  /  DBili  x   /  AST  343[H]  /  ALT  85[H]  /  AlkPhos  171[H]  01-14    LIVER FUNCTIONS - ( 14 Jan 2025 19:50 )  Alb: 3.6 g/dL / Pro: 7.5 g/dL / ALK PHOS: 171 U/L / ALT: 85 U/L / AST: 343 U/L / GGT: x           PT/INR - ( 14 Jan 2025 19:50 )   PT: 13.20 sec;   INR: 1.12 ratio         PTT - ( 14 Jan 2025 19:50 )  PTT:33.6 sec  Urinalysis Basic - ( 14 Jan 2025 19:50 )    Color: x / Appearance: x / SG: x / pH: x  Gluc: 130 mg/dL / Ketone: x  / Bili: x / Urobili: x   Blood: x / Protein: x / Nitrite: x   Leuk Esterase: x / RBC: x / WBC x   Sq Epi: x / Non Sq Epi: x / Bacteria: x          Neuroimaging:  NCHCT: CT Head No Cont:   ACC: [id number] EXAM:  CT BRAIN   ORDERED BY: VIKY CEDEÑO     PROCEDURE DATE:  01/14/2025          INTERPRETATION:  CLINICAL INDICATION: Trauma.    TECHNIQUE: CT of the head was performed without contrast. Multiple   contiguous axial images were acquired from the skullbase to the vertex   without the administration of intravenous contrast. Coronal and sagittal   reformations were made.    COMPARISON: CT head 11/28/2024.    FINDINGS:    This is a motion-degraded examination.    There is stableage-related generalized parenchymal volume loss with   commensurate ventricular dilatation. There are stable patchy   periventricular and subcortical hypodensities consistent with mild   chronic microvascular ischemic changes.    There is no acute territorial infarct, intracranial hemorrhage, mass   effect, or midline shift. There is no abnormal extra-axial fluid   collection. There is no hydrocephalus.    The calvarium is intact. There is trace dependent fluid in the bilateral   mastoid air cells. Post bilateral cataract surgery. The visualized   intraorbital compartments are unremarkable. Mild mucosal thickening in   the right maxillary sinus.    Partially imaged C1 fracture. Please see separately dictated report of   the concurrent cervical CT.      IMPRESSION:  No acute intracranial pathology.  Stable mild chronic microvascular ischemic changes.    Partially imaged C1 fracture. Please see separately dictated report of   the concurrent cervical CT.    --- End of Report ---    JEN ORTIZ MD; Resident Radiologist  This document has been electronically signed.  PAULINA BAHENA MD; Attending Radiologist  This document has been electronically signed. Jan 14 2025  9:58PM (01-14-25 @ 20:57)    < from: CT Cervical Spine No Cont (01.14.25 @ 20:58) >  INTERPRETATION:  CLINICAL INDICATION: Trauma.    TECHNIQUE: CT of the cervical spine was performed without the   administration of intravenous contrast.    COMPARISON: None.    FINDINGS:    There are nondisplaced subacute/chronic fracture in the right anterior   arch and left posterior arch of C1 consistent with a burst fracture   (series 3, image 89, 97). There is no appreciable atlantoaxial   subluxation. There is no widening of the atlantodental interval. Intact   odontoid process.    There is no facet subluxation of the cervical spine.    There is no significant prevertebral soft tissue swelling. The visualized   paraspinal softtissues are unremarkable.    There are severe multilevel endplate degenerative changes as evidenced by   marginal osteophyte formation, uncovertebral spurring, loss of normal   disc space height as well as facet joint space compartment narrowing.    There is no high-grade spinal canal stenosis. Please note spinal canal   contents are suboptimally evaluated inherent to CT technique.    The visualized lung apices are within normal limits.    IMPRESSION:  Subacute/chronic appearing nondisplaced fracture of the right anterior   arch and left posterior arch of C1. The right anterior arch fracture was   partially imaged on the CT head from 11/28/2024.    No atlantoaxial subluxation or widening of the atlantodental interval.    Communication: The summary of above findings were discussed with readback   confirmation with Renny Heredia by radiology resident Jen Ortiz MD at   9:40 PM on 1/14/2025. The addended findings were discussed with readback   confirmation with Dr. Cedeño by radiologist Dr. Bahena on 1/14/2025 at   10:08 PM.    --- End of Report ---    JEN ORTIZ MD; Resident Radiologist  This document has been electronically signed.  PAULINA BAHENA MD; Attending Radiologist        Assessment:  This is a 79y Male with h/o HLD/ DM/ chronic cellulitis of legs / legs edema BIBA from home s/p fall. Found to have chronic/ subacute non displaced  c1 fracture unchanged from 11/28/24.         Plan: No acute surgical intervention          Pt should remain in C Collar for now GCS 6->11          AP/LAT upright xrays     Case discussed with Dr Latif  -   01-14-25 @ 23:40

## 2025-01-14 NOTE — ED ADULT NURSE REASSESSMENT NOTE - NS ED NURSE REASSESS COMMENT FT1
received handoff from RN. Pt assessed at beside. Pt AXox3, RR even and unlabored, no distress noted at this time. Hooked up to bedside cardiac monitor. pt w/ bear hugger in place. temp Lemon in place and intact. IV intact.

## 2025-01-14 NOTE — ED PROVIDER NOTE - OBJECTIVE STATEMENT
78 yo male hx of HLD/ DM/ chronic cellulitis of legs / legs edema BIBA from home s/p fall. as per EMS, patient was found at the bottom of the staircase at the basement. patient was naked and his legs wrapped around the banisters. as per son ( Jj) who arrived later reported he hasn't heard from his father for almost 2 days. He found his father naked and at the bottom of the staircase to the basement so he called EMS. Patient lives alone and was discharged from NH few days ago.

## 2025-01-14 NOTE — ED ADULT NURSE NOTE - NSFALLHARMRISKINTERV_ED_ALL_ED

## 2025-01-14 NOTE — CONSULT NOTE ADULT - ATTENDING COMMENTS
This is 78 y/o M with PMH of HTN, HLD, h/o cellulitis, presents as a trauma alert s/p unwitnessed fall (?HT, ?LOC,-AC) and left on the floor for an unknown amount of time. Patient was found down his basement steps with a ladder over his body. Per son, who was eventually by bedside, stated he was recently discharge from nursing home and was at home ambulating with a rolling walker at the very least until Sunday night, after that the son only saw him today in the evening in a fallen state. Unsure for how long he was on the floor. ABCs intact. Initial GCS 6 (E2, V2, M2) with hypothermia of 28*C via mejia temp, improved throughout examination to GCS 9 with reheating. External signs of trauma: multiple skin excoriations at the b/l shins. cord-like compression marks noted on r knee. b/l pressure ulcers on b/l thighs, b/l knees, b/l shins.    Primary and secondary surveys were performed.    Awake  GCS 9-10  Neck: no step-offs  Chest: bilateral breath sounds present  CV : tachycardic  Abdomen: soft  Extr: Bilateral Lower Extremities ecchymoses     I independently reviewed and interpreted all images and labs:  CT Head - no ICH  CT C-spine - no injury  CT Chest/Abd/pelvis - no injury  Potassium 6.1;  creat 3.9; Lactic acid 7.3    ASSESSMENT:  78 y/o male, S/p Fall.  AMS.  Bilateral LEs Ecchymoses  RENEA.  Hyperkalemia.  Rhabdomyolysis.  Lactic acidosis.    PLAN:  Patient was monitored several hours.  His mental status has improved.  He needs aggressive resuscitation and MICU care.  Will follow for tertiary survey.

## 2025-01-14 NOTE — CONSULT NOTE ADULT - SUBJECTIVE AND OBJECTIVE BOX
TRAUMA SURGERY CONSULT NOTE    Patient: LEENA BAILEY , 79y ([birth year])Male   MRN: [id number]  Location: [facility] ED  Visit: 01-14-25 Emergency  Date: 01-14-25 @ 20:03    HPI:  78 y/o M with PMH of HTN, HLD, h/o cellulitis, presents as a trauma alert s/p unwitnessed fall (?HT, ?LOC,-AC) and left on the floor for an unknown amount of time. Patient was found down his basement steps with a ladder over his body. Per son, who was eventually by bedside, stated he was recently discharge from nursing home and was at home ambulating with a rolling walker at the very least until Sunday night, after that the son only saw him today in the evening in a fallen state. Unsure for how long he was on the floor. ABCs intact. Initial GCS 6 (E2, V2, M2) with hypothermia of 24*C via mejia temp, improved throughout examination to GCS 9 with reheating. External signs of trauma: multiple skin excoriations at the b/l shins. cord-like compression marks noted on r knee.      PAST MEDICAL & SURGICAL HISTORY:  Diabetes      HLD (hyperlipidemia)      BPH without urinary obstruction      S/P cataract surgery          Home Medications:  acetaminophen 325 mg oral tablet: 3 tab(s) orally every 8 hours (06 Dec 2024 11:18)  atorvastatin 20 mg oral tablet: 1 tab(s) orally once a day (15 Nov 2024 16:39)  furosemide 40 mg oral tablet: 1 tab(s) orally once a day (06 Dec 2024 11:18)  Januvia 100 mg oral tablet: 1 tab(s) orally once a day (15 Nov 2024 16:13)  traMADol 50 mg oral tablet: 1 tab(s) orally every 6 hours As needed Moderate Pain (4 - 6) (02 Dec 2024 12:37)        VITALS:  T(F): 83.7 (01-14-25 @ 19:43), Max: 83.7 (01-14-25 @ 19:43)  HR: 160 (01-14-25 @ 19:43) (160 - 160)  BP: 153/119 (01-14-25 @ 19:43) (153/119 - 153/119)  RR: 25 (01-14-25 @ 19:43) (25 - 25)  SpO2: 88% (01-14-25 @ 19:43) (88% - 88%)    PHYSICAL EXAM:  General: Moaning in pain, AandOx0  HEENT: no head trauma noted. C-Collar intact. pupils reactive to light b/l  BACK: (-)C/T/L spine tenderness  Cardiac: tachycardiac to 130s  Respiratory: b/l equal chest rise. No respiratory distress  Abdomen: Soft, non-distended, non-tender, no rebound, no guarding.  Pelvis: No instability  Musculoskeletal: flexion of UEs b/l on pain  Neuro: Sensation grossly intact and equal throughout, no focal deficits  Vascular: Pulses 2+ throughout, extremities well perfused  Skin: Cold/dry, pale, no jaundice. Multiple skin excoriations noted on b/l LE shins, cord-like indentation noted on R knee    MEDICATIONS  (STANDING):  lactated ringers Bolus 1000 milliLiter(s) IV Bolus once    MEDICATIONS  (PRN):      LAB/STUDIES:    LABS:  cret                        11.8   30.51 )-----------( 387      ( 14 Jan 2025 19:50 )             36.8           PT/INR - ( 14 Jan 2025 19:50 )   PT: 13.20 sec;   INR: 1.12 ratio         PTT - ( 14 Jan 2025 19:50 )  PTT:33.6 sec                      IMAGING:  ***pending***    ASSESSMENT:  78 y/o M with PMH of HTN, HLD, h/o cellulitis, presents as a trauma alert s/p unwitnessed fall (?HT, ?LOC,-AC) and left on the floor for an unknown amount of time. Patient was found down his basement steps with a ladder over his body. Per son, who was eventually by bedside, stated he was recently discharge from nursing home and was at home ambulating with a rolling walker at the very least until Sunday night, after that the son only saw him today in the evening in a fallen state. Unsure for how long he was on the floor. ABCs intact. Initial GCS 6 (E2, V2, M2) with hypothermia of 24*C via mejia temp, improved throughout examination to GCS 9 with reheating. External signs of trauma: multiple skin excoriations at the b/l shins. cord-like compression marks noted on r knee.    Injuries Identified  ***pending***    PLAN:   - Trauma imaging: CXR, Pelvic XR, CTH, CT CSpine, CT C/A/P   - Trauma labs (CBC, BMP, Mg, Phos, Coags, Type and Screen, EtOH)   - Final plan pending above results   - PTSD Screen    ----------------------------------------------------------------------------------------------------------  Discussed with surgical attending on call,  ***  SPECTRA [id number] TRAUMA SURGERY CONSULT NOTE    Patient: LEENA BAILEY , 79y ([birth year])Male   MRN: [id number]  Location: [facility] ED  Visit: 01-14-25 Emergency  Date: 01-14-25 @ 20:03    HPI:  78 y/o M with PMH of HTN, HLD, h/o cellulitis, presents as a trauma alert s/p unwitnessed fall (?HT, ?LOC,-AC) and left on the floor for an unknown amount of time. Patient was found down his basement steps with a ladder over his body. Per son, who was eventually by bedside, stated he was recently discharge from nursing home and was at home ambulating with a rolling walker at the very least until Sunday night, after that the son only saw him today in the evening in a fallen state. Unsure for how long he was on the floor. ABCs intact. Initial GCS 6 (E2, V2, M2) with hypothermia of 28*C via mejia temp, improved throughout examination to GCS 9 with reheating. External signs of trauma: multiple skin excoriations at the b/l shins. cord-like compression marks noted on r knee. b/l pressure ulcers on b/l thighs, b/l knees, b/l shins      PAST MEDICAL & SURGICAL HISTORY:  Diabetes      HLD (hyperlipidemia)      BPH without urinary obstruction      S/P cataract surgery          Home Medications:  acetaminophen 325 mg oral tablet: 3 tab(s) orally every 8 hours (06 Dec 2024 11:18)  atorvastatin 20 mg oral tablet: 1 tab(s) orally once a day (15 Nov 2024 16:39)  furosemide 40 mg oral tablet: 1 tab(s) orally once a day (06 Dec 2024 11:18)  Januvia 100 mg oral tablet: 1 tab(s) orally once a day (15 Nov 2024 16:13)  traMADol 50 mg oral tablet: 1 tab(s) orally every 6 hours As needed Moderate Pain (4 - 6) (02 Dec 2024 12:37)        VITALS:  T(F): 83.7 (01-14-25 @ 19:43), Max: 83.7 (01-14-25 @ 19:43)  HR: 160 (01-14-25 @ 19:43) (160 - 160)  BP: 153/119 (01-14-25 @ 19:43) (153/119 - 153/119)  RR: 25 (01-14-25 @ 19:43) (25 - 25)  SpO2: 88% (01-14-25 @ 19:43) (88% - 88%)    PHYSICAL EXAM:  General: Moaning in pain, AandOx0  HEENT: no head trauma noted. C-Collar intact. pupils reactive to light b/l  BACK: (-)C/T/L spine tenderness  Cardiac: tachycardiac to 130s  Respiratory: b/l equal chest rise. No respiratory distress  Abdomen: Soft, non-distended, non-tender, no rebound, no guarding.  Pelvis: No instability  Musculoskeletal: flexion of UEs b/l on pain  Neuro: Sensation grossly intact and equal throughout, no focal deficits  Vascular: Pulses 2+ throughout, extremities well perfused  Skin: Cold/dry, pale, no jaundice. Multiple skin excoriations noted on b/l LE shins, cord-like indentation noted on R knee. b/l pressure ulcers on b/l thighs, b/l knees, b/l shins    MEDICATIONS  (STANDING):  lactated ringers Bolus 1000 milliLiter(s) IV Bolus once    MEDICATIONS  (PRN):      LAB/STUDIES:    LABS:  cret                        11.8   30.51 )-----------( 387      ( 14 Jan 2025 19:50 )             36.8           PT/INR - ( 14 Jan 2025 19:50 )   PT: 13.20 sec;   INR: 1.12 ratio         PTT - ( 14 Jan 2025 19:50 )  PTT:33.6 sec                      IMAGING:  ***pending***    ASSESSMENT:  78 y/o M with PMH of HTN, HLD, h/o cellulitis, presents as a trauma alert s/p unwitnessed fall (?HT, ?LOC,-AC) and left on the floor for an unknown amount of time. Patient was found down his basement steps with a ladder over his body. Per son, who was eventually by bedside, stated he was recently discharge from nursing home and was at home ambulating with a rolling walker at the very least until Sunday night, after that the son only saw him today in the evening in a fallen state. Unsure for how long he was on the floor. ABCs intact. Initial GCS 6 (E2, V2, M2) with hypothermia of 28*C via mejia temp, improved throughout examination to GCS 9 with reheating. External signs of trauma: multiple skin excoriations at the b/l shins. cord-like compression marks noted on r knee. b/l pressure ulcers on b/l thighs, b/l knees, b/l shins    Injuries Identified  ***pending***    PLAN:   - Trauma imaging: CXR, Pelvic XR, CTH, CT CSpine, CT C/A/P   - Trauma labs (CBC, BMP, Mg, Phos, Coags, Type and Screen, EtOH)   - Final plan pending above results   - PTSD Screen    ----------------------------------------------------------------------------------------------------------  Discussed with surgical attending on call, Dr. Cordova  SPECTRA [id number] TRAUMA SURGERY CONSULT NOTE    Patient: LEENA BAILEY , 79y ([birth year])Male   MRN: [id number]  Location: [facility] ED  Visit: 01-14-25 Emergency  Date: 01-14-25 @ 20:03    HPI:  78 y/o M with PMH of HTN, HLD, h/o cellulitis, presents as a trauma alert s/p unwitnessed fall (?HT, ?LOC,-AC) and left on the floor for an unknown amount of time. Patient was found down his basement steps with a ladder over his body. Per son, who was eventually by bedside, stated he was recently discharge from nursing home and was at home ambulating with a rolling walker at the very least until Sunday night, after that the son only saw him today in the evening in a fallen state. Unsure for how long he was on the floor. ABCs intact. Initial GCS 6 (E2, V2, M2) with hypothermia of 28*C via mejia temp, improved throughout examination to GCS 9 with reheating. External signs of trauma: multiple skin excoriations at the b/l shins. cord-like compression marks noted on r knee. b/l pressure ulcers on b/l thighs, b/l knees, b/l shins.    PAST MEDICAL & SURGICAL HISTORY:  Diabetes  HLD (hyperlipidemia)  BPH without urinary obstruction  S/P cataract surgery    Home Medications:  acetaminophen 325 mg oral tablet: 3 tab(s) orally every 8 hours (06 Dec 2024 11:18)  atorvastatin 20 mg oral tablet: 1 tab(s) orally once a day (15 Nov 2024 16:39)  furosemide 40 mg oral tablet: 1 tab(s) orally once a day (06 Dec 2024 11:18)  Januvia 100 mg oral tablet: 1 tab(s) orally once a day (15 Nov 2024 16:13)  traMADol 50 mg oral tablet: 1 tab(s) orally every 6 hours As needed Moderate Pain (4 - 6) (02 Dec 2024 12:37)    VITALS:  T(F): 83.7 (01-14-25 @ 19:43), Max: 83.7 (01-14-25 @ 19:43)  HR: 160 (01-14-25 @ 19:43) (160 - 160)  BP: 153/119 (01-14-25 @ 19:43) (153/119 - 153/119)  RR: 25 (01-14-25 @ 19:43) (25 - 25)  SpO2: 88% (01-14-25 @ 19:43) (88% - 88%)    PHYSICAL EXAM:  General: Moaning in pain, AandOx0  HEENT: superficial abrasion on the right posterior scalp, cervical collar in place, pupils reactive to light b/l  BACK: (-)C/T/L spine tenderness  Cardiac: tachycardiac to 130s  Respiratory: b/l equal chest rise. No use of accessory muscles   Abdomen: Soft, non-distended, non-tender, no rebound, no guarding  Pelvis: No instability  Musculoskeletal: flexion of UEs b/l on pain  Neuro: Sensation grossly intact and equal throughout, no focal deficits  Vascular: Pulses 2+ throughout, extremities well perfused  Skin: Cold/dry, pale, no jaundice. Multiple skin excoriations noted on b/l LE shins, cord-like indentation noted on R knee. b/l pressure ulcers on b/l thighs, b/l knees, b/l shins    MEDICATIONS  (STANDING):  lactated ringers Bolus 1000 milliLiter(s) IV Bolus once    MEDICATIONS  (PRN):    LAB/STUDIES:  Labs:  CAPILLARY BLOOD GLUCOSE                        11.8   30.51 )-----------( 387      ( 14 Jan 2025 19:50 )             36.8       Auto Neutrophil %: 91.2 % (01-14-25 @ 19:50)    01-14    139  |  88[L]  |  67[HH]  ----------------------------<  130[H]  6.1[HH]   |  17  |  3.9[H]    Calcium: 8.5 mg/dL (01-14-25 @ 19:50)    LFTs:             7.5  | 0.7  | 343      ------------------[171     ( 14 Jan 2025 19:50 )  3.6  | x    | 85          Lipase:44     Amylase:x         Blood Gas Venous - Lactate: 8.2 mmol/L (01-14-25 @ 20:11)  Lactate, Blood: 7.3 mmol/L (01-14-25 @ 19:50)    Coags:     13.20  ----< 1.12    ( 14 Jan 2025 19:50 )     33.6      Urinalysis Basic - ( 14 Jan 2025 19:50 )    Color: x / Appearance: x / SG: x / pH: x  Gluc: 130 mg/dL / Ketone: x  / Bili: x / Urobili: x   Blood: x / Protein: x / Nitrite: x   Leuk Esterase: x / RBC: x / WBC x   Sq Epi: x / Non Sq Epi: x / Bacteria: x    IMAGING:  ***pending***     TRAUMA SURGERY CONSULT NOTE    Patient: LEENA BAILEY , 79y ([birth year])Male   MRN: [id number]  Location: [facility] ED  Visit: 01-14-25 Emergency  Date: 01-14-25 @ 20:03    HPI:  80 y/o M with PMH of HTN, HLD, h/o cellulitis, presents as a trauma alert s/p unwitnessed fall (?HT, ?LOC,-AC) and left on the floor for an unknown amount of time. Patient was found down his basement steps with a ladder over his body. Per son, who was eventually by bedside, stated he was recently discharge from nursing home and was at home ambulating with a rolling walker at the very least until Sunday night, after that the son only saw him today in the evening in a fallen state. Unsure for how long he was on the floor. ABCs intact. Initial GCS 6 (E2, V2, M2) with hypothermia of 28*C via mejia temp, improved throughout examination to GCS 9 with reheating. External signs of trauma: multiple skin excoriations at the b/l shins. cord-like compression marks noted on r knee. b/l pressure ulcers on b/l thighs, b/l knees, b/l shins.    PAST MEDICAL & SURGICAL HISTORY:  Diabetes  HLD (hyperlipidemia)  BPH without urinary obstruction  S/P cataract surgery    Home Medications:  acetaminophen 325 mg oral tablet: 3 tab(s) orally every 8 hours (06 Dec 2024 11:18)  atorvastatin 20 mg oral tablet: 1 tab(s) orally once a day (15 Nov 2024 16:39)  furosemide 40 mg oral tablet: 1 tab(s) orally once a day (06 Dec 2024 11:18)  Januvia 100 mg oral tablet: 1 tab(s) orally once a day (15 Nov 2024 16:13)  traMADol 50 mg oral tablet: 1 tab(s) orally every 6 hours As needed Moderate Pain (4 - 6) (02 Dec 2024 12:37)    VITALS:  T(F): 83.7 (01-14-25 @ 19:43), Max: 83.7 (01-14-25 @ 19:43)  HR: 160 (01-14-25 @ 19:43) (160 - 160)  BP: 153/119 (01-14-25 @ 19:43) (153/119 - 153/119)  RR: 25 (01-14-25 @ 19:43) (25 - 25)  SpO2: 88% (01-14-25 @ 19:43) (88% - 88%)    PHYSICAL EXAM:  General: Moaning in pain, AandOx0  HEENT: superficial abrasion on the right posterior scalp, cervical collar in place, pupils reactive to light b/l  BACK: (-)C/T/L spine tenderness  Cardiac: tachycardiac to 130s  Respiratory: b/l equal chest rise. No use of accessory muscles   Abdomen: Soft, non-distended, non-tender, no rebound, no guarding  Pelvis: No instability  Musculoskeletal: flexion of UEs b/l on pain  Neuro: Sensation grossly intact and equal throughout, no focal deficits  Vascular: Pulses 2+ throughout, extremities well perfused  Skin: Cold/dry, pale, no jaundice. Multiple skin excoriations noted on b/l LE shins, cord-like indentation noted on R knee. b/l pressure ulcers on b/l thighs, b/l knees, b/l shins    MEDICATIONS  (STANDING):  lactated ringers Bolus 1000 milliLiter(s) IV Bolus once    MEDICATIONS  (PRN):    LAB/STUDIES:  Labs:  CAPILLARY BLOOD GLUCOSE                        11.8   30.51 )-----------( 387      ( 14 Jan 2025 19:50 )             36.8       Auto Neutrophil %: 91.2 % (01-14-25 @ 19:50)    01-14    139  |  88[L]  |  67[HH]  ----------------------------<  130[H]  6.1[HH]   |  17  |  3.9[H]    Calcium: 8.5 mg/dL (01-14-25 @ 19:50)    LFTs:             7.5  | 0.7  | 343      ------------------[171     ( 14 Jan 2025 19:50 )  3.6  | x    | 85          Lipase:44     Amylase:x         Blood Gas Venous - Lactate: 8.2 mmol/L (01-14-25 @ 20:11)  Lactate, Blood: 7.3 mmol/L (01-14-25 @ 19:50)    Coags:     13.20  ----< 1.12    ( 14 Jan 2025 19:50 )     33.6      Urinalysis Basic - ( 14 Jan 2025 19:50 )    Color: x / Appearance: x / SG: x / pH: x  Gluc: 130 mg/dL / Ketone: x  / Bili: x / Urobili: x   Blood: x / Protein: x / Nitrite: x   Leuk Esterase: x / RBC: x / WBC x   Sq Epi: x / Non Sq Epi: x / Bacteria: x    IMAGING:  < from: CT Head No Cont (01.14.25 @ 20:57) >    IMPRESSION:    No acute intracranial pathology.    Stable mild chronic microvascular ischemic changes.    Partially imaged C1 fracture. Please see separately dictated report of   the concurrent cervical CT.    < end of copied text >  < from: CT Maxillofacial No Cont (01.14.25 @ 20:58) >  Impression:    No acute fracture or dislocation.    Redemonstrated small chronic deformity in the inferior right orbital   wall, and nondisplaced subacute/chronic fractures of the right anterior   arch and left posterior arch of C1.    Mild right nasopharyngeal fullness. Recommend further evaluation with   direct visual inspection.    < end of copied text >  < from: CT Cervical Spine No Cont (01.14.25 @ 20:58) >    IMPRESSION:    Subacute/chronic appearing nondisplaced fracture of the right anterior   arch and left posterior arch of C1. The right anterior arch fracture was   partially imaged on the CT head from 11/28/2024.    No atlantoaxial subluxation or widening of the atlantodental interval.    < end of copied text >  < from: CT Chest w/ IV Cont (01.14.25 @ 21:00) >  IMPRESSION:  1.  No evidence of acute traumatic injury to the chest, abdomen or pelvis.  2.  This bladder distention there is marked thickening of the urinary   bladder wall, can correlate with urinalysis if suspicion for cystitis.    < end of copied text >

## 2025-01-15 NOTE — H&P ADULT - ATTENDING COMMENTS
80 y/o M with PMH of HTN, HLD, h/o cellulitis, who presented to the ED as a trauma alert s/p unwitnessed fall with unknown trauma and unknown LOC. The patient's son had not heard from him in 2 days, patient was ambulating with a rolling walker on Sunday. The son was found him down his basement steps with a ladder over his body, naked and he called EMS. Unsure for how long he was on the floor. He was a trauma alert, hypothermic, but improved with rewarming.     Agree  with assessment  except for changes below.   Vital Signs Last 24 Hrs  T(C): 33.3 (14 Jan 2025 22:30), Max: 33.3 (14 Jan 2025 22:30)  T(F): 92 (14 Jan 2025 22:30), Max: 92 (14 Jan 2025 22:30)  HR: 90 (14 Jan 2025 22:30) (90 - 160)  BP: 101/55 (14 Jan 2025 22:30) (101/55 - 153/119)  BP(mean): 67 (14 Jan 2025 22:30) (67 - 67)  RR: 20 (14 Jan 2025 22:30) (20 - 25)  SpO2: 98% (14 Jan 2025 22:30) (88% - 98%)    Parameters below as of 14 Jan 2025 22:30  Patient On (Oxygen Delivery Method): nasal cannula  O2 Flow (L/min): 4    CTH: No acute intracranial pathology. Stable mild chronic microvascular ischemic changes. Partially imaged C1 fracture. Please see separately dictated report of the concurrent cervical CT.  - CT maxillary: no acute fracture or dislocation. Redemonstrated small chronic deformity in the inferior right orbital wall, and nondisplaced subacute/chronic fractures of the right anterior arch and left posterior arch of C1. Mild right nasopharyngeal fullness.     CTAP 1.  No evidence of acute traumatic injury to the chest, abdomen or pelvis.  2.  This bladder distention there is marked thickening of the urinary   bladder wall, can correlate with urinalysis if suspicion for cystitis.    IMPRESSION   Unwitnessed  Fall   Complicated by Rhabdomyolysis  and RENEA   - Trauma surgery and neurosurgery recs appreciated  - no acute interventions at this time  - CK 26k, continue to trend  - trend lactate  - c/w IVF  - orthostatics  - continue in C-collar  - pain control prn   - fall precautions  - monitor in tele  - TTE  EF of 69 %, G1DD, severe aortic valve stenosis. Mean PG 30 mmHg, DOUGLAS 0.8 cm^2.  - TAVR eval once appropriate  - previously concern for Afib?   - trend trop likely in setting of rhabdo   - f/u EEG  - monitor lytes - in setting of rhabdo  - monitor LFTs  - f/u repeat K         Sepsis POA  Likely Secondary   cystitis  Lactic acidosis  Hypothermia  This bladder distention there is marked thickening of the urinary   bladder wall, can correlate with urinalysis if suspicion for cystitis.  - f/u UA and cx  - f/u RVP  - f/u blood cx  - no previous urine cx  - trend lactate  - s/p cefepime and vanc  - continue for now, deescalate   - f/u procal   - corie hugger  - f/u MRSA  - c/w IVF    RENEA in the  Setting of Rhabdo  Baseline creatinine: 1.0  Creatinine today 3.9, Consider Renal US,  Avoid nephrotoxic agents, Monitor BUN/creatinine, Send  Urine Lytes, Start Bicarb  Drip     Hx HTN/HLD  Hx DM  - will hold BP meds  - ISS while inpatient 78 y/o M with PMH of HTN, HLD, h/o cellulitis, who presented to the ED as a trauma alert s/p unwitnessed fall with unknown trauma and unknown LOC. The patient's son had not heard from him in 2 days, patient was ambulating with a rolling walker on Sunday. The son was found him down his basement steps with a ladder over his body, naked and he called EMS. Unsure for how long he was on the floor. He was a trauma alert, hypothermic, but improved with rewarming.     Agree  with assessment  except for changes below.   Vital Signs Last 24 Hrs  T(C): 33.3 (14 Jan 2025 22:30), Max: 33.3 (14 Jan 2025 22:30)  T(F): 92 (14 Jan 2025 22:30), Max: 92 (14 Jan 2025 22:30)  HR: 90 (14 Jan 2025 22:30) (90 - 160)  BP: 101/55 (14 Jan 2025 22:30) (101/55 - 153/119)  BP(mean): 67 (14 Jan 2025 22:30) (67 - 67)  RR: 20 (14 Jan 2025 22:30) (20 - 25)  SpO2: 98% (14 Jan 2025 22:30) (88% - 98%)    Parameters below as of 14 Jan 2025 22:30  Patient On (Oxygen Delivery Method): nasal cannula  O2 Flow (L/min): 4    CTH: No acute intracranial pathology. Stable mild chronic microvascular ischemic changes. Partially imaged C1 fracture. Please see separately dictated report of the concurrent cervical CT.  - CT maxillary: no acute fracture or dislocation. Redemonstrated small chronic deformity in the inferior right orbital wall, and nondisplaced subacute/chronic fractures of the right anterior arch and left posterior arch of C1. Mild right nasopharyngeal fullness.     CTAP 1.  No evidence of acute traumatic injury to the chest, abdomen or pelvis.  2.  This bladder distention there is marked thickening of the urinary   bladder wall, can correlate with urinalysis if suspicion for cystitis.    IMPRESSION   Unwitnessed  Fall   Complicated by Rhabdomyolysis  and RENEA   Condisplaced subacute/chronic fractures of the right anterior arch and left posterior arch of C1  - Neurosurg Eval noted  - Pt should remain in C Collar for now GCS 6->11  - AP/LAT upright xrays   - Trauma surgery and neurosurgery recs appreciated  - no acute interventions at this time  - CK 26k, continue to trend  - trend lactate  - c/w IVF  - orthostatics  - continue in C-collar  - pain control prn   - fall precautions  - monitor in tele  - TTE  EF of 69 %, G1DD, severe aortic valve stenosis. Mean PG 30 mmHg, DOUGLAS 0.8 cm^2.  - TAVR eval once appropriate  - previously concern for Afib?   - trend trop likely in setting of rhabdo   - f/u EEG  - monitor lytes - in setting of rhabdo  - monitor LFTs  - f/u repeat K         Sepsis POA  Likely Secondary   cystitis  Lactic acidosis  Hypothermia  This bladder distention there is marked thickening of the urinary   bladder wall, can correlate with urinalysis if suspicion for cystitis.  - f/u UA and cx  - f/u RVP  - f/u blood cx  - no previous urine cx  - trend lactate  - s/p cefepime and vanc  - continue for now, deescalate   - f/u procal   - corie hugger  - f/u MRSA  - c/w IVF    RENEA in the  Setting of Rhabdo  Baseline creatinine: 1.0  Creatinine today 3.9, Consider Renal US,  Avoid nephrotoxic agents, Monitor BUN/creatinine, Send  Urine Lytes, Start Bicarb  Drip     Hx HTN/HLD  Hx DM  - will hold BP meds  - ISS while inpatient 80 y/o M with PMH of HTN, HLD, h/o cellulitis, who presented to the ED as a trauma alert s/p unwitnessed fall with unknown trauma and unknown LOC. The patient's son had not heard from him in 2 days, patient was ambulating with a rolling walker on Sunday. The son was found him down his basement steps with a ladder over his body, naked and he called EMS. Unsure for how long he was on the floor. He was a trauma alert, hypothermic, but improved with rewarming.     Agree  with assessment  except for changes below.   Vital Signs Last 24 Hrs  T(C): 33.3 (14 Jan 2025 22:30), Max: 33.3 (14 Jan 2025 22:30)  T(F): 92 (14 Jan 2025 22:30), Max: 92 (14 Jan 2025 22:30)  HR: 90 (14 Jan 2025 22:30) (90 - 160)  BP: 101/55 (14 Jan 2025 22:30) (101/55 - 153/119)  BP(mean): 67 (14 Jan 2025 22:30) (67 - 67)  RR: 20 (14 Jan 2025 22:30) (20 - 25)  SpO2: 98% (14 Jan 2025 22:30) (88% - 98%)    Parameters below as of 14 Jan 2025 22:30  Patient On (Oxygen Delivery Method): nasal cannula  O2 Flow (L/min): 4    CTH: No acute intracranial pathology. Stable mild chronic microvascular ischemic changes. Partially imaged C1 fracture. Please see separately dictated report of the concurrent cervical CT.  - CT maxillary: no acute fracture or dislocation. Redemonstrated small chronic deformity in the inferior right orbital wall, and nondisplaced subacute/chronic fractures of the right anterior arch and left posterior arch of C1. Mild right nasopharyngeal fullness.     CTAP 1.  No evidence of acute traumatic injury to the chest, abdomen or pelvis.  2.  This bladder distention there is marked thickening of the urinary   bladder wall, can correlate with urinalysis if suspicion for cystitis.    PHYSICAL EXAM  GENERAL: NAD,  HEAD: Post fall  Changes   NECK: Supple, Nontender  NERVOUS SYSTEM:  AAOx1  CHEST/LUNG: +bs b/l, No wheezing   HEART: +s1s2 RRR  ABDOMEN: soft, NT/ND  EXTREMITIES:  pp, no edema  SKIN: age related skin changes , Multiple skin  Bruises on lower extremities non infectious      IMPRESSION   Unwitnessed  Fall   Complicated by Rhabdomyolysis  and RENEA   Nondisplaced subacute/chronic fractures of the right anterior arch and left posterior arch of C1  Multiple Traumatic Bruises on  Lower Extremities  does not  appear infectious   - Neurosurg Eval noted  - Pt should remain in C Collar for now GCS 6->11  - AP/LAT upright xrays   - Trauma surgery and neurosurgery recs appreciated  - no acute interventions at this time  - CK 26k, continue to trend  - trend lactate  - c/w IVF  - orthostatics  - continue in C-collar  - pain control prn   - fall precautions  - monitor in tele  - TTE  EF of 69 %, G1DD, severe aortic valve stenosis. Mean PG 30 mmHg, DOUGLAS 0.8 cm^2.  - TAVR eval once appropriate  - previously concern for Afib?   - trend trop likely in setting of rhabdo   - f/u EEG  - monitor lytes - in setting of rhabdo  - monitor LFTs  - f/u repeat K   - Social  worker for safe discharge     Sepsis POA  Likely Secondary   cystitis  Toxic Metabolic Encephalopathy   Lactic acidosis  Hypothermia  This bladder distention there is marked thickening of the urinary   bladder wall, can correlate with urinalysis if suspicion for cystitis.  - f/u UA and cx  - f/u RVP  - f/u blood cx  - no previous urine cx  - trend lactate  - s/p cefepime and vanc  - continue for now, deescalate   - f/u procal   - corie hugger  - f/u MRSA  - c/w IVF  - Crital care eval   STEP DOWN    RENEA in the  Setting of Rhabdo  Baseline creatinine: 1.0  Creatinine today 3.9, Consider Renal US,  Avoid nephrotoxic agents, Monitor BUN/creatinine, Send  Urine Lytes, Start Bicarb  Drip     Hx HTN/HLD  Hx DM  - will hold BP meds  - ISS while inpatient  At high risk for adverse outcomes despite all care due to comorbidities and advanced age.   Seen on 01/14

## 2025-01-15 NOTE — CONSULT NOTE ADULT - ASSESSMENT
78 y/o M with PMH of HTN, HLD, h/o cellulitis, who presented to the ED as a trauma alert s/p unwitnessed fall with unknown trauma and unknown LOC.  Patient found to have Rhabdo and RENEA, sepsis, and a non displaced C1 ring fracture. Palliative care consulted for [facility].    Patient seen at bedside.  He was intubated and unable to participate in exam    Spoke with son Chapin over the phone. Palliative care introduced.  He was able to provide a medical history and hospital course. He spoke with multiple doctors today and made the patient DNR with a trial of intubation. Discussed that the patient is now intubated. We discussed the role of palliative care in [facility] decision making and he stated he would be up to speak with the ICU team later today. All questions answered.    Plan  -DNR/intubate  -ongoing medical management  -vent management and pressors management per ICU team  -neurosurgical note appreciate - no current intervention for C1 ring fracture  -will follow for [facility]    Education about palliative care provided to patient/family.  See Recs below.    Please call [phone] with questions or concerns 24/7.

## 2025-01-15 NOTE — CONSULT NOTE ADULT - CONVERSATION DETAILS
Spoke with son Chapin over the phone. Palliative care introduced.  He was able to provide a medical history and hospital course. He spoke with multiple doctors today and made the patient DNR with a trial of intubation. Discussed that the patient is now intubated. We discussed the role of palliative care in [facility] decision making and he stated he would be up to speak with the ICU team later today. All questions answered.

## 2025-01-15 NOTE — ED ADULT NURSE REASSESSMENT NOTE - NS ED NURSE REASSESS COMMENT FT1
pt intubated at this time. ETT and OG  confirmed by Xray. b/l wrist restraints in place from prior order. VSS. hooked up to bedside cardiac monitor

## 2025-01-15 NOTE — ED PROCEDURE NOTE - ATTENDING APP SHARED VISIT CONTRIBUTION OF CARE
I was present for and supervised the key/critical aspects of the procedures performed during the care of the patient.
I was present for and supervised the key/critical aspects of the procedures performed during the care of the patient.
None

## 2025-01-15 NOTE — H&P ADULT - NSHPLABSRESULTS_GEN_ALL_CORE
.  LABS:                         11.8   30.51 )-----------( 387      ( 14 Jan 2025 19:50 )             36.8     01-14    139  |  88[L]  |  67[HH]  ----------------------------<  130[H]  6.1[HH]   |  17  |  3.9[H]    Ca    8.5      14 Jan 2025 19:50  Phos  11.7     01-14  Mg     2.6     01-14    TPro  7.5  /  Alb  3.6  /  TBili  0.7  /  DBili  x   /  AST  343[H]  /  ALT  85[H]  /  AlkPhos  171[H]  01-14    PT/INR - ( 14 Jan 2025 19:50 )   PT: 13.20 sec;   INR: 1.12 ratio         PTT - ( 14 Jan 2025 19:50 )  PTT:33.6 sec  Urinalysis Basic - ( 14 Jan 2025 19:50 )    Color: x / Appearance: x / SG: x / pH: x  Gluc: 130 mg/dL / Ketone: x  / Bili: x / Urobili: x   Blood: x / Protein: x / Nitrite: x   Leuk Esterase: x / RBC: x / WBC x   Sq Epi: x / Non Sq Epi: x / Bacteria: x        Lactate, Blood: 7.3 mmol/L (01-14 @ 19:50)

## 2025-01-15 NOTE — CHART NOTE - NSCHARTNOTEFT_GEN_A_CORE
80 y/o M with PMH of HTN, HLD, h/o cellulitis, who presented to the ED as a trauma alert s/p unwitnessed fall with unknown trauma and unknown LOC. The patient's son had not heard from him in 2 days, patient was ambulating with a rolling walker on Sunday. The son was found him down his basement steps with a ladder over his body, naked and he called EMS. Unsure for how long he was on the floor. He was a trauma alert, hypothermic, but improved with rewarming. During my exam, patient is very agitated, ripping off gown and corie hugger, unable to provide any hx.     < from: CT Cervical Spine No Cont (01.14.25 @ 20:58) >  IMPRESSION:  Subacute/chronic appearing nondisplaced fracture of the right anterior   arch and left posterior arch of C1. The right anterior arch fracture was   partially imaged on the CT head from 11/28/2024.  No atlantoaxial subluxation or widening of the atlantodental interval.    PLAN   - No further neurosurgical intervention   - Keep in hard collar   - No need for XR   - Discussed case with attending

## 2025-01-15 NOTE — ED PROCEDURE NOTE - CPROC ED TIME OUT STATEMENT1
“Patient's name, , procedure and correct site were confirmed during the [place] Timeout.”
“Patient's name, , procedure and correct site were confirmed during the [place] Timeout.”

## 2025-01-15 NOTE — H&P ADULT - ASSESSMENT
#Unwitnessed fall, ?syncope  #Rhabdomyolysis with RENEA and mild transaminitis  - CTH: No acute intracranial pathology. Stable mild chronic microvascular ischemic changes. Partially imaged C1 fracture. Please see separately dictated report of the concurrent cervical CT.  - CT maxillary: no acute fracture or dislocation. Redemonstrated small chronic deformity in the inferior right orbital wall, and nondisplaced subacute/chronic fractures of the right anterior arch and left posterior arch of C1. Mild right nasopharyngeal fullness.   - CTAP 1.  No evidence of acute traumatic injury to the chest, abdomen or pelvis.  2.  This bladder distention there is marked thickening of the urinary   bladder wall, can correlate with urinalysis if suspicion for cystitis.  - trauma surgery and neurosurgery recs appreciated  - no acute interventions at this time  - CK 26k, continue to trend  - trend lactate  - c/w IVF  - orthostatics  - fall precautions  - monitor in tele  - TTE  EF of 69 %, G1DD, severe aortic valve stenosis. Mean PG 30 mmHg, DOUGLAS 0.8 cm^2.  - TAVR eval once appropriate  - previously concern for Afib?   - trend trop likely in setting of rhabdo   - f/u EEG  - monitor lytes - in setting of rhabdo  - monitor LFTs  - f/u repeat K     #Sepsis POA 2/2 cystitis?   #Lactic acidosis  - f/u UA and cx  - f/u RVP  - f/u blood cx  - no previous urine cx  - trend lactate  - s/p cefepime and vanc  - continue for now, deescalate   - f/u procal   - c/w IVF     #RENEA likely prerenal in the setting of rhabdo  - f/u urine lytes  - monitor BUN/Cr and lytes  - avoid nephrotoxic meds  - f/u nephro consult     #HTN/HLD  #DM  - will hold BP meds  - ISS while inpatient    #Misc  -DVT prophylaxis: heparin subq  -GI prophylaxis: PPI  -Diet: DASH, CC  -Code status: Full code  -Activity: As tolerated  -Dispo: SDU     #Unwitnessed fall, ?syncope  #Rhabdomyolysis with RENEA and mild transaminitis  #Non displaced C1 ring fracture  - CTH: No acute intracranial pathology. Stable mild chronic microvascular ischemic changes. Partially imaged C1 fracture. Please see separately dictated report of the concurrent cervical CT.  - CT maxillary: no acute fracture or dislocation. Redemonstrated small chronic deformity in the inferior right orbital wall, and nondisplaced subacute/chronic fractures of the right anterior arch and left posterior arch of C1. Mild right nasopharyngeal fullness.   - CTAP 1.  No evidence of acute traumatic injury to the chest, abdomen or pelvis.  2.  This bladder distention there is marked thickening of the urinary   bladder wall, can correlate with urinalysis if suspicion for cystitis.  - trauma surgery and neurosurgery recs appreciated  - no acute interventions at this time  - CK 26k, continue to trend  - trend lactate  - c/w IVF  - orthostatics  - continue in C-collar  - pain control prn   - fall precautions  - monitor in tele  - TTE  EF of 69 %, G1DD, severe aortic valve stenosis. Mean PG 30 mmHg, DOUGLAS 0.8 cm^2.  - TAVR eval once appropriate  - previously concern for Afib?   - trend trop likely in setting of rhabdo   - f/u EEG  - monitor lytes - in setting of rhabdo  - monitor LFTs  - f/u repeat K     #Sepsis POA 2/2 cystitis?   #Lactic acidosis  #hypothermia  - f/u UA and cx  - f/u RVP  - f/u blood cx  - no previous urine cx  - trend lactate  - s/p cefepime and vanc  - continue for now, deescalate   - f/u procal   - corie hugger  - f/u MRSA  - c/w IVF     #RENEA likely prerenal in the setting of rhabdo  - f/u urine lytes  - monitor BUN/Cr and lytes  - avoid nephrotoxic meds  - f/u nephro consult     #HTN/HLD  #DM  - will hold BP meds  - ISS while inpatient    #Misc  -DVT prophylaxis: heparin subq  -GI prophylaxis: PPI  -Diet: DASH, CC  -Code status: Full code  -Activity: bedrest  -Dispo: SDU PLEASE COMPLETE MED REC IN AM    #Unwitnessed fall, ?syncope  #Rhabdomyolysis with RENEA and mild transaminitis  #Non displaced C1 ring fracture  - CTH: No acute intracranial pathology. Stable mild chronic microvascular ischemic changes. Partially imaged C1 fracture. Please see separately dictated report of the concurrent cervical CT.  - CT maxillary: no acute fracture or dislocation. Redemonstrated small chronic deformity in the inferior right orbital wall, and nondisplaced subacute/chronic fractures of the right anterior arch and left posterior arch of C1. Mild right nasopharyngeal fullness.   - CTAP 1.  No evidence of acute traumatic injury to the chest, abdomen or pelvis.  2.  This bladder distention there is marked thickening of the urinary   bladder wall, can correlate with urinalysis if suspicion for cystitis.  - trauma surgery and neurosurgery recs appreciated  - no acute interventions at this time  - CK 26k, continue to trend  - trend lactate  - c/w IVF  - orthostatics  - continue in C-collar  - pain control prn   - fall precautions  - monitor in tele  - TTE  EF of 69 %, G1DD, severe aortic valve stenosis. Mean PG 30 mmHg, DOUGLAS 0.8 cm^2.  - TAVR eval once appropriate  - previously concern for Afib?   - trend trop likely in setting of rhabdo   - f/u EEG  - monitor lytes - in setting of rhabdo  - monitor LFTs  - f/u repeat K     #Sepsis POA 2/2 cystitis?   #Lactic acidosis  #hypothermia  - f/u UA and cx  - f/u RVP  - f/u blood cx  - no previous urine cx  - trend lactate  - s/p cefepime and vanc  - continue for now, deescalate   - f/u procal   - corie hugger  - f/u MRSA  - c/w IVF     #RENEA likely prerenal in the setting of rhabdo  - f/u urine lytes  - monitor BUN/Cr and lytes  - avoid nephrotoxic meds  - f/u nephro consult     #HTN/HLD  #DM  - will hold BP meds  - ISS while inpatient    #Misc  -DVT prophylaxis: heparin subq  -GI prophylaxis: PPI  -Diet: NPO  -Code status: Full code  -Activity: bedrest  -Dispo: SDU    PLEASE COMPLETE MED REC IN AM

## 2025-01-15 NOTE — CONSULT NOTE ADULT - ASSESSMENT
80 y/o M with PMH of HTN, HLD, h/o cellulitis, who presented to the ED as a trauma alert s/p unwitnessed fall with unknown trauma and unknown LOC. The patient's son had not heard from him in 2 days, patient was ambulating with a rolling walker on Sunday. The son was found him down his basement steps with a ladder over his body, naked and he called EMS. Unsure for how long he was on the floor. He was a trauma alert, hypothermic, but improved with rewarming.  patient intubated/ventilated   on 2 pressors   called for RENEA/ rhabo/ hyperkalemia / acidosis / hyperphosphatemia / lactic acidosis / respiratory failure   LR at 100 cc/h   on pressors   be careful with bicarbonate drip in view of hypocalcemia / corrected calcium in the mid 7 range   ph noted / RENEA with rhabdo   repeat K   follow UO   ck trending up / check repeat   seen by NS and trauma team    wbc noted cultures pending / on cefepime vanco/ follow vanco level   follow lactate level   I had a long   discussion with  patient son Dane Samson [phone]/agreeable to dialysis / CVVHD   case discussed with ICU team   overall prognosis poor   ? GOC   will follow

## 2025-01-15 NOTE — ED ADULT NURSE REASSESSMENT NOTE - NS ED NURSE REASSESS COMMENT FT1
pt became lethargic and AMS. MD Gutierrez called on teams and came to bedside. FS done. MD Wan at bedside. pt diaphragmatically breathing, with low BP, and altered. MD contacted family and as per son, pt is a DNR but intubate. pt hooked up to beside cardiac monitor. pt 100% on 4L NC.

## 2025-01-15 NOTE — CONSULT NOTE ADULT - ASSESSMENT
IMPRESSION:    sp fall/ trauma work up noted  Acute hypoxemic resp failure sp intubation  RENEA/ metabolic acidosis  Rhabdo ( on the floor for 2 days)  AT risk for sepsis  AMS/ TME/ head CT Noted    PLAN:    CNS: Daily SAT, Fentanyl, u tox, ammonia level, head CT reviewed    HEENT:  Oral care    PULMONARY:  HOB @ 45 degrees, sp intubation repeat ABG, dec FIO2 keep SaO2 92 TO 96%, monitor airway pressures, repeat CXR    CARDIOVASCULAR: Bicarb drip, EKG, CE, echo, trend CK/ LA    GI: GI prophylaxis                                          Feeding OG feeding, bowel regimen    RENAL:  F/u  lytes.  Correct as needed. accurate I/O, mejia, renal eval    INFECTIOUS DISEASE: ABX till cx, procal    HEMATOLOGICAL:  DVT prophylaxis. LE doppler    ENDOCRINE:  Follow up FS.  Insulin protocol if needed.    MUSCULOSKELETAL: Wound RN/ burn eval    MICU  VERY Poor prognosis  GO

## 2025-01-15 NOTE — GOALS OF CARE CONVERSATION - ADVANCED CARE PLANNING - CONVERSATION DETAILS
Spoke to the patient's son Chapin Kelly over the phone, prior to intubation. Discussed prognosis with him. He would like his father to be DNR, but intubate. Spoke to the patient's son Chapin Kelly over the phone, prior to intubation. Discussed prognosis with him. Explained patient is obtunded and would need intubation for airway protection, son understands and wants to proceed. Explained that patient may arrest s/p intubation and is extremely ooor prognosis. Also explained patient is unlikely to be extubated. Son understands and would still like to proceed w/ Intubation. When explained resuscitation and poor outcomes if patient were to require resuscitation in the event of cardiopulmonary arrest, patient proceeded w/ DNR    DNR/Intubate

## 2025-01-15 NOTE — CONSULT NOTE ADULT - SUBJECTIVE AND OBJECTIVE BOX
80 y/o M with PMH of HTN, HLD, h/o cellulitis, who presented to the ED as a trauma alert s/p unwitnessed fall with unknown trauma and unknown LOC. The patient's son had not heard from him in 2 days, patient was ambulating with a rolling walker on Sunday. The son was found him down his basement steps with a ladder over his body, naked and he called EMS. Unsure for how long he was on the floor. He was a trauma alert, hypothermic, but improved with rewarming.  patient intubated/ventilated   on 2 pressors   called for RENEA/ rhabo/ hyperkalemia / acidosis       PAST HISTORY  --------------------------------------------------------------------------------  PAST MEDICAL & SURGICAL HISTORY:  Diabetes      HLD (hyperlipidemia)      BPH without urinary obstruction      S/P cataract surgery        FAMILY HISTORY:    PAST SOCIAL HISTORY:    ALLERGIES & MEDICATIONS  --------------------------------------------------------------------------------  Allergies    No Known Allergies    Intolerances      Standing Inpatient Medications  cefepime   IVPB 1000 milliGRAM(s) IV Intermittent every 12 hours  dexMEDEtomidine Infusion 0.2 MICROgram(s)/kG/Hr IV Continuous <Continuous>  fentaNYL   Infusion 0.5 MICROgram(s)/kG/Hr IV Continuous <Continuous>  heparin   Injectable 5000 Unit(s) SubCutaneous every 8 hours  insulin lispro (ADMELOG) corrective regimen sliding scale   SubCutaneous Before meals and at bedtime  norepinephrine Infusion 0.05 MICROgram(s)/kG/Min IV Continuous <Continuous>  pantoprazole    Tablet 40 milliGRAM(s) Oral before breakfast  phenylephrine    Infusion 0.1 MICROgram(s)/kG/Min IV Continuous <Continuous>  sodium bicarbonate  Infusion 0.331 mEq/kG/Hr IV Continuous <Continuous>  vancomycin  IVPB 1000 milliGRAM(s) IV Intermittent every 24 hours    PRN Inpatient Medications          VITALS/PHYSICAL EXAM  --------------------------------------------------------------------------------  T(C): 37 (01-15-25 @ 11:00), Max: 37.1 (01-15-25 @ 05:50)  HR: 86 (01-15-25 @ 12:10) (80 - 160)  BP: 96/59 (01-15-25 @ 12:10) (76/39 - 153/119)  RR: 16 (01-15-25 @ 12:10) (16 - 25)  SpO2: 100% (01-15-25 @ 12:10) (88% - 100%)  Wt(kg): --  Height (cm): 165.1 (01-14-25 @ 19:43)  Weight (kg): 68 (01-14-25 @ 19:43)  BMI (kg/m2): 24.9 (01-14-25 @ 19:43)  BSA (m2): 1.75 (01-14-25 @ 19:43)      01-14-25 @ 07:01  -  01-15-25 @ 07:00  --------------------------------------------------------  IN: 1200 mL / OUT: 0 mL / NET: 1200 mL    01-15-25 @ 07:01  -  01-15-25 @ 13:31  --------------------------------------------------------  IN: 500 mL / OUT: 400 mL / NET: 100 mL      Physical Exam:  	Gen:intubated/ventilated   	Pulm:  B/L ria   	CV:  S1S2; no rub  	Abd: +distended  	LE:  no edema  	    LABS/STUDIES  --------------------------------------------------------------------------------              11.6   36.39 >-----------<  388      [01-15-25 @ 06:00]              36.9     138  |  92  |  68  ----------------------------<  178      [01-15-25 @ 06:00]  6.6   |  12  |  3.7        Ca     6.8     [01-15-25 @ 06:00]      Mg     2.5     [01-15-25 @ 06:00]      Phos  11.3     [01-15-25 @ 06:00]    TPro  5.4  /  Alb  2.6  /  TBili  0.8  /  DBili  x   /  AST  484  /  ALT  131  /  AlkPhos  129  [01-15-25 @ 06:00]    PT/INR: PT 16.30, INR 1.37       [01-15-25 @ 06:00]  PTT: 27.1       [01-15-25 @ 06:00]    < from: CT Abdomen and Pelvis w/ IV Cont (01.14.25 @ 21:00) >  1.  No evidence of acute traumatic injury to the chest, abdomen or pelvis.  2.  This bladder distention there is marked thickening of the urinary   bladder wall, can correlate with urinalysis if suspicion for cystitis.    < end of copied text >      Creatinine Trend:  SCr 3.7 [01-15 @ 06:00]  SCr 3.9 [01-14 @ 19:50]    Urinalysis - [01-15-25 @ 06:00]      Color  / Appearance  / SG  / pH       Gluc 178 / Ketone   / Bili  / Urobili        Blood  / Protein  / Leuk Est  / Nitrite       RBC  / WBC  / Hyaline  / Gran  / Sq Epi  / Non Sq Epi  / Bacteria       Iron 100, TIBC 370, %sat 27      [10-21-24 @ 19:06]  Ferritin 384      [10-21-24 @ 19:06]  TSH 4.05      [01-14-25 @ 21:38]       80 y/o M with PMH of HTN, HLD, h/o cellulitis, who presented to the ED as a trauma alert s/p unwitnessed fall with unknown trauma and unknown LOC. The patient's son had not heard from him in 2 days, patient was ambulating with a rolling walker on Sunday. The son was found him down his basement steps with a ladder over his body, naked and he called EMS. Unsure for how long he was on the floor. He was a trauma alert, hypothermic, but improved with rewarming.  patient intubated/ventilated   on 2 pressors   called for RENEA/ rhabo/ hyperkalemia / acidosis       PAST HISTORY  --------------------------------------------------------------------------------  PAST MEDICAL & SURGICAL HISTORY:  Diabetes      HLD (hyperlipidemia)      BPH without urinary obstruction      S/P cataract surgery        FAMILY HISTORY:    PAST SOCIAL HISTORY:    ALLERGIES & MEDICATIONS  --------------------------------------------------------------------------------  Allergies    No Known Allergies    Intolerances      Standing Inpatient Medications  cefepime   IVPB 1000 milliGRAM(s) IV Intermittent every 12 hours  dexMEDEtomidine Infusion 0.2 MICROgram(s)/kG/Hr IV Continuous <Continuous>  fentaNYL   Infusion 0.5 MICROgram(s)/kG/Hr IV Continuous <Continuous>  heparin   Injectable 5000 Unit(s) SubCutaneous every 8 hours  insulin lispro (ADMELOG) corrective regimen sliding scale   SubCutaneous Before meals and at bedtime  norepinephrine Infusion 0.05 MICROgram(s)/kG/Min IV Continuous <Continuous>  pantoprazole    Tablet 40 milliGRAM(s) Oral before breakfast  phenylephrine    Infusion 0.1 MICROgram(s)/kG/Min IV Continuous <Continuous>  sodium bicarbonate  Infusion 0.331 mEq/kG/Hr IV Continuous <Continuous>  vancomycin  IVPB 1000 milliGRAM(s) IV Intermittent every 24 hours    PRN Inpatient Medications          VITALS/PHYSICAL EXAM  --------------------------------------------------------------------------------  T(C): 37 (01-15-25 @ 11:00), Max: 37.1 (01-15-25 @ 05:50)  HR: 86 (01-15-25 @ 12:10) (80 - 160)  BP: 96/59 (01-15-25 @ 12:10) (76/39 - 153/119)  RR: 16 (01-15-25 @ 12:10) (16 - 25)  SpO2: 100% (01-15-25 @ 12:10) (88% - 100%)  Wt(kg): --  Height (cm): 165.1 (01-14-25 @ 19:43)  Weight (kg): 68 (01-14-25 @ 19:43)  BMI (kg/m2): 24.9 (01-14-25 @ 19:43)  BSA (m2): 1.75 (01-14-25 @ 19:43)      01-14-25 @ 07:01  -  01-15-25 @ 07:00  --------------------------------------------------------  IN: 1200 mL / OUT: 0 mL / NET: 1200 mL    01-15-25 @ 07:01  -  01-15-25 @ 13:31  --------------------------------------------------------  IN: 500 mL / OUT: 400 mL / NET: 100 mL      Physical Exam:  	Gen:intubated/ventilated   	Pulm:  B/L ria   	CV:  S1S2; no rub  	Abd: +distended  	LE:  skin lesions   	    LABS/STUDIES  --------------------------------------------------------------------------------              11.6   36.39 >-----------<  388      [01-15-25 @ 06:00]              36.9     138  |  92  |  68  ----------------------------<  178      [01-15-25 @ 06:00]  6.6   |  12  |  3.7        Ca     6.8     [01-15-25 @ 06:00]      Mg     2.5     [01-15-25 @ 06:00]      Phos  11.3     [01-15-25 @ 06:00]    TPro  5.4  /  Alb  2.6  /  TBili  0.8  /  DBili  x   /  AST  484  /  ALT  131  /  AlkPhos  129  [01-15-25 @ 06:00]    PT/INR: PT 16.30, INR 1.37       [01-15-25 @ 06:00]  PTT: 27.1       [01-15-25 @ 06:00]    < from: CT Abdomen and Pelvis w/ IV Cont (01.14.25 @ 21:00) >  1.  No evidence of acute traumatic injury to the chest, abdomen or pelvis.  2.  This bladder distention there is marked thickening of the urinary   bladder wall, can correlate with urinalysis if suspicion for cystitis.    < end of copied text >      Creatinine Trend:  SCr 3.7 [01-15 @ 06:00]  SCr 3.9 [01-14 @ 19:50]    Urinalysis - [01-15-25 @ 06:00]      Color  / Appearance  / SG  / pH       Gluc 178 / Ketone   / Bili  / Urobili        Blood  / Protein  / Leuk Est  / Nitrite       RBC  / WBC  / Hyaline  / Gran  / Sq Epi  / Non Sq Epi  / Bacteria       Iron 100, TIBC 370, %sat 27      [10-21-24 @ 19:06]  Ferritin 384      [10-21-24 @ 19:06]  TSH 4.05      [01-14-25 @ 21:38]

## 2025-01-15 NOTE — H&P ADULT - HISTORY OF PRESENT ILLNESS
80 y/o M with PMH of HTN, HLD, h/o cellulitis, presents as a trauma alert s/p unwitnessed fall (?HT, ?LOC,-AC) and left on the floor for an unknown amount of time. Patient was found down his basement steps with a ladder over his body. Per son, who was eventually by bedside, stated he was recently discharge from nursing home and was at home ambulating with a rolling walker at the very least until Sunday night, after that the son only saw him today in the evening in a fallen state. Unsure for how long he was on the floor. ABCs intact. Initial GCS 6 (E2, V2, M2) with hypothermia of 28*C via mejia temp, improved throughout examination to GCS 9 with reheating. External signs of trauma: multiple skin excoriations at the b/l shins. cord-like compression marks noted on r knee. b/l pressure ulcers on b/l thighs, b/l knees, b/l shins.    Vitals on admission:   / 119 mm Hg,  /min, RR 25 /min, T 83.7 Degrees F, sat 88 % on room air    Remarkable labs:   WBC 30k   Hgb 11.8  K 6.1  BUN/Cr 67/3.9   Alk phos 343  AST/ALT 85/15  Lactate 7.3  Trop 158  CK 26k   VBG with pH 7.28 (lactate 7.3) pCO2 44 and PO2 28    He was given cefepime, vanc, 2L LR bolus, insulin and dextrose for hyperK and is being admitted to SDU for further management.  78 y/o M with PMH of HTN, HLD, h/o cellulitis, who presented to the ED as a trauma alert s/p unwitnessed fall with unknown trauma and unknown LOC. Patient was found down his basement steps with a ladder over his body, naked. Per son, he was recently discharged from nursing home and was at home ambulating with a rolling walker until Sunday night. Unsure for how long he was on the floor. He was a trauma alert, hypothermic, but improved with rewarming.      Vitals on admission:   / 119 mm Hg,  /min, RR 25 /min, T 83.7 Degrees F, sat 88 % on room air    Remarkable labs:   WBC 30k   Hgb 11.8  K 6.1  BUN/Cr 67/3.9   Alk phos 343  AST/ALT 85/15  Lactate 7.3  Trop 158  CK 26k   VBG with pH 7.28 (lactate 7.3) pCO2 44 and PO2 28    - CTH: No acute intracranial pathology. Stable mild chronic microvascular ischemic changes. Partially imaged C1 fracture. Please see separately dictated report of the concurrent cervical CT.  - CT maxillary: no acute fracture or dislocation. Redemonstrated small chronic deformity in the inferior right orbital wall, and nondisplaced subacute/chronic fractures of the right anterior arch and left posterior arch of C1. Mild right nasopharyngeal fullness.   - CTAP 1.  No evidence of acute traumatic injury to the chest, abdomen or pelvis.  2.  This bladder distention there is marked thickening of the urinary   bladder wall, can correlate with urinalysis if suspicion for cystitis.    He was given cefepime, vanc, 2L LR bolus, insulin and dextrose for hyperK and is being admitted to SDU for further management.  80 y/o M with PMH of HTN, HLD, h/o cellulitis, who presented to the ED as a trauma alert s/p unwitnessed fall with unknown trauma and unknown LOC. The patient's son had not heard from him in 2 days, patient was ambulating with a rolling walker on Sunday. The son was found him down his basement steps with a ladder over his body, naked and he called EMS. Unsure for how long he was on the floor. He was a trauma alert, hypothermic, but improved with rewarming.      Vitals on admission:   / 119 mm Hg,  /min, RR 25 /min, T 83.7 Degrees F, sat 88 % on room air    Remarkable labs:   WBC 30k   Hgb 11.8  K 6.1  BUN/Cr 67/3.9   Alk phos 343  AST/ALT 85/15  Lactate 7.3  Trop 158  CK 26k   VBG with pH 7.28 (lactate 7.3) pCO2 44 and PO2 28  EKG 1st degree AV block  - CTH: No acute intracranial pathology. Stable mild chronic microvascular ischemic changes. Partially imaged C1 fracture. Please see separately dictated report of the concurrent cervical CT.  - CT maxillary: no acute fracture or dislocation. Redemonstrated small chronic deformity in the inferior right orbital wall, and nondisplaced subacute/chronic fractures of the right anterior arch and left posterior arch of C1. Mild right nasopharyngeal fullness.   - CTAP 1.  No evidence of acute traumatic injury to the chest, abdomen or pelvis.  2.  This bladder distention there is marked thickening of the urinary   bladder wall, can correlate with urinalysis if suspicion for cystitis.    He was given cefepime, vanc, 2L LR bolus, insulin and dextrose for hyperK and is being admitted to SDU for further management.  78 y/o M with PMH of HTN, HLD, h/o cellulitis, who presented to the ED as a trauma alert s/p unwitnessed fall with unknown trauma and unknown LOC. The patient's son had not heard from him in 2 days, patient was ambulating with a rolling walker on Sunday. The son was found him down his basement steps with a ladder over his body, naked and he called EMS. Unsure for how long he was on the floor. He was a trauma alert, hypothermic, but improved with rewarming. During my exam, patient is very agitated, ripping off gown and corie hugger, unable to provide any hx.     Vitals on admission:   / 119 mm Hg,  /min, RR 25 /min, T 83.7 Degrees F, sat 88 % on room air    Remarkable labs:   WBC 30k   Hgb 11.8  K 6.1  BUN/Cr 67/3.9   Alk phos 343  AST/ALT 85/15  Lactate 7.3  Trop 158  CK 26k   VBG with pH 7.28 (lactate 7.3) pCO2 44 and PO2 28  EKG 1st degree AV block  - CTH: No acute intracranial pathology. Stable mild chronic microvascular ischemic changes. Partially imaged C1 fracture. Please see separately dictated report of the concurrent cervical CT.  - CT maxillary: no acute fracture or dislocation. Redemonstrated small chronic deformity in the inferior right orbital wall, and nondisplaced subacute/chronic fractures of the right anterior arch and left posterior arch of C1. Mild right nasopharyngeal fullness.   - CTAP 1.  No evidence of acute traumatic injury to the chest, abdomen or pelvis.  2.  This bladder distention there is marked thickening of the urinary   bladder wall, can correlate with urinalysis if suspicion for cystitis.    He was given cefepime, vanc, 2L LR bolus, insulin and dextrose for hyperK and is being admitted to SDU for further management.

## 2025-01-15 NOTE — CONSULT NOTE ADULT - SUBJECTIVE AND OBJECTIVE BOX
CC:  fall    HPI:  80 y/o M with PMH of HTN, HLD, h/o cellulitis, who presented to the ED as a trauma alert s/p unwitnessed fall with unknown trauma and unknown LOC. The patient's son had not heard from him in 2 days, patient was ambulating with a rolling walker on Sunday. The son was found him down his basement steps with a ladder over his body, naked and he called EMS. Unsure for how long he was on the floor. He was a trauma alert, hypothermic, but improved with rewarming. During my exam, patient is very agitated, ripping off gown and corie hugger, unable to provide any hx.     Vitals on admission:   / 119 mm Hg,  /min, RR 25 /min, T 83.7 Degrees F, sat 88 % on room air    Remarkable labs:   WBC 30k   Hgb 11.8  K 6.1  BUN/Cr 67/3.9   Alk phos 343  AST/ALT 85/15  Lactate 7.3  Trop 158  CK 26k   VBG with pH 7.28 (lactate 7.3) pCO2 44 and PO2 28  EKG 1st degree AV block  - CTH: No acute intracranial pathology. Stable mild chronic microvascular ischemic changes. Partially imaged C1 fracture. Please see separately dictated report of the concurrent cervical CT.  - CT maxillary: no acute fracture or dislocation. Redemonstrated small chronic deformity in the inferior right orbital wall, and nondisplaced subacute/chronic fractures of the right anterior arch and left posterior arch of C1. Mild right nasopharyngeal fullness.   - CTAP 1.  No evidence of acute traumatic injury to the chest, abdomen or pelvis.  2.  This bladder distention there is marked thickening of the urinary   bladder wall, can correlate with urinalysis if suspicion for cystitis.    He was given cefepime, vanc, 2L LR bolus, insulin and dextrose for hyperK and is being admitted to SDU for further management.  (15 Mika 2025 02:08)    PERTINENT PM/SXH:   Diabetes    HLD (hyperlipidemia)    BPH without urinary obstruction      S/P cataract surgery      FAMILY HISTORY:    None pertinent    ITEMS NOT CHECKED ARE NOT PRESENT    SOCIAL HISTORY:   Significant other/partner[ ]  Children[ ]  Sabianist/Spirituality:  Substance hx:  [ ]   Tobacco hx:  [ ]   Alcohol hx: [ ]   Living Situation: [x ]Home  [ ]Long term care  [ ]Rehab [ ]Other  Home Services: [ ] HHA [ ] Visting RN [ ] Hospice  Occupation:  Home Opioid hx:  [ ] Y [ ] N [ x] I-Stop Reference No:    Reference #: [id number]        Patient Demographic Information (PDI)       PDI	First Name	Last Name	Birth Date	Gender	Street Address	[facility]	Zip Code  A	Asad	[facility]	1945	Male	[address]	[facility] [facility]	[address]  B	Asad	[facility]	1945	Male	[address] [facility]	[address]    Prescription Information      PDI Filter:    PDI	Current Rx	Drug Type	Rx Written	Rx Dispensed	Drug	Quantity	Days Supply	Prescriber Name	Prescriber TANMAY #	Payment Method	Dispenser  A	N	B	12/20/2024	12/21/2024	lorazepam 2 mg/ml carpuject	1ml	1	LatiEligio	[id number]	[place]  A	N	O	12/08/2024	12/08/2024	tramadol hcl 50 mg tablet	60	15	Latlazara Eligio	[id number]	[place]	[facility]  B	N	O	11/06/2024	11/08/2024	oxycodone hcl (ir) 5 mg tablet	6	3	Valdez Lockwood MD	[id number]	Medicare	[id number]  B	N	O	10/11/2024	10/11/2024	tramadol-acetaminophen 37.5-325 mg tab	60	30	Lin Isbell	[id number]	Medicare	[id number]  B	N	O	09/13/2024	09/13/2024	tramadol-acetaminophen 37.5-325 mg tab	14	7	Lin Isbell	[id number]	Medicare	[id number]       ADVANCE DIRECTIVES:     [ ] Full Code [x ] DNR  MOLST  [ ]  Living Will  [ ]   DECISION MAKER(s):  [ x] Health Care Proxy(s)  [ ] Surrogate(s)  [ ] Guardian           Name(s): Phone Number(s):  Chapin Kelly      BASELINE (I)ADL(s) (prior to admission):    [place]: [ ]Total  [ ] Moderate [ ]Dependent  Palliative Performance Status Version 2:         %    http://[facility].org/files/news/palliative_performance_scale_ppsv2.pdf    Allergies    No Known Allergies    Intolerances    MEDICATIONS  (STANDING):  cefepime   IVPB 1000 milliGRAM(s) IV Intermittent every 12 hours  dextrose 5%. 1000 milliLiter(s) (50 mL/Hr) IV Continuous <Continuous>  dextrose 5%. 1000 milliLiter(s) (100 mL/Hr) IV Continuous <Continuous>  dextrose 50% Injectable 25 Gram(s) IV Push once  dextrose 50% Injectable 12.5 Gram(s) IV Push once  dextrose 50% Injectable 25 Gram(s) IV Push once  fentaNYL   Infusion 0.5 MICROgram(s)/kG/Hr (3.4 mL/Hr) IV Continuous <Continuous>  glucagon  Injectable 1 milliGRAM(s) IntraMuscular once  heparin   Injectable 5000 Unit(s) SubCutaneous every 8 hours  insulin lispro (ADMELOG) corrective regimen sliding scale   SubCutaneous Before meals and at bedtime  lactated ringers. 1000 milliLiter(s) (150 mL/Hr) IV Continuous <Continuous>  norepinephrine Infusion 0.05 MICROgram(s)/kG/Min (6.38 mL/Hr) IV Continuous <Continuous>  pantoprazole    Tablet 40 milliGRAM(s) Oral before breakfast  phenylephrine    Infusion 0.1 MICROgram(s)/kG/Min (1.28 mL/Hr) IV Continuous <Continuous>  vancomycin  IVPB 1000 milliGRAM(s) IV Intermittent every 24 hours    MEDICATIONS  (PRN):  dextrose Oral Gel 15 Gram(s) Oral once PRN Blood Glucose LESS THAN 70 milliGRAM(s)/deciliter    PRESENT SYMPTOMS: [ ]Unable to obtain due to poor mentation   Source if other than patient:  [ ]Family   [ ]Team     Pain: [ ]yes [ ]no  ALL PAIN ASSESSMENT COMPONENTS WERE ASKED ABOUT UNLESS OTHERWISE NOTED  QOL impact -   Location -                    Aggravating factors -  Quality -  Radiation -  Timing-  Severity (0-10 scale):  Minimal acceptable level (0-10 scale):     CPOT:    https://www.sccm.org/getattachment/asu42n94-8u4h-5n5t-4c6k-5413f4752b0d/Critical-Care-Pain-Observation-Tool-(CPOT)    PAIN AD Score:   http://geriatrictoolkit.[place].[facility]/cog/painad.pdf (press ctrl +  left click to view)    Dyspnea:                           [ ]None[ ]Mild [ ]Moderate [ ]Severe     Respiratory Distress Observation Scale (RDOS):   A score of 0 to 2 signifies little or no respiratory distress, 3 signifies mild distress, scores 4 to 6 indicate moderate distress, and scores greater than 7 signify severe distress  https://www.[facility].ca/sites/default/files/PDFS/104094-bxmrpxspzan-beunilud-zcucprxcafq-saamn.pdf    Anxiety:                             [ ]None[ ]Mild [ ]Moderate [ ]Severe   Fatigue:                             [ ]None[ ]Mild [ ]Moderate [ ]Severe   Nausea:                             [ ]None[ ]Mild [ ]Moderate [ ]Severe   Loss of appetite:              [ ]None[ ]Mild [ ]Moderate [ ]Severe   Constipation:                    [ ]None[ ]Mild [ ]Moderate [ ]Severe    Other Symptoms:  [ ]All other review of systems negative     Palliative Performance Status Version 2:         %    http://[facility].org/files/news/palliative_performance_scale_ppsv2.pdf    PHYSICAL EXAM:  Vital Signs Last 24 Hrs  T(C): 37.1 (15 Mika 2025 05:50), Max: 37.1 (15 Mika 2025 05:50)  T(F): 98.8 (15 Mika 2025 05:50), Max: 98.8 (15 Mika 2025 05:50)  HR: 101 (15 Mika 2025 08:00) (80 - 160)  BP: 107/63 (15 Mika 2025 08:00) (76/39 - 153/119)  BP(mean): 60 (15 Mika 2025 05:55) (60 - 85)  RR: 16 (15 Mika 2025 08:00) (16 - 25)  SpO2: 100% (15 Mika 2025 08:00) (88% - 100%)    Parameters below as of 15 Mika 2025 08:00  Patient On (Oxygen Delivery Method): Ventilator: AC=16, WS=529, PEEP=6    O2 Concentration (%): 50 I&O's Summary      GENERAL:  [ ] No acute distress [ ]Lethargic  [ ]Unarousable  [ ]Verbal  [ ]Non-Verbal [ ]Cachexia    BEHAVIORAL/PSYCH:  [ ]Alert and Oriented x  [ ] Anxiety [ ] Delirium [ ] Agitation [ ] Calm   EYES: [ ] No scleral icterus [ ] Scleral icterus [ ] Closed  ENMT:  [ ]Dry mouth  [ ]No external oral lesions [ ] No external ear or nose lesions  CARDIOVASCULAR:  [ ]Regular [ ]Irregular [ ]Tachy [ ]Not Tachy  [ ]Shsahank [ ] Edema [ ] No edema  PULMONARY:  [ ]Tachypnea  [ ]Audible excessive secretions [ ] No labored breathing [ ] labored breathing  GASTROINTESTINAL: [ ]Soft  [ ]Distended  [ ]Not distended [ ]Non tender [ ]Tender  MUSCULOSKELETAL: [ ]No clubbing [ ] clubbing  [ ] No cyanosis [ ] cyanosis  NEUROLOGIC: [ ]No focal deficits  [ ]Follows commands  [ ]Does not follow commands  [ ]Cognitive impairment  [ ]Dysphagia  [ ]Dysarthria  [ ]Paresis   SKIN: [ ] Jaundiced [ ] Non-jaundiced [ ]Rash [ ]No Rash [ ] Warm [ ] Dry  MISC/LINES: [ ] ET tube [ ] Trach [ ]NGT/OGT [ ]PEG [ ]Lemon    LABS: reviewed by me                        11.6   36.39 )-----------( 388      ( 15 Mika 2025 06:00 )             36.9   01-15    138  |  92[L]  |  68[HH]  ----------------------------<  178[H]  6.6[HH]   |  12[L]  |  3.7[H]    Ca    6.8[L]      15 Mika 2025 06:00  Phos  11.3     01-15  Mg     2.5     01-15    TPro  5.4[L]  /  Alb  2.6[L]  /  TBili  0.8  /  DBili  x   /  AST  484[H]  /  ALT  131[H]  /  AlkPhos  129[H]  01-15  PT/INR - ( 15 Mika 2025 06:00 )   PT: 16.30 sec;   INR: 1.37 ratio         PTT - ( 15 Mika 2025 06:00 )  PTT:27.1 sec    Urinalysis Basic - ( 15 Mika 2025 06:00 )    Color: x / Appearance: x / SG: x / pH: x  Gluc: 178 mg/dL / Ketone: x  / Bili: x / Urobili: x   Blood: x / Protein: x / Nitrite: x   Leuk Esterase: x / RBC: x / WBC x   Sq Epi: x / Non Sq Epi: x / Bacteria: x      RADIOLOGY & ADDITIONAL STUDIES: reviewed by me    EKG: reviewed by me      PROTEIN CALORIE MALNUTRITION PRESENT: [ ]mild [ ]moderate [ ]severe [ ]underweight [ ]morbid obesity  https://www.andeal.org/vault/7360/web/files/ONC/Table_Clinical%20Characteristics%20to%20Document%20Malnutrition-White%20JV%20et%20al%202012.pdf    Height (cm): 165.1 (01-14-25 @ 19:43), 167.6 (11-28-24 @ 15:28), 162.6 (11-15-24 @ 08:02)  Weight (kg): 68 (01-14-25 @ 19:43), 81.6 (11-28-24 @ 15:28), 90.7 (11-15-24 @ 08:02)  BMI (kg/m2): 24.9 (01-14-25 @ 19:43), 29 (11-28-24 @ 15:28), 34.3 (11-15-24 @ 08:02)  [ ]PPSV2 < or = to 30% [ ]significant weight loss  [ ]poor nutritional intake  [ ]anasarca      [ ]Artificial Nutrition      Palliative Care Spiritual/Emotional Screening Tool Question  Severity (0-4):                    OR                    [ x] Unable to determine. Will assess at later time if appropriate.  Score of 2 or greater indicates recommendation of Chaplaincy and/or SW referral  Chaplaincy Referral: [ ] Yes [ ] Refused [ ] Following     Caregiver [place]:  [ ] Yes [ ] No    OR    [x ] Unable to determine. Will assess at later time if appropriate.  Social Work Referral [ ]  Patient and Family Centered Care Referral [ ]    Anticipatory Grief Present: [ ] Yes [ ] No    OR     [ x] Unable to determine. Will assess at later time if appropriate.  Social Work Referral [ ]  Patient and Family Centered Care Referral [ ]    Patient discussed with primary medical team MD  Palliative care education provided to patient and/or family   CC:  fall    HPI:  78 y/o M with PMH of HTN, HLD, h/o cellulitis, who presented to the ED as a trauma alert s/p unwitnessed fall with unknown trauma and unknown LOC. The patient's son had not heard from him in 2 days, patient was ambulating with a rolling walker on Sunday. The son was found him down his basement steps with a ladder over his body, naked and he called EMS. Unsure for how long he was on the floor. He was a trauma alert, hypothermic, but improved with rewarming. During my exam, patient is very agitated, ripping off gown and corie hugger, unable to provide any hx.     Vitals on admission:   / 119 mm Hg,  /min, RR 25 /min, T 83.7 Degrees F, sat 88 % on room air    Remarkable labs:   WBC 30k   Hgb 11.8  K 6.1  BUN/Cr 67/3.9   Alk phos 343  AST/ALT 85/15  Lactate 7.3  Trop 158  CK 26k   VBG with pH 7.28 (lactate 7.3) pCO2 44 and PO2 28  EKG 1st degree AV block  - CTH: No acute intracranial pathology. Stable mild chronic microvascular ischemic changes. Partially imaged C1 fracture. Please see separately dictated report of the concurrent cervical CT.  - CT maxillary: no acute fracture or dislocation. Redemonstrated small chronic deformity in the inferior right orbital wall, and nondisplaced subacute/chronic fractures of the right anterior arch and left posterior arch of C1. Mild right nasopharyngeal fullness.   - CTAP 1.  No evidence of acute traumatic injury to the chest, abdomen or pelvis.  2.  This bladder distention there is marked thickening of the urinary   bladder wall, can correlate with urinalysis if suspicion for cystitis.    He was given cefepime, vanc, 2L LR bolus, insulin and dextrose for hyperK and is being admitted to SDU for further management.  (15 Mika 2025 02:08)    PERTINENT PM/SXH:   Diabetes    HLD (hyperlipidemia)    BPH without urinary obstruction      S/P cataract surgery      FAMILY HISTORY:    None pertinent    ITEMS NOT CHECKED ARE NOT PRESENT    SOCIAL HISTORY:   Significant other/partner[ ]  Children[ ]  Yazidism/Spirituality:  Substance hx:  [ ]   Tobacco hx:  [ ]   Alcohol hx: [ ]   Living Situation: [x ]Home  [ ]Long term care  [ ]Rehab [ ]Other  Home Services: [ ] HHA [ ] Visting RN [ ] Hospice  Occupation:  Home Opioid hx:  [ ] Y [ ] N [ x] I-Stop Reference No:    Reference #: [id number]        Patient Demographic Information (PDI)       PDI	First Name	Last Name	Birth Date	Gender	Street Address	[facility]	Zip Code  A	Aasd	[facility]	1945	Male	[address]	[facility]	[address]  B	Asad	[facility]	1945	Male	[address] [facility]	[address]    Prescription Information      PDI Filter:    PDI	Current Rx	Drug Type	Rx Written	Rx Dispensed	Drug	Quantity	Days Supply	Prescriber Name	Prescriber TANMAY #	Payment Method	Dispenser  A	N	B	12/20/2024	12/21/2024	lorazepam 2 mg/ml carpuject	1ml	1	LatiEligio	[id number]	[place]	[facility]  A	N	O	12/08/2024	12/08/2024	tramadol hcl 50 mg tablet	60	15	Latlazara Eligio	[id number]	[place]	[facility]  B	N	O	11/06/2024	11/08/2024	oxycodone hcl (ir) 5 mg tablet	6	3	Valdez Lockwood MD	[id number]	Medicare	[id number]  B	N	O	10/11/2024	10/11/2024	tramadol-acetaminophen 37.5-325 mg tab	60	30	Lin Isbell	[id number]	Medicare	[id number]  B	N	O	09/13/2024	09/13/2024	tramadol-acetaminophen 37.5-325 mg tab	14	7	Lin Isbell	[id number]	Medicare	[id number]       ADVANCE DIRECTIVES:     [ ] Full Code [x ] DNR  MOLST  [ ]  Living Will  [ ]   DECISION MAKER(s):  [ x] Health Care Proxy(s)  [ ] Surrogate(s)  [ ] Guardian           Name(s): Phone Number(s):  Chapin Kelly      BASELINE (I)ADL(s) (prior to admission):    [place]: [ ]Total  [ ] Moderate [ ]Dependent  Palliative Performance Status Version 2:         %    http://[facility].org/files/news/palliative_performance_scale_ppsv2.pdf    Allergies    No Known Allergies    Intolerances    MEDICATIONS  (STANDING):  cefepime   IVPB 1000 milliGRAM(s) IV Intermittent every 12 hours  dextrose 5%. 1000 milliLiter(s) (50 mL/Hr) IV Continuous <Continuous>  dextrose 5%. 1000 milliLiter(s) (100 mL/Hr) IV Continuous <Continuous>  dextrose 50% Injectable 25 Gram(s) IV Push once  dextrose 50% Injectable 12.5 Gram(s) IV Push once  dextrose 50% Injectable 25 Gram(s) IV Push once  fentaNYL   Infusion 0.5 MICROgram(s)/kG/Hr (3.4 mL/Hr) IV Continuous <Continuous>  glucagon  Injectable 1 milliGRAM(s) IntraMuscular once  heparin   Injectable 5000 Unit(s) SubCutaneous every 8 hours  insulin lispro (ADMELOG) corrective regimen sliding scale   SubCutaneous Before meals and at bedtime  lactated ringers. 1000 milliLiter(s) (150 mL/Hr) IV Continuous <Continuous>  norepinephrine Infusion 0.05 MICROgram(s)/kG/Min (6.38 mL/Hr) IV Continuous <Continuous>  pantoprazole    Tablet 40 milliGRAM(s) Oral before breakfast  phenylephrine    Infusion 0.1 MICROgram(s)/kG/Min (1.28 mL/Hr) IV Continuous <Continuous>  vancomycin  IVPB 1000 milliGRAM(s) IV Intermittent every 24 hours    MEDICATIONS  (PRN):  dextrose Oral Gel 15 Gram(s) Oral once PRN Blood Glucose LESS THAN 70 milliGRAM(s)/deciliter    PRESENT SYMPTOMS: [x ]Unable to obtain due to poor mentation   Source if other than patient:  [ ]Family   [ ]Team     Pain: [ ]yes [ ]no  ALL PAIN ASSESSMENT COMPONENTS WERE ASKED ABOUT UNLESS OTHERWISE NOTED  QOL impact -   Location -                    Aggravating factors -  Quality -  Radiation -  Timing-  Severity (0-10 scale):  Minimal acceptable level (0-10 scale):     CPOT:  0  https://www.sccm.org/getattachment/ytc45e03-7g3g-2h5k-0m1o-0259q6309k9g/Critical-Care-Pain-Observation-Tool-(CPOT)    PAIN AD Score:   http://geriatrictoolkit.[place].[facility]/cog/painad.pdf (press ctrl +  left click to view)    Dyspnea:                           [ ]None[ ]Mild [ ]Moderate [ ]Severe     Respiratory Distress Observation Scale (RDOS): 0  A score of 0 to 2 signifies little or no respiratory distress, 3 signifies mild distress, scores 4 to 6 indicate moderate distress, and scores greater than 7 signify severe distress  https://www.[facility].ca/sites/default/files/PDFS/431378-uohdlfmwwkj-hpalynns-yacaucxnpre-qfnvo.pdf    Anxiety:                             [ ]None[ ]Mild [ ]Moderate [ ]Severe   Fatigue:                             [ ]None[ ]Mild [ ]Moderate [ ]Severe   Nausea:                             [ ]None[ ]Mild [ ]Moderate [ ]Severe   Loss of appetite:              [ ]None[ ]Mild [ ]Moderate [ ]Severe   Constipation:                    [ ]None[ ]Mild [ ]Moderate [ ]Severe    Other Symptoms:  [ ]All other review of systems negative     Palliative Performance Status Version 2:         10%    http://[facility].org/files/news/palliative_performance_scale_ppsv2.pdf    PHYSICAL EXAM:  Vital Signs Last 24 Hrs  T(C): 37.1 (15 Mika 2025 05:50), Max: 37.1 (15 Mika 2025 05:50)  T(F): 98.8 (15 Mika 2025 05:50), Max: 98.8 (15 Mika 2025 05:50)  HR: 101 (15 Mika 2025 08:00) (80 - 160)  BP: 107/63 (15 Mika 2025 08:00) (76/39 - 153/119)  BP(mean): 60 (15 Mika 2025 05:55) (60 - 85)  RR: 16 (15 Mika 2025 08:00) (16 - 25)  SpO2: 100% (15 Mika 2025 08:00) (88% - 100%)    Parameters below as of 15 Mika 2025 08:00  Patient On (Oxygen Delivery Method): Ventilator: AC=16, JH=319, PEEP=6    O2 Concentration (%): 50 I&O's Summary      GENERAL:  [ ] No acute distress [ ]Lethargic  [x ]Unarousable  [ ]Verbal  [ ]Non-Verbal [ ]Cachexia    BEHAVIORAL/PSYCH:  [ ]Alert and Oriented x  [ ] Anxiety [ ] Delirium [ ] Agitation [ ] Calm   EYES: [ ] No scleral icterus [ ] Scleral icterus [ ] Closed  ENMT:  [ ]Dry mouth  [x ]No external oral lesions [ ] No external ear or nose lesions  CARDIOVASCULAR:  [ ]Regular [ ]Irregular [ ]Tachy [ ]Not Tachy  [ ]Shashank [ ] Edema [ ] No edema  PULMONARY:  [ ]Tachypnea  [ ]Audible excessive secretions [x ] No labored breathing [ ] labored breathing  GASTROINTESTINAL: [ ]Soft  [ ]Distended  [ ]Not distended [ ]Non tender [ ]Tender  MUSCULOSKELETAL: [ ]No clubbing [ ] clubbing  [ ] No cyanosis [ ] cyanosis  NEUROLOGIC: [ ]No focal deficits  [ ]Follows commands  [ x]Does not follow commands  [ ]Cognitive impairment  [ ]Dysphagia  [ ]Dysarthria  [ ]Paresis   SKIN: [ ] Jaundiced [ ] Non-jaundiced [ ]Rash [ ]No Rash [ ] Warm [ ] Dry  MISC/LINES: [x ] ET tube [ ] Trach [ ]NGT/OGT [ ]PEG [ ]Lmeon    LABS: reviewed by me                        11.6   36.39 )-----------( 388      ( 15 Mika 2025 06:00 )             36.9   01-15    138  |  92[L]  |  68[HH]  ----------------------------<  178[H]  6.6[HH]   |  12[L]  |  3.7[H]    Ca    6.8[L]      15 Mika 2025 06:00  Phos  11.3     01-15  Mg     2.5     01-15    TPro  5.4[L]  /  Alb  2.6[L]  /  TBili  0.8  /  DBili  x   /  AST  484[H]  /  ALT  131[H]  /  AlkPhos  129[H]  01-15  PT/INR - ( 15 Mika 2025 06:00 )   PT: 16.30 sec;   INR: 1.37 ratio         PTT - ( 15 Mika 2025 06:00 )  PTT:27.1 sec    Urinalysis Basic - ( 15 Mika 2025 06:00 )    Color: x / Appearance: x / SG: x / pH: x  Gluc: 178 mg/dL / Ketone: x  / Bili: x / Urobili: x   Blood: x / Protein: x / Nitrite: x   Leuk Esterase: x / RBC: x / WBC x   Sq Epi: x / Non Sq Epi: x / Bacteria: x      RADIOLOGY & ADDITIONAL STUDIES: reviewed by me    < from: Xray Chest 1 View-PORTABLE IMMEDIATE (Xray Chest 1 View-PORTABLE IMMEDIATE .) (01.15.25 @ 06:44) >  IMPRESSION:    No radiographic evidence of acute cardiopulmonary disease.    < end of copied text >      EKG: reviewed by me    < from: 12 Lead ECG (01.14.25 @ 22:22) >  Ventricular Rate 76 BPM    Atrial Rate 76 BPM    P-R Interval 218 ms    QRS Duration 136 ms    Q-T Interval 526 ms    QTC Calculation(Bazett) 591 ms    P Axis 58 degrees    R Axis -44 degrees    T Axis 19 degrees    Diagnosis Line Sinus rhythm azne0hs degree A-V block  Left axis deviation  Right bundle branch block  Abnormal ECG    < end of copied text >      PROTEIN CALORIE MALNUTRITION PRESENT: [ ]mild [ ]moderate [ ]severe [ ]underweight [ ]morbid obesity  https://www.andeal.org/vault/2440/web/files/ONC/Table_Clinical%20Characteristics%20to%20Document%20Malnutrition-White%20JV%20et%20al%202012.pdf    Height (cm): 165.1 (01-14-25 @ 19:43), 167.6 (11-28-24 @ 15:28), 162.6 (11-15-24 @ 08:02)  Weight (kg): 68 (01-14-25 @ 19:43), 81.6 (11-28-24 @ 15:28), 90.7 (11-15-24 @ 08:02)  BMI (kg/m2): 24.9 (01-14-25 @ 19:43), 29 (11-28-24 @ 15:28), 34.3 (11-15-24 @ 08:02)  [ ]PPSV2 < or = to 30% [ ]significant weight loss  [ ]poor nutritional intake  [ ]anasarca      [ ]Artificial Nutrition      Palliative Care Spiritual/Emotional Screening Tool Question  Severity (0-4):                    OR                    [ x] Unable to determine. Will assess at later time if appropriate.  Score of 2 or greater indicates recommendation of Chaplaincy and/or SW referral  Chaplaincy Referral: [ ] Yes [ ] Refused [ ] Following     Caregiver [place]:  [ ] Yes [ ] No    OR    [x ] Unable to determine. Will assess at later time if appropriate.  Social Work Referral [ ]  Patient and Family Centered Care Referral [ ]    Anticipatory Grief Present: [ ] Yes [ ] No    OR     [ x] Unable to determine. Will assess at later time if appropriate.  Social Work Referral [ ]  Patient and Family Centered Care Referral [ ]    Patient discussed with primary medical team MD  Palliative care education provided to patient and/or family

## 2025-01-15 NOTE — CHART NOTE - NSCHARTNOTEFT_GEN_A_CORE
TRANSFER FROM SDU  TO MICU    UPGRADE due to intubation for airway protection.     78 y/o M with PMH of HTN, HLD, h/o cellulitis, who presented to the ED as a trauma alert s/p unwitnessed fall with unknown trauma and unknown LOC. The patient's son had not heard from him in 2 days, patient was ambulating with a rolling walker on Sunday. The son was found him down his basement steps with a ladder over his body, naked and he called EMS. Unsure for how long he was on the floor. He was a trauma alert, hypothermic, but improved with rewarming. During my exam, patient is very agitated, ripping off gown and corie hugger, unable to provide any hx.     Later in the night, he became very lethargic with soft BP- 80s/50s. Spoke to the son, Chapin Kelly over the phone who decided DNR/intubate (MOLST in chart) and patient was intubated for airway protection - gave 1 x julian. Also received 1 x meropenem.     STAT labs and VBG drawn. VBG with lactate of 13, pH 7.26, pCO2 32 and pO2 18. Also K 6.3 - given Ca, insulin and D50.   Post intubation Xray confirmed ET tube placement and OGT.     Transition from levo to julian as patient has severe AS.     Follow up:  [] STAT labs  [] GOC with family  [] ID and nephro consult    For additional detailed hx and plan, please refer to H&P.

## 2025-01-15 NOTE — CONSULT NOTE ADULT - SUBJECTIVE AND OBJECTIVE BOX
Patient is a 79y old  Male who presents with a chief complaint of s/p fall - hypothermia (14 Jan 2025 23:39)    80 y/o M with PMH of HTN, HLD, h/o cellulitis, who presented to the ED as a trauma alert s/p unwitnessed fall with unknown trauma and unknown LOC. The patient's son had not heard from him in 2 days, patient was ambulating with a rolling walker on Sunday. The son was found him down his basement steps with a ladder over his body, naked and he called EMS. Unsure for how long he was on the floor. He was a trauma alert, hypothermic, but improved with rewarming. During my exam, patient is very agitated, ripping off gown and corie hugger, unable to provide any hx.     Vitals on admission:   / 119 mm Hg,  /min, RR 25 /min, T 83.7 Degrees F, sat 88 % on room air    Remarkable labs:   WBC 30k   Hgb 11.8  K 6.1  BUN/Cr 67/3.9   Alk phos 343  AST/ALT 85/15  Lactate 7.3  Trop 158  CK 26k   VBG with pH 7.28 (lactate 7.3) pCO2 44 and PO2 28  EKG 1st degree AV block  - CTH: No acute intracranial pathology. Stable mild chronic microvascular ischemic changes. Partially imaged C1 fracture. Please see separately dictated report of the concurrent cervical CT.  - CT maxillary: no acute fracture or dislocation. Redemonstrated small chronic deformity in the inferior right orbital wall, and nondisplaced subacute/chronic fractures of the right anterior arch and left posterior arch of C1. Mild right nasopharyngeal fullness.   - CTAP 1.  No evidence of acute traumatic injury to the chest, abdomen or pelvis.  2.  This bladder distention there is marked thickening of the urinary   bladder wall, can correlate with urinalysis if suspicion for cystitis.    He was given cefepime, vanc, 2L LR bolus, insulin and dextrose for hyperK and is being admitted to SDU for further management.  (15 Mika 2025 02:08), This am seen in ED, tachypneic not following commands LE mottled      PAST MEDICAL & SURGICAL HISTORY:  Diabetes      HLD (hyperlipidemia)      BPH without urinary obstruction      S/P cataract surgery          SOCIAL HX:   Smoking uto        REVIEW OF SYSTEMS see hpi        Allergies    No Known Allergies    Intolerances        cefepime   IVPB 1000 milliGRAM(s) IV Intermittent every 12 hours  dextrose 5%. 1000 milliLiter(s) IV Continuous <Continuous>  dextrose 5%. 1000 milliLiter(s) IV Continuous <Continuous>  dextrose 50% Injectable 25 Gram(s) IV Push once  dextrose 50% Injectable 12.5 Gram(s) IV Push once  dextrose 50% Injectable 25 Gram(s) IV Push once  dextrose Oral Gel 15 Gram(s) Oral once PRN  fentaNYL   Infusion 0.5 MICROgram(s)/kG/Hr IV Continuous <Continuous>  glucagon  Injectable 1 milliGRAM(s) IntraMuscular once  heparin   Injectable 5000 Unit(s) SubCutaneous every 8 hours  insulin lispro (ADMELOG) corrective regimen sliding scale   SubCutaneous Before meals and at bedtime  lactated ringers. 1000 milliLiter(s) IV Continuous <Continuous>  meropenem  IVPB 500 milliGRAM(s) IV Intermittent once  norepinephrine Infusion 0.05 MICROgram(s)/kG/Min IV Continuous <Continuous>  pantoprazole    Tablet 40 milliGRAM(s) Oral before breakfast  phenylephrine    Infusion 0.1 MICROgram(s)/kG/Min IV Continuous <Continuous>  vancomycin  IVPB 1000 milliGRAM(s) IV Intermittent every 24 hours  : Home Meds:      PHYSICAL EXAM    ICU Vital Signs Last 24 Hrs  T(C): 37.1 (15 Mika 2025 05:50), Max: 37.1 (15 Mika 2025 05:50)  T(F): 98.8 (15 Mika 2025 05:50), Max: 98.8 (15 Mika 2025 05:50)  HR: 106 (15 Mika 2025 07:00) (80 - 160)  BP: 140/73 (15 Mika 2025 07:00) (76/39 - 153/119)  BP(mean): 60 (15 Mika 2025 05:55) (60 - 85)  RR: 18 (15 Mika 2025 07:00) (18 - 25)  SpO2: 100% (15 Mika 2025 07:00) (88% - 100%)    O2 Parameters below as of 15 Mika 2025 07:00  Patient On (Oxygen Delivery Method): ventilator            General: ill looking  Lungs: Bilateral rhonchi  Cardiovascular: RUBY 2.6  Abdomen: Soft, Positive BS  LE mottled, not tight  no following commands        LABS:                          11.6   36.39 )-----------( 388      ( 15 Mika 2025 06:00 )             36.9                                               01-15    138  |  92[L]  |  68[HH]  ----------------------------<  178[H]  6.6[HH]   |  12[L]  |  3.7[H]    Ca    6.8[L]      15 Mika 2025 06:00  Phos  11.3     01-15  Mg     2.5     01-15    TPro  5.4[L]  /  Alb  2.6[L]  /  TBili  0.8  /  DBili  x   /  AST  484[H]  /  ALT  131[H]  /  AlkPhos  129[H]  01-15      PT/INR - ( 15 Mika 2025 06:00 )   PT: 16.30 sec;   INR: 1.37 ratio         PTT - ( 15 Mika 2025 06:00 )  PTT:27.1 sec                                       Urinalysis Basic - ( 15 Mika 2025 06:00 )    Color: x / Appearance: x / SG: x / pH: x  Gluc: 178 mg/dL / Ketone: x  / Bili: x / Urobili: x   Blood: x / Protein: x / Nitrite: x   Leuk Esterase: x / RBC: x / WBC x   Sq Epi: x / Non Sq Epi: x / Bacteria: x                                                  LIVER FUNCTIONS - ( 15 Mika 2025 06:00 )  Alb: 2.6 g/dL / Pro: 5.4 g/dL / ALK PHOS: 129 U/L / ALT: 131 U/L / AST: 484 U/L / GGT: x                                                                                               Mode: AC/ CMV (Assist Control/ Continuous Mandatory Ventilation)  RR (machine): 16  TV (machine): 450  FiO2: 50  PEEP: 6  ITime: 1  MAP: 9  PIP: 18                                      ABG - ( 15 Mika 2025 07:14 )  pH, Arterial: 7.22  pH, Blood: x     /  pCO2: 37    /  pO2: 212   / HCO3: 15    / Base Excess: -11.8 /  SaO2: 99.0                MEDICATIONS  (STANDING):  cefepime   IVPB 1000 milliGRAM(s) IV Intermittent every 12 hours  dextrose 5%. 1000 milliLiter(s) (50 mL/Hr) IV Continuous <Continuous>  dextrose 5%. 1000 milliLiter(s) (100 mL/Hr) IV Continuous <Continuous>  dextrose 50% Injectable 25 Gram(s) IV Push once  dextrose 50% Injectable 12.5 Gram(s) IV Push once  dextrose 50% Injectable 25 Gram(s) IV Push once  fentaNYL   Infusion 0.5 MICROgram(s)/kG/Hr (3.4 mL/Hr) IV Continuous <Continuous>  glucagon  Injectable 1 milliGRAM(s) IntraMuscular once  heparin   Injectable 5000 Unit(s) SubCutaneous every 8 hours  insulin lispro (ADMELOG) corrective regimen sliding scale   SubCutaneous Before meals and at bedtime  lactated ringers. 1000 milliLiter(s) (150 mL/Hr) IV Continuous <Continuous>  meropenem  IVPB 500 milliGRAM(s) IV Intermittent once  norepinephrine Infusion 0.05 MICROgram(s)/kG/Min (6.38 mL/Hr) IV Continuous <Continuous>  pantoprazole    Tablet 40 milliGRAM(s) Oral before breakfast  phenylephrine    Infusion 0.1 MICROgram(s)/kG/Min (1.28 mL/Hr) IV Continuous <Continuous>  vancomycin  IVPB 1000 milliGRAM(s) IV Intermittent every 24 hours    MEDICATIONS  (PRN):  dextrose Oral Gel 15 Gram(s) Oral once PRN Blood Glucose LESS THAN 70 milliGRAM(s)/deciliter    pan CT noted

## 2025-01-15 NOTE — ED PROCEDURE NOTE - NS ED ATTENDING STATEMENT MOD
This was a shared visit with the MADELINE. I reviewed and verified the documentation.
This was a shared visit with the MADELINE. I reviewed and verified the documentation.

## 2025-01-15 NOTE — ED ADULT NURSE REASSESSMENT NOTE - NS ED NURSE REASSESS COMMENT FT1
level 1 b/l wrist restraints applied. MD at bedside assessing pt. pt was observed pulling at lines and pulling tele leads off.

## 2025-01-15 NOTE — CHART NOTE - NSCHARTNOTEFT_GEN_A_CORE
Tertiary Trauma Survey (TTS)    Date of TTS: 01-15-25 @ 13:27   Admit Date: 01-14-25  Trauma Activation: CODE / ALERT / CONSULT  Admit GCS:        E-     V-     M-     HPI:  80 y/o M with PMH of HTN, HLD, h/o cellulitis, who presented to the ED as a trauma alert s/p unwitnessed fall with unknown trauma and unknown LOC. The patient's son had not heard from him in 2 days, patient was ambulating with a rolling walker on Sunday. The son was found him down his basement steps with a ladder over his body, naked and he called EMS. Unsure for how long he was on the floor. He was a trauma alert, hypothermic, but improved with rewarming. During my exam, patient is very agitated, ripping off gown and corie hugger, unable to provide any hx.     Vitals on admission:   / 119 mm Hg,  /min, RR 25 /min, T 83.7 Degrees F, sat 88 % on room air    Remarkable labs:   WBC 30k   Hgb 11.8  K 6.1  BUN/Cr 67/3.9   Alk phos 343  AST/ALT 85/15  Lactate 7.3  Trop 158  CK 26k   VBG with pH 7.28 (lactate 7.3) pCO2 44 and PO2 28  EKG 1st degree AV block  - CTH: No acute intracranial pathology. Stable mild chronic microvascular ischemic changes. Partially imaged C1 fracture. Please see separately dictated report of the concurrent cervical CT.  - CT maxillary: no acute fracture or dislocation. Redemonstrated small chronic deformity in the inferior right orbital wall, and nondisplaced subacute/chronic fractures of the right anterior arch and left posterior arch of C1. Mild right nasopharyngeal fullness.   - CTAP 1.  No evidence of acute traumatic injury to the chest, abdomen or pelvis.  2.  This bladder distention there is marked thickening of the urinary   bladder wall, can correlate with urinalysis if suspicion for cystitis.    He was given cefepime, vanc, 2L LR bolus, insulin and dextrose for hyperK and is being admitted to SDU for further management.  (15 Mika 2025 02:08)    Patient seen and examined.     PHYSICAL EXAM:  HEENT: superficial abrasion on the right posterior scalp, cervical collar in place, pupils reactive to light b/l  BACK: (-)C/T/L spine tenderness  Cardiac: RRR  Respiratory: b/l equal chest rise. No use of accessory muscles   Abdomen: Soft, non-distended, non-tender, no rebound, no guarding  Pelvis: No instability  Musculoskeletal: flexion of UEs b/l on pain  Neuro: Sensation grossly intact and equal throughout, no focal deficits  Vascular: Pulses 2+ throughout, extremities well perfused  Skin: Cold/dry, pale, no jaundice. Multiple skin excoriations noted on b/l LE shins, cord-like indentation noted on R knee. b/l pressure ulcers on b/l thighs, b/l knees, b/l shins    Vital Signs Last 24 Hrs  T(C): 37 (15 Mika 2025 11:00), Max: 37.1 (15 Mika 2025 05:50)  T(F): 98.6 (15 Mika 2025 11:00), Max: 98.8 (15 Mika 2025 05:50)  HR: 86 (15 Mika 2025 12:10) (80 - 160)  BP: 96/59 (15 Mika 2025 12:10) (76/39 - 153/119)  BP(mean): 74 (15 Mika 2025 11:00) (60 - 97)  RR: 16 (15 Mika 2025 12:10) (16 - 25)  SpO2: 100% (15 Mika 2025 12:10) (88% - 100%)    Parameters below as of 15 Mika 2025 12:10  Patient On (Oxygen Delivery Method): ventilator    O2 Concentration (%): 50    Labs:  CAPILLARY BLOOD GLUCOSE      POCT Blood Glucose.: 113 mg/dL (15 Mika 2025 13:02)  POCT Blood Glucose.: 154 mg/dL (15 Mika 2025 09:32)  POCT Blood Glucose.: 217 mg/dL (15 Mika 2025 07:23)  POCT Blood Glucose.: 24 mg/dL (15 Mika 2025 06:53)  POCT Blood Glucose.: 220 mg/dL (15 Mika 2025 06:07)  POCT Blood Glucose.: 35 mg/dL (15 Mika 2025 06:02)  POCT Blood Glucose.: 43 mg/dL (15 Mika 2025 05:48)  POCT Blood Glucose.: 171 mg/dL (14 Jan 2025 21:45)                          11.6   36.39 )-----------( 388      ( 15 Mika 2025 06:00 )             36.9       Auto Neutrophil %: 86.1 % (01-15-25 @ 06:00)  Auto Immature Granulocyte %: 2.1 % (01-15-25 @ 06:00)  Auto Neutrophil %: 91.2 % (01-14-25 @ 19:50)    01-15    138  |  92[L]  |  68[HH]  ----------------------------<  178[H]  6.6[HH]   |  12[L]  |  3.7[H]      Calcium: 6.8 mg/dL (01-15-25 @ 06:00)      LFTs:             5.4  | 0.8  | 484      ------------------[129     ( 15 Mika 2025 06:00 )  2.6  | x    | 131         Lipase:x      Amylase:x         Blood Gas Arterial, Lactate: 9.3 mmol/L (01-15-25 @ 07:14)  Blood Gas Venous - Lactate: 13.2 mmol/L (01-15-25 @ 06:02)  Lactate, Blood: 13.5 mmol/L (01-15-25 @ 06:00)  Blood Gas Venous - Lactate: 7.3 mmol/L (01-14-25 @ 22:45)  Blood Gas Venous - Lactate: 8.2 mmol/L (01-14-25 @ 20:11)  Lactate, Blood: 7.3 mmol/L (01-14-25 @ 19:50)    ABG - ( 15 Mika 2025 07:14 )  pH: 7.22  /  pCO2: 37    /  pO2: 212   / HCO3: 15    / Base Excess: -11.8 /  SaO2: 99.0              Coags:     16.30  ----< 1.37    ( 15 Mika 2025 06:00 )     27.1              Alcohol, Blood: <10 mg/dL (01-14-25 @ 19:50)    Urinalysis Basic - ( 15 Mika 2025 06:00 )    Color: x / Appearance: x / SG: x / pH: x  Gluc: 178 mg/dL / Ketone: x  / Bili: x / Urobili: x   Blood: x / Protein: x / Nitrite: x   Leuk Esterase: x / RBC: x / WBC x   Sq Epi: x / Non Sq Epi: x / Bacteria: x            Alcohol, Blood: <10 mg/dL (01-14-25 @ 19:50)      RADIOLOGICAL FINDINGS REVIEW:    < from: CT Head No Cont (01.14.25 @ 20:57) >    IMPRESSION:    No acute intracranial pathology.    Stable mild chronic microvascular ischemic changes.    Partially imaged C1 fracture. Please see separately dictated report of   the concurrent cervical CT.    < end of copied text >    < from: CT Maxillofacial No Cont (01.14.25 @ 20:58) >    Impression:    No acute fracture or dislocation.    Redemonstrated small chronic deformity in the inferior right orbital   wall, and nondisplaced subacute/chronic fractures of the right anterior   arch and left posterior arch of C1.    Mild right nasopharyngeal fullness. Recommend further evaluation with   direct visual inspection.    < end of copied text >    < from: CT Cervical Spine No Cont (01.14.25 @ 20:58) >    IMPRESSION:    Subacute/chronic appearing nondisplaced fracture of the right anterior   arch and left posterior arch of C1. The right anterior arch fracture was   partially imaged on the CT head from 11/28/2024.    No atlantoaxial subluxation or widening of the atlantodental interval.    Communication: The summary of above findings were discussed with readback   confirmation with Renny Heredia by radiology resident Jen Ortiz MD at   9:40 PM on 1/14/2025. The addended findings were discussed with readback   confirmation with Dr. Cedeño by radiologist Dr. Hernandez on 1/14/2025 at   10:08 PM.    < end of copied text >      < from: CT Chest w/ IV Cont (01.14.25 @ 21:00) >    IMPRESSION:  1.  No evidence of acute traumatic injury to the chest, abdomen or pelvis.  2.  This bladder distention there is marked thickening of the urinary   bladder wall, can correlate with urinalysis if suspicion for cystitis.    < end of copied text >    < from: CT Abdomen and Pelvis w/ IV Cont (01.14.25 @ 21:00) >    IMPRESSION:  1.  No evidence of acute traumatic injury to the chest, abdomen or pelvis.  2.  This bladder distention there is marked thickening of the urinary   bladder wall, can correlate with urinalysis if suspicion for cystitis.    < end of copied text >    < from: Xray Chest 1 View AP/PA (01.14.25 @ 21:59) >    FINDINGS:    SUPPORT DEVICES: None.    CARDIAC/MEDIASTINUM/HILUM: Unremarkable cardiac silhouette.    LUNG PARENCHYMA/PLEURA: No focal consolidation or pleural effusion. No   pneumothorax.    SKELETON/SOFT TISSUES: Unremarkable.      IMPRESSION:    No radiographic evidence of acute cardiopulmonary disease.    < end of copied text >    < from: Xray Pelvis AP only (01.14.25 @ 21:59) >    FINDINGS/  IMPRESSION:    Bony structures are intact without definite evidence of acute displaced   fracture or dislocation. Degenerative changes. Mejia catheter.    < end of copied text >    < from: Xray Knee 1 or 2 Views, Bilateral (01.14.25 @ 22:00) >    FINDINGS /  IMPRESSION:    RIGHT KNEE: No acute displaced fracture or lipohemarthrosis.   Tricompartmental degenerative changes. Vascular calcifications.    LEFT TIBIA/FIBULA: No acute displaced fracture. Vascular calcifications.    LEFT KNEE: No acute displaced fracture or lipohemarthrosis.   Tricompartmental degenerative changes. Vascular calcifications.    LEFT TIBIA/FIBULA: No acute displaced fracture. Vascular calcifications.    < end of copied text >    < from: Xray Tibia + Fibula 2 Views, Bilateral (01.14.25 @ 22:01) >    IMPRESSION:    RIGHT KNEE: No acute displaced fracture or lipohemarthrosis.   Tricompartmental degenerative changes. Vascular calcifications.    LEFT TIBIA/FIBULA: No acute displaced fracture. Vascular calcifications.    LEFT KNEE: No acute displaced fracture or lipohemarthrosis.   Tricompartmental degenerative changes. Vascular calcifications.    LEFT TIBIA/FIBULA: No acute displaced fracture. Vascular calcifications.    < end of copied text >        ASSESSMENT:  80 y/o M with PMH of HTN, HLD, h/o cellulitis, presents as a trauma alert s/p unwitnessed fall (?HT, ?LOC,-AC) and left on the floor for an unknown amount of time. Patient was found down his basement steps with a ladder over his body. Per son, who was eventually by bedside, stated he was recently discharge from nursing home and was at home ambulating with a rolling walker at the very least until Sunday night, after that the son only saw him today in the evening in a fallen state. Unsure for how long he was on the floor. ABCs intact. Initial GCS 6 (E2, V2, M2) with hypothermia of 28*C via mejia temp, improved throughout examination to GCS 9 with reheating. External signs of trauma: multiple skin excoriations at the b/l shins. cord-like compression marks noted on r knee. b/l pressure ulcers on b/l thighs, b/l knees, b/l shins    Injuries Identified  NONE    PLAN:  - No acute traumatic injuries identified on P[facility].     TRAUMA SURGERY SPECTRA: [id number] Patient was seen for tertiary survey. Patient was found to be intubated 40/6 and sedated. Trauma surgery to perform a tertiary exam once more stable.

## 2025-01-16 NOTE — PROGRESS NOTE ADULT - SUBJECTIVE AND OBJECTIVE BOX
Patient is a 79y old  Male who presents with a chief complaint of s/p Fall (16 Jan 2025 07:11)        Over Night Events: Still critically ill on MV. Fentanyl 3. Levophed 5. vaso 0.04. Steve 10. Bicarb 10.       ROS:     All ROS are negative except HPI       PHYSICAL EXAM    ICU Vital Signs Last 24 Hrs  T(C): 34.6 (16 Jan 2025 08:00), Max: 38.1 (15 Mika 2025 22:00)  T(F): 94.2 (16 Jan 2025 08:00), Max: 100.6 (15 Mika 2025 22:00)  HR: 97 (16 Jan 2025 10:00) (77 - 225)  BP: 98/68 (16 Jan 2025 06:00) (83/51 - 148/85)  BP(mean): 78 (16 Jan 2025 06:00) (63 - 110)  ABP: 106/56 (16 Jan 2025 10:00) (56/53 - 185/71)  ABP(mean): 76 (16 Jan 2025 10:00) (56 - 142)  RR: 16 (16 Jan 2025 10:00) (15 - 42)  SpO2: 66% (16 Jan 2025 08:00) (58% - 100%)    O2 Parameters below as of 16 Jan 2025 08:00  Patient On (Oxygen Delivery Method): ventilator    O2 Concentration (%): 40        CONSTITUTIONAL:  Ill appearing    ENT:   Airway patent,   intubtaed  Mouth with normal mucosa.   No thrush    EYES:   Pupils equal,   Round and reactive to light.    CARDIAC:   Normal rate,   Regular rhythm.    No edema      Vascular:  Normal systolic impulse  No Carotid bruits    RESPIRATORY:   No wheezing  Bilateral BS  Normal chest expansion  Not tachypneic,  No use of accessory muscles    GASTROINTESTINAL:  Abdomen soft,   Non-tender,   No guarding,   + BS    MUSCULOSKELETAL:   Range of motion is not limited,  No clubbing, cyanosis    NEUROLOGICAL:   sedated    SKIN:   bruises.  Stage II  Motelling          01-15-25 @ 07:01  -  01-16-25 @ 07:00  --------------------------------------------------------  IN:    Dexmedetomidine: 9 mL    Dexmedetomidine: 29.5 mL    FentaNYL: 169.6 mL    FentaNYL: 42.4 mL    IV PiggyBack: 200 mL    Lactated Ringers: 450 mL    Lactated Ringers: 500 mL    Lactated Ringers Bolus: 300 mL    Modular Fluid: 450 mL    Norepinephrine: 251 mL    Norepinephrine: 26.5 mL    Norepinephrine: 1105.1 mL    Phenylephrine: 694.8 mL    Sodium Bicarbonate: 500 mL    Vasopressin: 36 mL  Total IN: 4763.9 mL    OUT:    Indwelling Catheter - Urethral (mL): 710 mL    Other (mL): 167 mL  Total OUT: 877 mL    Total NET: 3886.9 mL      01-16-25 @ 07:01  -  01-16-25 @ 10:41  --------------------------------------------------------  IN:    Dexmedetomidine: 5.3 mL    FentaNYL: 63.6 mL    Norepinephrine: 994.2 mL    Phenylephrine: 397.5 mL    Sodium Bicarbonate: 300 mL    Vasopressin: 18 mL  Total IN: 1778.6 mL    OUT:  Total OUT: 0 mL    Total NET: 1778.6 mL      LABS:                            8.1    20.06 )-----------( 125      ( 16 Jan 2025 04:40 )             27.4                                               01-16    137  |  96[L]  |  70[HH]  ----------------------------<  117[H]  6.8[HH]   |  10[L]  |  4.3[HH]    Ca    5.3[LL]      16 Jan 2025 06:32  Phos  16.1     01-16  Mg     2.7     01-16    TPro  3.5[L]  /  Alb  1.6[L]  /  TBili  0.8  /  DBili  x   /  AST  1715[H]  /  ALT  763[H]  /  AlkPhos  135[H]  01-16      PT/INR - ( 16 Jan 2025 03:20 )   PT: 26.50 sec;   INR: 2.21 ratio         PTT - ( 16 Jan 2025 03:20 )  PTT:45.5 sec                                       Urinalysis Basic - ( 16 Jan 2025 06:32 )    Color: x / Appearance: x / SG: x / pH: x  Gluc: 117 mg/dL / Ketone: x  / Bili: x / Urobili: x   Blood: x / Protein: x / Nitrite: x   Leuk Esterase: x / RBC: x / WBC x   Sq Epi: x / Non Sq Epi: x / Bacteria: x                                                  LIVER FUNCTIONS - ( 16 Jan 2025 06:32 )  Alb: 1.6 g/dL / Pro: 3.5 g/dL / ALK PHOS: 135 U/L / ALT: 763 U/L / AST: 1715 U/L / GGT: x                                                  Culture - Blood (collected 14 Jan 2025 20:20)  Source: .Blood BLOOD  Preliminary Report (16 Jan 2025 03:02):    No growth at 24 hours    Culture - Blood (collected 14 Jan 2025 20:20)  Source: .Blood BLOOD  Preliminary Report (16 Jan 2025 03:02):    No growth at 24 hours                                                   Mode: AC/ CMV (Assist Control/ Continuous Mandatory Ventilation)  RR (machine): 16  TV (machine): 450  FiO2: 40  PEEP: 6  ITime: 1  MAP: 9  PIP: 21                                      ABG - ( 16 Jan 2025 03:19 )  pH, Arterial: 6.97  pH, Blood: x     /  pCO2: 27    /  pO2: 220   / HCO3: 6     / Base Excess: -24.5 /  SaO2: 100.0           MEDICATIONS  (STANDING):  albuterol/ipratropium for Nebulization. 3 milliLiter(s) Nebulizer once  cefepime   IVPB 1000 milliGRAM(s) IV Intermittent every 12 hours  chlorhexidine 0.12% Liquid 15 milliLiter(s) Oral Mucosa every 12 hours  chlorhexidine 2% Cloths 1 Application(s) Topical daily  CRRT Treatment    <Continuous>  dexMEDEtomidine Infusion 0.2 MICROgram(s)/kG/Hr (3.54 mL/Hr) IV Continuous <Continuous>  fentaNYL   Infusion 0.5 MICROgram(s)/kG/Hr (3.54 mL/Hr) IV Continuous <Continuous>  heparin   Injectable 5000 Unit(s) SubCutaneous every 8 hours  insulin lispro (ADMELOG) corrective regimen sliding scale   SubCutaneous Before meals and at bedtime  mupirocin 2% Nasal 1 Application(s) Both Nostrils every 12 hours  norepinephrine Infusion 0.05 MICROgram(s)/kG/Min (3.31 mL/Hr) IV Continuous <Continuous>  pantoprazole    Tablet 40 milliGRAM(s) Oral before breakfast  pantoprazole  Injectable 40 milliGRAM(s) IV Push daily  phenylephrine    Infusion 0.1 MICROgram(s)/kG/Min (1.28 mL/Hr) IV Continuous <Continuous>  PureFlow Dialysate RFP-400 (K 2 / Ca 3) 5000 milliLiter(s) (2000 mL/Hr) CRRT <Continuous>  sodium bicarbonate  Infusion 0.212 mEq/kG/Hr (100 mL/Hr) IV Continuous <Continuous>  sodium zirconium cyclosilicate 10 Gram(s) Oral every 8 hours  vancomycin  IVPB 1000 milliGRAM(s) IV Intermittent every 24 hours  vasopressin Infusion 0.02 Unit(s)/Min (3 mL/Hr) IV Continuous <Continuous>    MEDICATIONS  (PRN):      New X-rays reviewed:                                                                                  ECHO    CXR interpreted by me:

## 2025-01-16 NOTE — PROGRESS NOTE ADULT - ASSESSMENT
Acute hypoxemic respiratory on MV  Severe lactic acidosis  Multi organ failure / mottled skin  Renal failure on CVVHD - not tolerating  sp fall/ trauma work up noted  Rhabdo ( on the floor for 2 days)  C1 fracture    PLAN:    CNS: Sedation Fentanyl, U tox, ammonia level, head CT reviewed. Neuro Sx. Neck Collar.     HEENT:  Oral care. ET care    PULMONARY:  HOB @ 45 degrees, Vent changes: RR 24 , PEEP 8, Wean O2. SaO2 92 TO 96%, monitor airway pressures.    CARDIOVASCULAR: Bicarb drip, EKG, CE, echo, trend CK/ LA.     GI: GI prophylaxis           NPO except meds.      bowel regimen    RENAL:  F/u  lytes.  Correct as needed. accurate I/O, mejia, renal eval. BMP. insulin dextrose and Bicarb pushed as needed.    INFECTIOUS DISEASE: ABX till cx, procal    HEMATOLOGICAL:  DVT prophylaxis. LE doppler. DIC and HIT panel.     ENDOCRINE:  Follow up FS.  Insulin protocol if needed. HC and FC    MUSCULOSKELETAL: Wound RN/ burn eval    MICU  VERY Poor prognosis  DNR.  UDAL 1/15  Pending: NSgy for collar, f/u BMP for hyperkalemia

## 2025-01-16 NOTE — CONSULT NOTE ADULT - CONSULT REQUESTED DATE/TIME
16-Jan-2025 07:19
15-Mika-2025 08:38
15-Mika-2025 13:31
14-Jan-2025 19:40
14-Jan-2025 23:40
15-Mika-2025 08:18

## 2025-01-16 NOTE — PROGRESS NOTE ADULT - ASSESSMENT
80 y/o M with PMH of HTN, HLD, h/o cellulitis, who presented to the ED as a trauma alert s/p unwitnessed fall with unknown trauma and unknown LOC.  Patient found to have Rhabdo and RENEA, sepsis, and a non displaced C1 ring fracture. Palliative care consulted for [facility].    Patient seen at bedside.  He was intubated and unable to participate in exam    Spoke with primary team. They will reach out to the patient's son and provide an update/prognosis. Will plan to reach out to son after that time.      Plan  -DNR/intubate  -ongoing medical management  -vent management and pressors management per ICU team  -neurosurgical note appreciate - no current intervention for C1 ring fracture  -follow up renal - CVVHD if patient can tolerate it  -will follow for [facility]    Education about palliative care provided to patient/family.  See Recs below.    Please call [phone] with questions or concerns 24/7.    80 y/o M with PMH of HTN, HLD, h/o cellulitis, who presented to the ED as a trauma alert s/p unwitnessed fall with unknown trauma and unknown LOC.  Patient found to have Rhabdo and RENEA, sepsis, and a non displaced C1 ring fracture. Palliative care consulted for [facility].    Patient seen at bedside.  He was intubated and unable to participate in exam    Spoke with primary team. They will reach out to the patient's son and provide an update/prognosis. Will plan to reach out to son after that time.    Called and spoke with patient's son on the phone. He recently spoke with Dr. Lara and was told that they're still waiting to see if the patient will improve. He noted that he felt the patient was suffering before coming to the hospital, and that he doesn't want him to continue suffering. We discussed that if the patient declines or does not improve, we could discuss comfort based care. He expressed understanding. All questions answered.      Plan  -DNR/intubate  -ongoing medical management  -vent management and pressors management per ICU team  -neurosurgical note appreciate - no current intervention for C1 ring fracture  -follow up renal - CVVHD if patient can tolerate it  -will follow for [facility]    Education about palliative care provided to patient/family.  See Recs below.    Please call [phone] with questions or concerns 24/7.

## 2025-01-16 NOTE — PROCEDURE NOTE - ADDITIONAL PROCEDURE DETAILS
R Fem [place] w/ no blood return, not flushing > tried flushing w/ TPA; Attempted to exchange R Fem [place] over the guidewire, however was unsuccessful
Procedure team was consulted to perform central line insertion urgent procedure for the diagnosis of  sepsis requiring pressors.  Consent obtained from son on phone and placed in chart.  I was present for the key critical aspects of the procedure performed during the care of the patient.

## 2025-01-16 NOTE — DISCHARGE NOTE FOR THE EXPIRED PATIENT - HOSPITAL COURSE
78 y/o M with PMH of HTN, HLD, h/o cellulitis, who presented to the ED as a trauma alert s/p unwitnessed fall with unknown trauma and unknown LOC. The patient's son had not heard from him in 2 days, patient was ambulating with a rolling walker on . The son was found him down his basement steps with a ladder over his body, naked and he called EMS. Unsure for how long he was on the floor. He was a trauma alert, hypothermic, but improved with rewarming. In hospital, patient became very lethargic with soft BP- 80s/50s. Spoke to the son, Chapin Kelly over the phone who decided DNR/intubate (MOLST in chart) and patient was intubated for airway protection. Patient BP started to drop and went into asystole at 16:20. Patient was pronounced and  at 16:21.

## 2025-01-16 NOTE — PROGRESS NOTE ADULT - SUBJECTIVE AND OBJECTIVE BOX
seen and examined  24 h events noted         PAST HISTORY  --------------------------------------------------------------------------------  No significant changes to PMH, PSH, FHx, SHx, unless otherwise noted    ALLERGIES & MEDICATIONS  --------------------------------------------------------------------------------  Allergies    No Known Allergies    Intolerances      Standing Inpatient Medications  albuterol/ipratropium for Nebulization. 3 milliLiter(s) Nebulizer once  cefepime   IVPB 1000 milliGRAM(s) IV Intermittent every 12 hours  chlorhexidine 0.12% Liquid 15 milliLiter(s) Oral Mucosa every 12 hours  chlorhexidine 2% Cloths 1 Application(s) Topical daily  CRRT Treatment    <Continuous>  dexMEDEtomidine Infusion 0.2 MICROgram(s)/kG/Hr IV Continuous <Continuous>  fentaNYL   Infusion 0.5 MICROgram(s)/kG/Hr IV Continuous <Continuous>  heparin   Injectable 5000 Unit(s) SubCutaneous every 8 hours  insulin lispro (ADMELOG) corrective regimen sliding scale   SubCutaneous Before meals and at bedtime  mupirocin 2% Nasal 1 Application(s) Both Nostrils every 12 hours  norepinephrine Infusion 0.05 MICROgram(s)/kG/Min IV Continuous <Continuous>  pantoprazole    Tablet 40 milliGRAM(s) Oral before breakfast  pantoprazole  Injectable 40 milliGRAM(s) IV Push daily  phenylephrine    Infusion 0.1 MICROgram(s)/kG/Min IV Continuous <Continuous>  PureFlow Dialysate RFP-400 (K 2 / Ca 3) 5000 milliLiter(s) CRRT <Continuous>  sodium bicarbonate  Infusion 0.212 mEq/kG/Hr IV Continuous <Continuous>  sodium zirconium cyclosilicate 10 Gram(s) Oral every 8 hours  vancomycin  IVPB 1000 milliGRAM(s) IV Intermittent every 24 hours  vasopressin Infusion 0.02 Unit(s)/Min IV Continuous <Continuous>    PRN Inpatient Medications          VITALS/PHYSICAL EXAM  --------------------------------------------------------------------------------  T(C): 36.6 (01-16-25 @ 04:00), Max: 38.1 (01-15-25 @ 22:00)  HR: 104 (01-16-25 @ 07:00) (77 - 225)  BP: 98/68 (01-16-25 @ 06:00) (83/51 - 148/85)  RR: 19 (01-16-25 @ 07:00) (15 - 42)  SpO2: 100% (01-16-25 @ 07:00) (58% - 100%)  Wt(kg): --  Height (cm): 170.2 (01-16-25 @ 00:00)  Weight (kg): 70.7 (01-16-25 @ 00:00)  BMI (kg/m2): 24.4 (01-16-25 @ 00:00)  BSA (m2): 1.82 (01-16-25 @ 00:00)      01-15-25 @ 07:01  -  01-16-25 @ 07:00  --------------------------------------------------------  IN: 4763.9 mL / OUT: 877 mL / NET: 3886.9 mL      Physical Exam:  	Gen: NAD  	Pulm: decrease BS B/L  	CV:  S1S2; no rub  	Abd: +distended  	LE: skin lesions   	Vascular access:udall     LABS/STUDIES  --------------------------------------------------------------------------------              8.1    20.06 >-----------<  125      [01-16-25 @ 04:40]              27.4     137  |  96  |  70  ----------------------------<  117      [01-16-25 @ 06:32]  6.8   |  10  |  4.3        Ca     5.3     [01-16-25 @ 06:32]      Mg     2.7     [01-16-25 @ 04:40]      Phos  16.1     [01-16-25 @ 04:40]    TPro  3.5  /  Alb  1.6  /  TBili  0.8  /  DBili  x   /  AST  1715  /  ALT  763  /  AlkPhos  135  [01-16-25 @ 06:32]    PT/INR: PT 26.50, INR 2.21       [01-16-25 @ 03:20]  PTT: 45.5       [01-16-25 @ 03:20]    Creatinine Trend:  SCr 4.3 [01-16 @ 06:32]  SCr 4.6 [01-16 @ 04:40]  SCr 4.4 [01-16 @ 03:20]  SCr 4.1 [01-15 @ 19:42]  SCr 4.0 [01-15 @ 16:31]    Urinalysis - [01-16-25 @ 06:32]      Color  / Appearance  / SG  / pH       Gluc 117 / Ketone   / Bili  / Urobili        Blood  / Protein  / Leuk Est  / Nitrite       RBC  / WBC  / Hyaline  / Gran  / Sq Epi  / Non Sq Epi  / Bacteria       Iron 100, TIBC 370, %sat 27      [10-21-24 @ 19:06]  Ferritin 384      [10-21-24 @ 19:06]  TSH 4.05      [01-14-25 @ 21:38]

## 2025-01-16 NOTE — PATIENT PROFILE ADULT - FALL HARM RISK - HARM RISK INTERVENTIONS

## 2025-01-16 NOTE — CONSULT NOTE ADULT - CRITICAL CARE ATTENDING COMMENT
I have personally seen and examined this patient.    I have reviewed all pertinent clinical information and reviewed all relevant imaging and diagnostic studies personally.   I counseled the patient about diagnostic testing and treatment plan. All questions were answered.   I discussed recommendations with the primary team. I have personally seen and examined this patient.    I have reviewed all pertinent clinical information and reviewed all relevant imaging and diagnostic studies personally.   I discussed recommendations with the primary team.

## 2025-01-16 NOTE — PROCEDURE NOTE - NSPOSTPRCRAD_GEN_A_CORE
post procedure radiography not performed
central line located in the superior vena cava/no pneumothorax/post-procedure radiography performed

## 2025-01-16 NOTE — PROGRESS NOTE ADULT - ASSESSMENT
80 y/o M with PMH of HTN, HLD, h/o cellulitis, who presented to the ED as a trauma alert s/p unwitnessed fall with unknown trauma and unknown LOC. The patient's son had not heard from him in 2 days, patient was ambulating with a rolling walker on Sunday. The son was found him down his basement steps with a ladder over his body, naked and he called EMS. Unsure for how long he was on the floor. He was a trauma alert, hypothermic, but improved with rewarming.  patient intubated/ventilated   on 3 pressors   called for RENEA/ rhabo/ hyperkalemia / acidosis / hyperphosphatemia / lactic acidosis / respiratory failure   bicarbonate drip  at 100 cc/h   be careful with bicarbonate drip in view of hypocalcemia / corrected calcium in the mid 7 range / check calcium / might calcium IV   ph noted / RENEA with rhabdo     follow ck levels   seen by NS and trauma team    wbc noted followed by ID   follow lactate level   continue cvvhd if patient able to tolerate  continue lokelma 10 q 8 / give d50/ insulin / bicarbonate push as needed   overall prognosis poor   ? GOC   will follow

## 2025-01-16 NOTE — PROGRESS NOTE ADULT - SUBJECTIVE AND OBJECTIVE BOX
HPI:  80 y/o M with PMH of HTN, HLD, h/o cellulitis, who presented to the ED as a trauma alert s/p unwitnessed fall with unknown trauma and unknown LOC. The patient's son had not heard from him in 2 days, patient was ambulating with a rolling walker on Sunday. The son was found him down his basement steps with a ladder over his body, naked and he called EMS. Unsure for how long he was on the floor. He was a trauma alert, hypothermic, but improved with rewarming. During my exam, patient is very agitated, ripping off gown and corie hugger, unable to provide any hx.     Interval history  -Patient seen at bedside  -He was intubated/sedated and unable to participate in exam     ADVANCE DIRECTIVES:     [ ] Full Code [x ] DNR  MOLST  [ ]  Living Will  [ ]   DECISION MAKER(s):  [ x] Health Care Proxy(s)  [ ] Surrogate(s)  [ ] Guardian           Name(s): Phone Number(s):  Chapin Kelly      BASELINE (I)ADL(s) (prior to admission):    [place]: [ ]Total  [ ] Moderate [ ]Dependent  Palliative Performance Status Version 2:         %    http://npcrc.org/files/news/palliative_performance_scale_ppsv2.pdf    Allergies    No Known Allergies    Intolerances    MEDICATIONS  (STANDING):  albuterol/ipratropium for Nebulization. 3 milliLiter(s) Nebulizer once  cefepime   IVPB 1000 milliGRAM(s) IV Intermittent every 12 hours  chlorhexidine 0.12% Liquid 15 milliLiter(s) Oral Mucosa every 12 hours  chlorhexidine 2% Cloths 1 Application(s) Topical daily  CRRT Treatment    <Continuous>  dexMEDEtomidine Infusion 0.2 MICROgram(s)/kG/Hr (3.54 mL/Hr) IV Continuous <Continuous>  fentaNYL   Infusion 0.5 MICROgram(s)/kG/Hr (3.54 mL/Hr) IV Continuous <Continuous>  fludroCORTISONE 0.1 milliGRAM(s) Oral daily  heparin   Injectable 5000 Unit(s) SubCutaneous every 8 hours  hydrocortisone sodium succinate Injectable 50 milliGRAM(s) IV Push every 6 hours  insulin lispro (ADMELOG) corrective regimen sliding scale   SubCutaneous Before meals and at bedtime  mupirocin 2% Nasal 1 Application(s) Both Nostrils every 12 hours  norepinephrine Infusion 0.05 MICROgram(s)/kG/Min (3.31 mL/Hr) IV Continuous <Continuous>  pantoprazole  Injectable 40 milliGRAM(s) IV Push daily  phenylephrine    Infusion 0.1 MICROgram(s)/kG/Min (1.28 mL/Hr) IV Continuous <Continuous>  PureFlow Dialysate RFP-400 (K 2 / Ca 3) 5000 milliLiter(s) (2000 mL/Hr) CRRT <Continuous>  sodium bicarbonate  Infusion 0.424 mEq/kG/Hr (200 mL/Hr) IV Continuous <Continuous>  sodium zirconium cyclosilicate 10 Gram(s) Oral every 8 hours  vancomycin  IVPB 1000 milliGRAM(s) IV Intermittent every 24 hours  vasopressin Infusion 0.04 Unit(s)/Min (6 mL/Hr) IV Continuous <Continuous>    MEDICATIONS  (PRN):      PRESENT SYMPTOMS: [x ]Unable to obtain due to poor mentation   Source if other than patient:  [ ]Family   [ ]Team     Pain: [ ]yes [ ]no  ALL PAIN ASSESSMENT COMPONENTS WERE ASKED ABOUT UNLESS OTHERWISE NOTED  QOL impact -   Location -                    Aggravating factors -  Quality -  Radiation -  Timing-  Severity (0-10 scale):  Minimal acceptable level (0-10 scale):     CPOT:  0  https://www.[facility].org/getattachment/[email or web address]/Critical-Care-Pain-Observation-Tool-(CPOT)    PAIN AD Score:   http://geriatrictoolkit.[facility]/cog/painad.pdf (press ctrl +  left click to view)    Dyspnea:                           [ ]None[ ]Mild [ ]Moderate [ ]Severe     Respiratory Distress Observation Scale (RDOS): 0  A score of 0 to 2 signifies little or no respiratory distress, 3 signifies mild distress, scores 4 to 6 indicate moderate distress, and scores greater than 7 signify severe distress  https://www.[facility].ca/sites/default/files/PDFS/953622-olmmbribaja-skpzgakc-ytbjdzcucre-makee.pdf    Anxiety:                             [ ]None[ ]Mild [ ]Moderate [ ]Severe   Fatigue:                             [ ]None[ ]Mild [ ]Moderate [ ]Severe   Nausea:                             [ ]None[ ]Mild [ ]Moderate [ ]Severe   Loss of appetite:              [ ]None[ ]Mild [ ]Moderate [ ]Severe   Constipation:                    [ ]None[ ]Mild [ ]Moderate [ ]Severe    Other Symptoms:  [ ]All other review of systems negative     Palliative Performance Status Version 2:         10%    http://npcrc.org/files/news/palliative_performance_scale_ppsv2.pdf    PHYSICAL EXAM:  Vital Signs Last 24 Hrs  T(C): 34.6 (16 Jan 2025 08:00), Max: 38.1 (15 Mika 2025 22:00)  T(F): 94.2 (16 Jan 2025 08:00), Max: 100.6 (15 Mika 2025 22:00)  HR: 107 (16 Jan 2025 12:30) (77 - 225)  BP: 98/68 (16 Jan 2025 06:00) (83/51 - 148/85)  BP(mean): 78 (16 Jan 2025 06:00) (63 - 110)  RR: 6 (16 Jan 2025 12:30) (6 - 42)  SpO2: 100% (16 Jan 2025 12:30) (58% - 100%)    Parameters below as of 16 Jan 2025 12:00  Patient On (Oxygen Delivery Method): ventilator    O2 Concentration (%): 40      GENERAL:  [ ] No acute distress [ ]Lethargic  [x ]Unarousable  [ ]Verbal  [ ]Non-Verbal [ ]Cachexia    BEHAVIORAL/PSYCH:  [ ]Alert and Oriented x  [ ] Anxiety [ ] Delirium [ ] Agitation [ ] Calm   EYES: [ ] No scleral icterus [ ] Scleral icterus [ ] Closed  ENMT:  [ ]Dry mouth  [x ]No external oral lesions [ ] No external ear or nose lesions  CARDIOVASCULAR:  [ ]Regular [ ]Irregular [ ]Tachy [ ]Not Tachy  [ ]Shashank [ ] Edema [ ] No edema  PULMONARY:  [ ]Tachypnea  [ ]Audible excessive secretions [x ] No labored breathing [ ] labored breathing  GASTROINTESTINAL: [ ]Soft  [ ]Distended  [ ]Not distended [ ]Non tender [ ]Tender  MUSCULOSKELETAL: [ ]No clubbing [ ] clubbing  [ ] No cyanosis [ ] cyanosis  NEUROLOGIC: [ ]No focal deficits  [ ]Follows commands  [ x]Does not follow commands  [ ]Cognitive impairment  [ ]Dysphagia  [ ]Dysarthria  [ ]Paresis   SKIN: [ ] Jaundiced [ ] Non-jaundiced [ ]Rash [ ]No Rash [ ] Warm [ ] Dry  MISC/LINES: [x ] ET tube [ ] Trach [ ]NGT/OGT [ ]PEG [ ]Lemon    LABS: reviewed by me                          8.1    20.06 )-----------( 125      ( 16 Jan 2025 04:40 )             27.4       01-16    137  |  96[L]  |  70[HH]  ----------------------------<  117[H]  6.8[HH]   |  10[L]  |  4.3[HH]    Ca    5.3[LL]      16 Jan 2025 06:32  Phos  16.1     01-16  Mg     2.7     01-16    TPro  3.5[L]  /  Alb  1.6[L]  /  TBili  0.8  /  DBili  x   /  AST  1715[H]  /  ALT  763[H]  /  AlkPhos  135[H]  01-16          ABG - ( 16 Jan 2025 11:22 )  pH, Arterial: 7.08  pH, Blood: x     /  pCO2: 29    /  pO2: 208   / HCO3: 9     / Base Excess: -20.1 /  SaO2: 99.9                Urinalysis Basic - ( 16 Jan 2025 06:32 )    Color: x / Appearance: x / SG: x / pH: x  Gluc: 117 mg/dL / Ketone: x  / Bili: x / Urobili: x   Blood: x / Protein: x / Nitrite: x   Leuk Esterase: x / RBC: x / WBC x   Sq Epi: x / Non Sq Epi: x / Bacteria: x        PT/INR - ( 16 Jan 2025 03:20 )   PT: 26.50 sec;   INR: 2.21 ratio         PTT - ( 16 Jan 2025 03:20 )  PTT:45.5 sec          CAPILLARY BLOOD GLUCOSE      POCT Blood Glucose.: 110 mg/dL (16 Jan 2025 11:12)              RADIOLOGY & ADDITIONAL STUDIES: reviewed by me    < from: Xray Chest 1 View- PORTABLE-Routine (Xray Chest 1 View- PORTABLE-Routine in AM.) (01.16.25 @ 06:25) >  IMPRESSION:    No radiographic evidence of acute cardiopulmonary disease.    < end of copied text >        EKG: reviewed by me    < from: 12 Lead ECG (01.16.25 @ 07:47) >    Ventricular Rate 100 BPM    QRS Duration 158 ms    Q-T Interval 428 ms    QTC Calculation(Bazett) 552 ms    R Axis -89 degrees    T Axis 83 degrees    Diagnosis Line Undetermined rhythm  Left axis deviation  Right bundle branch block  Inferior infarct , age undetermined  Abnormal ECG    < end of copied text >      PROTEIN CALORIE MALNUTRITION PRESENT: [ ]mild [ ]moderate [ ]severe [ ]underweight [ ]morbid obesity  https://www.andeal.org/vault/5280/web/files/ONC/Table_Clinical%20Characteristics%20to%20Document%20Malnutrition-White%20JV%20et%20al%202012.pdf    Height (cm): 165.1 (01-14-25 @ 19:43), 167.6 (11-28-24 @ 15:28), 162.6 (11-15-24 @ 08:02)  Weight (kg): 68 (01-14-25 @ 19:43), 81.6 (11-28-24 @ 15:28), 90.7 (11-15-24 @ 08:02)  BMI (kg/m2): 24.9 (01-14-25 @ 19:43), 29 (11-28-24 @ 15:28), 34.3 (11-15-24 @ 08:02)  [ ]PPSV2 < or = to 30% [ ]significant weight loss  [ ]poor nutritional intake  [ ]anasarca      [ ]Artificial Nutrition      Palliative Care Spiritual/Emotional Screening Tool Question  Severity (0-4):                    OR                    [ x] Unable to determine. Will assess at later time if appropriate.  Score of 2 or greater indicates recommendation of Chaplaincy and/or SW referral  Chaplaincy Referral: [ ] Yes [ ] Refused [ ] Following     Caregiver [place]:  [ ] Yes [ ] No    OR    [x ] Unable to determine. Will assess at later time if appropriate.  Social Work Referral [ ]  Patient and Family Centered Care Referral [ ]    Anticipatory Grief Present: [ ] Yes [ ] No    OR     [ x] Unable to determine. Will assess at later time if appropriate.  Social Work Referral [ ]  Patient and Family Centered Care Referral [ ]    Patient discussed with primary medical team MD  Palliative care education provided to patient and/or family   HPI:  80 y/o M with PMH of HTN, HLD, h/o cellulitis, who presented to the ED as a trauma alert s/p unwitnessed fall with unknown trauma and unknown LOC. The patient's son had not heard from him in 2 days, patient was ambulating with a rolling walker on Sunday. The son was found him down his basement steps with a ladder over his body, naked and he called EMS. Unsure for how long he was on the floor. He was a trauma alert, hypothermic, but improved with rewarming. During my exam, patient is very agitated, ripping off gown and corie hugger, unable to provide any hx.     Interval history  -Patient seen at bedside  -He was intubated/sedated and unable to participate in exam     ADVANCE DIRECTIVES:     [ ] Full Code [x ] DNR  MOLST  [ ]  Living Will  [ ]   DECISION MAKER(s):  [ x] Health Care Proxy(s)  [ ] Surrogate(s)  [ ] Guardian           Name(s): Phone Number(s):  Chapin Kelly      BASELINE (I)ADL(s) (prior to admission):    [place]: [ ]Total  [ ] Moderate [ ]Dependent  Palliative Performance Status Version 2:         %    http://npcrc.org/files/news/palliative_performance_scale_ppsv2.pdf    Allergies    No Known Allergies    Intolerances    MEDICATIONS  (STANDING):  albuterol/ipratropium for Nebulization. 3 milliLiter(s) Nebulizer once  cefepime   IVPB 1000 milliGRAM(s) IV Intermittent every 12 hours  chlorhexidine 0.12% Liquid 15 milliLiter(s) Oral Mucosa every 12 hours  chlorhexidine 2% Cloths 1 Application(s) Topical daily  CRRT Treatment    <Continuous>  dexMEDEtomidine Infusion 0.2 MICROgram(s)/kG/Hr (3.54 mL/Hr) IV Continuous <Continuous>  fentaNYL   Infusion 0.5 MICROgram(s)/kG/Hr (3.54 mL/Hr) IV Continuous <Continuous>  fludroCORTISONE 0.1 milliGRAM(s) Oral daily  heparin   Injectable 5000 Unit(s) SubCutaneous every 8 hours  hydrocortisone sodium succinate Injectable 50 milliGRAM(s) IV Push every 6 hours  insulin lispro (ADMELOG) corrective regimen sliding scale   SubCutaneous Before meals and at bedtime  mupirocin 2% Nasal 1 Application(s) Both Nostrils every 12 hours  norepinephrine Infusion 0.05 MICROgram(s)/kG/Min (3.31 mL/Hr) IV Continuous <Continuous>  pantoprazole  Injectable 40 milliGRAM(s) IV Push daily  phenylephrine    Infusion 0.1 MICROgram(s)/kG/Min (1.28 mL/Hr) IV Continuous <Continuous>  PureFlow Dialysate RFP-400 (K 2 / Ca 3) 5000 milliLiter(s) (2000 mL/Hr) CRRT <Continuous>  sodium bicarbonate  Infusion 0.424 mEq/kG/Hr (200 mL/Hr) IV Continuous <Continuous>  sodium zirconium cyclosilicate 10 Gram(s) Oral every 8 hours  vancomycin  IVPB 1000 milliGRAM(s) IV Intermittent every 24 hours  vasopressin Infusion 0.04 Unit(s)/Min (6 mL/Hr) IV Continuous <Continuous>    MEDICATIONS  (PRN):        PRESENT SYMPTOMS: [x ]Unable to obtain due to poor mentation   Source if other than patient:  [ ]Family   [ ]Team     Pain: [ ]yes [ ]no  ALL PAIN ASSESSMENT COMPONENTS WERE ASKED ABOUT UNLESS OTHERWISE NOTED  QOL impact -   Location -                    Aggravating factors -  Quality -  Radiation -  Timing-  Severity (0-10 scale):  Minimal acceptable level (0-10 scale):     CPOT:  0  https://www.[facility].org/getattachment/[email or web address]/Critical-Care-Pain-Observation-Tool-(CPOT)    PAIN AD Score:   http://geriatrictoolkit.[facility]/cog/painad.pdf (press ctrl +  left click to view)    Dyspnea:                           [ ]None[ ]Mild [ ]Moderate [ ]Severe     Respiratory Distress Observation Scale (RDOS): 0  A score of 0 to 2 signifies little or no respiratory distress, 3 signifies mild distress, scores 4 to 6 indicate moderate distress, and scores greater than 7 signify severe distress  https://www.[facility].ca/sites/default/files/PDFS/757328-lasdsxnxpva-xmnvygvz-egouqlgozvp-mkmvi.pdf    Anxiety:                             [ ]None[ ]Mild [ ]Moderate [ ]Severe   Fatigue:                             [ ]None[ ]Mild [ ]Moderate [ ]Severe   Nausea:                             [ ]None[ ]Mild [ ]Moderate [ ]Severe   Loss of appetite:              [ ]None[ ]Mild [ ]Moderate [ ]Severe   Constipation:                    [ ]None[ ]Mild [ ]Moderate [ ]Severe    Other Symptoms:  [ ]All other review of systems negative     Palliative Performance Status Version 2:         10%    http://npcrc.org/files/news/palliative_performance_scale_ppsv2.pdf    PHYSICAL EXAM:  Vital Signs Last 24 Hrs  T(C): 34.6 (16 Jan 2025 08:00), Max: 38.1 (15 Mika 2025 22:00)  T(F): 94.2 (16 Jan 2025 08:00), Max: 100.6 (15 Mika 2025 22:00)  HR: 107 (16 Jan 2025 12:30) (77 - 225)  BP: 98/68 (16 Jan 2025 06:00) (83/51 - 148/85)  BP(mean): 78 (16 Jan 2025 06:00) (63 - 110)  RR: 6 (16 Jan 2025 12:30) (6 - 42)  SpO2: 100% (16 Jan 2025 12:30) (58% - 100%)    Parameters below as of 16 Jan 2025 12:00  Patient On (Oxygen Delivery Method): ventilator    O2 Concentration (%): 40      GENERAL:  [ ] No acute distress [ ]Lethargic  [x ]Unarousable  [ ]Verbal  [ ]Non-Verbal [ ]Cachexia    BEHAVIORAL/PSYCH:  [ ]Alert and Oriented x  [ ] Anxiety [ ] Delirium [ ] Agitation [ ] Calm   EYES: [ ] No scleral icterus [ ] Scleral icterus [ ] Closed  ENMT:  [ ]Dry mouth  [x ]No external oral lesions [ ] No external ear or nose lesions  CARDIOVASCULAR:  [ ]Regular [ ]Irregular [ ]Tachy [ ]Not Tachy  [ ]Shashank [ ] Edema [ ] No edema  PULMONARY:  [ ]Tachypnea  [ ]Audible excessive secretions [x ] No labored breathing [ ] labored breathing  GASTROINTESTINAL: [ ]Soft  [ ]Distended  [ ]Not distended [ ]Non tender [ ]Tender  MUSCULOSKELETAL: [ ]No clubbing [ ] clubbing  [ ] No cyanosis [ ] cyanosis  NEUROLOGIC: [ ]No focal deficits  [ ]Follows commands  [ x]Does not follow commands  [ ]Cognitive impairment  [ ]Dysphagia  [ ]Dysarthria  [ ]Paresis   SKIN: [ ] Jaundiced [ ] Non-jaundiced [ ]Rash [ ]No Rash [ ] Warm [ ] Dry  MISC/LINES: [x ] ET tube [ ] Trach [ ]NGT/OGT [ ]PEG [ ]Lemon    LABS: reviewed by me                          8.1    20.06 )-----------( 125      ( 16 Jan 2025 04:40 )             27.4       01-16    137  |  96[L]  |  70[HH]  ----------------------------<  117[H]  6.8[HH]   |  10[L]  |  4.3[HH]    Ca    5.3[LL]      16 Jan 2025 06:32  Phos  16.1     01-16  Mg     2.7     01-16    TPro  3.5[L]  /  Alb  1.6[L]  /  TBili  0.8  /  DBili  x   /  AST  1715[H]  /  ALT  763[H]  /  AlkPhos  135[H]  01-16          ABG - ( 16 Jan 2025 11:22 )  pH, Arterial: 7.08  pH, Blood: x     /  pCO2: 29    /  pO2: 208   / HCO3: 9     / Base Excess: -20.1 /  SaO2: 99.9                Urinalysis Basic - ( 16 Jan 2025 06:32 )    Color: x / Appearance: x / SG: x / pH: x  Gluc: 117 mg/dL / Ketone: x  / Bili: x / Urobili: x   Blood: x / Protein: x / Nitrite: x   Leuk Esterase: x / RBC: x / WBC x   Sq Epi: x / Non Sq Epi: x / Bacteria: x        PT/INR - ( 16 Jan 2025 03:20 )   PT: 26.50 sec;   INR: 2.21 ratio         PTT - ( 16 Jan 2025 03:20 )  PTT:45.5 sec          CAPILLARY BLOOD GLUCOSE      POCT Blood Glucose.: 110 mg/dL (16 Jan 2025 11:12)              RADIOLOGY & ADDITIONAL STUDIES: reviewed by me    < from: Xray Chest 1 View- PORTABLE-Routine (Xray Chest 1 View- PORTABLE-Routine in AM.) (01.16.25 @ 06:25) >  IMPRESSION:    No radiographic evidence of acute cardiopulmonary disease.    < end of copied text >        EKG: reviewed by me    < from: 12 Lead ECG (01.16.25 @ 07:47) >    Ventricular Rate 100 BPM    QRS Duration 158 ms    Q-T Interval 428 ms    QTC Calculation(Bazett) 552 ms    R Axis -89 degrees    T Axis 83 degrees    Diagnosis Line Undetermined rhythm  Left axis deviation  Right bundle branch block  Inferior infarct , age undetermined  Abnormal ECG    < end of copied text >      PROTEIN CALORIE MALNUTRITION PRESENT: [ ]mild [ ]moderate [ ]severe [ ]underweight [ ]morbid obesity  https://www.andeal.org/vault/3660/web/files/ONC/Table_Clinical%20Characteristics%20to%20Document%20Malnutrition-White%20JV%20et%20al%202012.pdf    Height (cm): 165.1 (01-14-25 @ 19:43), 167.6 (11-28-24 @ 15:28), 162.6 (11-15-24 @ 08:02)  Weight (kg): 68 (01-14-25 @ 19:43), 81.6 (11-28-24 @ 15:28), 90.7 (11-15-24 @ 08:02)  BMI (kg/m2): 24.9 (01-14-25 @ 19:43), 29 (11-28-24 @ 15:28), 34.3 (11-15-24 @ 08:02)  [ ]PPSV2 < or = to 30% [ ]significant weight loss  [ ]poor nutritional intake  [ ]anasarca      [ ]Artificial Nutrition      Palliative Care Spiritual/Emotional Screening Tool Question  Severity (0-4):                    OR                    [ x] Unable to determine. Will assess at later time if appropriate.  Score of 2 or greater indicates recommendation of Chaplaincy and/or SW referral  Chaplaincy Referral: [ ] Yes [ ] Refused [ ] Following     Caregiver [place]:  [ ] Yes [ ] No    OR    [x ] Unable to determine. Will assess at later time if appropriate.  Social Work Referral [ ]  Patient and Family Centered Care Referral [ ]    Anticipatory Grief Present: [ ] Yes [ ] No    OR     [ x] Unable to determine. Will assess at later time if appropriate.  Social Work Referral [ ]  Patient and Family Centered Care Referral [ ]    Patient discussed with primary medical team MD  Palliative care education provided to patient and/or family

## 2025-01-16 NOTE — PROGRESS NOTE ADULT - SUBJECTIVE AND OBJECTIVE BOX
Patient is a 79y old  Male who presents with a chief complaint of s/p Fall (16 Jan 2025 07:11)      INTERVAL HPI/OVERNIGHT EVENTS:   No overnight events   Afebrile. Critically ill on 3 pressors (levo, vaso, julian).     ICU Vital Signs Last 24 Hrs  T(C): 34.6 (16 Jan 2025 08:00), Max: 38.1 (15 Mika 2025 22:00)  T(F): 94.2 (16 Jan 2025 08:00), Max: 100.6 (15 Mika 2025 22:00)  HR: 97 (16 Jan 2025 10:00) (77 - 225)  BP: 98/68 (16 Jan 2025 06:00) (83/51 - 148/85)  BP(mean): 78 (16 Jan 2025 06:00) (63 - 110)  ABP: 106/56 (16 Jan 2025 10:00) (56/53 - 185/71)  ABP(mean): 76 (16 Jan 2025 10:00) (56 - 142)  RR: 16 (16 Jan 2025 10:00) (15 - 42)  SpO2: 66% (16 Jan 2025 08:00) (58% - 100%)    O2 Parameters below as of 16 Jan 2025 08:00  Patient On (Oxygen Delivery Method): ventilator    O2 Concentration (%): 40      I&O's Summary    15 Mika 2025 07:01  -  16 Jan 2025 07:00  --------------------------------------------------------  IN: 4763.9 mL / OUT: 877 mL / NET: 3886.9 mL    16 Jan 2025 07:01  -  16 Jan 2025 11:36  --------------------------------------------------------  IN: 1778.6 mL / OUT: 0 mL / NET: 1778.6 mL      Mode: AC/ CMV (Assist Control/ Continuous Mandatory Ventilation)  RR (machine): 16  TV (machine): 450  FiO2: 40  PEEP: 6  ITime: 1  MAP: 9  PIP: 21      LABS:                        8.1    20.06 )-----------( 125      ( 16 Jan 2025 04:40 )             27.4     01-16    137  |  96[L]  |  70[HH]  ----------------------------<  117[H]  6.8[HH]   |  10[L]  |  4.3[HH]    Ca    5.3[LL]      16 Jan 2025 06:32  Phos  16.1     01-16  Mg     2.7     01-16    TPro  3.5[L]  /  Alb  1.6[L]  /  TBili  0.8  /  DBili  x   /  AST  1715[H]  /  ALT  763[H]  /  AlkPhos  135[H]  01-16    PT/INR - ( 16 Jan 2025 03:20 )   PT: 26.50 sec;   INR: 2.21 ratio         PTT - ( 16 Jan 2025 03:20 )  PTT:45.5 sec  Urinalysis Basic - ( 16 Jan 2025 06:32 )    Color: x / Appearance: x / SG: x / pH: x  Gluc: 117 mg/dL / Ketone: x  / Bili: x / Urobili: x   Blood: x / Protein: x / Nitrite: x   Leuk Esterase: x / RBC: x / WBC x   Sq Epi: x / Non Sq Epi: x / Bacteria: x      CAPILLARY BLOOD GLUCOSE      POCT Blood Glucose.: 110 mg/dL (16 Jan 2025 11:12)  POCT Blood Glucose.: 120 mg/dL (16 Jan 2025 06:34)  POCT Blood Glucose.: 73 mg/dL (15 Mika 2025 21:14)  POCT Blood Glucose.: 92 mg/dL (15 Mika 2025 18:35)  POCT Blood Glucose.: 128 mg/dL (15 Mika 2025 17:28)  POCT Blood Glucose.: 112 mg/dL (15 Mika 2025 14:57)  POCT Blood Glucose.: 113 mg/dL (15 Mika 2025 13:02)    ABG - ( 16 Jan 2025 03:19 )  pH, Arterial: 6.97  pH, Blood: x     /  pCO2: 27    /  pO2: 220   / HCO3: 6     / Base Excess: -24.5 /  SaO2: 100.0               RADIOLOGY & ADDITIONAL TESTS:    Consultant(s) Notes Reviewed:  [x ] YES  [ ] NO    MEDICATIONS  (STANDING):  albuterol/ipratropium for Nebulization. 3 milliLiter(s) Nebulizer once  cefepime   IVPB 1000 milliGRAM(s) IV Intermittent every 12 hours  chlorhexidine 0.12% Liquid 15 milliLiter(s) Oral Mucosa every 12 hours  chlorhexidine 2% Cloths 1 Application(s) Topical daily  CRRT Treatment    <Continuous>  dexMEDEtomidine Infusion 0.2 MICROgram(s)/kG/Hr (3.54 mL/Hr) IV Continuous <Continuous>  fentaNYL   Infusion 0.5 MICROgram(s)/kG/Hr (3.54 mL/Hr) IV Continuous <Continuous>  fludroCORTISONE 0.1 milliGRAM(s) Oral daily  heparin   Injectable 5000 Unit(s) SubCutaneous every 8 hours  hydrocortisone sodium succinate Injectable 50 milliGRAM(s) IV Push every 6 hours  insulin lispro (ADMELOG) corrective regimen sliding scale   SubCutaneous Before meals and at bedtime  mupirocin 2% Nasal 1 Application(s) Both Nostrils every 12 hours  norepinephrine Infusion 0.05 MICROgram(s)/kG/Min (3.31 mL/Hr) IV Continuous <Continuous>  pantoprazole  Injectable 40 milliGRAM(s) IV Push daily  phenylephrine    Infusion 0.1 MICROgram(s)/kG/Min (1.28 mL/Hr) IV Continuous <Continuous>  PureFlow Dialysate RFP-400 (K 2 / Ca 3) 5000 milliLiter(s) (2000 mL/Hr) CRRT <Continuous>  sodium bicarbonate  Infusion 0.424 mEq/kG/Hr (200 mL/Hr) IV Continuous <Continuous>  sodium zirconium cyclosilicate 10 Gram(s) Oral every 8 hours  vancomycin  IVPB 1000 milliGRAM(s) IV Intermittent every 24 hours  vasopressin Infusion 0.04 Unit(s)/Min (6 mL/Hr) IV Continuous <Continuous>    MEDICATIONS  (PRN):      PHYSICAL EXAM:  GENERAL:   HEAD:  Atraumatic, Normocephalic  EYES: EOMI, PERRLA, conjunctiva and sclera clear  NECK: Supple, No JVD, Normal thyroid, no enlarged nodes  NERVOUS SYSTEM:  Sedated, intubated  CHEST/LUNG: B/L good air entry; No rales, rhonchi, or wheezing  HEART: S1S2 normal, no S3, Regular rate and rhythm; No murmurs  ABDOMEN: Soft, Nontender, Nondistended; Bowel sounds present  EXTREMITIES:  2+ Peripheral Pulses, No clubbing, cyanosis, or edema  LYMPH: No lymphadenopathy noted  SKIN: No rashes or lesions    Care Discussed with Consultants/Other Providers [ x] YES  [ ] NO

## 2025-01-16 NOTE — PROGRESS NOTE ADULT - ATTENDING COMMENTS
IMPRESSION:    Acute hypoxemic respiratory on MV  Severe lactic acidosis  Multi organ failure / mottled skin  Renal failure on CVVHD - not tolerating  sp fall/ trauma work up noted  Rhabdo ( on the floor for 2 days)  C1 fracture    Plan as outlined above

## 2025-01-16 NOTE — CONSULT NOTE ADULT - ASSESSMENT
ASSESSMENT  80 y/o M with PMH of HTN, HLD, h/o cellulitis, who presented to the ED as a trauma alert s/p unwitnessed fall with unknown trauma and unknown LOC    IMPRESSION  #s/p Fall   #C1 Fracture   #Acute Hypoxemic respiratory failure s/p intubation  #RENEA  #Metabolic Acidosis/elevated lactate/Hyperphosphatemia/Hypocalemia   #Rhabdo   #Transaminitis  #Severe Aortic Valve Stenosis   #SIRS   - CT Chest/Abdomen/Pelvis w/ IV Cont (01.14.25 @ 21:00): IMPRESSION: 1.  No evidence of acute traumatic injury to the chest, abdomen or pelvis. 2.  This bladder distention there is marked thickening of the urinary  bladder wall, can correlate with urinalysis if suspicion for cystitis.  - Blood Cx 1/14 NG     #DM   #Abx allergy: No Known Allergies    RECOMMENDATIONS  This is a preliminary incomplete pended note, all final recommendations to follow after interview and examination of the patient.    Please call or message on [employer] Teams if with any questions.  Spectra [phone]     ASSESSMENT  78 y/o M with PMH of HTN, HLD, h/o cellulitis, who presented to the ED as a trauma alert s/p unwitnessed fall with unknown trauma and unknown LOC    IMPRESSION  #s/p Fall   #C1 Fracture   #Acute Hypoxemic respiratory failure s/p intubation  #RENEA/ATN - on CVVH  #Metabolic Acidosis/elevated lactate/Hyperphosphatemia/Hypocalemia   #Rhabdo   #Transaminitis - secondary to shock Liver/Rhabdo  #Severe Aortic Valve Stenosis   #SIRS   - CT Chest/Abdomen/Pelvis w/ IV Cont (01.14.25 @ 21:00): IMPRESSION: 1.  No evidence of acute traumatic injury to the chest, abdomen or pelvis. 2.  This bladder distention there is marked thickening of the urinary  bladder wall, can correlate with urinalysis if suspicion for cystitis.  - Blood Cx 1/14 NG     #DM   #Abx allergy: No Known Allergies    RECOMMENDATION  - poor prognosis overall -- ongoing acidosis with organ failure in setting of Rhabo/Severe aortic stenosis; Skin breakdown, but no clear infectious source   - continue vancomycin -- dose by levels in setting of CVVH   - continue cefepime 1g q 12 hours   - ongoing GOC discussions, grim prognosis     Please call or message on [employer] Teams if with any questions.  Spectra [phone]

## 2025-01-16 NOTE — CONSULT NOTE ADULT - SUBJECTIVE AND OBJECTIVE BOX
LYNN, LEENA  79y, Male  Allergy: No Known Allergies      CHIEF COMPLAINT: s/p fall - hypothermia (14 Jan 2025 23:39)      LOS  2d    HPI:  80 y/o M with PMH of HTN, HLD, h/o cellulitis, who presented to the ED as a trauma alert s/p unwitnessed fall with unknown trauma and unknown LOC. The patient's son had not heard from him in 2 days, patient was ambulating with a rolling walker on Sunday. The son was found him down his basement steps with a ladder over his body, naked and he called EMS. Unsure for how long he was on the floor. He was a trauma alert, hypothermic, but improved with rewarming. During my exam, patient is very agitated, ripping off gown and corie hugger, unable to provide any hx.     Vitals on admission:   / 119 mm Hg,  /min, RR 25 /min, T 83.7 Degrees F, sat 88 % on room air    Remarkable labs:   WBC 30k   Hgb 11.8  K 6.1  BUN/Cr 67/3.9   Alk phos 343  AST/ALT 85/15  Lactate 7.3  Trop 158  CK 26k   VBG with pH 7.28 (lactate 7.3) pCO2 44 and PO2 28  EKG 1st degree AV block  - CTH: No acute intracranial pathology. Stable mild chronic microvascular ischemic changes. Partially imaged C1 fracture. Please see separately dictated report of the concurrent cervical CT.  - CT maxillary: no acute fracture or dislocation. Redemonstrated small chronic deformity in the inferior right orbital wall, and nondisplaced subacute/chronic fractures of the right anterior arch and left posterior arch of C1. Mild right nasopharyngeal fullness.   - CTAP 1.  No evidence of acute traumatic injury to the chest, abdomen or pelvis.  2.  This bladder distention there is marked thickening of the urinary   bladder wall, can correlate with urinalysis if suspicion for cystitis.    He was given cefepime, vanc, 2L LR bolus, insulin and dextrose for hyperK and is being admitted to SDU for further management.  (15 Mika 2025 02:08)      INFECTIOUS DISEASE HISTORY:  History as above.  ID consulted for antibiotic management.   Planned for admission to SDU for further management, but became lethargic/hypotensive.   Intubated for airway protectuion.     Follow up:  PAST MEDICAL & SURGICAL HISTORY:  Diabetes      HLD (hyperlipidemia)      BPH without urinary obstruction      S/P cataract surgery          FAMILY HISTORY      SOCIAL HISTORY  Social History:  Tobacco use: denies  EtOH use: denies  Illicit drug use: denies  Lives with his son (27 Nov 2024 15:17)        ROS  unable to obtain history secondary to patient's mental status and/or sedation    VITALS:  T(F): 97.8, Max: 100.6 (01-15-25 @ 22:00)  HR: 105  BP: 97/53  RR: 29Vital Signs Last 24 Hrs  T(C): 36.6 (16 Jan 2025 04:00), Max: 38.1 (15 Mika 2025 22:00)  T(F): 97.8 (16 Jan 2025 04:00), Max: 100.6 (15 Mika 2025 22:00)  HR: 105 (16 Jan 2025 05:00) (77 - 220)  BP: 97/53 (16 Jan 2025 05:00) (83/51 - 148/85)  BP(mean): 71 (16 Jan 2025 05:00) (63 - 110)  RR: 29 (16 Jan 2025 05:00) (16 - 42)  SpO2: 99% (16 Jan 2025 05:00) (58% - 100%)    Parameters below as of 16 Jan 2025 04:00  Patient On (Oxygen Delivery Method): ventilator    O2 Concentration (%): 40    PHYSICAL EXAM:  Gen: NAD, resting in bed  HEENT: Normocephalic, atraumatic  Neck: supple, no lymphadenopathy  CV: Regular rate & regular rhythm  Lungs: decreased BS at bases, no fremitus  Abdomen: Soft, BS present  Ext: Warm, well perfused  Neuro: non focal, awake  Skin: no rash, no erythema  Lines: no phlebitis    TESTS & MEASUREMENTS:                        8.1    20.06 )-----------( 125      ( 16 Jan 2025 04:40 )             27.4     01-16    140  |  97[L]  |  75[HH]  ----------------------------<  137[H]  6.8[HH]   |  10[L]  |  4.6[HH]    Ca    5.0[LL]      16 Jan 2025 04:40  Phos  16.1     01-16  Mg     2.7     01-16    TPro  3.5[L]  /  Alb  1.6[L]  /  TBili  0.7  /  DBili  x   /  AST  1145[H]  /  ALT  486[H]  /  AlkPhos  124[H]  01-16      LIVER FUNCTIONS - ( 16 Jan 2025 04:40 )  Alb: 1.6 g/dL / Pro: 3.5 g/dL / ALK PHOS: 124 U/L / ALT: 486 U/L / AST: 1145 U/L / GGT: x           Urinalysis Basic - ( 16 Jan 2025 04:40 )    Color: x / Appearance: x / SG: x / pH: x  Gluc: 137 mg/dL / Ketone: x  / Bili: x / Urobili: x   Blood: x / Protein: x / Nitrite: x   Leuk Esterase: x / RBC: x / WBC x   Sq Epi: x / Non Sq Epi: x / Bacteria: x        Culture - Blood (collected 01-14-25 @ 20:20)  Source: .Blood BLOOD  Preliminary Report (01-16-25 @ 03:02):    No growth at 24 hours    Culture - Blood (collected 01-14-25 @ 20:20)  Source: .Blood BLOOD  Preliminary Report (01-16-25 @ 03:02):    No growth at 24 hours    Urinalysis with Rflx Culture (collected 11-15-24 @ 10:04)    Culture - Blood (collected 11-15-24 @ 08:30)  Source: .Blood BLOOD  Final Report (11-20-24 @ 14:00):    No growth at 5 days    Culture - Blood (collected 11-15-24 @ 08:30)  Source: .Blood BLOOD  Final Report (11-20-24 @ 14:00):    No growth at 5 days    Culture - Wound Aerobic/Anaerobic (collected 11-01-24 @ 14:00)  Source: Skin/Wound  Final Report (11-08-24 @ 11:47):    Numerous Pseudomonas putida group    Rare Candida parapsilosis "Susceptibilities not performed"    Few Fusarium species  Organism: Pseudomonas putida group (11-08-24 @ 11:47)  Organism: Pseudomonas putida group (11-08-24 @ 11:47)      -  Levofloxacin: S <=0.5      -  Tobramycin: S <=2      -  Aztreonam: S <=4      -  Gentamicin: S <=2      -  Cefepime: S <=2      -  Piperacillin/Tazobactam: S <=8      -  Ciprofloxacin: S <=0.25      -  Ceftriaxone: S 8      Method Type: SEBLE      -  Meropenem: S <=1      -  Amikacin: S <=16      -  Trimethoprim/Sulfamethoxazole: S 2/38    Culture - Blood (collected 10-30-24 @ 19:09)  Source: .Blood BLOOD  Final Report (11-05-24 @ 10:00):    No growth at 5 days    Culture - Blood (collected 10-30-24 @ 19:09)  Source: .Blood BLOOD  Final Report (11-05-24 @ 10:00):    No growth at 5 days    Culture - Blood (collected 10-21-24 @ 12:50)  Source: .Blood BLOOD  Final Report (10-26-24 @ 21:00):    No growth at 5 days    Culture - Blood (collected 10-21-24 @ 12:50)  Source: .Blood BLOOD  Final Report (10-26-24 @ 21:00):    No growth at 5 days        Blood Gas Venous - Lactate: >16.0 mmol/L (01-16-25 @ 06:15)  Blood Gas Venous - Lactate: 13.2 mmol/L (01-15-25 @ 06:02)  Lactate, Blood: 13.5 mmol/L (01-15-25 @ 06:00)  Blood Gas Venous - Lactate: 7.3 mmol/L (01-14-25 @ 22:45)  Blood Gas Venous - Lactate: 8.2 mmol/L (01-14-25 @ 20:11)  Lactate, Blood: 7.3 mmol/L (01-14-25 @ 19:50)      INFECTIOUS DISEASES TESTING  MRSA PCR Result.: Positive (01-15-25 @ 16:31)  Procalcitonin: 4.86 ng/mL (01-15-25 @ 06:00)  MRSA PCR Result.: Negative (10-31-24 @ 10:00)      RADIOLOGY & ADDITIONAL TESTS:  I have personally reviewed the last Chest xray  CXR  Xray Chest 1 View- PORTABLE-Urgent:   ACC: [id number] EXAM:  XR CHEST PORTABLE URGENT 1V   ORDERED BY: ANDREI GARRISON     PROCEDURE DATE:  01/15/2025          INTERPRETATION:  CLINICAL HISTORY: Catheter placement    COMPARISON: Earlier the same day.    TECHNIQUE: Portable frontal chestradiograph.    FINDINGS:    Support devices: Endotracheal tube is above the yulisa. Enteric catheter   seen. Telemetry leads. Left neck central catheter.    Cardiac/mediastinum/hilum: Stable.    Lung parenchyma/Pleura: No focal parenchymal opacities,pleural   effusions, or pneumothorax.    Skeleton/soft tissues: Stable.      IMPRESSION:    No radiographic evidence of acute cardiopulmonary disease. Support   devices as described.    --- End of Report ---            NILDA HARDIN MD; AttendingInterventional Radiologist  This document has been electronically signed. Mika 15 2025  1:56PM (01-15-25 @ 13:43)      CT  CT Chest w/ IV Cont:   ACC: [id number] EXAM:  CT ABDOMEN AND PELVIS IC   ORDERED BY: VIKY NEAL     ACC: [id number] EXAM:  CT CHEST IC   ORDERED BY: VIKY NEAL     PROCEDURE DATE:  01/14/2025          INTERPRETATION:  CLINICAL INFORMATION: "Found naked and at bottom of   staircase"    COMPARISON: None.    CONTRAST/COMPLICATIONS:  IV Contrast: Omnipaque 350 (accession [id number]), IV contrast documented   in unlinked concurrent exam (accession [id number])  100 cc administered   0   cc discarded  Oral Contrast: NONE  .    PROCEDURE:  CT of the Chest, Abdomen and Pelvis was performed.  Sagittal and coronal reformats were performed.    FINDINGS:  CHEST:  LUNGS/PLEURA/AIRWAYS: Trachea is clear. No acute consolidation. No   pleural effusion. No pneumothorax. No suspicious masses. Mild dependent   subsegmental atelectatic changes.  VESSELS: Mild atherosclerosis. No central pulmonary embolism.  HEART: Aortic valve calcifications. Coronary calcifications  MEDIASTINUM AND GREER: No lymphadenopathy.  CHEST WALL AND LOWER NECK: Within normal limits.    ABDOMEN AND PELVIS:  LIVER: Mild steatosis.  BILE DUCTS: Normal caliber.  GALLBLADDER: Within normal limits.  SPLEEN: Within normal limits.  PANCREAS: Within normal limits.  ADRENALS: Left greater than right adrenalthickening/hypertrophy  KIDNEYS/URETERS: Symmetric enhancement. No hydronephrosis. Renal cysts   and additional subcentimeter hypodensities too small to characterize    BLADDER: Decompressed with Lemon catheter with markedly thickened wall.  REPRODUCTIVE ORGANS: Prostate size appears within normal limits though   Lemon catheter limits evaluation. Calcified vas deferens indicative of   diabetes.    BOWEL: No bowel obstruction. Colonic diverticulosis  PERITONEUM/RETROPERITONEUM: Within normal limits.  VESSELS: Atherosclerosis.  LYMPH NODES: No lymphadenopathy.  ABDOMINAL WALL: Within normal limits.  BONES: Diffuse osteopenia. Degenerative changes. No definite acute bony   abnormality seen. L1-L2 moderate disc osteophyte complex moderately   compressing the thecal sac.    IMPRESSION:  1.  No evidence of acute traumatic injury to the chest, abdomen or pelvis.  2.  This bladder distention there is marked thickening of the urinary   bladder wall, can correlate with urinalysis if suspicion for cystitis.        --- End of Report ---            ROD TREJO MD; Attending Radiologist  This document has been electronically signed. Jan 14 2025  9:15PM (01-14-25 @ 21:00)      CARDIOLOGY TESTING  12 Lead ECG:   Ventricular Rate 85 BPM    Atrial Rate 85 BPM    P-R Interval 192 ms    QRS Duration 138 ms    Q-T Interval 474 ms    QTC Calculation(Bazett) 564 ms    P Axis 33 degrees    R Axis -82 degrees    T Axis 81 degrees    Diagnosis Line Normal sinus rhythm  Left axis deviation  Right bundle branch block  Abnormal ECG    Confirmed by YANETH MOREAU MD ([id number]) on 1/15/2025 10:17:14 PM (01-15-25 @ 17:31)  12 Lead ECG:   Ventricular Rate 99 BPM    Atrial Rate 99 BPM    P-R Interval 188 ms    QRS Duration 136 ms    Q-T Interval 418 ms    QTC Calculation(Bazett) 536 ms    P Axis 10 degrees    R Axis -89 degrees    T Axis 88 degrees    Diagnosis Line Normal sinus rhythm  Left axis deviation  Right bundle branch block  Abnormal ECG    Confirmed by YANETH MOREAU MD ([id number]) on 1/15/2025 10:16:24 PM (01-15-25 @ 09:42)      MEDICATIONS  albuterol/ipratropium for Nebulization. 3 Nebulizer once  cefepime   IVPB 1000 IV Intermittent every 12 hours  chlorhexidine 0.12% Liquid 15 Oral Mucosa every 12 hours  chlorhexidine 2% Cloths 1 Topical daily  CRRT Treatment   <Continuous>  dexMEDEtomidine Infusion 0.2 IV Continuous <Continuous>  fentaNYL   Infusion 0.5 IV Continuous <Continuous>  heparin   Injectable 5000 SubCutaneous every 8 hours  insulin lispro (ADMELOG) corrective regimen sliding scale  SubCutaneous Before meals and at bedtime  lactated ringers. 1000 IV Continuous <Continuous>  mupirocin 2% Nasal 1 Both Nostrils every 12 hours  norepinephrine Infusion 0.05 IV Continuous <Continuous>  pantoprazole    Tablet 40 Oral before breakfast  pantoprazole  Injectable 40 IV Push daily  phenylephrine    Infusion 0.1 IV Continuous <Continuous>  PureFlow Dialysate RFP-400 (K 2 / Ca 3) 5000 CRRT <Continuous>  sodium bicarbonate  Infusion 0.212 IV Continuous <Continuous>  sodium zirconium cyclosilicate 10 Oral every 8 hours  vancomycin  IVPB 1000 IV Intermittent every 24 hours  vasopressin Infusion 0.02 IV Continuous <Continuous>      Weight  Weight (kg): 70.7 (01-16-25 @ 00:00)    ANTIBIOTICS:  cefepime   IVPB 1000 milliGRAM(s) IV Intermittent every 12 hours  vancomycin  IVPB 1000 milliGRAM(s) IV Intermittent every 24 hours      ALLERGIES:  No Known Allergies       LYNN, LEENA  79y, Male  Allergy: No Known Allergies      CHIEF COMPLAINT: s/p fall - hypothermia (14 Jan 2025 23:39)      LOS  2d    HPI:  78 y/o M with PMH of HTN, HLD, h/o cellulitis, who presented to the ED as a trauma alert s/p unwitnessed fall with unknown trauma and unknown LOC. The patient's son had not heard from him in 2 days, patient was ambulating with a rolling walker on Sunday. The son was found him down his basement steps with a ladder over his body, naked and he called EMS. Unsure for how long he was on the floor. He was a trauma alert, hypothermic, but improved with rewarming. During my exam, patient is very agitated, ripping off gown and corie hugger, unable to provide any hx.     Vitals on admission:   / 119 mm Hg,  /min, RR 25 /min, T 83.7 Degrees F, sat 88 % on room air    Remarkable labs:   WBC 30k   Hgb 11.8  K 6.1  BUN/Cr 67/3.9   Alk phos 343  AST/ALT 85/15  Lactate 7.3  Trop 158  CK 26k   VBG with pH 7.28 (lactate 7.3) pCO2 44 and PO2 28  EKG 1st degree AV block  - CTH: No acute intracranial pathology. Stable mild chronic microvascular ischemic changes. Partially imaged C1 fracture. Please see separately dictated report of the concurrent cervical CT.  - CT maxillary: no acute fracture or dislocation. Redemonstrated small chronic deformity in the inferior right orbital wall, and nondisplaced subacute/chronic fractures of the right anterior arch and left posterior arch of C1. Mild right nasopharyngeal fullness.   - CTAP 1.  No evidence of acute traumatic injury to the chest, abdomen or pelvis.  2.  This bladder distention there is marked thickening of the urinary   bladder wall, can correlate with urinalysis if suspicion for cystitis.    He was given cefepime, vanc, 2L LR bolus, insulin and dextrose for hyperK and is being admitted to SDU for further management.  (15 Mika 2025 02:08)      INFECTIOUS DISEASE HISTORY:  History as above.  ID consulted for antibiotic management.   Planned for admission to SDU for further management, but became lethargic/hypotensive.   Intubated for airway protectuion.   He is currently on 3 pressors, yet still hypotensive.   Ongoing lactic acidosis.   On CVVHD     Follow up:  PAST MEDICAL & SURGICAL HISTORY:  Diabetes      HLD (hyperlipidemia)      BPH without urinary obstruction      S/P cataract surgery          FAMILY HISTORY  unable to obtian     SOCIAL HISTORY  Social History:  Tobacco use: denies  EtOH use: denies  Illicit drug use: denies  Lives with his son (27 Nov 2024 15:17)        ROS  unable to obtain history secondary to patient's mental status and/or sedation    VITALS:  T(F): 97.8, Max: 100.6 (01-15-25 @ 22:00)  HR: 105  BP: 97/53  RR: 29Vital Signs Last 24 Hrs  T(C): 36.6 (16 Jan 2025 04:00), Max: 38.1 (15 Mika 2025 22:00)  T(F): 97.8 (16 Jan 2025 04:00), Max: 100.6 (15 Mika 2025 22:00)  HR: 105 (16 Jan 2025 05:00) (77 - 220)  BP: 97/53 (16 Jan 2025 05:00) (83/51 - 148/85)  BP(mean): 71 (16 Jan 2025 05:00) (63 - 110)  RR: 29 (16 Jan 2025 05:00) (16 - 42)  SpO2: 99% (16 Jan 2025 05:00) (58% - 100%)    Parameters below as of 16 Jan 2025 04:00  Patient On (Oxygen Delivery Method): ventilator    O2 Concentration (%): 40    PHYSICAL EXAM:  Gen: Intubated, ill appearing   HEENT: Normocephalic, atraumatic  Neck: supple, no lymphadenopathy  CV: Regular rate & regular rhythm  Lungs: decreased BS at bases, no fremitus  Abdomen: Soft, BS present  Ext: Warm, well perfused  Neuro: non focal, awake  Skin: no rash, no erythema; skin breakdown on lower extremities -- peripheral skin mottled/ischemic  Lines: no phlebitis    TESTS & MEASUREMENTS:                        8.1    20.06 )-----------( 125      ( 16 Jan 2025 04:40 )             27.4     01-16    140  |  97[L]  |  75[HH]  ----------------------------<  137[H]  6.8[HH]   |  10[L]  |  4.6[HH]    Ca    5.0[LL]      16 Jan 2025 04:40  Phos  16.1     01-16  Mg     2.7     01-16    TPro  3.5[L]  /  Alb  1.6[L]  /  TBili  0.7  /  DBili  x   /  AST  1145[H]  /  ALT  486[H]  /  AlkPhos  124[H]  01-16      LIVER FUNCTIONS - ( 16 Jan 2025 04:40 )  Alb: 1.6 g/dL / Pro: 3.5 g/dL / ALK PHOS: 124 U/L / ALT: 486 U/L / AST: 1145 U/L / GGT: x           Urinalysis Basic - ( 16 Jan 2025 04:40 )    Color: x / Appearance: x / SG: x / pH: x  Gluc: 137 mg/dL / Ketone: x  / Bili: x / Urobili: x   Blood: x / Protein: x / Nitrite: x   Leuk Esterase: x / RBC: x / WBC x   Sq Epi: x / Non Sq Epi: x / Bacteria: x        Culture - Blood (collected 01-14-25 @ 20:20)  Source: .Blood BLOOD  Preliminary Report (01-16-25 @ 03:02):    No growth at 24 hours    Culture - Blood (collected 01-14-25 @ 20:20)  Source: .Blood BLOOD  Preliminary Report (01-16-25 @ 03:02):    No growth at 24 hours    Urinalysis with Rflx Culture (collected 11-15-24 @ 10:04)    Culture - Blood (collected 11-15-24 @ 08:30)  Source: .Blood BLOOD  Final Report (11-20-24 @ 14:00):    No growth at 5 days    Culture - Blood (collected 11-15-24 @ 08:30)  Source: .Blood BLOOD  Final Report (11-20-24 @ 14:00):    No growth at 5 days    Culture - Wound Aerobic/Anaerobic (collected 11-01-24 @ 14:00)  Source: Skin/Wound  Final Report (11-08-24 @ 11:47):    Numerous Pseudomonas putida group    Rare Candida parapsilosis "Susceptibilities not performed"    Few Fusarium species  Organism: Pseudomonas putida group (11-08-24 @ 11:47)  Organism: Pseudomonas putida group (11-08-24 @ 11:47)      -  Levofloxacin: S <=0.5      -  Tobramycin: S <=2      -  Aztreonam: S <=4      -  Gentamicin: S <=2      -  Cefepime: S <=2      -  Piperacillin/Tazobactam: S <=8      -  Ciprofloxacin: S <=0.25      -  Ceftriaxone: S 8      Method Type: SEBLE      -  Meropenem: S <=1      -  Amikacin: S <=16      -  Trimethoprim/Sulfamethoxazole: S 2/38    Culture - Blood (collected 10-30-24 @ 19:09)  Source: .Blood BLOOD  Final Report (11-05-24 @ 10:00):    No growth at 5 days    Culture - Blood (collected 10-30-24 @ 19:09)  Source: .Blood BLOOD  Final Report (11-05-24 @ 10:00):    No growth at 5 days    Culture - Blood (collected 10-21-24 @ 12:50)  Source: .Blood BLOOD  Final Report (10-26-24 @ 21:00):    No growth at 5 days    Culture - Blood (collected 10-21-24 @ 12:50)  Source: .Blood BLOOD  Final Report (10-26-24 @ 21:00):    No growth at 5 days        Blood Gas Venous - Lactate: >16.0 mmol/L (01-16-25 @ 06:15)  Blood Gas Venous - Lactate: 13.2 mmol/L (01-15-25 @ 06:02)  Lactate, Blood: 13.5 mmol/L (01-15-25 @ 06:00)  Blood Gas Venous - Lactate: 7.3 mmol/L (01-14-25 @ 22:45)  Blood Gas Venous - Lactate: 8.2 mmol/L (01-14-25 @ 20:11)  Lactate, Blood: 7.3 mmol/L (01-14-25 @ 19:50)      INFECTIOUS DISEASES TESTING  MRSA PCR Result.: Positive (01-15-25 @ 16:31)  Procalcitonin: 4.86 ng/mL (01-15-25 @ 06:00)  MRSA PCR Result.: Negative (10-31-24 @ 10:00)      RADIOLOGY & ADDITIONAL TESTS:  I have personally reviewed the last Chest xray  CXR  Xray Chest 1 View- PORTABLE-Urgent:   ACC: [id number] EXAM:  XR CHEST PORTABLE URGENT 1V   ORDERED BY: ANDREI GARRISON     PROCEDURE DATE:  01/15/2025          INTERPRETATION:  CLINICAL HISTORY: Catheter placement    COMPARISON: Earlier the same day.    TECHNIQUE: Portable frontal chestradiograph.    FINDINGS:    Support devices: Endotracheal tube is above the yulisa. Enteric catheter   seen. Telemetry leads. Left neck central catheter.    Cardiac/mediastinum/hilum: Stable.    Lung parenchyma/Pleura: No focal parenchymal opacities,pleural   effusions, or pneumothorax.    Skeleton/soft tissues: Stable.      IMPRESSION:    No radiographic evidence of acute cardiopulmonary disease. Support   devices as described.    --- End of Report ---            NILDA HARDIN MD; AttendingInterventional Radiologist  This document has been electronically signed. Mika 15 2025  1:56PM (01-15-25 @ 13:43)      CT  CT Chest w/ IV Cont:   ACC: [id number] EXAM:  CT ABDOMEN AND PELVIS IC   ORDERED BY: VIKY NEAL     ACC: [id number] EXAM:  CT CHEST IC   ORDERED BY: VIKY NEAL     PROCEDURE DATE:  01/14/2025          INTERPRETATION:  CLINICAL INFORMATION: "Found naked and at bottom of   staircase"    COMPARISON: None.    CONTRAST/COMPLICATIONS:  IV Contrast: Omnipaque 350 (accession [id number]), IV contrast documented   in unlinked concurrent exam (accession [id number])  100 cc administered   0   cc discarded  Oral Contrast: NONE  .    PROCEDURE:  CT of the Chest, Abdomen and Pelvis was performed.  Sagittal and coronal reformats were performed.    FINDINGS:  CHEST:  LUNGS/PLEURA/AIRWAYS: Trachea is clear. No acute consolidation. No   pleural effusion. No pneumothorax. No suspicious masses. Mild dependent   subsegmental atelectatic changes.  VESSELS: Mild atherosclerosis. No central pulmonary embolism.  HEART: Aortic valve calcifications. Coronary calcifications  MEDIASTINUM AND GREER: No lymphadenopathy.  CHEST WALL AND LOWER NECK: Within normal limits.    ABDOMEN AND PELVIS:  LIVER: Mild steatosis.  BILE DUCTS: Normal caliber.  GALLBLADDER: Within normal limits.  SPLEEN: Within normal limits.  PANCREAS: Within normal limits.  ADRENALS: Left greater than right adrenalthickening/hypertrophy  KIDNEYS/URETERS: Symmetric enhancement. No hydronephrosis. Renal cysts   and additional subcentimeter hypodensities too small to characterize    BLADDER: Decompressed with Lemon catheter with markedly thickened wall.  REPRODUCTIVE ORGANS: Prostate size appears within normal limits though   Lemon catheter limits evaluation. Calcified vas deferens indicative of   diabetes.    BOWEL: No bowel obstruction. Colonic diverticulosis  PERITONEUM/RETROPERITONEUM: Within normal limits.  VESSELS: Atherosclerosis.  LYMPH NODES: No lymphadenopathy.  ABDOMINAL WALL: Within normal limits.  BONES: Diffuse osteopenia. Degenerative changes. No definite acute bony   abnormality seen. L1-L2 moderate disc osteophyte complex moderately   compressing the thecal sac.    IMPRESSION:  1.  No evidence of acute traumatic injury to the chest, abdomen or pelvis.  2.  This bladder distention there is marked thickening of the urinary   bladder wall, can correlate with urinalysis if suspicion for cystitis.        --- End of Report ---            ROD TREJO MD; Attending Radiologist  This document has been electronically signed. Jan 14 2025  9:15PM (01-14-25 @ 21:00)      CARDIOLOGY TESTING  12 Lead ECG:   Ventricular Rate 85 BPM    Atrial Rate 85 BPM    P-R Interval 192 ms    QRS Duration 138 ms    Q-T Interval 474 ms    QTC Calculation(Bazett) 564 ms    P Axis 33 degrees    R Axis -82 degrees    T Axis 81 degrees    Diagnosis Line Normal sinus rhythm  Left axis deviation  Right bundle branch block  Abnormal ECG    Confirmed by YANETH MOREAU MD ([id number]) on 1/15/2025 10:17:14 PM (01-15-25 @ 17:31)  12 Lead ECG:   Ventricular Rate 99 BPM    Atrial Rate 99 BPM    P-R Interval 188 ms    QRS Duration 136 ms    Q-T Interval 418 ms    QTC Calculation(Bazett) 536 ms    P Axis 10 degrees    R Axis -89 degrees    T Axis 88 degrees    Diagnosis Line Normal sinus rhythm  Left axis deviation  Right bundle branch block  Abnormal ECG    Confirmed by YANETH MOREAU MD ([id number]) on 1/15/2025 10:16:24 PM (01-15-25 @ 09:42)      MEDICATIONS  albuterol/ipratropium for Nebulization. 3 Nebulizer once  cefepime   IVPB 1000 IV Intermittent every 12 hours  chlorhexidine 0.12% Liquid 15 Oral Mucosa every 12 hours  chlorhexidine 2% Cloths 1 Topical daily  CRRT Treatment   <Continuous>  dexMEDEtomidine Infusion 0.2 IV Continuous <Continuous>  fentaNYL   Infusion 0.5 IV Continuous <Continuous>  heparin   Injectable 5000 SubCutaneous every 8 hours  insulin lispro (ADMELOG) corrective regimen sliding scale  SubCutaneous Before meals and at bedtime  lactated ringers. 1000 IV Continuous <Continuous>  mupirocin 2% Nasal 1 Both Nostrils every 12 hours  norepinephrine Infusion 0.05 IV Continuous <Continuous>  pantoprazole    Tablet 40 Oral before breakfast  pantoprazole  Injectable 40 IV Push daily  phenylephrine    Infusion 0.1 IV Continuous <Continuous>  PureFlow Dialysate RFP-400 (K 2 / Ca 3) 5000 CRRT <Continuous>  sodium bicarbonate  Infusion 0.212 IV Continuous <Continuous>  sodium zirconium cyclosilicate 10 Oral every 8 hours  vancomycin  IVPB 1000 IV Intermittent every 24 hours  vasopressin Infusion 0.02 IV Continuous <Continuous>      Weight  Weight (kg): 70.7 (01-16-25 @ 00:00)    ANTIBIOTICS:  cefepime   IVPB 1000 milliGRAM(s) IV Intermittent every 12 hours  vancomycin  IVPB 1000 milliGRAM(s) IV Intermittent every 24 hours      ALLERGIES:  No Known Allergies

## 2025-01-16 NOTE — PROGRESS NOTE ADULT - CONVERSATION DETAILS
Called and spoke with patient's son on the phone. He recently spoke with Dr. Lara and was told that they're still waiting to see if the patient will improve. He noted that he felt the patient was suffering before coming to the hospital, and that he doesn't want him to continue suffering. We discussed that if the patient declines or does not improve, we could discuss comfort based care. He expressed understanding. All questions answered.

## 2025-01-16 NOTE — PROGRESS NOTE ADULT - ASSESSMENT
IMPRESSION:    Acute hypoxemic respiratory on MV  Severe lactic acidosis  Multi organ failure / mottled skin  Renal failure on CVVHD - not tolerating  sp fall/ trauma work up noted  Rhabdo ( on the floor for 2 days)  C1 fracture    PLAN:    CNS: Sedation Fentanyl, U tox, ammonia level, head CT reviewed. Neuro Sx. Neck Collar.     HEENT:  Oral care. ET care    PULMONARY:  HOB @ 45 degrees, Vent changes: RR 24 , PEEP 8, Wean O2. SaO2 92 TO 96%, monitor airway pressures.    CARDIOVASCULAR: Bicarb drip, EKG, CE, echo, trend CK/ LA.     GI: GI prophylaxis           NPO except meds.      bowel regimen    RENAL:  F/u  lytes.  Correct as needed. accurate I/O, mejia, renal eval. BMP. insulin dextrose and Bicarb pushed as needed.    INFECTIOUS DISEASE: ABX till cx, procal    HEMATOLOGICAL:  DVT prophylaxis. LE doppler. DIC and HIT panel.     ENDOCRINE:  Follow up FS.  Insulin protocol if needed.    MUSCULOSKELETAL: Wound RN/ burn eval    MICU  VERY Poor prognosis  DNR.  BERTHA 1/15     IMPRESSION:    Acute hypoxemic respiratory on MV  Severe lactic acidosis  Multi organ failure / mottled skin  Renal failure on CVVHD - not tolerating  sp fall/ trauma work up noted  Rhabdo ( on the floor for 2 days)  C1 fracture    PLAN:    CNS: Sedation Fentanyl, U tox, ammonia level, head CT reviewed. Neuro Sx. Neck Collar.     HEENT:  Oral care. ET care    PULMONARY:  HOB @ 45 degrees, Vent changes: RR 24 , PEEP 8, Wean O2. SaO2 92 TO 96%, monitor airway pressures.    CARDIOVASCULAR: Bicarb drip, EKG, CE, echo, trend CK/ LA.     GI: GI prophylaxis           NPO except meds.      bowel regimen    RENAL:  F/u  lytes.  Correct as needed. accurate I/O, mejia, renal eval. BMP. insulin dextrose and Bicarb pushed as needed.    INFECTIOUS DISEASE: ABX till cx, procal    HEMATOLOGICAL:  DVT prophylaxis. LE doppler. DIC and HIT panel.     ENDOCRINE:  Follow up FS.  Insulin protocol if needed. HC and FC    MUSCULOSKELETAL: Wound RN/ burn eval    MICU  VERY Poor prognosis  DNR.  UDAL 1/15     IMPRESSION:    Acute hypoxemic respiratory on MV  Severe lactic acidosis  Multi organ failure / mottled skin  Renal failure on CVVHD - not tolerating  sp fall/ trauma work up noted  Rhabdo ( on the floor for 2 days)  C1 fracture    PLAN:    CNS: Sedation Fentanyl, U tox, ammonia level, head CT reviewed. Neuro Sx. Neck Collar.     HEENT:  Oral care. ET care    PULMONARY:  HOB @ 45 degrees, Vent changes: RR 24 , PEEP 8, Wean O2. SaO2 92 TO 96%, monitor airway pressures.    CARDIOVASCULAR: Bicarb drip, EKG, CE, echo, trend CK/ LA.     GI: GI prophylaxis           NPO except meds.      bowel regimen    RENAL:  F/u  lytes.  Correct as needed. accurate I/O, mejia, renal eval. BMP. insulin dextrose and Bicarb pushed as needed.    INFECTIOUS DISEASE: ABX till cx, procal    HEMATOLOGICAL:  DVT prophylaxis. LE doppler. DIC and HIT panel.     ENDOCRINE:  Follow up FS.  Insulin protocol if needed. HC and FC    MUSCULOSKELETAL: Wound RN/ burn eval    MICU  VERY Poor prognosis  DNR.  Palliative care eval/follow up   UDAL 1/15

## 2025-01-17 LAB
HEPARIN-PF4 AB RESULT: <0.6 U/ML — SIGNIFICANT CHANGE UP (ref 0–0.9)
PF4 HEPARIN CMPLX AB SER-ACNC: NEGATIVE — SIGNIFICANT CHANGE UP

## 2025-01-17 NOTE — EEG REPORT - NS EEG TEXT BOX
Patient Name:	LEENA BAILEY    :	1945  MRN:	[id number]  Study Date/Time:	2025, 12:39:17 PM  Referred by:	-    Brief Clinical History:  LEENA BAILEY is a 79 year old Male; study performed to investigate for seizures or markers of epilepsy.   Diagnosis Code: R40.4 Transient alteration of awareness  CPT:  94971 (coma)     Patient Medication:  PHENYLEPHRINE INFUSION    VASOSTRICT    LOKELMA    PROTONIX    LEVOPHED    FLORINEF    FENTANYL    HEPARIN    MAXIPIME    PERIDEX    VANCOMYCIN    INSULIN    ALBUTEROL    PRECEDEX    -    -      Acquisition Details:  Electroencephalography was acquired using a minimum of 21 channels on an [email or web address] system v 9.3.1 with electrode placement according to the standard International 10-20 system following ACNS (American Clinical Neurophysiology Society) guidelines.  Anterior temporal T1 and T2 electrodes were utilized whenever possible.   The [employer] automated spike & seizure detections were all reviewed in detail, in addition to the entire raw EEG.    Findings:  Background:  continuous.   Voltage:  Low (most <20uV LBP Peak-Trough)  Organization:  Rudimentary  Posterior Dominant Rhythm:  <7 Hz , symmetrical  Variability:  poor  Reactivity:  minimal  Sleep:  Absent.  Focal abnormalities:  No persistent asymmetries of voltage or frequency.  Interictal Activity:  None  Location:    Focal Slowing:  None  Generalized Slowing:  moderate to severe  Events:  1)	No electrographic seizures or significant clinical events.  Provocations:  1)	Hyperventilation: was not performed.  2)	Photic stimulation: was not performed.  Impression:  Abnormal due to generalized slowing as above    Clinical Correlation:  Findings consistent with diffuse electrocerebral dysfunction secondary to structural/metabolic/nonspecific etiology    Pardeep Ojeda MD  Attending Neurologist, Division of Epilepsy

## 2025-01-18 LAB — CA-I BLD-SCNC: <3 MG/DL — LOW (ref 4.5–5.6)

## 2025-01-22 DIAGNOSIS — Z66 DO NOT RESUSCITATE: ICD-10-CM

## 2025-01-22 DIAGNOSIS — J96.01 ACUTE RESPIRATORY FAILURE WITH HYPOXIA: ICD-10-CM

## 2025-01-22 DIAGNOSIS — E83.39 OTHER DISORDERS OF PHOSPHORUS METABOLISM: ICD-10-CM

## 2025-01-22 DIAGNOSIS — G92.8 OTHER TOXIC ENCEPHALOPATHY: ICD-10-CM

## 2025-01-22 DIAGNOSIS — Z78.1 PHYSICAL RESTRAINT STATUS: ICD-10-CM

## 2025-01-22 DIAGNOSIS — E87.20 ACIDOSIS, UNSPECIFIED: ICD-10-CM

## 2025-01-22 DIAGNOSIS — E11.649 TYPE 2 DIABETES MELLITUS WITH HYPOGLYCEMIA WITHOUT COMA: ICD-10-CM

## 2025-01-22 DIAGNOSIS — I35.0 NONRHEUMATIC AORTIC (VALVE) STENOSIS: ICD-10-CM

## 2025-01-22 DIAGNOSIS — K72.00 ACUTE AND SUBACUTE HEPATIC FAILURE WITHOUT COMA: ICD-10-CM

## 2025-01-22 DIAGNOSIS — Z79.84 LONG TERM (CURRENT) USE OF ORAL HYPOGLYCEMIC DRUGS: ICD-10-CM

## 2025-01-22 DIAGNOSIS — M84.68XA PATHOLOGICAL FRACTURE IN OTHER DISEASE, OTHER SITE, INITIAL ENCOUNTER FOR FRACTURE: ICD-10-CM

## 2025-01-22 DIAGNOSIS — Z91.81 HISTORY OF FALLING: ICD-10-CM

## 2025-01-22 DIAGNOSIS — R45.1 RESTLESSNESS AND AGITATION: ICD-10-CM

## 2025-01-22 DIAGNOSIS — E78.5 HYPERLIPIDEMIA, UNSPECIFIED: ICD-10-CM

## 2025-01-22 DIAGNOSIS — I10 ESSENTIAL (PRIMARY) HYPERTENSION: ICD-10-CM

## 2025-01-22 DIAGNOSIS — R58 HEMORRHAGE, NOT ELSEWHERE CLASSIFIED: ICD-10-CM

## 2025-01-22 DIAGNOSIS — A41.9 SEPSIS, UNSPECIFIED ORGANISM: ICD-10-CM

## 2025-01-22 DIAGNOSIS — E83.51 HYPOCALCEMIA: ICD-10-CM

## 2025-01-22 DIAGNOSIS — N17.0 ACUTE KIDNEY FAILURE WITH TUBULAR NECROSIS: ICD-10-CM

## 2025-01-22 DIAGNOSIS — E87.5 HYPERKALEMIA: ICD-10-CM
